# Patient Record
Sex: FEMALE | Race: WHITE | NOT HISPANIC OR LATINO | URBAN - METROPOLITAN AREA
[De-identification: names, ages, dates, MRNs, and addresses within clinical notes are randomized per-mention and may not be internally consistent; named-entity substitution may affect disease eponyms.]

---

## 2021-04-20 ENCOUNTER — INPATIENT (INPATIENT)
Facility: HOSPITAL | Age: 76
LOS: 4 days | Discharge: HOME | DRG: 64 | End: 2021-04-25
Attending: HOSPITALIST | Admitting: HOSPITALIST
Payer: COMMERCIAL

## 2021-04-20 VITALS
DIASTOLIC BLOOD PRESSURE: 96 MMHG | WEIGHT: 240.08 LBS | HEART RATE: 73 BPM | SYSTOLIC BLOOD PRESSURE: 149 MMHG | OXYGEN SATURATION: 97 % | TEMPERATURE: 98 F | HEIGHT: 65 IN | RESPIRATION RATE: 20 BRPM

## 2021-04-20 DIAGNOSIS — R06.02 SHORTNESS OF BREATH: ICD-10-CM

## 2021-04-20 LAB
ALBUMIN SERPL ELPH-MCNC: 3.7 G/DL — SIGNIFICANT CHANGE UP (ref 3.5–5)
ALP SERPL-CCNC: 81 U/L — SIGNIFICANT CHANGE UP (ref 40–120)
ALT FLD-CCNC: 15 U/L DA — SIGNIFICANT CHANGE UP (ref 10–60)
ANION GAP SERPL CALC-SCNC: 6 MMOL/L — SIGNIFICANT CHANGE UP (ref 5–17)
APTT BLD: 33.7 SEC — SIGNIFICANT CHANGE UP (ref 27.5–35.5)
AST SERPL-CCNC: 14 U/L — SIGNIFICANT CHANGE UP (ref 10–40)
BASE EXCESS BLDV CALC-SCNC: 1.6 MMOL/L — SIGNIFICANT CHANGE UP
BILIRUB SERPL-MCNC: 0.6 MG/DL — SIGNIFICANT CHANGE UP (ref 0.2–1.2)
BLOOD GAS COMMENTS, VENOUS: SIGNIFICANT CHANGE UP
BUN SERPL-MCNC: 40 MG/DL — HIGH (ref 7–18)
CALCIUM SERPL-MCNC: 9.4 MG/DL — SIGNIFICANT CHANGE UP (ref 8.4–10.5)
CHLORIDE SERPL-SCNC: 110 MMOL/L — HIGH (ref 96–108)
CO2 SERPL-SCNC: 24 MMOL/L — SIGNIFICANT CHANGE UP (ref 22–31)
CREAT SERPL-MCNC: 1.78 MG/DL — HIGH (ref 0.5–1.3)
GLUCOSE SERPL-MCNC: 169 MG/DL — HIGH (ref 70–99)
HCO3 BLDV-SCNC: 29 MMOL/L — SIGNIFICANT CHANGE UP (ref 22–29)
HCT VFR BLD CALC: 51 % — HIGH (ref 34.5–45)
HGB BLD-MCNC: 16.1 G/DL — HIGH (ref 11.5–15.5)
HOROWITZ INDEX BLDV+IHG-RTO: 21 — SIGNIFICANT CHANGE UP
INR BLD: 1.05 RATIO — SIGNIFICANT CHANGE UP (ref 0.88–1.16)
MCHC RBC-ENTMCNC: 30.7 PG — SIGNIFICANT CHANGE UP (ref 27–34)
MCHC RBC-ENTMCNC: 31.6 GM/DL — LOW (ref 32–36)
MCV RBC AUTO: 97.3 FL — SIGNIFICANT CHANGE UP (ref 80–100)
NRBC # BLD: 0 /100 WBCS — SIGNIFICANT CHANGE UP (ref 0–0)
PCO2 BLDV: 56 MMHG — HIGH (ref 39–42)
PH BLDV: 7.32 — SIGNIFICANT CHANGE UP (ref 7.32–7.43)
PLATELET # BLD AUTO: 238 K/UL — SIGNIFICANT CHANGE UP (ref 150–400)
PO2 BLDV: 24 MMHG — SIGNIFICANT CHANGE UP
POTASSIUM SERPL-MCNC: 4.8 MMOL/L — SIGNIFICANT CHANGE UP (ref 3.5–5.3)
POTASSIUM SERPL-SCNC: 4.8 MMOL/L — SIGNIFICANT CHANGE UP (ref 3.5–5.3)
PROT SERPL-MCNC: 8.1 G/DL — SIGNIFICANT CHANGE UP (ref 6–8.3)
PROTHROM AB SERPL-ACNC: 12.5 SEC — SIGNIFICANT CHANGE UP (ref 10.6–13.6)
RAPID RVP RESULT: SIGNIFICANT CHANGE UP
RBC # BLD: 5.24 M/UL — HIGH (ref 3.8–5.2)
RBC # FLD: 13.3 % — SIGNIFICANT CHANGE UP (ref 10.3–14.5)
SAO2 % BLDV: 36.8 % — SIGNIFICANT CHANGE UP
SARS-COV-2 RNA SPEC QL NAA+PROBE: SIGNIFICANT CHANGE UP
SODIUM SERPL-SCNC: 140 MMOL/L — SIGNIFICANT CHANGE UP (ref 135–145)
TROPONIN I SERPL-MCNC: <0.015 NG/ML — SIGNIFICANT CHANGE UP (ref 0–0.04)
WBC # BLD: 13.25 K/UL — HIGH (ref 3.8–10.5)
WBC # FLD AUTO: 13.25 K/UL — HIGH (ref 3.8–10.5)

## 2021-04-20 PROCEDURE — 71045 X-RAY EXAM CHEST 1 VIEW: CPT | Mod: 26

## 2021-04-20 PROCEDURE — 99223 1ST HOSP IP/OBS HIGH 75: CPT

## 2021-04-20 PROCEDURE — 99285 EMERGENCY DEPT VISIT HI MDM: CPT

## 2021-04-20 PROCEDURE — 70450 CT HEAD/BRAIN W/O DYE: CPT | Mod: 26,MA

## 2021-04-20 RX ORDER — ASPIRIN/CALCIUM CARB/MAGNESIUM 324 MG
81 TABLET ORAL DAILY
Refills: 0 | Status: DISCONTINUED | OUTPATIENT
Start: 2021-04-20 | End: 2021-04-25

## 2021-04-20 RX ORDER — CEFTRIAXONE 500 MG/1
1000 INJECTION, POWDER, FOR SOLUTION INTRAMUSCULAR; INTRAVENOUS EVERY 24 HOURS
Refills: 0 | Status: DISCONTINUED | OUTPATIENT
Start: 2021-04-20 | End: 2021-04-23

## 2021-04-20 RX ORDER — ATENOLOL 25 MG/1
100 TABLET ORAL DAILY
Refills: 0 | Status: DISCONTINUED | OUTPATIENT
Start: 2021-04-20 | End: 2021-04-21

## 2021-04-20 RX ORDER — SODIUM CHLORIDE 9 MG/ML
1000 INJECTION INTRAMUSCULAR; INTRAVENOUS; SUBCUTANEOUS ONCE
Refills: 0 | Status: COMPLETED | OUTPATIENT
Start: 2021-04-20 | End: 2021-04-20

## 2021-04-20 RX ORDER — AZITHROMYCIN 500 MG/1
500 TABLET, FILM COATED ORAL EVERY 24 HOURS
Refills: 0 | Status: DISCONTINUED | OUTPATIENT
Start: 2021-04-20 | End: 2021-04-23

## 2021-04-20 RX ORDER — LISINOPRIL 2.5 MG/1
20 TABLET ORAL DAILY
Refills: 0 | Status: DISCONTINUED | OUTPATIENT
Start: 2021-04-20 | End: 2021-04-21

## 2021-04-20 RX ORDER — ATORVASTATIN CALCIUM 80 MG/1
40 TABLET, FILM COATED ORAL AT BEDTIME
Refills: 0 | Status: DISCONTINUED | OUTPATIENT
Start: 2021-04-20 | End: 2021-04-25

## 2021-04-20 RX ADMIN — CEFTRIAXONE 100 MILLIGRAM(S): 500 INJECTION, POWDER, FOR SOLUTION INTRAMUSCULAR; INTRAVENOUS at 23:36

## 2021-04-20 RX ADMIN — SODIUM CHLORIDE 1000 MILLILITER(S): 9 INJECTION INTRAMUSCULAR; INTRAVENOUS; SUBCUTANEOUS at 21:18

## 2021-04-20 RX ADMIN — SODIUM CHLORIDE 1000 MILLILITER(S): 9 INJECTION INTRAMUSCULAR; INTRAVENOUS; SUBCUTANEOUS at 23:35

## 2021-04-20 NOTE — ED PROVIDER NOTE - OBJECTIVE STATEMENT
74 yo F pmh of CVA w/ R sided residual weakness and HTN presents from home with increased work of breathing x 2 days. Also can normally sit up straight but has been falling to the L x 1 day. Pt unable to provide detailed history due to previous stroke.

## 2021-04-20 NOTE — H&P ADULT - PROBLEM SELECTOR PLAN 7
Has h/o old cva with residual weakness has h/o HTn  Will hold Quinilapril in view of DEJA  c/w atenolol   Monitor BP Presented with postural imbalance, compared to baseline  CT head showed old findings  Examination was limited due to posture, but strength was intact on left side of body  Will consult neuro  Pt eval

## 2021-04-20 NOTE — ED PROVIDER NOTE - PHYSICAL EXAMINATION
GENERAL: well appearing  HEAD: atraumatic   EYES: EOMI, pink conjunctiva   ENT: dry oral mucosa, poor dentition   CARDIAC: RRR, no edema, distal pulses present   RESPIRATORY: lungs CTAB, mild increased work of breathing with abdominal muscle use  GASTROINTESTINAL: no abdominal tenderness, no rebound or guarding, bowel sounds presents  GENITOURINARY: no CVA tenderness   MUSCULOSKELETAL: no deformity   NEUROLOGICAL: AAOx3, spontaneous movement of extremities, RUE contracture    SKIN: intact   PSYCHIATRIC: cooperative  HEME LYMPH: no lymphadenopathy

## 2021-04-20 NOTE — H&P ADULT - PROBLEM SELECTOR PLAN 8
Pt p/w c/o BRBPR x 6 times today assc RLQ pain.  Denies blood thinner use, chest pain, shortness of breath. On Lovenox for dvt prophylaxis Has h/o old cva with residual weakness has h/o HTn  Will hold Quinilapril in view of DEJA  c/w atenolol   Monitor BP

## 2021-04-20 NOTE — H&P ADULT - PROBLEM SELECTOR PLAN 2
Same as above Presented with shortness of breath, abdominal breathing  Was saturating 93 on RA at home  Saturating well on RA  Couldn't appreciate any crackles or ronchi due to body habitus  CXR is concerning for infiltrate in RUL, ?aspiration PNA  Pro-BNP is elevated, patient does not look fluid overload  EKG showed new onset Afib, rate controlled, less concerning for flash pulmonary edema  Will hold off diuretics  Will start on rocephin and zithro for empiric coverage  Will do echocardiogram   Monitor respiratory status

## 2021-04-20 NOTE — H&P ADULT - PROBLEM SELECTOR PLAN 4
Presented with elevated cr  Likely pre renal  Was given 1 L fluid in Ed  Will send UA and urine lytes  Monitor BMP  Avoid nephrotoxins Was found to have new onset Afib   rate controlled  CHADVASC 5   Will start on AC  will c/w atenolol home dose

## 2021-04-20 NOTE — ED ADULT TRIAGE NOTE - CHIEF COMPLAINT QUOTE
Son reports mother usually able to sit up however noted leaning on l side since am and abd breathing and hypoxia (spo2 93%)since Saturday

## 2021-04-20 NOTE — H&P ADULT - ATTENDING COMMENTS
Pt seen and examined.  Case discussed with MAR.  75 year old woman with PMH of remote CVA with right sided residual weakness, HTN, with 2 days of suspected SOB and left sided weakness noted at inability to maintain balance without falling to the left side.  Pt at baseline mental status with minimal verbal communication after remote CVA.  Compliance with meds unclear.    Vital Signs Last 24 Hrs  T(C): 36.8 (20 Apr 2021 19:14), Max: 36.8 (20 Apr 2021 19:14)  T(F): 98.3 (20 Apr 2021 19:14), Max: 98.3 (20 Apr 2021 19:14)  HR: 73 (20 Apr 2021 19:14) (73 - 73)  BP: 149/96 (20 Apr 2021 19:14) (149/96 - 149/96)  RR: 20 (20 Apr 2021 19:14) (20 - 20)  SpO2: 97% (20 Apr 2021 19:14) (97% - 97%)      Labs                         16.1   13.25 )-----------( 238      ( 20 Apr 2021 20:36 )             51.0     PT/INR - ( 20 Apr 2021 20:36 )   PT: 12.5 sec;   INR: 1.05 ratio         PTT - ( 20 Apr 2021 20:36 )  PTT:33.7 sec    04-20    140  |  110<H>  |  40<H>  ----------------------------<  169<H>  4.8   |  24  |  1.78<H>    Ca    9.4      20 Apr 2021 20:36  TPro  8.1  /  Alb  3.7  /  TBili  0.6  /  DBili  x   /  AST  14  /  ALT  15  /  AlkPhos  81  04-20    Pro BNP - 7190  CXR - independent review  suspected early RUL parahilar infiltrate/opacity concerning for a PNA     CT head  Patchy hypodensities in the left cerebral hemisphere predominantly involving the left corona radiata and basal ganglia likely represents encephalomalacia and gliosis due to chronic infarct, associated with ex vacuo dilatation of the left lateral ventricle. Old infarcts are also seen involving the left thalamus, and there is atrophy of the left anterior midbrain, consistent with wallerian degeneration. Additional patchy periventricular and subcortical white matter hypodensities are nonspecific, although likely on the basis of chronic microangiopathy. The basal cisterns are patent.      Impression   - Acute respiratory failure with hypoxia   - Suspected RUL pneumonia with concern for aspiration PNA  - R/O new right sided hemispheric ischemic CVA  - Atrial fibrillation ( ? newly diagnosed) on EKG   - Elevated BNP  - Elevated renal indices with DEJA +/- CKD likely with dehydration  - hemoconcentration likely with dehydration Pt seen and examined.  Case discussed with MAR.  75 year old woman with PMH of remote CVA with right sided residual weakness, HTN, with 2 days of suspected SOB and left sided weakness noted at inability to maintain balance without falling to the left side.  Pt at baseline mental status with minimal verbal communication after remote CVA.  Compliance with meds unclear.    Vital Signs Last 24 Hrs  T(C): 36.8 (20 Apr 2021 19:14), Max: 36.8 (20 Apr 2021 19:14)  T(F): 98.3 (20 Apr 2021 19:14), Max: 98.3 (20 Apr 2021 19:14)  HR: 73 (20 Apr 2021 19:14) (73 - 73)  BP: 149/96 (20 Apr 2021 19:14) (149/96 - 149/96)  RR: 20 (20 Apr 2021 19:14) (20 - 20)  SpO2: 97% (20 Apr 2021 19:14) (97% - 97%)    Exam - as above    Labs                         16.1   13.25 )-----------( 238      ( 20 Apr 2021 20:36 )             51.0     PT/INR - ( 20 Apr 2021 20:36 )   PT: 12.5 sec;   INR: 1.05 ratio         PTT - ( 20 Apr 2021 20:36 )  PTT:33.7 sec    04-20    140  |  110<H>  |  40<H>  ----------------------------<  169<H>  4.8   |  24  |  1.78<H>    Ca    9.4      20 Apr 2021 20:36  TPro  8.1  /  Alb  3.7  /  TBili  0.6  /  DBili  x   /  AST  14  /  ALT  15  /  AlkPhos  81  04-20    Pro BNP - 7190  CXR - independent review  suspected early RUL parahilar infiltrate/opacity concerning for a PNA     CT head  Patchy hypodensities in the left cerebral hemisphere predominantly involving the left corona radiata and basal ganglia likely represents encephalomalacia and gliosis due to chronic infarct, associated with ex vacuo dilatation of the left lateral ventricle. Old infarcts are also seen involving the left thalamus, and there is atrophy of the left anterior midbrain, consistent with wallerian degeneration. Additional patchy periventricular and subcortical white matter hypodensities are nonspecific, although likely on the basis of chronic microangiopathy. The basal cisterns are patent.      Impression   - Acute respiratory failure with hypoxia   - Suspected RUL pneumonia with concern for aspiration PNA  - R/O new right sided hemispheric ischemic CVA  - Atrial fibrillation ( ? newly diagnosed) on EKG - no RVR  - Elevated BNP - r/o left ventricular dysfunction  - Elevated renal indices with DEJA +/- CKD likely with dehydration  - hemoconcentration likely with dehydration  - Physical debility     Plan   - Admit to telemetry   - Supplemental oxygen to keep SaO2 >96%  - Sepsis work up  - Empiric antibiotics for CAP - ceftriaxone and azithromycin  - CHF work up- check for LV dysfunction with ECHO  - Repeat EKG, obtain ECHO for clots, cardiology consult, continue BB and ACE inhibitors  - CHADS2-VASC scoring; high risk for stroke ; initiate full dose anticoagulation  - Hx of stroke; start statin for secondary CVA prevention  - s/p IVF hydration, recheck serum chemistry, check urine electrolytes, urinalysis  - PT evaluation   - neurology consult

## 2021-04-20 NOTE — H&P ADULT - ASSESSMENT
74 yo female from home lives with (care taker) with medical history significant for remote CVA with right sided residual weakness, HTN, comes in with dyspnea for past 2 days.      Ed:   CT head  Patchy hypo densities in the left cerebral hemisphere predominantly involving the left corona radiata and basal ganglia likely represents encephalomalacia and gliosis due to chronic infarct, associated with ex vacuo dilatation of the left lateral ventricle. Old infarcts are also seen involving the left thalamus, and there is atrophy of the left anterior midbrain, consistent with wallerian degeneration. Additional patchy periventricular and subcortical white matter hypo densities are nonspecific, although likely on the basis of chronic microangiopathy. The basal cisterns are patent.  EKG-Afib  Pro-BNP elevated  CXR mild infiltrate    Patient is being admitted to telemetry for new onset Afib and evaluation of dyspnea.

## 2021-04-20 NOTE — H&P ADULT - PROBLEM SELECTOR PLAN 6
has h/o HTn  Will hold Quinilapril in view of DEJA  c/w atenolol   Monitor BP Presented with postural imbalance, compared to baseline  CT head showed old findings  Examination was limited due to posture, but strength was intact on left side of body  Will consult neuro  Pt eval UA positive  Will start on rocephin  f/u cx

## 2021-04-20 NOTE — ED PROVIDER NOTE - CLINICAL SUMMARY MEDICAL DECISION MAKING FREE TEXT BOX
74 yo F with increased work of breathing and unable to sit up straight, possible new CVA? Plan - [facility], CT head, EKG, labs, CXR, admit.

## 2021-04-20 NOTE — H&P ADULT - HISTORY OF PRESENT ILLNESS
74 yo female from home lives with (care taker) with medical history significant for remote CVA with right sided residual weakness, HTN, comes in with dyspnea for past 2 days. History was provided by Son who was at bedside. Patient is mostly bed bound, gets up and lies down. Does not move around at baseline. Patient was found to have abnormal abdominal breathing which started 2 days ago as per  and was not getting better, which got him concerned. Denies cough, fever, chest pain, nausea, vomiting. At baseline patient is able to balance herself in sitting position. But for past 2 days she was falling on left side to support herself and was not able to maintain balance. Otherwise there was no change in movement of left arm and leg compared to baseline. There was no change in mental status. She has speech difficulty at baseline but the family is able to understand her communications, unchanged from baseline. Denies trauma, fall, sick contacts, recent illness. No history of CAD or heart failure. Patient had a PCP who retired.

## 2021-04-20 NOTE — ED PROVIDER NOTE - PROGRESS NOTE DETAILS
EKG - afib, rate 82 EKG - afib, rate 82 (new onset?) labs - WBC 13, Hgb 16, BUN 40, Cr 1.7  CT head - no ICH; no acute infarct  CXR - lungs clear  No PCP. Endorsed to unattached Dr Easton and MAR for admission for new onset afib with SOB/increased work of breathing. Repeat sat 98% on 2 L NC. labs - WBC 13, Hgb 16, BUN 40, Cr 1.7, bnp 7000  CT head - no ICH; no acute infarct  CXR - lungs clear  No PCP. Endorsed to unattached Dr Easton and MAR for admission for new onset afib with SOB/increased work of breathing. Repeat sat 98% on 2 L NC.

## 2021-04-20 NOTE — H&P ADULT - PROBLEM SELECTOR PLAN 1
Presented with shortness of breath, abdominal breathing  Was saturating 93 on RA at home  Saturating well on RA  Couldn't appreciate any crackles or ronchi due to body habitus  CXR is concerning for infiltrate in RUL, ?aspiration PNA  Pro-BNP is elevated, patient does not look fluid overload  EKG showed new onset Afib, rate controlled, less concerning for flash pulmonary edema  Will hold off diuretics  Will start on rocephin and zithro for empiric coverage  Will do echocardiogram   Monitor respiratory status Presented with shortness of breath, abdominal breathing  Was saturating 93 on RA at home  Saturating well on RA  Couldn't appreciate any crackles or ronchi due to body habitus  CXR is concerning for infiltrate in RUL, ?aspiration PNA  Pro-BNP is elevated, patient does not look fluid overload  EKG showed new onset Afib, rate controlled, less concerning for flash pulmonary edema  Will hold off diuretics  Will start on rocephin and zithro for empiric coverage  Will do echocardiogram   Monitor respiratory status  cardio consult Dr Bahena

## 2021-04-20 NOTE — H&P ADULT - NSHPPHYSICALEXAM_GEN_ALL_CORE
Vital Signs (24 Hrs):  T(C): 36.8 (04-20-21 @ 19:14), Max: 36.8 (04-20-21 @ 19:14)  HR: 73 (04-20-21 @ 19:14) (73 - 73)  BP: 149/96 (04-20-21 @ 19:14) (149/96 - 149/96)  RR: 20 (04-20-21 @ 19:14) (20 - 20)  SpO2: 97% (04-20-21 @ 19:14) (97% - 97%)

## 2021-04-20 NOTE — H&P ADULT - PROBLEM SELECTOR PLAN 5
Presented with postural imbalance, compared to baseline  CT head showed old findings  Examination was limited due to posture, but strength was intact on left side of body  Will consult neuro  Pt eval Presented with elevated cr  Likely pre renal  Was given 1 L fluid in Ed  Will send UA and urine lytes  Monitor BMP  Avoid nephrotoxins

## 2021-04-20 NOTE — H&P ADULT - PROBLEM SELECTOR PLAN 3
Was found to have new onset Afib   rate controlled  CHADVASC 5   Will start on AC  will c/w atenolol home dose Presented with shortness of breath, abdominal breathing  Was saturating 93 on RA at home  Saturating well on RA  Couldn't appreciate any crackles or ronchi due to body habitus  CXR is concerning for infiltrate in RUL, ?aspiration PNA  Pro-BNP is elevated, patient does not look fluid overload  EKG showed new onset Afib, rate controlled, less concerning for flash pulmonary edema  Will hold off diuretics  Will start on rocephin and zithro for empiric coverage  Will do echocardiogram   Monitor respiratory status

## 2021-04-20 NOTE — ED PROVIDER NOTE - PRINCIPAL DIAGNOSIS
SOB (shortness of breath) BP: 102/67 continue to monitor BP and metoprolol XL BP: 111/75 this am; continue to monitor BP; continue Metoprolol BP: 127/86 this am; continue to monitor BP; continue Metoprolol laxatives being held, continue to monitor laxatives being held, pt without further episodes  continue to monitor pt with episodes of loose stools yesterday, hold laxatives as per primary team and observe pt with episodes of loose stools yesterday, hold laxatives as per primary team and observe sbp-120-107  continue BB BP: 111/71 (29 Sep 2019 08:22) (111/71 - 125/78)  - c/w toprol XL

## 2021-04-21 DIAGNOSIS — N17.9 ACUTE KIDNEY FAILURE, UNSPECIFIED: ICD-10-CM

## 2021-04-21 DIAGNOSIS — I48.91 UNSPECIFIED ATRIAL FIBRILLATION: ICD-10-CM

## 2021-04-21 DIAGNOSIS — I63.9 CEREBRAL INFARCTION, UNSPECIFIED: ICD-10-CM

## 2021-04-21 DIAGNOSIS — R29.3 ABNORMAL POSTURE: ICD-10-CM

## 2021-04-21 DIAGNOSIS — I10 ESSENTIAL (PRIMARY) HYPERTENSION: ICD-10-CM

## 2021-04-21 DIAGNOSIS — R06.00 DYSPNEA, UNSPECIFIED: ICD-10-CM

## 2021-04-21 DIAGNOSIS — G93.40 ENCEPHALOPATHY, UNSPECIFIED: ICD-10-CM

## 2021-04-21 DIAGNOSIS — N39.0 URINARY TRACT INFECTION, SITE NOT SPECIFIED: ICD-10-CM

## 2021-04-21 DIAGNOSIS — Z29.9 ENCOUNTER FOR PROPHYLACTIC MEASURES, UNSPECIFIED: ICD-10-CM

## 2021-04-21 DIAGNOSIS — J96.01 ACUTE RESPIRATORY FAILURE WITH HYPOXIA: ICD-10-CM

## 2021-04-21 DIAGNOSIS — J18.9 PNEUMONIA, UNSPECIFIED ORGANISM: ICD-10-CM

## 2021-04-21 LAB
A1C WITH ESTIMATED AVERAGE GLUCOSE RESULT: 7.1 % — HIGH (ref 4–5.6)
ALBUMIN SERPL ELPH-MCNC: 3.1 G/DL — LOW (ref 3.5–5)
ALP SERPL-CCNC: 74 U/L — SIGNIFICANT CHANGE UP (ref 40–120)
ALT FLD-CCNC: 13 U/L DA — SIGNIFICANT CHANGE UP (ref 10–60)
ANION GAP SERPL CALC-SCNC: 6 MMOL/L — SIGNIFICANT CHANGE UP (ref 5–17)
ANION GAP SERPL CALC-SCNC: 7 MMOL/L — SIGNIFICANT CHANGE UP (ref 5–17)
APPEARANCE UR: CLEAR — SIGNIFICANT CHANGE UP
AST SERPL-CCNC: 10 U/L — SIGNIFICANT CHANGE UP (ref 10–40)
BACTERIA # UR AUTO: ABNORMAL /HPF
BASOPHILS # BLD AUTO: 0.05 K/UL — SIGNIFICANT CHANGE UP (ref 0–0.2)
BASOPHILS # BLD AUTO: 0.06 K/UL — SIGNIFICANT CHANGE UP (ref 0–0.2)
BASOPHILS NFR BLD AUTO: 0.5 % — SIGNIFICANT CHANGE UP (ref 0–2)
BASOPHILS NFR BLD AUTO: 0.6 % — SIGNIFICANT CHANGE UP (ref 0–2)
BILIRUB SERPL-MCNC: 0.7 MG/DL — SIGNIFICANT CHANGE UP (ref 0.2–1.2)
BILIRUB UR-MCNC: NEGATIVE — SIGNIFICANT CHANGE UP
BUN SERPL-MCNC: 37 MG/DL — HIGH (ref 7–18)
BUN SERPL-MCNC: 38 MG/DL — HIGH (ref 7–18)
CALCIUM SERPL-MCNC: 8.6 MG/DL — SIGNIFICANT CHANGE UP (ref 8.4–10.5)
CALCIUM SERPL-MCNC: 8.8 MG/DL — SIGNIFICANT CHANGE UP (ref 8.4–10.5)
CHLORIDE SERPL-SCNC: 109 MMOL/L — HIGH (ref 96–108)
CHLORIDE SERPL-SCNC: 111 MMOL/L — HIGH (ref 96–108)
CHLORIDE UR-SCNC: 85 MMOL/L — SIGNIFICANT CHANGE UP
CHOLEST SERPL-MCNC: 231 MG/DL — HIGH
CK MB BLD-MCNC: <1.6 % — SIGNIFICANT CHANGE UP (ref 0–3.5)
CK MB CFR SERPL CALC: <1 NG/ML — SIGNIFICANT CHANGE UP (ref 0–3.6)
CK SERPL-CCNC: 63 U/L — SIGNIFICANT CHANGE UP (ref 21–215)
CO2 SERPL-SCNC: 24 MMOL/L — SIGNIFICANT CHANGE UP (ref 22–31)
CO2 SERPL-SCNC: 25 MMOL/L — SIGNIFICANT CHANGE UP (ref 22–31)
COLOR SPEC: YELLOW — SIGNIFICANT CHANGE UP
CREAT ?TM UR-MCNC: 94 MG/DL — SIGNIFICANT CHANGE UP
CREAT SERPL-MCNC: 1.4 MG/DL — HIGH (ref 0.5–1.3)
CREAT SERPL-MCNC: 1.41 MG/DL — HIGH (ref 0.5–1.3)
DIFF PNL FLD: ABNORMAL
EOSINOPHIL # BLD AUTO: 0.13 K/UL — SIGNIFICANT CHANGE UP (ref 0–0.5)
EOSINOPHIL # BLD AUTO: 0.2 K/UL — SIGNIFICANT CHANGE UP (ref 0–0.5)
EOSINOPHIL NFR BLD AUTO: 1.3 % — SIGNIFICANT CHANGE UP (ref 0–6)
EOSINOPHIL NFR BLD AUTO: 2.1 % — SIGNIFICANT CHANGE UP (ref 0–6)
EPI CELLS # UR: ABNORMAL /HPF
ESTIMATED AVERAGE GLUCOSE: 157 MG/DL — HIGH (ref 68–114)
FOLATE SERPL-MCNC: 5.4 NG/ML — SIGNIFICANT CHANGE UP
GLUCOSE BLDC GLUCOMTR-MCNC: 171 MG/DL — HIGH (ref 70–99)
GLUCOSE SERPL-MCNC: 138 MG/DL — HIGH (ref 70–99)
GLUCOSE SERPL-MCNC: 162 MG/DL — HIGH (ref 70–99)
GLUCOSE UR QL: NEGATIVE — SIGNIFICANT CHANGE UP
HCT VFR BLD CALC: 41.8 % — SIGNIFICANT CHANGE UP (ref 34.5–45)
HCT VFR BLD CALC: 44.3 % — SIGNIFICANT CHANGE UP (ref 34.5–45)
HCV AB S/CO SERPL IA: 0.19 S/CO — SIGNIFICANT CHANGE UP (ref 0–0.99)
HCV AB SERPL-IMP: SIGNIFICANT CHANGE UP
HDLC SERPL-MCNC: 30 MG/DL — LOW
HGB BLD-MCNC: 13.4 G/DL — SIGNIFICANT CHANGE UP (ref 11.5–15.5)
HGB BLD-MCNC: 13.9 G/DL — SIGNIFICANT CHANGE UP (ref 11.5–15.5)
IMM GRANULOCYTES NFR BLD AUTO: 0.4 % — SIGNIFICANT CHANGE UP (ref 0–1.5)
IMM GRANULOCYTES NFR BLD AUTO: 0.4 % — SIGNIFICANT CHANGE UP (ref 0–1.5)
INR BLD: 1.12 RATIO — SIGNIFICANT CHANGE UP (ref 0.88–1.16)
KETONES UR-MCNC: NEGATIVE — SIGNIFICANT CHANGE UP
LACTATE SERPL-SCNC: 1.6 MMOL/L — SIGNIFICANT CHANGE UP (ref 0.7–2)
LACTATE SERPL-SCNC: 2.6 MMOL/L — HIGH (ref 0.7–2)
LEUKOCYTE ESTERASE UR-ACNC: ABNORMAL
LIPID PNL WITH DIRECT LDL SERPL: 167 MG/DL — HIGH
LYMPHOCYTES # BLD AUTO: 1.25 K/UL — SIGNIFICANT CHANGE UP (ref 1–3.3)
LYMPHOCYTES # BLD AUTO: 1.7 K/UL — SIGNIFICANT CHANGE UP (ref 1–3.3)
LYMPHOCYTES # BLD AUTO: 12.7 % — LOW (ref 13–44)
LYMPHOCYTES # BLD AUTO: 17.8 % — SIGNIFICANT CHANGE UP (ref 13–44)
MAGNESIUM SERPL-MCNC: 2.7 MG/DL — HIGH (ref 1.6–2.6)
MCHC RBC-ENTMCNC: 31.3 PG — SIGNIFICANT CHANGE UP (ref 27–34)
MCHC RBC-ENTMCNC: 31.4 GM/DL — LOW (ref 32–36)
MCHC RBC-ENTMCNC: 31.4 PG — SIGNIFICANT CHANGE UP (ref 27–34)
MCHC RBC-ENTMCNC: 32.1 GM/DL — SIGNIFICANT CHANGE UP (ref 32–36)
MCV RBC AUTO: 100 FL — SIGNIFICANT CHANGE UP (ref 80–100)
MCV RBC AUTO: 97.7 FL — SIGNIFICANT CHANGE UP (ref 80–100)
MONOCYTES # BLD AUTO: 0.75 K/UL — SIGNIFICANT CHANGE UP (ref 0–0.9)
MONOCYTES # BLD AUTO: 0.87 K/UL — SIGNIFICANT CHANGE UP (ref 0–0.9)
MONOCYTES NFR BLD AUTO: 7.6 % — SIGNIFICANT CHANGE UP (ref 2–14)
MONOCYTES NFR BLD AUTO: 9.1 % — SIGNIFICANT CHANGE UP (ref 2–14)
NEUTROPHILS # BLD AUTO: 6.68 K/UL — SIGNIFICANT CHANGE UP (ref 1.8–7.4)
NEUTROPHILS # BLD AUTO: 7.59 K/UL — HIGH (ref 1.8–7.4)
NEUTROPHILS NFR BLD AUTO: 70.1 % — SIGNIFICANT CHANGE UP (ref 43–77)
NEUTROPHILS NFR BLD AUTO: 77.4 % — HIGH (ref 43–77)
NITRITE UR-MCNC: POSITIVE
NON HDL CHOLESTEROL: 201 MG/DL — HIGH
NRBC # BLD: 0 /100 WBCS — SIGNIFICANT CHANGE UP (ref 0–0)
NRBC # BLD: 0 /100 WBCS — SIGNIFICANT CHANGE UP (ref 0–0)
OSMOLALITY UR: 586 MOS/KG — SIGNIFICANT CHANGE UP (ref 50–1200)
PH UR: 6 — SIGNIFICANT CHANGE UP (ref 5–8)
PHOSPHATE SERPL-MCNC: 3.4 MG/DL — SIGNIFICANT CHANGE UP (ref 2.5–4.5)
PLATELET # BLD AUTO: 212 K/UL — SIGNIFICANT CHANGE UP (ref 150–400)
PLATELET # BLD AUTO: 216 K/UL — SIGNIFICANT CHANGE UP (ref 150–400)
POTASSIUM SERPL-MCNC: 4.6 MMOL/L — SIGNIFICANT CHANGE UP (ref 3.5–5.3)
POTASSIUM SERPL-MCNC: 4.7 MMOL/L — SIGNIFICANT CHANGE UP (ref 3.5–5.3)
POTASSIUM SERPL-SCNC: 4.6 MMOL/L — SIGNIFICANT CHANGE UP (ref 3.5–5.3)
POTASSIUM SERPL-SCNC: 4.7 MMOL/L — SIGNIFICANT CHANGE UP (ref 3.5–5.3)
PROT SERPL-MCNC: 7 G/DL — SIGNIFICANT CHANGE UP (ref 6–8.3)
PROT UR-MCNC: 30 MG/DL
PROTHROM AB SERPL-ACNC: 13.3 SEC — SIGNIFICANT CHANGE UP (ref 10.6–13.6)
RBC # BLD: 4.28 M/UL — SIGNIFICANT CHANGE UP (ref 3.8–5.2)
RBC # BLD: 4.43 M/UL — SIGNIFICANT CHANGE UP (ref 3.8–5.2)
RBC # FLD: 13.3 % — SIGNIFICANT CHANGE UP (ref 10.3–14.5)
RBC # FLD: 13.5 % — SIGNIFICANT CHANGE UP (ref 10.3–14.5)
RBC CASTS # UR COMP ASSIST: ABNORMAL /HPF (ref 0–2)
SODIUM SERPL-SCNC: 140 MMOL/L — SIGNIFICANT CHANGE UP (ref 135–145)
SODIUM SERPL-SCNC: 142 MMOL/L — SIGNIFICANT CHANGE UP (ref 135–145)
SODIUM UR-SCNC: 95 MMOL/L — SIGNIFICANT CHANGE UP
SP GR SPEC: 1.02 — SIGNIFICANT CHANGE UP (ref 1.01–1.02)
TRIGL SERPL-MCNC: 169 MG/DL — HIGH
TROPONIN I SERPL-MCNC: 0.02 NG/ML — SIGNIFICANT CHANGE UP (ref 0–0.04)
TROPONIN I SERPL-MCNC: <0.015 NG/ML — SIGNIFICANT CHANGE UP (ref 0–0.04)
TSH SERPL-MCNC: 0.31 UU/ML — LOW (ref 0.34–4.82)
UROBILINOGEN FLD QL: NEGATIVE — SIGNIFICANT CHANGE UP
VIT B12 SERPL-MCNC: 250 PG/ML — SIGNIFICANT CHANGE UP (ref 232–1245)
WBC # BLD: 9.54 K/UL — SIGNIFICANT CHANGE UP (ref 3.8–10.5)
WBC # BLD: 9.82 K/UL — SIGNIFICANT CHANGE UP (ref 3.8–10.5)
WBC # FLD AUTO: 9.54 K/UL — SIGNIFICANT CHANGE UP (ref 3.8–10.5)
WBC # FLD AUTO: 9.82 K/UL — SIGNIFICANT CHANGE UP (ref 3.8–10.5)
WBC UR QL: ABNORMAL /HPF (ref 0–5)

## 2021-04-21 PROCEDURE — 72170 X-RAY EXAM OF PELVIS: CPT | Mod: 26

## 2021-04-21 PROCEDURE — 99222 1ST HOSP IP/OBS MODERATE 55: CPT

## 2021-04-21 PROCEDURE — 71045 X-RAY EXAM CHEST 1 VIEW: CPT | Mod: 26

## 2021-04-21 PROCEDURE — 70498 CT ANGIOGRAPHY NECK: CPT | Mod: 26

## 2021-04-21 PROCEDURE — 70496 CT ANGIOGRAPHY HEAD: CPT | Mod: 26

## 2021-04-21 PROCEDURE — 99233 SBSQ HOSP IP/OBS HIGH 50: CPT | Mod: GC

## 2021-04-21 PROCEDURE — 99223 1ST HOSP IP/OBS HIGH 75: CPT

## 2021-04-21 RX ORDER — ENOXAPARIN SODIUM 100 MG/ML
100 INJECTION SUBCUTANEOUS
Refills: 0 | Status: DISCONTINUED | OUTPATIENT
Start: 2021-04-21 | End: 2021-04-22

## 2021-04-21 RX ORDER — SODIUM CHLORIDE 9 MG/ML
500 INJECTION INTRAMUSCULAR; INTRAVENOUS; SUBCUTANEOUS ONCE
Refills: 0 | Status: COMPLETED | OUTPATIENT
Start: 2021-04-21 | End: 2021-04-21

## 2021-04-21 RX ORDER — SODIUM CHLORIDE 9 MG/ML
1000 INJECTION, SOLUTION INTRAVENOUS
Refills: 0 | Status: DISCONTINUED | OUTPATIENT
Start: 2021-04-21 | End: 2021-04-23

## 2021-04-21 RX ORDER — SODIUM CHLORIDE 9 MG/ML
500 INJECTION INTRAMUSCULAR; INTRAVENOUS; SUBCUTANEOUS
Refills: 0 | Status: DISCONTINUED | OUTPATIENT
Start: 2021-04-21 | End: 2021-04-21

## 2021-04-21 RX ORDER — ATENOLOL 25 MG/1
100 TABLET ORAL DAILY
Refills: 0 | Status: DISCONTINUED | OUTPATIENT
Start: 2021-04-22 | End: 2021-04-25

## 2021-04-21 RX ORDER — ENOXAPARIN SODIUM 100 MG/ML
100 INJECTION SUBCUTANEOUS
Refills: 0 | Status: DISCONTINUED | OUTPATIENT
Start: 2021-04-21 | End: 2021-04-21

## 2021-04-21 RX ADMIN — AZITHROMYCIN 255 MILLIGRAM(S): 500 TABLET, FILM COATED ORAL at 02:19

## 2021-04-21 RX ADMIN — ENOXAPARIN SODIUM 100 MILLIGRAM(S): 100 INJECTION SUBCUTANEOUS at 17:25

## 2021-04-21 RX ADMIN — Medication 1 APPLICATION(S): at 05:39

## 2021-04-21 RX ADMIN — LISINOPRIL 20 MILLIGRAM(S): 2.5 TABLET ORAL at 05:28

## 2021-04-21 RX ADMIN — Medication 1 APPLICATION(S): at 17:25

## 2021-04-21 RX ADMIN — SODIUM CHLORIDE 75 MILLILITER(S): 9 INJECTION, SOLUTION INTRAVENOUS at 02:55

## 2021-04-21 RX ADMIN — SODIUM CHLORIDE 2000 MILLILITER(S): 9 INJECTION INTRAMUSCULAR; INTRAVENOUS; SUBCUTANEOUS at 13:09

## 2021-04-21 RX ADMIN — ATENOLOL 100 MILLIGRAM(S): 25 TABLET ORAL at 05:28

## 2021-04-21 RX ADMIN — SODIUM CHLORIDE 1000 MILLILITER(S): 9 INJECTION INTRAMUSCULAR; INTRAVENOUS; SUBCUTANEOUS at 12:02

## 2021-04-21 RX ADMIN — ATORVASTATIN CALCIUM 40 MILLIGRAM(S): 80 TABLET, FILM COATED ORAL at 22:54

## 2021-04-21 NOTE — CONSULT NOTE ADULT - ASSESSMENT
Impression:  Encephalopathy in patient with prior CVA who is also noted to have new transient aphasia and left sided weakness in setting of recent Afib Dx concerning for acute CVA or seizure, since exam at around 1pm today, patient's exam has returned to baseline per primary team.     Recommendations:  1.            MRI brain,  2. Please check HbA1C and fasting lipid profile  3. secondary stroke prevebtion with AC and and Lipitor 40mg HS  4.             BP goal of normal of high normal to ensure perfusion  5. EEG. sz and fall ppx.  No AED for now  6. Formal speech and swallow evaluation  7.          PT evaluation  8.          STAT CTH IF the patient has sudden change in mental status or neurological exam  9.          DVT PPx    Thank you for the courtesy of this consult.    david resident and DR. Bocanegra

## 2021-04-21 NOTE — PROGRESS NOTE ADULT - SUBJECTIVE AND OBJECTIVE BOX
PGY-1 Progress Note discussed with attending    PAGER #: [[phone]] TILL 5:00 PM  PLEASE CONTACT ON CALL TEAM:  - On Call Team (Please refer to Marta) FROM 5:00 PM - 8:30PM  - Nightfloat Team FROM 8:30 -7:30 AM    CHIEF COMPLAINT & BRIEF HOSPITAL COURSE:    INTERVAL HPI/OVERNIGHT EVENTS:       MEDICATIONS  (STANDING):  aspirin enteric coated 81 milliGRAM(s) Oral daily  atorvastatin 40 milliGRAM(s) Oral at bedtime  azithromycin  IVPB 500 milliGRAM(s) IV Intermittent every 24 hours  cefTRIAXone   IVPB 1000 milliGRAM(s) IV Intermittent every 24 hours  clotrimazole 1% Cream 1 Application(s) Topical every 12 hours  enoxaparin Injectable 100 milliGRAM(s) SubCutaneous two times a day  sodium chloride 0.45%. 1000 milliLiter(s) (75 mL/Hr) IV Continuous <Continuous>    MEDICATIONS  (PRN):      REVIEW OF SYSTEMS:  CONSTITUTIONAL: No fever, weight loss, or fatigue  RESPIRATORY: No cough, wheezing, chills or hemoptysis; No shortness of breath  CARDIOVASCULAR: No chest pain, palpitations, dizziness, or leg swelling  GASTROINTESTINAL: No abdominal pain. No nausea, vomiting, or hematemesis; No diarrhea or constipation. No melena or hematochezia.  GENITOURINARY: No dysuria or hematuria, urinary frequency  NEUROLOGICAL: No headaches, memory loss, loss of strength, numbness, or tremors  SKIN: No itching, burning, rashes, or lesions     Vital Signs Last 24 Hrs  T(C): 37.2 (21 Apr 2021 11:51), Max: 37.3 (21 Apr 2021 04:15)  T(F): 99 (21 Apr 2021 11:51), Max: 99.1 (21 Apr 2021 04:15)  HR: 88 (21 Apr 2021 12:44) (73 - 127)  BP: 91/62 (21 Apr 2021 12:44) (91/62 - 154/107)  BP(mean): --  RR: 24 (21 Apr 2021 11:51) (18 - 24)  SpO2: 97% (21 Apr 2021 11:51) (97% - 100%)    PHYSICAL EXAMINATION:  GENERAL: NAD, well built  HEAD:  Atraumatic, Normocephalic  EYES:  conjunctiva and sclera clear  NECK: Supple, No JVD, Normal thyroid  CHEST/LUNG: Clear to auscultation. Clear to percussion bilaterally; No rales, rhonchi, wheezing, or rubs  HEART: Regular rate and rhythm; No murmurs, rubs, or gallops  ABDOMEN: Soft, Nontender, Nondistended; Bowel sounds present  NERVOUS SYSTEM:  Alert & Oriented X3,    EXTREMITIES:  2+ Peripheral Pulses, No clubbing, cyanosis, or edema  SKIN: warm dry                          13.4   9.82  )-----------( 212      ( 21 Apr 2021 12:09 )             41.8     04-21    140  |  109<H>  |  37<H>  ----------------------------<  138<H>  4.7   |  25  |  1.40<H>    Ca    8.8      21 Apr 2021 08:28  Phos  3.4     04-21  Mg     2.7     04-21    TPro  7.0  /  Alb  3.1<L>  /  TBili  0.7  /  DBili  x   /  AST  10  /  ALT  13  /  AlkPhos  74  04-21    LIVER FUNCTIONS - ( 21 Apr 2021 08:28 )  Alb: 3.1 g/dL / Pro: 7.0 g/dL / ALK PHOS: 74 U/L / ALT: 13 U/L DA / AST: 10 U/L / GGT: x           CARDIAC MARKERS ( 21 Apr 2021 08:28 )  <0.015 ng/mL / x     / x     / x     / x      CARDIAC MARKERS ( 20 Apr 2021 20:36 )  <0.015 ng/mL / x     / x     / x     / x          PT/INR - ( 21 Apr 2021 08:28 )   PT: 13.3 sec;   INR: 1.12 ratio         PTT - ( 20 Apr 2021 20:36 )  PTT:33.7 sec    CAPILLARY BLOOD GLUCOSE      POCT Blood Glucose.: 171 mg/dL (21 Apr 2021 11:37)          RADIOLOGY & ADDITIONAL TESTS:                   PGY-1 Progress Note discussed with attending    PAGER #: [[phone]] TILL 5:00 PM  PLEASE CONTACT ON CALL TEAM:  - On Call Team (Please refer to Marta) FROM 5:00 PM - 8:30PM  - Nightfloat Team FROM 8:30 -7:30 AM    CHIEF COMPLAINT & BRIEF HOSPITAL COURSE:  76 yo female from home lives with (care taker) with medical history significant for remote CVA with right sided residual weakness, HTN, comes in with dyspnea for past 2 days. History was provided by Son who was at bedside. Patient is mostly bed bound, gets up and lies down. Does not move around at baseline. Patient was found to have abnormal abdominal breathing which started 2 days ago as per  and was not getting better, which got him concerned. Denies cough, fever, chest pain, nausea, vomiting. At baseline patient is able to balance herself in sitting position. But for past 2 days she was falling on left side to support herself and was not able to maintain balance. Otherwise there was no change in movement of left arm and leg compared to baseline. There was no change in mental status. She has speech difficulty at baseline but the family is able to understand her communications, unchanged from baseline. Denies trauma, fall, sick contacts, recent illness. No history of CAD or heart failure. Patient had a PCP who retired.    INTERVAL HPI/OVERNIGHT EVENTS:   Pt was moaning in pain in the am and not responding to verbal commands but was opening her eyes and making eye contact. At 11:30 am Rapid response was called for unresponsiveness as pt was not responding to painful stimuli. BP was 80/50 and 1L of NS bolus given. All labs sent, lactate 2.6 which improved to 1.6 s/p bolus. Trops negative. Urgent CTA head and neck ordered, showing bilateral carotid artery stenosis. Neuro consulted and recommended MRI and EEG to rule out seizures. CXR and CMP pending.   Patient may have metal in her hip from a surgery done in her childhood. Will get xray done before ordering MRI.     MEDICATIONS  (STANDING):  aspirin enteric coated 81 milliGRAM(s) Oral daily  atorvastatin 40 milliGRAM(s) Oral at bedtime  azithromycin  IVPB 500 milliGRAM(s) IV Intermittent every 24 hours  cefTRIAXone   IVPB 1000 milliGRAM(s) IV Intermittent every 24 hours  clotrimazole 1% Cream 1 Application(s) Topical every 12 hours  enoxaparin Injectable 100 milliGRAM(s) SubCutaneous two times a day  sodium chloride 0.45%. 1000 milliLiter(s) (75 mL/Hr) IV Continuous <Continuous>    MEDICATIONS  (PRN):      REVIEW OF SYSTEMS:  CONSTITUTIONAL: No fever, weight loss, or fatigue  RESPIRATORY: No cough, wheezing, chills or hemoptysis; No shortness of breath  CARDIOVASCULAR: No chest pain, palpitations, dizziness, or leg swelling  GASTROINTESTINAL: No abdominal pain. No nausea, vomiting, or hematemesis; No diarrhea or constipation. No melena or hematochezia.  GENITOURINARY: No dysuria or hematuria, urinary frequency  NEUROLOGICAL: No headaches, memory loss, loss of strength, numbness, or tremors  SKIN: No itching, burning, rashes, or lesions     Vital Signs Last 24 Hrs  T(C): 37.2 (21 Apr 2021 11:51), Max: 37.3 (21 Apr 2021 04:15)  T(F): 99 (21 Apr 2021 11:51), Max: 99.1 (21 Apr 2021 04:15)  HR: 88 (21 Apr 2021 12:44) (73 - 127)  BP: 91/62 (21 Apr 2021 12:44) (91/62 - 154/107)  BP(mean): --  RR: 24 (21 Apr 2021 11:51) (18 - 24)  SpO2: 97% (21 Apr 2021 11:51) (97% - 100%)    PHYSICAL EXAMINATION:  GENERAL: lying comfortably in bed  HEAD:  Atraumatic, Normocephalic  EYES:  conjunctiva and sclera clear  NECK: Supple, No JVD, Normal thyroid  CHEST/LUNG: Clear to auscultation. Clear to percussion bilaterally; No rales, rhonchi, wheezing, or rubs  HEART: Irregularly irregular; No murmurs, rubs, or gallops  ABDOMEN: Soft, Nontender, Nondistended; Bowel sounds present  NERVOUS SYSTEM:  Alert & Oriented X1-2,    EXTREMITIES:  2+ Peripheral Pulses, No clubbing, cyanosis, or edema  SKIN: sacral ulcer                           13.4   9.82  )-----------( 212      ( 21 Apr 2021 12:09 )             41.8     04-21    140  |  109<H>  |  37<H>  ----------------------------<  138<H>  4.7   |  25  |  1.40<H>    Ca    8.8      21 Apr 2021 08:28  Phos  3.4     04-21  Mg     2.7     04-21    TPro  7.0  /  Alb  3.1<L>  /  TBili  0.7  /  DBili  x   /  AST  10  /  ALT  13  /  AlkPhos  74  04-21    LIVER FUNCTIONS - ( 21 Apr 2021 08:28 )  Alb: 3.1 g/dL / Pro: 7.0 g/dL / ALK PHOS: 74 U/L / ALT: 13 U/L DA / AST: 10 U/L / GGT: x           CARDIAC MARKERS ( 21 Apr 2021 08:28 )  <0.015 ng/mL / x     / x     / x     / x      CARDIAC MARKERS ( 20 Apr 2021 20:36 )  <0.015 ng/mL / x     / x     / x     / x          PT/INR - ( 21 Apr 2021 08:28 )   PT: 13.3 sec;   INR: 1.12 ratio         PTT - ( 20 Apr 2021 20:36 )  PTT:33.7 sec    CAPILLARY BLOOD GLUCOSE      POCT Blood Glucose.: 171 mg/dL (21 Apr 2021 11:37)          RADIOLOGY & ADDITIONAL TESTS:

## 2021-04-21 NOTE — PROGRESS NOTE ADULT - PROBLEM SELECTOR PLAN 5
Presented with elevated cr  Likely pre renal  Was given 1 L fluid in Ed  Will send UA and urine lytes  Monitor BMP  Avoid nephrotoxins Presented with elevated cr  Likely pre renal  Was given 1 L fluid in Ed  Monitor BMP  Avoid nephrotoxins

## 2021-04-21 NOTE — PROGRESS NOTE ADULT - PROBLEM SELECTOR PLAN 3
Presented with shortness of breath, abdominal breathing  Was saturating 93 on RA at home  Saturating well on RA  Couldn't appreciate any crackles or ronchi due to body habitus  CXR is concerning for infiltrate in RUL, ?aspiration PNA  Pro-BNP is elevated, patient does not look fluid overload  EKG showed new onset Afib, rate controlled, less concerning for flash pulmonary edema  Will hold off diuretics  Will start on rocephin and zithro for empiric coverage  Will do echocardiogram   Monitor respiratory status Presented with shortness of breath, abdominal breathing  Was saturating 93 on RA at home  Saturating well on RA  Couldn't appreciate any crackles or ronchi due to body habitus  CXR is concerning for infiltrate in RUL, ?aspiration PNA  Pro-BNP is elevated, patient does not look fluid overload  EKG showed new onset Afib, rate controlled, less concerning for flash pulmonary edema  Will hold off diuretics  cw rocephin and zithro for empiric coverage  fu echocardiogram   Monitor respiratory status

## 2021-04-21 NOTE — ED ADULT NURSE NOTE - NSIMPLEMENTINTERV_GEN_ALL_ED
Implemented All Universal Safety Interventions:  [place] to call system. Call bell, personal items and telephone within reach. Instruct patient to call for assistance. Room bathroom lighting operational. Non-slip footwear when patient is off stretcher. Physically safe environment: no spills, clutter or unnecessary equipment. Stretcher in lowest position, wheels locked, appropriate side rails in place. Implemented All Fall Risk Interventions:  [place] to call system. Call bell, personal items and telephone within reach. Instruct patient to call for assistance. Room bathroom lighting operational. Non-slip footwear when patient is off stretcher. Physically safe environment: no spills, clutter or unnecessary equipment. Stretcher in lowest position, wheels locked, appropriate side rails in place. Provide visual cue, wrist band, yellow gown, etc. Monitor gait and stability. Monitor for mental status changes and reorient to person, place, and time. Review medications for side effects contributing to fall risk. Reinforce activity limits and safety measures with patient and family.

## 2021-04-21 NOTE — SWALLOW BEDSIDE ASSESSMENT ADULT - SLP PERTINENT HISTORY OF CURRENT PROBLEM
75F lives w/ (care taker) PMHx remote CVA w/ R sided residual weakness, HTN, dyspnea (starting 4/18). Hx reportedly provided by Son who was at bedside. Patient reportedly mostly bed bound, gets up, lies down, Does not move around at baseline. Patient reportedly was found to have abnormal abdominal breathing started 4/18 as per  and was not getting better. Reportedly, at baseline pt able to balance in sitting position, but since 4/18 she was falling on L side to support herself and unable to maintain balance. Reportedly. otherwise no change in mov't of L arm/leg compared to baseline. Reportedly speech difficulty at baseline but family able to understand communications, unchanged from baseline.

## 2021-04-21 NOTE — PROGRESS NOTE ADULT - PROBLEM SELECTOR PLAN 7
Presented with postural imbalance, compared to baseline  CT head showed old findings  Examination was limited due to posture, but strength was intact on left side of body  Will consult neuro  Pt eval Presented with postural imbalance, compared to baseline  CT head showed old findings  Examination was limited due to posture, but strength was intact on left side of body  neuro consulted   Pt josephineal

## 2021-04-21 NOTE — SWALLOW BEDSIDE ASSESSMENT ADULT - COMMENTS
Pt received supine, HOB elevated to 90°. AA+Ox3 (verbal&gestures used) +aphasia +audible grunting +weak labial seal. Pt c/o pain in protruding tooth. Pt requires max assist w/ feeding; however endorsed preference to self-drink (long wait time needed to raise L arm to mouth). Overt s/s penetration/aspiration on cup presentation of nectar thick only.

## 2021-04-21 NOTE — PROGRESS NOTE ADULT - PROBLEM SELECTOR PLAN 1
Presented with shortness of breath, abdominal breathing  Was saturating 93 on RA at home  Saturating well on RA  Couldn't appreciate any crackles or ronchi due to body habitus  CXR is concerning for infiltrate in RUL, ?aspiration PNA  Pro-BNP is elevated, patient does not look fluid overload  EKG showed new onset Afib, rate controlled, less concerning for flash pulmonary edema  Will hold off diuretics  Will start on rocephin and zithro for empiric coverage  Will do echocardiogram   Monitor respiratory status  cardio consult Dr Bahena Pt unresponsive this am, likely syncope vs stroke vs seizure  CTA: Bilateral carotid artery stenosis  MRI to be ordered after xray hips (for metal)  F/u EEG  Cw aspirin, lovenox and statin  BP goal of normal of high normal to ensure perfusion  Dr. Marshall following

## 2021-04-21 NOTE — ED ADULT NURSE NOTE - NSFALLRSKPASTHIST_ED_ALL_ED
Lacey Bahena is a 82 year old female presenting for   Chief Complaint   Patient presents with   • Follow-up     discuss labs; HTN, hypercholesterolemia     Denies Latex allergy or sensitivity.    Medication verified and med list updated  Refills needed today: No    Health Maintenance Due   Topic Date Due   • DTaP/Tdap/Td Vaccine (1 - Tdap) 05/[birth year]/1957   • Shingles Vaccine (1 of 2) 05/28/1988   • Medicare Wellness 65+  10/17/2019                              no

## 2021-04-21 NOTE — SWALLOW BEDSIDE ASSESSMENT ADULT - PHARYNGEAL PHASE
Delayed pharyngeal swallow/Decreased laryngeal elevation Teary eyes post PO intake/Decreased laryngeal elevation/Cough post oral intake/Throat clear post oral intake

## 2021-04-21 NOTE — CONSULT NOTE ADULT - SUBJECTIVE AND OBJECTIVE BOX
Patient is a 75y old  Female who presents with a chief complaint of dyspnea and imbalance (2021 14:00)      HPI:  76 yo female from home lives with (care taker) with medical history significant for remote CVA with right sided residual weakness, HTN, comes in with dyspnea, neurology call rapid response this morning, FS normal.  Patient was not responsive.  The patient has prior right sided weakness but noted by family to lean more to the left recently.  At baseline, she is AOx2, able to sit in chair with assistance but spend most of her time in bed.     She was noted to have recent onset Afib in the hospital.        MEDICATIONS  (STANDING):  aspirin enteric coated 81 milliGRAM(s) Oral daily  atorvastatin 40 milliGRAM(s) Oral at bedtime  azithromycin  IVPB 500 milliGRAM(s) IV Intermittent every 24 hours  cefTRIAXone   IVPB 1000 milliGRAM(s) IV Intermittent every 24 hours  clotrimazole 1% Cream 1 Application(s) Topical every 12 hours  enoxaparin Injectable 100 milliGRAM(s) SubCutaneous two times a day  sodium chloride 0.45%. 1000 milliLiter(s) (75 mL/Hr) IV Continuous <Continuous>    MEDICATIONS  (PRN):    Allergies    Allergy Status Unknown    Intolerances      PAST MEDICAL & SURGICAL HISTORY:  HTN (hypertension)    CVA (cerebrovascular accident)      FAMILY HISTORY: nc mother    SOCIAL HISTORY: non smoker    Review of Systems:  Constitutional: No fevers .                    Eyes, Ears, Mouth, Throat: No vision loss   Respiratory: + shortness of breath.                                Cardiovascular: No chest pain or palpitations  Gastrointestinal: No vomiting.                                         Genitourinary: No  burning on urination.  Musculoskeletal: No joint pain.                                                           Dermatologic: No rash.  Neurological: as per HPI                                                                      Psychiatric: No behavioral problems.  Endocrine: No known hypoglycemia.               Hematologic/Lymphatic: No easy bleeding.    O:  Vital Signs Last 24 Hrs  T(C): 37.2 (2021 11:51), Max: 37.3 (2021 04:15)  T(F): 99 (2021 11:51), Max: 99.1 (2021 04:15)  HR: 88 (2021 12:44) (73 - 127)  BP: 91/62 (2021 12:44) (91/62 - 154/107)  BP(mean): --  RR: 24 (2021 11:51) (18 - 24)  SpO2: 97% (2021 11:51) (97% - 100%)    General Exam:   General appearance: No acute distress                 Cardiovascular: Pedal dorsalis pulses intact bilaterally    Neurological Exam:  NIH Stroke Scale (NIHSS):   1a. LOConscious:  0-alert 1-lethargy 2-obtund 3-coma:    ____0_  1b. LOC Questions:  0-both 1-one 2-none                       __2___  1c. LOC Commands:  0-both 1-one 2-none                     __2__  2.   Gaze:  0-nl 1-partial 2-conjugate                                _0____  3.   Visual:  0-nl 1-part ester 2-full ester 3-bilat ester         _0___  4.   Facial Palsy:  0-nl 1-minor 2-part 3-complete             __2_  5.   Motor Arm:  0-nl 1-drift 2-effort 3-no effort         Left             _4____                              4-no move UN-amputated                     Right  __4___  6.   Motor Leg:                                                                 Left   __4_                                                                                   Right __4_  7.   Ataxia:  0-nl 1-one limb 2-two UN-amp                      0____  8.   Sensory:  0-nl 1-mild 2-severe                                  __0___  9.   Language:  0-nl 1-mild 2-severe 3-mute                     ___3  10.  Dysarthria:  0-nl 1-mild 2-severe 3-barrier                  _3____  11.  Extinction/Inattention:  0-nl 1-mild 2-deep                 _0___         TOTAL NIHSS       ___28_____    Mental Status: Not orientated to self, date and place.  Attention intact.  Does not speak.  Follows simple request to look up or down.  No aphasia.  Unable to assess Knowledge,  Registration or memory.      Cranial Nerves: CN I - not tested.  PERRL, EOMI, VFF, no nystagmus or diplopia.  No APD.  Fundi not visualized bilaterally.  CN V1-3 intact to light touch.  Left facial weakness.  Hearing intact to finger rub bilaterally.  Tongue, uvula and palate midline.  Sternocleidomastoid and Trapezius intact bilaterally.    Motor:   Tone: normal.                  Strength 4/4 bl arms and legs  Unable to assess dysmetria on finger-nose-finger or heel-shin-heel    Sensation: intact to pain in all 4 ext    Deep Tendon Reflexes: 1+ bilateral biceps, triceps, brachioradialis, knee and ankle  Toes equivacal bilaterally    Gait: pt unable    Other:MRS 4      LABS:                        13.4   9.82  )-----------( 212      ( 2021 12:09 )             41.8     04-    140  |  109<H>  |  37<H>  ----------------------------<  138<H>  4.7   |  25  |  1.40<H>    Ca    8.8      2021 08:28  Phos  3.4     -  Mg     2.7     -    TPro  7.0  /  Alb  3.1<L>  /  TBili  0.7  /  DBili  x   /  AST  10  /  ALT  13  /  AlkPhos  74  -    PT/INR - ( 2021 08:28 )   PT: 13.3 sec;   INR: 1.12 ratio         PTT - ( 2021 20:36 )  PTT:33.7 sec  Urinalysis Basic - ( 2021 02:44 )    Color: Yellow / Appearance: Clear / S.020 / pH: x  Gluc: x / Ketone: Negative  / Bili: Negative / Urobili: Negative   Blood: x / Protein: 30 mg/dL / Nitrite: Positive   Leuk Esterase: Moderate / RBC: 5-10 /HPF / WBC 26-50 /HPF   Sq Epi: x / Non Sq Epi: Moderate /HPF / Bacteria: Many /HPF      LDL  HbA1C    RADIOLOGY & ADDITIONAL STUDIES:    EKG: < from: CT Angio Neck w/ IV Cont (21 @ 14:27) >  CT brain:  No hydrocephalus, acute intracranial hemorrhage, mass effect,or brain edema.    Gliosis in the left centrum semiovale and corona radiata. Encephalomalacia in the left subinsular region. Findings are consistent with chronic ischemic changes.    CTA neck:  Approximately 70-75% % short segment stenosis of the origin and proximal segment of the right ICA due to heavily calcified plaque. Normal flow-related enhancement of the more distal right ICA in the neck.    Approximately 75-80% short segment stenosis of the origin and proximal segment of the left ICA due to partially calcified plaque. Normal flow-related enhancement of the more distal left ICA in the neck.    High-grade short segment stenosis of the origin of the left vertebral artery. The left vertebral artery is hypoplastic, but enhances normally distal to its origin.    CTA brain:  No flow-limiting stenosis, vascular aneurysm, or AVM.    The left skull base and intradural internal carotid artery is mildly decreased in caliber.    Otherwise normal flow-related enhancement of the skull base/intracranial internal carotid arteries.    Mild to moderate short segment stenosis of the origin of the left M1 segment. Normal flow-related of the more distal left MCA.    Dr. Mccurdy discussed these findings with Dr. Gomez on 2021 3:30 PM with read back.    < end of copied text >

## 2021-04-21 NOTE — RAPID RESPONSE TEAM SUMMARY - NSSITUATIONBACKGROUNDRRT_GEN_ALL_CORE
RRT was called as pt was not responding to stimuli . Pt was assessed at bedside   Vitals were taken and showed low BP 80/50

## 2021-04-21 NOTE — PROGRESS NOTE ADULT - PROBLEM SELECTOR PLAN 2
Presented with shortness of breath, abdominal breathing  Was saturating 93 on RA at home  Saturating well on RA  Couldn't appreciate any crackles or ronchi due to body habitus  CXR is concerning for infiltrate in RUL, ?aspiration PNA  Pro-BNP is elevated, patient does not look fluid overload  EKG showed new onset Afib, rate controlled, less concerning for flash pulmonary edema  Will hold off diuretics  Will start on rocephin and zithro for empiric coverage  Will do echocardiogram   Monitor respiratory status Presented with shortness of breath, abdominal breathing  Was saturating 93 on RA at home  Saturating well on RA  Couldn't appreciate any crackles or ronchi due to body habitus  CXR is concerning for infiltrate in RUL, ?aspiration PNA  Pro-BNP is elevated, patient does not look fluid overload  EKG showed new onset Afib, rate controlled, less concerning for flash pulmonary edema  Will hold off diuretics  cw rocephin and zithro for empiric coverage  f/u echocardiogram   Monitor respiratory status  cardio consult Dr Bahena

## 2021-04-21 NOTE — SWALLOW BEDSIDE ASSESSMENT ADULT - SWALLOW EVAL: DIAGNOSIS
Pt p/w s/s oropharyngeal dysphagia c/b prolonged mastication, base of tongue pumping, decreased a-p transport/transit time, reduced hyolaryngeal elevation/excursion & delayed pharyngeal swallow. Overt s/s penetration/aspiration noted at this exam on nectar thick liquids e/b cough, throat clear, teary eyes.

## 2021-04-21 NOTE — CONSULT NOTE ADULT - ASSESSMENT
76 yo F with HTN and CVA who presented with shortness of breath, noted to have atrial fibrillation and ? aspiration pneumonia.     1. Dyspnea: Likely multifactorial. Differential includes lung pathology (i.e. infection), possible mild HF exacerbation in setting of infection and/or atrial fibrillation. This is supported by elevated pro-BNP levels, though chest X-ray appears clean.   -Agree with echocardiogram, this will help to evaluate for elevated right-sided pressures which may also contribute to elevated pro-BNP.    2. Atrial fibrillation:   -CHADS2-VASc score is 5 (HTN, age+1, CVA +2, female), consistent with >5% annual risk of stroke if off systemic anticoagulation   -Recommend anticoagulation with Eliquis 5mg PO BID (age < 80, weight > 60 kg)  -Rate controlled on atenolol  -Echocardiogram pending  -Check TSH  -Patient should follow up with cardiologist as outpatient    3. HTN: On atenolol and lisinopril, well controlled    4. Chronic CVAs:  -Previously on aspirin. Would discontinue and switch to Eliquis as per above (if covered by insurance)    ***Note that this is a preliminary note and any recommendations should NOT be carried out until this note is finalized. ***       76 yo F with HTN and CVA who presented with ?shortness of breath, noted to have atrial fibrillation and ? aspiration pneumonia.     1. Dyspnea: Likely multifactorial. Differential includes lung pathology (i.e. infection), possible mild HF exacerbation in setting of infection and/or atrial fibrillation. This is supported by elevated pro-BNP levels, though chest X-ray appears clear.   -Agree with echocardiogram, this will help to evaluate for elevated right-sided pressures which may also contribute to elevated pro-BNP.    2. Atrial fibrillation:   -CHADS2-VASc score is 5 (HTN, age+1, CVA +2, female), consistent with >5% annual risk of stroke if off systemic anticoagulation   -Recommend anticoagulation with Eliquis 5mg PO BID (age < 80, weight > 60 kg)  -Rate controlled on atenolol  -Echocardiogram pending  -Check TSH  -Patient should follow up with cardiologist as outpatient. If she is bedbound, consider cardiologist who makes house calls such as Dr. Christian.     3. HTN: On atenolol and lisinopril, well controlled    4. Chronic CVAs:  -Previously on aspirin. Would discontinue and switch to Eliquis as per above (if covered by insurance)

## 2021-04-21 NOTE — SWALLOW BEDSIDE ASSESSMENT ADULT - CONSISTENCIES ADMINISTERED
applesauce + asif cracker x3 tsp/mech soft asif cracker/solid ice chip water x4 oz/honey thick water/nectar thick applesauce x4 tsp/puree

## 2021-04-21 NOTE — SWALLOW BEDSIDE ASSESSMENT ADULT - SWALLOW EVAL: RECOMMENDED FEEDING/EATING TECHNIQUES
allow for swallow between intakes/alternate food with liquid/check mouth frequently for oral residue/pocketing/maintain upright posture during/after eating for 30 mins/oral hygiene/position upright (90 degrees)

## 2021-04-21 NOTE — CONSULT NOTE ADULT - SUBJECTIVE AND OBJECTIVE BOX
CHIEF COMPLAINT: Shortness of breath    HPI: 74 yo F with HTN and CVA who presented with shortness of breath. Patient     PAST MEDICAL & SURGICAL HISTORY:  As above      Allergies    Allergy Status Unknown    MEDICATIONS  (STANDING):  aspirin enteric coated 81 milliGRAM(s) Oral daily  ATENolol  Tablet 100 milliGRAM(s) Oral daily  atorvastatin 40 milliGRAM(s) Oral at bedtime  azithromycin  IVPB 500 milliGRAM(s) IV Intermittent every 24 hours  cefTRIAXone   IVPB 1000 milliGRAM(s) IV Intermittent every 24 hours  clotrimazole 1% Cream 1 Application(s) Topical every 12 hours  enoxaparin Injectable 100 milliGRAM(s) SubCutaneous two times a day  lisinopril 20 milliGRAM(s) Oral daily  sodium chloride 0.45%. 1000 milliLiter(s) (75 mL/Hr) IV Continuous <Continuous>    MEDICATIONS  (PRN):      FAMILY HISTORY:    No family history of premature coronary artery disease or sudden cardiac death    SOCIAL HISTORY:  Smoking-  Alcohol-  Illicit Drug use-    REVIEW OF SYSTEMS:  Constitutional: [ ] fever, [ ]weight loss,  [ ]fatigue  Eyes: [ ] visual changes  Respiratory: [ ]shortness of breath;  [ ] cough, [ ]wheezing, [ ]chills, [ ]hemoptysis  Cardiovascular: [ ] chest pain, [ ]palpitations, [ ]dizziness,  [ ]leg swelling [ ]syncope  Gastrointestinal: [ ] abdominal pain, [ ]nausea, [ ]vomiting,  [ ]diarrhea   Genitourinary: [ ] dysuria, [ ] hematuria  Neurologic: [ ] headaches [ ] tremors  [ ] weakness [ ] lightheadedness  Skin: [ ] itching, [ ]burning, [ ] rashes  Endocrine: [ ] heat or cold intolerance  Musculoskeletal: [ ] joint pain or swelling; [ ] muscle, back, or extremity pain  Psychiatric: [ ] depression, [ ]anxiety, [ ]mood swings, or [ ]difficulty sleeping  Hematologic: [ ] easy bruising, [ ] bleeding gums       [ x] All others negative	  [ ] Unable to obtain    Vital Signs Last 24 Hrs  T(C): 36.9 (21 Apr 2021 08:11), Max: 37.3 (21 Apr 2021 04:15)  T(F): 98.5 (21 Apr 2021 08:11), Max: 99.1 (21 Apr 2021 04:15)  HR: 81 (21 Apr 2021 08:11) (73 - 89)  BP: 98/53 (21 Apr 2021 08:11) (98/53 - 149/96)  BP(mean): --  RR: 18 (21 Apr 2021 08:11) (18 - 21)  SpO2: 100% (21 Apr 2021 08:11) (97% - 100%)  I&O's Summary      PHYSICAL EXAM:  General: No acute distress  HEENT: EOMI, PERRL  Neck: Supple, No JVD  Lungs: Clear to auscultation bilaterally; No rales or wheezing  Heart: Regular rate and rhythm; No murmurs, rubs, or gallops  Abdomen: Nontender, bowel sounds present  Extremities: No clubbing, cyanosis, or edema  Nervous system:  Alert & Oriented X3, no focal deficits  Psychiatric: Normal affect  Skin: No rashes or lesions      LABS:  04-21    140  |  109<H>  |  37<H>  ----------------------------<  138<H>  4.7   |  25  |  1.40<H>    Ca    8.8      21 Apr 2021 08:28  Phos  3.4     04-21  Mg     2.7     04-21    TPro  7.0  /  Alb  3.1<L>  /  TBili  0.7  /  DBili  x   /  AST  10  /  ALT  13  /  AlkPhos  74  04-21    Creatinine Trend: 1.40<--, 1.41<--, 1.78<--                        13.9   9.54  )-----------( 216      ( 21 Apr 2021 08:28 )             44.3     PT/INR - ( 21 Apr 2021 08:28 )   PT: 13.3 sec;   INR: 1.12 ratio         PTT - ( 20 Apr 2021 20:36 )  PTT:33.7 sec    Lipid Panel:     Cardiac Enzymes: CARDIAC MARKERS ( 20 Apr 2021 20:36 )  <0.015 ng/mL / x     / x     / x     / x        Serum Pro-Brain Natriuretic Peptide: 7190 pg/mL (04-20-21 @ 21:11)    RADIOLOGY:   < from: Xray Chest 1 View- PORTABLE-Urgent (Xray Chest 1 View- PORTABLE-Urgent .) (04.20.21 @ 21:31) >  IMPRESSION:    No acute infiltrate.    < end of copied text >    < from: CT Head No Cont (04.20.21 @ 21:02) >  IMPRESSION:  No acute intracranial hemorrhage or mass effect.  Chronic changes, as described above.    < end of copied text >      ECG [my interpretation]: 4/20/21 @ 19:47: A fib at 82 bpm, nonspecific ST-T wave abnormality    TELEMETRY:    ECHO: Pending     CHIEF COMPLAINT: Shortness of breath    HPI: 76 yo F, bedbound, with HTN and CVA who presented with shortness of breath. Patient is somnolent this morning and despite numerous attempts was unable to provide any information for this HPI, which was obtained from chart review as well as discussion with care providers. Patient reportedly with dyspnea x2 days (unclear if she complained of this or just noted to have abdominal breathing). No reported chest pain, palpitations, or syncope. Symptoms appear to have improved as she is no longer with evidence of respiratory distress based on my exam. No associated LE edema.      PAST MEDICAL & SURGICAL HISTORY:  As above      Allergies    Allergy Status Unknown    MEDICATIONS  (STANDING):  aspirin enteric coated 81 milliGRAM(s) Oral daily  ATENolol  Tablet 100 milliGRAM(s) Oral daily  atorvastatin 40 milliGRAM(s) Oral at bedtime  azithromycin  IVPB 500 milliGRAM(s) IV Intermittent every 24 hours  cefTRIAXone   IVPB 1000 milliGRAM(s) IV Intermittent every 24 hours  clotrimazole 1% Cream 1 Application(s) Topical every 12 hours  enoxaparin Injectable 100 milliGRAM(s) SubCutaneous two times a day  lisinopril 20 milliGRAM(s) Oral daily  sodium chloride 0.45%. 1000 milliLiter(s) (75 mL/Hr) IV Continuous <Continuous>    MEDICATIONS  (PRN):      FAMILY HISTORY:    No family history of premature coronary artery disease or sudden cardiac death    SOCIAL HISTORY:  Smoking-No  Alcohol-No  Illicit Drug use-No    REVIEW OF SYSTEMS:  Constitutional: [ ] fever, [ ]weight loss,  [ ]fatigue  Eyes: [ ] visual changes  Respiratory: [ ]shortness of breath;  [ ] cough, [ ]wheezing, [ ]chills, [ ]hemoptysis  Cardiovascular: [ ] chest pain, [ ]palpitations, [ ]dizziness,  [ ]leg swelling [ ]syncope  Gastrointestinal: [ ] abdominal pain, [ ]nausea, [ ]vomiting,  [ ]diarrhea   Genitourinary: [ ] dysuria, [ ] hematuria  Neurologic: [ ] headaches [ ] tremors  [ ] weakness [ ] lightheadedness  Skin: [ ] itching, [ ]burning, [ ] rashes  Endocrine: [ ] heat or cold intolerance  Musculoskeletal: [ ] joint pain or swelling; [ ] muscle, back, or extremity pain  Psychiatric: [ ] depression, [ ]anxiety, [ ]mood swings, or [ ]difficulty sleeping  Hematologic: [ ] easy bruising, [ ] bleeding gums       [ x] All others negative	  [ ] Unable to obtain    Vital Signs Last 24 Hrs  T(C): 36.9 (21 Apr 2021 08:11), Max: 37.3 (21 Apr 2021 04:15)  T(F): 98.5 (21 Apr 2021 08:11), Max: 99.1 (21 Apr 2021 04:15)  HR: 81 (21 Apr 2021 08:11) (73 - 89)  BP: 98/53 (21 Apr 2021 08:11) (98/53 - 149/96)  BP(mean): --  RR: 18 (21 Apr 2021 08:11) (18 - 21)  SpO2: 100% (21 Apr 2021 08:11) (97% - 100%)  I&O's Summary      PHYSICAL EXAM:  General: No acute distress  HEENT: EOMI, PERRL  Neck: Supple, No JVD  Lungs: Clear to auscultation bilaterally; No rales or wheezing  Heart: Regular rate and rhythm; No murmurs, rubs, or gallops  Abdomen: Nontender, bowel sounds present  Extremities: No clubbing, cyanosis, or edema  Nervous system:  Alert & Oriented X3, no focal deficits  Psychiatric: Normal affect  Skin: No rashes or lesions      LABS:  04-21    140  |  109<H>  |  37<H>  ----------------------------<  138<H>  4.7   |  25  |  1.40<H>    Ca    8.8      21 Apr 2021 08:28  Phos  3.4     04-21  Mg     2.7     04-21    TPro  7.0  /  Alb  3.1<L>  /  TBili  0.7  /  DBili  x   /  AST  10  /  ALT  13  /  AlkPhos  74  04-21    Creatinine Trend: 1.40<--, 1.41<--, 1.78<--                        13.9   9.54  )-----------( 216      ( 21 Apr 2021 08:28 )             44.3     PT/INR - ( 21 Apr 2021 08:28 )   PT: 13.3 sec;   INR: 1.12 ratio         PTT - ( 20 Apr 2021 20:36 )  PTT:33.7 sec    Lipid Panel:     Cardiac Enzymes: CARDIAC MARKERS ( 20 Apr 2021 20:36 )  <0.015 ng/mL / x     / x     / x     / x        Serum Pro-Brain Natriuretic Peptide: 7190 pg/mL (04-20-21 @ 21:11)    RADIOLOGY:   < from: Xray Chest 1 View- PORTABLE-Urgent (Xray Chest 1 View- PORTABLE-Urgent .) (04.20.21 @ 21:31) >  IMPRESSION:    No acute infiltrate.    < end of copied text >    < from: CT Head No Cont (04.20.21 @ 21:02) >  IMPRESSION:  No acute intracranial hemorrhage or mass effect.  Chronic changes, as described above.    < end of copied text >      ECG [my interpretation]: 4/20/21 @ 19:47: A fib at 82 bpm, nonspecific ST-T wave abnormality    TELEMETRY: A fib in 70s, no pauses    ECHO: Pending     CHIEF COMPLAINT: Shortness of breath    HPI: 76 yo F, bedbound, with HTN and CVA who presented with shortness of breath. Patient is somnolent this morning and despite numerous attempts was unable to provide any information for this HPI, which was obtained from chart review as well as discussion with care providers. Patient reportedly with dyspnea x2 days (unclear if she complained of this or just noted to have abdominal breathing). No reported chest pain, palpitations, or syncope. Symptoms appear to have improved as she is no longer with evidence of respiratory distress based on my exam. No associated LE edema.      PAST MEDICAL & SURGICAL HISTORY:  As above      Allergies    Allergy Status Unknown    MEDICATIONS  (STANDING):  aspirin enteric coated 81 milliGRAM(s) Oral daily  ATENolol  Tablet 100 milliGRAM(s) Oral daily  atorvastatin 40 milliGRAM(s) Oral at bedtime  azithromycin  IVPB 500 milliGRAM(s) IV Intermittent every 24 hours  cefTRIAXone   IVPB 1000 milliGRAM(s) IV Intermittent every 24 hours  clotrimazole 1% Cream 1 Application(s) Topical every 12 hours  enoxaparin Injectable 100 milliGRAM(s) SubCutaneous two times a day  lisinopril 20 milliGRAM(s) Oral daily  sodium chloride 0.45%. 1000 milliLiter(s) (75 mL/Hr) IV Continuous <Continuous>    MEDICATIONS  (PRN):      FAMILY HISTORY:    No family history of premature coronary artery disease or sudden cardiac death    SOCIAL HISTORY:  Smoking-No  Alcohol-No  Illicit Drug use-No    REVIEW OF SYSTEMS:  Constitutional: [ ] fever, [ ]weight loss,  [ ]fatigue  Eyes: [ ] visual changes  Respiratory: [ ]shortness of breath;  [ ] cough, [ ]wheezing, [ ]chills, [ ]hemoptysis  Cardiovascular: [ ] chest pain, [ ]palpitations, [ ]dizziness,  [ ]leg swelling [ ]syncope  Gastrointestinal: [ ] abdominal pain, [ ]nausea, [ ]vomiting,  [ ]diarrhea   Genitourinary: [ ] dysuria, [ ] hematuria  Neurologic: [ ] headaches [ ] tremors  [ ] weakness [ ] lightheadedness  Skin: [ ] itching, [ ]burning, [ ] rashes  Endocrine: [ ] heat or cold intolerance  Musculoskeletal: [ ] joint pain or swelling; [ ] muscle, back, or extremity pain  Psychiatric: [ ] depression, [ ]anxiety, [ ]mood swings, or [ ]difficulty sleeping  Hematologic: [ ] easy bruising, [ ] bleeding gums       [ x] All others negative	  [ ] Unable to obtain    Vital Signs Last 24 Hrs  T(C): 36.9 (21 Apr 2021 08:11), Max: 37.3 (21 Apr 2021 04:15)  T(F): 98.5 (21 Apr 2021 08:11), Max: 99.1 (21 Apr 2021 04:15)  HR: 81 (21 Apr 2021 08:11) (73 - 89)  BP: 98/53 (21 Apr 2021 08:11) (98/53 - 149/96)  BP(mean): --  RR: 18 (21 Apr 2021 08:11) (18 - 21)  SpO2: 100% (21 Apr 2021 08:11) (97% - 100%)  I&O's Summary      PHYSICAL EXAM:  General: No acute distress; somnolent  HEENT: EOMI, PERRL  Neck: Supple, No JVD  Lungs: Clear to auscultation bilaterally; No rales or wheezing  Heart: Irregular rate and rhythm; No murmurs, rubs, or gallops  Abdomen: Nontender, bowel sounds present  Extremities: No clubbing, cyanosis, or edema  Nervous system:  Unable to assess as patinet does not follow commands  Psychiatric: Unable to assess  Skin: No rashes or lesions      LABS:  04-21    140  |  109<H>  |  37<H>  ----------------------------<  138<H>  4.7   |  25  |  1.40<H>    Ca    8.8      21 Apr 2021 08:28  Phos  3.4     04-21  Mg     2.7     04-21    TPro  7.0  /  Alb  3.1<L>  /  TBili  0.7  /  DBili  x   /  AST  10  /  ALT  13  /  AlkPhos  74  04-21    Creatinine Trend: 1.40<--, 1.41<--, 1.78<--                        13.9   9.54  )-----------( 216      ( 21 Apr 2021 08:28 )             44.3     PT/INR - ( 21 Apr 2021 08:28 )   PT: 13.3 sec;   INR: 1.12 ratio         PTT - ( 20 Apr 2021 20:36 )  PTT:33.7 sec    Lipid Panel:     Cardiac Enzymes: CARDIAC MARKERS ( 20 Apr 2021 20:36 )  <0.015 ng/mL / x     / x     / x     / x        Serum Pro-Brain Natriuretic Peptide: 7190 pg/mL (04-20-21 @ 21:11)    RADIOLOGY:   < from: Xray Chest 1 View- PORTABLE-Urgent (Xray Chest 1 View- PORTABLE-Urgent .) (04.20.21 @ 21:31) >  IMPRESSION:    No acute infiltrate.    < end of copied text >    < from: CT Head No Cont (04.20.21 @ 21:02) >  IMPRESSION:  No acute intracranial hemorrhage or mass effect.  Chronic changes, as described above.    < end of copied text >      ECG [my interpretation]: 4/20/21 @ 19:47: A fib at 82 bpm, nonspecific ST-T wave abnormality    TELEMETRY: A fib in 70s, no pauses    ECHO: Pending

## 2021-04-21 NOTE — PROGRESS NOTE ADULT - PROBLEM SELECTOR PLAN 4
Was found to have new onset Afib   rate controlled  CHADVASC 5   Will start on AC  will c/w atenolol home dose Was found to have new onset Afib   rate controlled  CHADVASC 5   cw full dose lovenox  will change to eliquis once copay confirmed with pharmacy  will c/w atenolol home dose

## 2021-04-21 NOTE — SWALLOW BEDSIDE ASSESSMENT ADULT - SWALLOW EVAL: CRITERIA FOR SKILLED INTERVENTION MET
no problems identified which require skilled intervention demonstrates skilled criteria for swallowing intervention

## 2021-04-21 NOTE — SWALLOW BEDSIDE ASSESSMENT ADULT - SWALLOW EVAL: RECOMMENDED DIET
Regular diet w/ honey thick liquids for maximal independence Regular diet w/ tray prep & honey thick liquids for maximal independence

## 2021-04-21 NOTE — SWALLOW BEDSIDE ASSESSMENT ADULT - ORAL PHASE
Base of tongue pumping/Decreased anterior-posterior movement of the bolus/Delayed oral transit time Base of tongue pumping Base of tongue pumping/Lingual stasis

## 2021-04-21 NOTE — SWALLOW BEDSIDE ASSESSMENT ADULT - ORAL PREPARATORY PHASE
Decreased mastication ability Within functional limits Anterior loss of bolus/Decreased mastication ability

## 2021-04-22 DIAGNOSIS — R53.2 FUNCTIONAL QUADRIPLEGIA: ICD-10-CM

## 2021-04-22 DIAGNOSIS — E44.0 MODERATE PROTEIN-CALORIE MALNUTRITION: ICD-10-CM

## 2021-04-22 DIAGNOSIS — Z51.5 ENCOUNTER FOR PALLIATIVE CARE: ICD-10-CM

## 2021-04-22 LAB
ANION GAP SERPL CALC-SCNC: 10 MMOL/L — SIGNIFICANT CHANGE UP (ref 5–17)
BUN SERPL-MCNC: 33 MG/DL — HIGH (ref 7–18)
CALCIUM SERPL-MCNC: 8.6 MG/DL — SIGNIFICANT CHANGE UP (ref 8.4–10.5)
CHLORIDE SERPL-SCNC: 109 MMOL/L — HIGH (ref 96–108)
CO2 SERPL-SCNC: 21 MMOL/L — LOW (ref 22–31)
COVID-19 SPIKE DOMAIN AB INTERP: NEGATIVE — SIGNIFICANT CHANGE UP
COVID-19 SPIKE DOMAIN ANTIBODY RESULT: 0.4 U/ML — SIGNIFICANT CHANGE UP
CREAT SERPL-MCNC: 1.2 MG/DL — SIGNIFICANT CHANGE UP (ref 0.5–1.3)
GLUCOSE SERPL-MCNC: 148 MG/DL — HIGH (ref 70–99)
HCT VFR BLD CALC: 42.4 % — SIGNIFICANT CHANGE UP (ref 34.5–45)
HGB BLD-MCNC: 13.3 G/DL — SIGNIFICANT CHANGE UP (ref 11.5–15.5)
MAGNESIUM SERPL-MCNC: 2.6 MG/DL — SIGNIFICANT CHANGE UP (ref 1.6–2.6)
MCHC RBC-ENTMCNC: 31.4 GM/DL — LOW (ref 32–36)
MCHC RBC-ENTMCNC: 31.4 PG — SIGNIFICANT CHANGE UP (ref 27–34)
MCV RBC AUTO: 100 FL — SIGNIFICANT CHANGE UP (ref 80–100)
NRBC # BLD: 0 /100 WBCS — SIGNIFICANT CHANGE UP (ref 0–0)
PHOSPHATE SERPL-MCNC: 2.9 MG/DL — SIGNIFICANT CHANGE UP (ref 2.5–4.5)
PLATELET # BLD AUTO: 184 K/UL — SIGNIFICANT CHANGE UP (ref 150–400)
POTASSIUM SERPL-MCNC: 4.6 MMOL/L — SIGNIFICANT CHANGE UP (ref 3.5–5.3)
POTASSIUM SERPL-SCNC: 4.6 MMOL/L — SIGNIFICANT CHANGE UP (ref 3.5–5.3)
RBC # BLD: 4.24 M/UL — SIGNIFICANT CHANGE UP (ref 3.8–5.2)
RBC # FLD: 13.3 % — SIGNIFICANT CHANGE UP (ref 10.3–14.5)
SARS-COV-2 IGG+IGM SERPL QL IA: 0.4 U/ML — SIGNIFICANT CHANGE UP
SARS-COV-2 IGG+IGM SERPL QL IA: NEGATIVE — SIGNIFICANT CHANGE UP
SODIUM SERPL-SCNC: 140 MMOL/L — SIGNIFICANT CHANGE UP (ref 135–145)
WBC # BLD: 7.55 K/UL — SIGNIFICANT CHANGE UP (ref 3.8–10.5)
WBC # FLD AUTO: 7.55 K/UL — SIGNIFICANT CHANGE UP (ref 3.8–10.5)

## 2021-04-22 PROCEDURE — 95819 EEG AWAKE AND ASLEEP: CPT | Mod: 26

## 2021-04-22 PROCEDURE — 99232 SBSQ HOSP IP/OBS MODERATE 35: CPT

## 2021-04-22 PROCEDURE — 70551 MRI BRAIN STEM W/O DYE: CPT | Mod: 26

## 2021-04-22 PROCEDURE — 99233 SBSQ HOSP IP/OBS HIGH 50: CPT | Mod: GC

## 2021-04-22 RX ORDER — ENOXAPARIN SODIUM 100 MG/ML
100 INJECTION SUBCUTANEOUS ONCE
Refills: 0 | Status: COMPLETED | OUTPATIENT
Start: 2021-04-22 | End: 2021-04-22

## 2021-04-22 RX ORDER — SODIUM CHLORIDE 9 MG/ML
1000 INJECTION, SOLUTION INTRAVENOUS
Refills: 0 | Status: DISCONTINUED | OUTPATIENT
Start: 2021-04-22 | End: 2021-04-23

## 2021-04-22 RX ORDER — APIXABAN 2.5 MG/1
5 TABLET, FILM COATED ORAL
Refills: 0 | Status: DISCONTINUED | OUTPATIENT
Start: 2021-04-23 | End: 2021-04-25

## 2021-04-22 RX ORDER — APIXABAN 2.5 MG/1
1 TABLET, FILM COATED ORAL
Qty: 60 | Refills: 0
Start: 2021-04-22 | End: 2021-05-21

## 2021-04-22 RX ADMIN — CEFTRIAXONE 100 MILLIGRAM(S): 500 INJECTION, POWDER, FOR SOLUTION INTRAMUSCULAR; INTRAVENOUS at 22:42

## 2021-04-22 RX ADMIN — AZITHROMYCIN 255 MILLIGRAM(S): 500 TABLET, FILM COATED ORAL at 05:53

## 2021-04-22 RX ADMIN — Medication 1 APPLICATION(S): at 17:04

## 2021-04-22 RX ADMIN — SODIUM CHLORIDE 75 MILLILITER(S): 9 INJECTION, SOLUTION INTRAVENOUS at 08:46

## 2021-04-22 RX ADMIN — CEFTRIAXONE 100 MILLIGRAM(S): 500 INJECTION, POWDER, FOR SOLUTION INTRAMUSCULAR; INTRAVENOUS at 05:53

## 2021-04-22 RX ADMIN — ENOXAPARIN SODIUM 100 MILLIGRAM(S): 100 INJECTION SUBCUTANEOUS at 05:29

## 2021-04-22 RX ADMIN — ATORVASTATIN CALCIUM 40 MILLIGRAM(S): 80 TABLET, FILM COATED ORAL at 22:33

## 2021-04-22 RX ADMIN — Medication 1 APPLICATION(S): at 06:49

## 2021-04-22 RX ADMIN — Medication 81 MILLIGRAM(S): at 12:15

## 2021-04-22 RX ADMIN — ENOXAPARIN SODIUM 100 MILLIGRAM(S): 100 INJECTION SUBCUTANEOUS at 17:04

## 2021-04-22 NOTE — PHYSICAL THERAPY INITIAL EVALUATION ADULT - SITTING BALANCE: STATIC
needed to supprt self by holding bed bar; or examiners hand. once stable, pt able to support self without aid approx 10 seconds/fair balance

## 2021-04-22 NOTE — PHYSICAL THERAPY INITIAL EVALUATION ADULT - LIVES WITH, PROFILE
lives with  in house with stairs ( pt will be moving to first floor in near future to avoid needing to manage stairs) as per  and son--pt has no services at home and uses a regular bed/spouse

## 2021-04-22 NOTE — PHYSICAL THERAPY INITIAL EVALUATION ADULT - ACTIVE RANGE OF MOTION EXAMINATION, REHAB EVAL
minimal movement of shoulder IR and elbow flex/ext of 10 deg RLE min hip abd/add min ankle DF noted/Left UE Active ROM was WFL (within functional limits)/Left LE Active ROM was WFL (within functional limits)

## 2021-04-22 NOTE — PHYSICAL THERAPY INITIAL EVALUATION ADULT - PASSIVE RANGE OF MOTION EXAMINATION, REHAB EVAL
RUE contracture in shoulder ER ABD and elevation, elbow flex 100 deg 90 deg of ext wrist and hand and fingers contrancted in closed position/Left UE Passive ROM was WFL (within functional limits)/Left LE Passive ROM was WFL (within functional limits)

## 2021-04-22 NOTE — PROGRESS NOTE ADULT - PROBLEM SELECTOR PLAN 1
Pt unresponsive this am, likely syncope vs stroke vs seizure  CTA: Bilateral carotid artery stenosis  MRI to be ordered after xray hips (for metal)  F/u EEG  Cw aspirin, lovenox and statin  BP goal of normal of high normal to ensure perfusion  Dr. Marshall following Pt unresponsive this am, likely syncope vs stroke vs seizure  CTA: Bilateral carotid artery stenosis  MRI: acute/ subacute lacunar infarct   F/u EEG  Cw aspirin, lovenox and statin. Will change lovenox to eliquis tomorrow   BP goal of normal of high normal to ensure perfusion  Dr. Marshall following  Will consult vascular

## 2021-04-22 NOTE — CONSULT NOTE ADULT - PROBLEM SELECTOR RECOMMENDATION 9
CVA with right sided residual weakness 1999; became bedbound dependent 3 years ago.  Able to sit up in bed prior to admission.  Neurology following    Pt remains a FULL CODE

## 2021-04-22 NOTE — PROGRESS NOTE ADULT - PROBLEM SELECTOR PLAN 4
Was found to have new onset Afib   rate controlled  CHADVASC 5   cw full dose lovenox  will change to eliquis once copay confirmed with pharmacy  will c/w atenolol home dose Was found to have new onset Afib   rate controlled  CHADVASC 5   cw full dose lovenox  will change to eliquis tomorrow   will c/w atenolol home dose

## 2021-04-22 NOTE — PHYSICAL THERAPY INITIAL EVALUATION ADULT - GENERAL OBSERVATIONS, REHAB EVAL
Pt found in 5 north bed in NAD, with heart monitor, primafit, and 02 np at 3L/min in situ. Pt is alert, eating her lunch with left hand and son by bedside aiding with lunch

## 2021-04-22 NOTE — CONSULT NOTE ADULT - PROBLEM SELECTOR RECOMMENDATION 3
S/p  fall 3 years ago, bedbound.  Dependent.  High risk for skin failure.  Skin care per protocol  Frequent positioning  PT eval

## 2021-04-22 NOTE — PHYSICAL THERAPY INITIAL EVALUATION ADULT - RISK REDUCTION/PREVENTION, PT EVAL
risk of further contractures, risk of skin breakdown, risk of further deconditioning/risk factors/secondary impairments

## 2021-04-22 NOTE — PROGRESS NOTE ADULT - PROBLEM SELECTOR PLAN 7
Presented with postural imbalance, compared to baseline  CT head showed old findings  Examination was limited due to posture, but strength was intact on left side of body  neuro consulted   Pt josephineal

## 2021-04-22 NOTE — PHYSICAL THERAPY INITIAL EVALUATION ADULT - ADDITIONAL COMMENTS
Pt has been bedbound x 3 years...  washes pt and serves meals and help with feeding. Pt has been able to transfer with mod-max A x1 to sitting at EOB where she sits for 4-5 hours a day.

## 2021-04-22 NOTE — CONSULT NOTE ADULT - PROBLEM SELECTOR RECOMMENDATION 4
Please see discussion noted above ion GOC conversation. Please see discussion noted above in GOC conversation.

## 2021-04-22 NOTE — PROGRESS NOTE ADULT - PROBLEM SELECTOR PLAN 3
Presented with shortness of breath, abdominal breathing  Was saturating 93 on RA at home  Saturating well on RA  Couldn't appreciate any crackles or ronchi due to body habitus  CXR is concerning for infiltrate in RUL, ?aspiration PNA  Pro-BNP is elevated, patient does not look fluid overload  EKG showed new onset Afib, rate controlled, less concerning for flash pulmonary edema  Will hold off diuretics  cw rocephin and zithro for empiric coverage  fu echocardiogram   Monitor respiratory status

## 2021-04-22 NOTE — PROGRESS NOTE ADULT - PROBLEM SELECTOR PLAN 5
Presented with elevated cr  Likely pre renal  Was given 1 L fluid in Ed  Monitor BMP  Avoid nephrotoxins

## 2021-04-22 NOTE — PROGRESS NOTE ADULT - PROBLEM SELECTOR PLAN 2
Presented with shortness of breath, abdominal breathing  Was saturating 93 on RA at home  Saturating well on RA  Couldn't appreciate any crackles or ronchi due to body habitus  CXR is concerning for infiltrate in RUL, ?aspiration PNA  Pro-BNP is elevated, patient does not look fluid overload  EKG showed new onset Afib, rate controlled, less concerning for flash pulmonary edema  Will hold off diuretics  cw rocephin and zithro for empiric coverage  f/u echocardiogram   Monitor respiratory status  cardio consult Dr Bahena Presented with shortness of breath, abdominal breathing  Was saturating 93 on RA at home  Saturating well on RA  Couldn't appreciate any crackles or ronchi due to body habitus  CXR is concerning for infiltrate in RUL, ?aspiration PNA  Pro-BNP is elevated, patient does not look fluid overload  EKG showed new onset Afib, rate controlled, less concerning for flash pulmonary edema  Will hold off diuretics  cw rocephin and zithro for empiric coverage  echocardiogram : EF>55%  Monitor respiratory status  cardio consult Dr Bahena

## 2021-04-22 NOTE — PROGRESS NOTE ADULT - SUBJECTIVE AND OBJECTIVE BOX
Neurology Follow up note    Name  STACI GONG      Subjective:  patient feeling better, son at bedside  no c/o    Review of Systems:  Constitutional:      no fever  Respiratory:                     no cough           MEDICATIONS  (STANDING):  aspirin enteric coated 81 milliGRAM(s) Oral daily  ATENolol  Tablet 100 milliGRAM(s) Oral daily  atorvastatin 40 milliGRAM(s) Oral at bedtime  azithromycin  IVPB 500 milliGRAM(s) IV Intermittent every 24 hours  cefTRIAXone   IVPB 1000 milliGRAM(s) IV Intermittent every 24 hours  clotrimazole 1% Cream 1 Application(s) Topical every 12 hours  sodium chloride 0.45%. 1000 milliLiter(s) (75 mL/Hr) IV Continuous <Continuous>  sodium chloride 0.45%. 1000 milliLiter(s) (75 mL/Hr) IV Continuous <Continuous>    MEDICATIONS  (PRN):      Allergies    Allergy Status Unknown    Intolerances        Objective:   Vital Signs Last 24 Hrs  T(C): 36.2 (22 Apr 2021 16:28), Max: 36.6 (21 Apr 2021 20:07)  T(F): 97.2 (22 Apr 2021 16:28), Max: 97.8 (21 Apr 2021 20:07)  HR: 90 (22 Apr 2021 17:12) (83 - 106)  BP: 129/87 (22 Apr 2021 17:12) (94/60 - 150/94)  BP(mean): --  RR: 20 (22 Apr 2021 16:28) (18 - 20)  SpO2: 98% (22 Apr 2021 17:12) (96% - 100%)    General Exam:   General appearance: No acute distress                 Cardiovascular: Pedal dorsalis pulses intact bilaterally    Neurological Exam:  Mental Status: Orientated to self, date and place.  Attention intact.  No dysarthria, aphasia or neglect.    Cranial Nerves: CN I - not tested.  PERRL, EOMI, VFF, no nystagmus or diplopia.  No APD.  Fundi not visualized bilaterally.  CN V1-3 intact to light touch.  No facial asymmetry.      Motor:   Tone: increased right side.                  Strength: right arm and leg weakness 4, 4  Pronator drift: none                 Dysmeria: None to finger-nose-finger or heel-shin-heel on left    Sensation: intact to pain in all 4 ext    Gait: unable    Other:    04-22    140  |  109<H>  |  33<H>  ----------------------------<  148<H>  4.6   |  21<L>  |  1.20    Ca    8.6      22 Apr 2021 08:10  Phos  2.9     04-22  Mg     2.6     04-22    TPro  7.0  /  Alb  3.1<L>  /  TBili  0.7  /  DBili  x   /  AST  10  /  ALT  13  /  AlkPhos  74  04-21 04-22    140  |  109<H>  |  33<H>  ----------------------------<  148<H>  4.6   |  21<L>  |  1.20    Ca    8.6      22 Apr 2021 08:10  Phos  2.9     04-22  Mg     2.6     04-22    TPro  7.0  /  Alb  3.1<L>  /  TBili  0.7  /  DBili  x   /  AST  10  /  ALT  13  /  AlkPhos  74  04-21    LIVER FUNCTIONS - ( 21 Apr 2021 08:28 )  Alb: 3.1 g/dL / Pro: 7.0 g/dL / ALK PHOS: 74 U/L / ALT: 13 U/L DA / AST: 10 U/L / GGT: x             Radiology    < from: MR Head No Cont (04.22.21 @ 11:26) >    IMPRESSION:  Incomplete and limited exam due to motion.    Acute/subacute lacunar infarct in the right thalamus.    No acute intracranial hemorrhage    < end of copied text >

## 2021-04-22 NOTE — PHYSICAL THERAPY INITIAL EVALUATION ADULT - ORIENTATION, REHAB EVAL
able to state birthdate with cueing, pt aphasic but able to express with single words , pt able to follow requests inconsistently/person

## 2021-04-22 NOTE — CONSULT NOTE ADULT - SUBJECTIVE AND OBJECTIVE BOX
HPI:  76 yo female from home lives with (care taker) with medical history significant for remote CVA with right sided residual weakness, HTN, comes in with dyspnea for past 2 days. History was provided by Son who was at bedside. Patient is mostly bed bound, gets up and lies down. Does not move around at baseline. Patient was found to have abnormal abdominal breathing which started 2 days ago as per  and was not getting better, which got him concerned. Denies cough, fever, chest pain, nausea, vomiting. At baseline patient is able to balance herself in sitting position. But for past 2 days she was falling on left side to support herself and was not able to maintain balance. Otherwise there was no change in movement of left arm and leg compared to baseline. There was no change in mental status. She has speech difficulty at baseline but the family is able to understand her communications, unchanged from baseline. Denies trauma, fall, sick contacts, recent illness. No history of CAD or heart failure. Patient had a PCP who retired. (2021 22:02)    Interval hx: Pt seen and examined at the bedside AOX2 NC in her mouth.  NA.    PAST MEDICAL & SURGICAL HISTORY:  HTN (hypertension)    CVA (cerebrovascular accident)        SOCIAL HISTORY:    Admitted from:  home with spouse and family    [employer]:    [employer]                                    Surrogate/HCP/Guardian:  Damian Garcia          Phone#: [phone]    FAMILY HISTORY:  Pt is unable to participate   Baseline ADLs (prior to admission): bedbound    Allergies    Allergy Status Unknown    Intolerances      Present Symptoms:   Unable to obtain due to poor mentation    MEDICATIONS  (STANDING):  aspirin enteric coated 81 milliGRAM(s) Oral daily  ATENolol  Tablet 100 milliGRAM(s) Oral daily  atorvastatin 40 milliGRAM(s) Oral at bedtime  azithromycin  IVPB 500 milliGRAM(s) IV Intermittent every 24 hours  cefTRIAXone   IVPB 1000 milliGRAM(s) IV Intermittent every 24 hours  clotrimazole 1% Cream 1 Application(s) Topical every 12 hours  enoxaparin Injectable 100 milliGRAM(s) SubCutaneous two times a day  sodium chloride 0.45%. 1000 milliLiter(s) (75 mL/Hr) IV Continuous <Continuous>  sodium chloride 0.45%. 1000 milliLiter(s) (75 mL/Hr) IV Continuous <Continuous>    MEDICATIONS  (PRN):      PHYSICAL EXAM:    Vital Signs Last 24 Hrs  T(C): 36.4 (2021 07:39), Max: 36.8 (2021 15:27)  T(F): 97.5 (2021 07:39), Max: 98.2 (2021 15:27)  HR: 83 (2021 07:39) (83 - 88)  BP: 150/94 (2021 07:39) (91/62 - 150/94)  BP(mean): --  RR: 18 (2021 07:39) (18 - 20)  SpO2: 100% (2021 07:39) (96% - 100%)    General: Chronically ill appearing, obese woman, AOx2, confused.  NAD   Karnofsky Performance Score/Palliative Performance Status Version2: 40    %    HEENT: atraumatic, poor dentition  Lungs: unlabored on NC  CV: normal  tachycardia  GI: soft, non tender on palpation  :  ferrari  Musculoskeletal: bedbound  Skin: no rash or lesions noted  Neuro: able to follow simple commands  Oral intake ability: unable/only mouth care   Diet: [NPO]    LABS:                        13.3   7.55  )-----------( 184      ( 2021 08:10 )             42.4     04-22    140  |  109<H>  |  33<H>  ----------------------------<  148<H>  4.6   |  21<L>  |  1.20    Ca    8.6      2021 08:10  Phos  2.9     04-22  Mg     2.6     04-22    TPro  7.0  /  Alb  3.1<L>  /  TBili  0.7  /  DBili  x   /  AST  10  /  ALT  13  /  AlkPhos  74  04-21    Urinalysis Basic - ( 2021 02:44 )    Color: Yellow / Appearance: Clear / S.020 / pH: x  Gluc: x / Ketone: Negative  / Bili: Negative / Urobili: Negative   Blood: x / Protein: 30 mg/dL / Nitrite: Positive   Leuk Esterase: Moderate / RBC: 5-10 /HPF / WBC 26-50 /HPF   Sq Epi: x / Non Sq Epi: Moderate /HPF / Bacteria: Many /HPF    < from: CT Angio Head w/ IV Cont (21 @ 14:27) >  EXAM:  CT ANGIO NECK (W)AW IC                          EXAM:  CT ANGIO BRAIN (W)AW IC                            PROCEDURE DATE:  2021          INTERPRETATION:  CT angiography of the brain and neck    CLINICAL INDICATION: Falling to the left    TECHNIQUE: Direct axial CT scanning of the brain and neck was obtained from the vertex to the level of the clavicular heads after the dynamic intravenous injection of 90 cc of Omnipaque 350. 10 cc were discarded. Sagittal and coronal maximum intensity projection reformats were provided.  Three-dimensional reconstructions were performed by the radiologist using the [employer] workstation.    Additionally, noncontrast axial CT scanning of the brain was obtained from the skull base to the vertex. Sagittal and coronal reformats were provided.    COMPARISON: CT brain 2021    FINDINGS:    CT BRAIN:    Gliosis in the left centrum semiovale and corona radiata. Encephalomalacia in the left subinsular region. Findings are consistent with chronicischemic changes.    Asymmetric dilatation of the left lateral ventricle which is in part ex vacuo in nature. This results in mild rightward bowing of the septum pellucidum. No effacement of the basal cisterns.    No acute intracranial hemorrhage or brain edema. Moderate white matter microvascular ischemic disease.    No displaced calvarial fracture. Left maxillary sinus mucosal thickening.    CTA NECK:    Stenoses described in this report are on the basis of NASCET criteria.    Approximately 70-75% % short segment stenosis of the origin and proximal segment of the right ICA due to heavily calcified plaque. Normal flow-related enhancement of the more distal right ICA in the neck.    Approximately 75-80% short segment stenosis of the origin and proximal segment of the left ICA due to partially calcified plaque. Normal flow-related enhancement of the more distal left ICA in the neck.    High-grade short segment stenosis of the origin of the left vertebral artery. The left vertebral artery is hypoplastic, but enhances normally distal to its origin.    Mild short segment stenosis of the origin of the right vertebral artery, with normal flow-related enhancement more distally in the neck.    Origins of the common carotid arteries are unremarkable.    Normal flow-related enhancement of the right common carotid artery.    Approximately 35% short segment stenosis of the distal left common carotid artery due to partially calcified plaque.    Approximately 35% short segment stenosis of the origin and proximal segment of the left subclavian artery due to predominantly noncalcified plaque. Normal flow-related enhancement of the more distal left subclavian artery.    CTA BRAIN:    The left skull base and intradural internal carotid artery is mildly decreased in caliber.    Otherwise normal flow-related enhancement of the skull base/intracranial internal carotid arteries.    Mild to moderate short segment stenosis of the origin of the left M1 segment. Normal flow-related of the more distal left MCA.    Normal flow-related enhancement of the bilateral anterior, right middle, and posterior cerebral arteries.    Anterior commuting artery is present. The left A1 segment is hypoplastic.    The right vertebral artery is hypoplastic and ends in the right PICA. Normal flow-related enhancement of the left vertebral artery and basilar artery.    No flow-limiting stenosis or vascular aneurysm. No AVM.    IMPRESSION:    CT brain:  No hydrocephalus, acute intracranial hemorrhage, mass effect,or brain edema.    Gliosis in the left centrum semiovale and corona radiata. Encephalomalacia in the left subinsular region. Findings are consistent with chronic ischemic changes.    CTA neck:  Approximately 70-75% % short segment stenosis of the origin and proximal segment of the right ICA due to heavily calcified plaque. Normal flow-related enhancement of the more distal right ICA in the neck.    Approximately 75-80% short segment stenosis of the origin and proximal segment of the left ICA due to partially calcified plaque. Normal flow-related enhancement of the more distal left ICA in the neck.    High-grade short segment stenosis of the origin of the left vertebral artery. The left vertebral artery is hypoplastic, but enhances normally distal to its origin.    CTA brain:  No flow-limiting stenosis, vascular aneurysm, or AVM.    The left skull base and intradural internal carotid artery is mildly decreased in caliber.    Otherwise normal flow-related enhancement of the skull base/intracranial internal carotid arteries.    Mild to moderate short segment stenosis of the origin of the left M1 segment. Normal flow-related of the more distal left MCA.    Dr. Mccurdy discussed these findings with Dr. Gomez on 2021 3:30 PM with read back.    < end of copied text >      RADIOLOGY & ADDITIONAL STUDIES: Reviewed    ADVANCE DIRECTIVES: FULL CODE

## 2021-04-22 NOTE — PHYSICAL THERAPY INITIAL EVALUATION ADULT - DIAGNOSIS, PT EVAL
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

## 2021-04-22 NOTE — CONSULT NOTE ADULT - CONVERSATION DETAILS
Palliative care NP spoke with the pt's spouse Mr. Redman discussed her current clinical condition and goasl of care.  He endorsed pt suffered a CVA in 1999 with residual rt sided weakness.  Three years ago she fell and has been bedbound, and dependent on ALDs.  He stated she does speak sometimes she get mixed up, and only the family is able to understand what she is saying.   Discussed the risks vs benefits of cardiopulmonary resuscitation and intubation.  He stated he wants everything to keep her alive, including CPR and intubation.  Also discussed artificial nutrition with the risks and benefits.  He stated he is not able to make that decision yet.  All questions answered.  Support provided.     Plan discussed with the primary team.

## 2021-04-22 NOTE — PROGRESS NOTE ADULT - SUBJECTIVE AND OBJECTIVE BOX
PGY-1 Progress Note discussed with attending    PAGER #: [[phone]] TILL 5:00 PM  PLEASE CONTACT ON CALL TEAM:  - On Call Team (Please refer to Marta) FROM 5:00 PM - 8:30PM  - Nightfloat Team FROM 8:30 -7:30 AM    CHIEF COMPLAINT & BRIEF HOSPITAL COURSE:  76 yo female from home lives with (care taker) with medical history significant for remote CVA with right sided residual weakness, HTN, comes in with dyspnea for past 2 days. History was provided by Son who was at bedside. Patient is mostly bed bound, gets up and lies down. Does not move around at baseline. Patient was found to have abnormal abdominal breathing which started 2 days ago as per  and was not getting better, which got him concerned. Denies cough, fever, chest pain, nausea, vomiting. At baseline patient is able to balance herself in sitting position. But for past 2 days she was falling on left side to support herself and was not able to maintain balance. Otherwise there was no change in movement of left arm and leg compared to baseline. There was no change in mental status. She has speech difficulty at baseline but the family is able to understand her communications, unchanged from baseline. Denies trauma, fall, sick contacts, recent illness. No history of CAD or heart failure. Patient had a PCP who retired.    INTERVAL HPI/OVERNIGHT EVENTS:       MEDICATIONS  (STANDING):  aspirin enteric coated 81 milliGRAM(s) Oral daily  ATENolol  Tablet 100 milliGRAM(s) Oral daily  atorvastatin 40 milliGRAM(s) Oral at bedtime  azithromycin  IVPB 500 milliGRAM(s) IV Intermittent every 24 hours  cefTRIAXone   IVPB 1000 milliGRAM(s) IV Intermittent every 24 hours  clotrimazole 1% Cream 1 Application(s) Topical every 12 hours  enoxaparin Injectable 100 milliGRAM(s) SubCutaneous two times a day  sodium chloride 0.45%. 1000 milliLiter(s) (75 mL/Hr) IV Continuous <Continuous>  sodium chloride 0.45%. 1000 milliLiter(s) (75 mL/Hr) IV Continuous <Continuous>    MEDICATIONS  (PRN):      REVIEW OF SYSTEMS:  CONSTITUTIONAL: No fever, weight loss, or fatigue  RESPIRATORY: No cough, wheezing, chills or hemoptysis; No shortness of breath  CARDIOVASCULAR: No chest pain, palpitations, dizziness, or leg swelling  GASTROINTESTINAL: No abdominal pain. No nausea, vomiting, or hematemesis; No diarrhea or constipation. No melena or hematochezia.  GENITOURINARY: No dysuria or hematuria, urinary frequency  NEUROLOGICAL: No headaches, memory loss, loss of strength, numbness, or tremors  SKIN: No itching, burning, rashes, or lesions     Vital Signs Last 24 Hrs  T(C): 36.6 (22 Apr 2021 12:20), Max: 36.8 (21 Apr 2021 15:27)  T(F): 97.8 (22 Apr 2021 12:20), Max: 98.2 (21 Apr 2021 15:27)  HR: 90 (22 Apr 2021 12:20) (83 - 90)  BP: 122/69 (22 Apr 2021 12:20) (94/60 - 150/94)  BP(mean): --  RR: 20 (22 Apr 2021 12:20) (18 - 20)  SpO2: 99% (22 Apr 2021 12:20) (96% - 100%)    PHYSICAL EXAMINATION:  GENERAL: NAD, well built  HEAD:  Atraumatic, Normocephalic  EYES:  conjunctiva and sclera clear  NECK: Supple, No JVD, Normal thyroid  CHEST/LUNG: Clear to auscultation. Clear to percussion bilaterally; No rales, rhonchi, wheezing, or rubs  HEART: Regular rate and rhythm; No murmurs, rubs, or gallops  ABDOMEN: Soft, Nontender, Nondistended; Bowel sounds present  NERVOUS SYSTEM:  Alert & Oriented X3,    EXTREMITIES:  2+ Peripheral Pulses, No clubbing, cyanosis, or edema  SKIN: warm dry                          13.3   7.55  )-----------( 184      ( 22 Apr 2021 08:10 )             42.4     04-22    140  |  109<H>  |  33<H>  ----------------------------<  148<H>  4.6   |  21<L>  |  1.20    Ca    8.6      22 Apr 2021 08:10  Phos  2.9     04-22  Mg     2.6     04-22    TPro  7.0  /  Alb  3.1<L>  /  TBili  0.7  /  DBili  x   /  AST  10  /  ALT  13  /  AlkPhos  74  04-21    LIVER FUNCTIONS - ( 21 Apr 2021 08:28 )  Alb: 3.1 g/dL / Pro: 7.0 g/dL / ALK PHOS: 74 U/L / ALT: 13 U/L DA / AST: 10 U/L / GGT: x           CARDIAC MARKERS ( 21 Apr 2021 15:13 )  0.017 ng/mL / x     / 63 U/L / x     / <1.0 ng/mL  CARDIAC MARKERS ( 21 Apr 2021 08:28 )  <0.015 ng/mL / x     / x     / x     / x      CARDIAC MARKERS ( 20 Apr 2021 20:36 )  <0.015 ng/mL / x     / x     / x     / x          PT/INR - ( 21 Apr 2021 08:28 )   PT: 13.3 sec;   INR: 1.12 ratio         PTT - ( 20 Apr 2021 20:36 )  PTT:33.7 sec    CAPILLARY BLOOD GLUCOSE      POCT Blood Glucose.: 171 mg/dL (21 Apr 2021 11:37)        Culture - Blood (collected 21 Apr 2021 13:33)  Source: .Blood Blood-Peripheral  Preliminary Report (22 Apr 2021 14:01):    No growth to date.        RADIOLOGY & ADDITIONAL TESTS:                   PGY-1 Progress Note discussed with attending    PAGER #: [[phone]] TILL 5:00 PM  PLEASE CONTACT ON CALL TEAM:  - On Call Team (Please refer to Marta) FROM 5:00 PM - 8:30PM  - Nightfloat Team FROM 8:30 -7:30 AM    CHIEF COMPLAINT & BRIEF HOSPITAL COURSE:  76 yo female from home lives with (care taker) with medical history significant for remote CVA with right sided residual weakness, HTN, comes in with dyspnea for past 2 days. History was provided by Son who was at bedside. Patient is mostly bed bound, gets up and lies down. Does not move around at baseline. Patient was found to have abnormal abdominal breathing which started 2 days ago as per  and was not getting better, which got him concerned. Denies cough, fever, chest pain, nausea, vomiting. At baseline patient is able to balance herself in sitting position. But for past 2 days she was falling on left side to support herself and was not able to maintain balance. Otherwise there was no change in movement of left arm and leg compared to baseline. There was no change in mental status. She has speech difficulty at baseline but the family is able to understand her communications, unchanged from baseline. Denies trauma, fall, sick contacts, recent illness. No history of CAD or heart failure. Patient had a PCP who retired.    INTERVAL HPI/OVERNIGHT EVENTS:   No acute overnight events. MRI shows acute/subacute infarct in lacunar area (right thalamus). Pt is back at baseline     MEDICATIONS  (STANDING):  aspirin enteric coated 81 milliGRAM(s) Oral daily  ATENolol  Tablet 100 milliGRAM(s) Oral daily  atorvastatin 40 milliGRAM(s) Oral at bedtime  azithromycin  IVPB 500 milliGRAM(s) IV Intermittent every 24 hours  cefTRIAXone   IVPB 1000 milliGRAM(s) IV Intermittent every 24 hours  clotrimazole 1% Cream 1 Application(s) Topical every 12 hours  enoxaparin Injectable 100 milliGRAM(s) SubCutaneous two times a day  sodium chloride 0.45%. 1000 milliLiter(s) (75 mL/Hr) IV Continuous <Continuous>  sodium chloride 0.45%. 1000 milliLiter(s) (75 mL/Hr) IV Continuous <Continuous>    MEDICATIONS  (PRN):      REVIEW OF SYSTEMS:  CONSTITUTIONAL: No fever, weight loss, or fatigue  RESPIRATORY: No cough, wheezing, chills or hemoptysis; No shortness of breath  CARDIOVASCULAR: No chest pain, palpitations, dizziness, or leg swelling  GASTROINTESTINAL: No abdominal pain. No nausea, vomiting, or hematemesis; No diarrhea or constipation. No melena or hematochezia.  GENITOURINARY: No dysuria or hematuria, urinary frequency  NEUROLOGICAL: No headaches, memory loss, loss of strength, numbness, or tremors  SKIN: No itching, burning, rashes, or lesions     Vital Signs Last 24 Hrs  T(C): 36.6 (22 Apr 2021 12:20), Max: 36.8 (21 Apr 2021 15:27)  T(F): 97.8 (22 Apr 2021 12:20), Max: 98.2 (21 Apr 2021 15:27)  HR: 90 (22 Apr 2021 12:20) (83 - 90)  BP: 122/69 (22 Apr 2021 12:20) (94/60 - 150/94)  BP(mean): --  RR: 20 (22 Apr 2021 12:20) (18 - 20)  SpO2: 99% (22 Apr 2021 12:20) (96% - 100%)    PHYSICAL EXAMINATION:  GENERAL: NAD,   HEAD:  Atraumatic, Normocephalic  EYES:  conjunctiva and sclera clear  NECK: Supple, No JVD, Normal thyroid  CHEST/LUNG: Clear to auscultation. Clear to percussion bilaterally; No rales, rhonchi, wheezing, or rubs  HEART: Regular rate and rhythm; No murmurs, rubs, or gallops  ABDOMEN: Soft, Nontender, Nondistended; Bowel sounds present  NERVOUS SYSTEM:  Alert & Oriented X1-2,    EXTREMITIES:  2+ Peripheral Pulses, No clubbing, cyanosis, or edema  SKIN: sacral ulcer                          13.3   7.55  )-----------( 184      ( 22 Apr 2021 08:10 )             42.4     04-22    140  |  109<H>  |  33<H>  ----------------------------<  148<H>  4.6   |  21<L>  |  1.20    Ca    8.6      22 Apr 2021 08:10  Phos  2.9     04-22  Mg     2.6     04-22    TPro  7.0  /  Alb  3.1<L>  /  TBili  0.7  /  DBili  x   /  AST  10  /  ALT  13  /  AlkPhos  74  04-21    LIVER FUNCTIONS - ( 21 Apr 2021 08:28 )  Alb: 3.1 g/dL / Pro: 7.0 g/dL / ALK PHOS: 74 U/L / ALT: 13 U/L DA / AST: 10 U/L / GGT: x           CARDIAC MARKERS ( 21 Apr 2021 15:13 )  0.017 ng/mL / x     / 63 U/L / x     / <1.0 ng/mL  CARDIAC MARKERS ( 21 Apr 2021 08:28 )  <0.015 ng/mL / x     / x     / x     / x      CARDIAC MARKERS ( 20 Apr 2021 20:36 )  <0.015 ng/mL / x     / x     / x     / x          PT/INR - ( 21 Apr 2021 08:28 )   PT: 13.3 sec;   INR: 1.12 ratio         PTT - ( 20 Apr 2021 20:36 )  PTT:33.7 sec    CAPILLARY BLOOD GLUCOSE      POCT Blood Glucose.: 171 mg/dL (21 Apr 2021 11:37)        Culture - Blood (collected 21 Apr 2021 13:33)  Source: .Blood Blood-Peripheral  Preliminary Report (22 Apr 2021 14:01):    No growth to date.        RADIOLOGY & ADDITIONAL TESTS:

## 2021-04-22 NOTE — PHYSICAL THERAPY INITIAL EVALUATION ADULT - DISCHARGE DISPOSITION, PT EVAL
home with home PT pending progress in mobility ( to obtain baseline of sitting indep and safe at EOB x hours) and assist x1 person for transfer supine-sit EOB/home w/ home PT

## 2021-04-22 NOTE — EEG REPORT - NS EEG TEXT BOX
[facility]   COMPREHENSIVE EPILEPSY CENTER   REPORT OF ROUTINE VIDEO EEG     [facility]: [address], [place] [address], Ph#: [phone]  LI:  [address], [place] [address], Ph#: [phone]      Patient Name: STACI GONG  Age and : 75y ([birth year])  MRN #: [id number]  Location: [address]  Referring Physician: Margaret Bocanegra    Study Date: 21    _____________________________________________________________  TECHNICAL INFORMATION    Placement and Labeling of Electrodes:  The EEG was performed utilizing 20 channels referential EEG connections (coronal over temporal over parasagittal montage) using all standard 10-20 electrode placements with EKG.  Recording was at a sampling rate of 256 samples per second per channel.  Time synchronized digital video recording was done simultaneously with EEG recording.  A low light infrared camera was used for low light recording.  Gilles and seizure detection algorithms were utilized.    _____________________________________________________________  HISTORY    Patient is a 75y old  Female who presents with a chief complaint of dyspnea and imbalance. 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       PERTINENT MEDICATION:  MEDICATIONS  (STANDING):  aspirin enteric coated 81 milliGRAM(s) Oral daily  ATENolol  Tablet 100 milliGRAM(s) Oral daily  atorvastatin 40 milliGRAM(s) Oral at bedtime  azithromycin  IVPB 500 milliGRAM(s) IV Intermittent every 24 hours  cefTRIAXone   IVPB 1000 milliGRAM(s) IV Intermittent every 24 hours  clotrimazole 1% Cream 1 Application(s) Topical every 12 hours  enoxaparin Injectable 100 milliGRAM(s) SubCutaneous two times a day  sodium chloride 0.45%. 1000 milliLiter(s) (75 mL/Hr) IV Continuous <Continuous>  sodium chloride 0.45%. 1000 milliLiter(s) (75 mL/Hr) IV Continuous <Continuous>    _____________________________________________________________  STUDY INTERPRETATION    Findings: The background was continuous, spontaneously variable and reactive. During wakefulness, the posterior dominant rhythm consisted of symmetric, well-modulated 6-7 Hz activity, with amplitude to 30 uV, that attenuated to eye opening.  Low amplitude frontal beta was noted in wakefulness.    Background Slowing:  Diffuse theta and polymorphic delta slowing.    Focal Slowing:   Near continuous theta/delta slowing in the left hemisphere region.    Sleep Background:  Drowsiness was characterized by fragmentation, attenuation, and slowing of the background activity.    Sleep was characterized by the presence of vertex waves, symmetric sleep spindles and K-complexes.    Other Non-Epileptiform Findings:  None were present.    Interictal Epileptiform Activity:   None were present.    Events:  Clinical events: None recorded.  Seizures: None recorded.    Activation Procedures:   Hyperventilation was not performed.    Photic stimulation was performed and did not elicit any abnormality.     Artifacts:  Intermittent myogenic and movement artifacts were noted.    ECG:  The heart rate on single channel ECG was predominantly between 70-80 BPM.    _____________________________________________________________  EEG SUMMARY/CLASSIFICATION    Abnormal EEG in the awake, drowsy and asleep states.  - Near continuous theta/delta slowing in the left hemisphere region.  - Moderate generalized slowing.    _____________________________________________________________  EEG IMPRESSION/CLINICAL CORRELATE    Abnormal EEG study.  1. Structural or functional abnormality in the left hemisphere.   2. Moderate nonspecific diffuse or multifocal cerebral dysfunction.   3. No epileptiform pattern or seizure seen.    _____________________________________________________________        Prelim read     Alexander Chew MD  Epilepsy Fellow, [facility]      Epilepsy Reading Room Ph# [phone]  Epilepsy Answering Service after 5 pm and before 8:30 am: # [phone] [facility]   COMPREHENSIVE EPILEPSY CENTER   REPORT OF ROUTINE VIDEO EEG     [facility]: [address], [place] [address], Ph#: [phone]  [address]:  [address], [place] [address], Ph#: [phone]      Patient Name: STACI GONG  Age and : 75y ([birth year])  MRN #: [id number]  Location: [address]  Referring Physician: Margaret Bocanegra    Study Date: 21    _____________________________________________________________  TECHNICAL INFORMATION    Placement and Labeling of Electrodes:  The EEG was performed utilizing 20 channels referential EEG connections (coronal over temporal over parasagittal montage) using all standard 10-20 electrode placements with EKG.  Recording was at a sampling rate of 256 samples per second per channel.  Time synchronized digital video recording was done simultaneously with EEG recording.  A low light infrared camera was used for low light recording.  Gilles and seizure detection algorithms were utilized.    _____________________________________________________________  HISTORY    Patient is a 75y old  Female who presents with a chief complaint of dyspnea and imbalance. 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       PERTINENT MEDICATION:  MEDICATIONS  (STANDING):  aspirin enteric coated 81 milliGRAM(s) Oral daily  ATENolol  Tablet 100 milliGRAM(s) Oral daily  atorvastatin 40 milliGRAM(s) Oral at bedtime  azithromycin  IVPB 500 milliGRAM(s) IV Intermittent every 24 hours  cefTRIAXone   IVPB 1000 milliGRAM(s) IV Intermittent every 24 hours  clotrimazole 1% Cream 1 Application(s) Topical every 12 hours  enoxaparin Injectable 100 milliGRAM(s) SubCutaneous two times a day  sodium chloride 0.45%. 1000 milliLiter(s) (75 mL/Hr) IV Continuous <Continuous>  sodium chloride 0.45%. 1000 milliLiter(s) (75 mL/Hr) IV Continuous <Continuous>    _____________________________________________________________  STUDY INTERPRETATION    Findings: The background was continuous, spontaneously variable and reactive. During wakefulness, the posterior dominant rhythm consisted of symmetric, well-modulated 7-8 Hz activity, with amplitude to 30 uV, that attenuated to eye opening.  Low amplitude frontal beta was noted in wakefulness.    Background Slowing:  Diffuse theta slowing.    Focal Slowing:   Near continuous theta/delta slowing in the left hemisphere region.    Sleep Background:  Drowsiness was characterized by fragmentation, attenuation, and slowing of the background activity.    Sleep was characterized by the presence of vertex waves, symmetric sleep spindles and K-complexes.    Other Non-Epileptiform Findings:  None were present.    Interictal Epileptiform Activity:   None were present.    Events:  Clinical events: None recorded.  Seizures: None recorded.    Activation Procedures:   Hyperventilation was not performed.    Photic stimulation was performed and did not elicit any abnormality.     Artifacts:  Intermittent myogenic and movement artifacts were noted.    ECG:  The heart rate on single channel ECG was predominantly between 70-80 BPM.    _____________________________________________________________  EEG SUMMARY/CLASSIFICATION    Abnormal EEG in the awake, drowsy and asleep states.  - Near continuous theta/delta slowing in the left hemisphere.  - background slowing, generalized including posterior rhythm less than 8 Hz.     _____________________________________________________________  EEG IMPRESSION/CLINICAL CORRELATE    Abnormal EEG study.  1. Structural or functional abnormality in the left hemisphere.   2. mild diffuse cerebral dysfunction.  3. No clear epileptiform pattern or seizure seen.    _____________________________________________________________    Alexander Chew MD  Epilepsy Fellow, [facility]    Nathanael Hayes MD PhD  Director, Epilepsy Division, [facility] EEG Reading Room Ph#: [phone]  Epilepsy Answering Service after 5PM and before 8:30AM: Ph#: [phone]

## 2021-04-23 LAB
ANION GAP SERPL CALC-SCNC: 8 MMOL/L — SIGNIFICANT CHANGE UP (ref 5–17)
BUN SERPL-MCNC: 36 MG/DL — HIGH (ref 7–18)
CALCIUM SERPL-MCNC: 8.8 MG/DL — SIGNIFICANT CHANGE UP (ref 8.4–10.5)
CHLORIDE SERPL-SCNC: 111 MMOL/L — HIGH (ref 96–108)
CO2 SERPL-SCNC: 22 MMOL/L — SIGNIFICANT CHANGE UP (ref 22–31)
CREAT SERPL-MCNC: 1.4 MG/DL — HIGH (ref 0.5–1.3)
GLUCOSE SERPL-MCNC: 138 MG/DL — HIGH (ref 70–99)
HCT VFR BLD CALC: 43.3 % — SIGNIFICANT CHANGE UP (ref 34.5–45)
HGB BLD-MCNC: 13.6 G/DL — SIGNIFICANT CHANGE UP (ref 11.5–15.5)
MAGNESIUM SERPL-MCNC: 2.6 MG/DL — SIGNIFICANT CHANGE UP (ref 1.6–2.6)
MCHC RBC-ENTMCNC: 31.1 PG — SIGNIFICANT CHANGE UP (ref 27–34)
MCHC RBC-ENTMCNC: 31.4 GM/DL — LOW (ref 32–36)
MCV RBC AUTO: 98.9 FL — SIGNIFICANT CHANGE UP (ref 80–100)
NRBC # BLD: 0 /100 WBCS — SIGNIFICANT CHANGE UP (ref 0–0)
PHOSPHATE SERPL-MCNC: 2.9 MG/DL — SIGNIFICANT CHANGE UP (ref 2.5–4.5)
PLATELET # BLD AUTO: 198 K/UL — SIGNIFICANT CHANGE UP (ref 150–400)
POTASSIUM SERPL-MCNC: 4.8 MMOL/L — SIGNIFICANT CHANGE UP (ref 3.5–5.3)
POTASSIUM SERPL-SCNC: 4.8 MMOL/L — SIGNIFICANT CHANGE UP (ref 3.5–5.3)
RBC # BLD: 4.38 M/UL — SIGNIFICANT CHANGE UP (ref 3.8–5.2)
RBC # FLD: 13.1 % — SIGNIFICANT CHANGE UP (ref 10.3–14.5)
SODIUM SERPL-SCNC: 141 MMOL/L — SIGNIFICANT CHANGE UP (ref 135–145)
WBC # BLD: 8.51 K/UL — SIGNIFICANT CHANGE UP (ref 3.8–10.5)
WBC # FLD AUTO: 8.51 K/UL — SIGNIFICANT CHANGE UP (ref 3.8–10.5)

## 2021-04-23 PROCEDURE — 99233 SBSQ HOSP IP/OBS HIGH 50: CPT | Mod: GC

## 2021-04-23 PROCEDURE — 99232 SBSQ HOSP IP/OBS MODERATE 35: CPT

## 2021-04-23 RX ORDER — SODIUM CHLORIDE 9 MG/ML
1000 INJECTION, SOLUTION INTRAVENOUS
Refills: 0 | Status: DISCONTINUED | OUTPATIENT
Start: 2021-04-23 | End: 2021-04-24

## 2021-04-23 RX ORDER — LEVETIRACETAM 250 MG/1
750 TABLET, FILM COATED ORAL
Refills: 0 | Status: DISCONTINUED | OUTPATIENT
Start: 2021-04-23 | End: 2021-04-25

## 2021-04-23 RX ADMIN — Medication 81 MILLIGRAM(S): at 11:27

## 2021-04-23 RX ADMIN — Medication 1 APPLICATION(S): at 17:15

## 2021-04-23 RX ADMIN — APIXABAN 5 MILLIGRAM(S): 2.5 TABLET, FILM COATED ORAL at 17:15

## 2021-04-23 RX ADMIN — Medication 1 APPLICATION(S): at 06:21

## 2021-04-23 RX ADMIN — ATORVASTATIN CALCIUM 40 MILLIGRAM(S): 80 TABLET, FILM COATED ORAL at 22:47

## 2021-04-23 RX ADMIN — SODIUM CHLORIDE 75 MILLILITER(S): 9 INJECTION, SOLUTION INTRAVENOUS at 17:15

## 2021-04-23 RX ADMIN — APIXABAN 5 MILLIGRAM(S): 2.5 TABLET, FILM COATED ORAL at 06:21

## 2021-04-23 RX ADMIN — SODIUM CHLORIDE 75 MILLILITER(S): 9 INJECTION, SOLUTION INTRAVENOUS at 14:25

## 2021-04-23 RX ADMIN — LEVETIRACETAM 750 MILLIGRAM(S): 250 TABLET, FILM COATED ORAL at 17:15

## 2021-04-23 RX ADMIN — AZITHROMYCIN 255 MILLIGRAM(S): 500 TABLET, FILM COATED ORAL at 01:33

## 2021-04-23 NOTE — PROGRESS NOTE ADULT - PROBLEM SELECTOR PLAN 7
Presented with postural imbalance, compared to baseline  CT head showed old findings  Examination was limited due to posture, but strength was intact on left side of body  neuro consulted   Pt josephineal has h/o HTn  Will hold Quinilapril in view of DEJA  c/w atenolol   Monitor BP

## 2021-04-23 NOTE — ADVANCED PRACTICE NURSE CONSULT - RECOMMEDATIONS
-Clean all affected areas with warm water, mild soap, pat dry, and apply skin prep to the surrounding skin  -Apply TRIAD Moisture Barrier Cream to the Gluteal Fold and Bilateral Gluteal areas b.i.d. PRN  -Elevate/float the patients heels using heel protectors and reposition the patient Q 2hrs using wedges or pillows

## 2021-04-23 NOTE — PROGRESS NOTE ADULT - PROBLEM SELECTOR PLAN 3
Presented with shortness of breath, abdominal breathing  Was saturating 93 on RA at home  Saturating well on RA  Couldn't appreciate any crackles or ronchi due to body habitus  CXR is concerning for infiltrate in RUL, ?aspiration PNA  Pro-BNP is elevated, patient does not look fluid overload  EKG showed new onset Afib, rate controlled, less concerning for flash pulmonary edema  Will hold off diuretics  cw rocephin and zithro for empiric coverage  fu echocardiogram   Monitor respiratory status Presented with shortness of breath, abdominal breathing  Was saturating 93 on RA at home  Saturating well on RA  CXR is concerning for infiltrate in RUL, ?aspiration PNA  Pro-BNP is elevated, patient does not look fluid overload  EKG showed new onset Afib, rate controlled, less concerning for flash pulmonary edema  Will hold off diuretics  S/p rocephin and zithro (3 days)  Monitor respiratory status

## 2021-04-23 NOTE — DISCHARGE NOTE PROVIDER - NSDCMRMEDTOKEN_GEN_ALL_CORE_FT
aspirin 81 mg oral tablet:   ATENOLOL 100MG TABLETS: TAKE 1 TABLET BY MOUTH EVERY DAY  Eliquis 5 mg oral tablet: 1 tab(s) orally 2 times a day   QUINAPRIL 20MG TABLETS: TAKE 1 TABLET BY MOUTH EVERY DAY   aspirin 81 mg oral tablet:   ATENOLOL 100MG TABLETS: TAKE 1 TABLET BY MOUTH EVERY DAY  atorvastatin 40 mg oral tablet: 1 tab(s) orally once a day (at bedtime)  Eliquis 5 mg oral tablet: 1 tab(s) orally 2 times a day   levETIRAcetam 750 mg oral tablet: 1 tab(s) orally 2 times a day

## 2021-04-23 NOTE — PROGRESS NOTE ADULT - PROBLEM SELECTOR PLAN 4
Was found to have new onset Afib   rate controlled  CHADVASC 5   cw full dose lovenox  will change to eliquis tomorrow   will c/w atenolol home dose Was found to have new onset Afib   rate controlled  CHADVASC 5   cw eliquis  will c/w atenolol home dose

## 2021-04-23 NOTE — PROGRESS NOTE ADULT - PROBLEM SELECTOR PLAN 1
Pt unresponsive this am, likely syncope vs stroke vs seizure  CTA: Bilateral carotid artery stenosis  MRI: acute/ subacute lacunar infarct   F/u EEG  Cw aspirin, lovenox and statin. Will change lovenox to eliquis tomorrow   BP goal of normal of high normal to ensure perfusion  Dr. Marshall following  Will consult vascular likely syncope vs stroke vs seizure  CTA: Bilateral carotid artery stenosis  MRI: acute/ subacute lacunar infarct   EEG: Structural or functional abnormality in the left hemisphere, mild diffuse cerebral dysfunction, no epileptiform pattern or seizure   Cw aspirin, Eliquis and statin.    BP goal of normal of high normal to ensure perfusion  Dr. Marshall following  vascular consulted

## 2021-04-23 NOTE — CONSULT NOTE ADULT - ASSESSMENT
76 y/o Female w/ bilateral ICA stenosis; hx of CVA w/ residual right sided weakness and aphasia, new onset of Afib and dyspnea, encephalopathy, post stroke seizure    -BP control   -Cholesterol control   -Recommend Carotid Duplex   -Will follow   -D/w Dr Khanna and agrees

## 2021-04-23 NOTE — DISCHARGE NOTE PROVIDER - HOSPITAL COURSE
74 yo female from home lives with (care taker) with medical history significant for remote CVA with right sided residual weakness, HTN, comes in with dyspnea for past 2 days and encephalopathy with episodes of unresponsiveness. Pt was found to have new afib. She was started on full dose anticoagulation for a  CHADS2-VASc score of 5. Rate controlled with atenolol. On 4/21 rapid response was called as pt had another episode of unresponsiveness. Urgent CT/CTA were done and neuro consulted. Pt was found to have Bilateral stenosis of the carotid arteries. MRI was done and found to have acute/subacute lacunar infarct in the right thalamus. Pt was on aspirin and statin. EEG was done and found to have Structural or functional abnormality in the left hemisphere with mild diffuse cerebral dysfunction. Pt was hence started on keppra 750mg BID. Pt returned to her baseline mental status, passed speech and swallow evaluation and recommended home PT.     On admission pt had a      74 yo female from home lives with (care taker) with medical history significant for remote CVA with right sided residual weakness, HTN, comes in with dyspnea for past 2 days and encephalopathy with episodes of unresponsiveness. Pt was found to have new afib. She was started on full dose anticoagulation for a  CHADS2-VASc score of 5. Rate controlled with atenolol. On 4/21 rapid response was called as pt had another episode of unresponsiveness. Urgent CT/CTA were done and neuro consulted. Pt was found to have Bilateral stenosis of the carotid arteries. Vascular was consulted and carotid duplex ordered which showed.....................MRI was done and found to have acute/subacute lacunar infarct in the right thalamus. Pt was on aspirin and statin. EEG was done and found to have Structural or functional abnormality in the left hemisphere with mild diffuse cerebral dysfunction. Pt was hence started on keppra 750mg BID. Pt returned to her baseline mental status, passed speech and swallow evaluation and recommended home PT.     On admission pt had a SOB and was started on antibiotics azithromycin and ceftriaxone for possible RUL pneumonia and UTI with a positive UA. Blood cultures were negative and urine cultures not sent. Abx were discontinued after a 3 day course with clearing on CXR.     Pt is stable for discharge     76 yo female from home lives with (care taker) with medical history significant for remote CVA with right sided residual weakness, HTN, comes in with dyspnea for past 2 days and encephalopathy with episodes of unresponsiveness. Pt was found to have new afib. She was started on full dose anticoagulation for a  CHADS2-VASc score of 5. Rate controlled with atenolol. On 4/21 rapid response was called as pt had another episode of unresponsiveness. Urgent CT/CTA were done and neuro consulted. Pt was found to have Bilateral stenosis of the carotid arteries. Vascular was consulted and carotid duplex attempted but carotids were unable to be visualized. They recommended Pt is not a surgical candidate and will be managed medically with aspirin, statin, and continued BP control. MRI was done and found to have acute/subacute lacunar infarct in the right thalamus. Pt was on aspirin and statin. EEG was done and found to have Structural or functional abnormality in the left hemisphere with mild diffuse cerebral dysfunction. Pt was hence started on keppra 750mg BID. Pt returned to her baseline mental status, passed speech and swallow evaluation and recommended home PT.     On admission pt had a SOB and was started on antibiotics azithromycin and ceftriaxone for possible RUL pneumonia and UTI with a positive UA. Blood cultures were negative and urine cultures not sent. Abx were discontinued after a 3 day course with clearing on CXR.     Pt is stable for discharge     76 yo female from home lives with (care taker) with medical history significant for remote CVA with right sided residual weakness, HTN, comes in with dyspnea for past 2 days and encephalopathy with episodes of unresponsiveness. Pt was found to have new afib. She was started on full dose anticoagulation w/ eliquis for a  CHADS2-VASc score of 5. Rate controlled with atenolol. On 4/21 rapid response was called as pt had another episode of unresponsiveness. Urgent CT/CTA were done and neuro consulted. Pt was found to have Bilateral stenosis of the carotid arteries. Vascular was consulted and carotid duplex attempted but carotids were unable to be visualized. They recommended Pt is not a surgical candidate and will be managed medically with aspirin, statin, and continued BP control. MRI was done and found to have acute/subacute lacunar infarct in the right thalamus. Pt was on aspirin and statin. EEG was done and found to have Structural or functional abnormality in the left hemisphere with mild diffuse cerebral dysfunction. Pt was hence started on keppra 750mg BID. Pt returned to her baseline mental status, passed speech and swallow evaluation and recommended home PT.     On admission pt had a SOB and was started on antibiotics azithromycin and ceftriaxone for possible RUL pneumonia and UTI with a positive UA. Blood cultures were negative and urine cultures not sent. Abx were discontinued after a 3 day course with clearing on CXR.     Pt is stable for discharge

## 2021-04-23 NOTE — PROGRESS NOTE ADULT - ATTENDING COMMENTS
Transient encephalopathy in patient with chronic right sided weakness, noted to have recurrent left facial weakness today without epileptoform activity on EEG but given prior and recent CVA will recommend to start Keppra 750mg for presumed seziures.  NIHSS 11, MRS 4.  Continue with secondary stroke prevention.  Neurology fu 2 weeks after discharge.  Pls call back with questions.    david resident and Dr. Bocanegra
Patient seen and examined this morning. Plan of care discussed w/ medical team and Neurology. Patient admitted for SOB due to possible RUL pneumonia as well as acute encephalopathy, which appears to be secondary to new acute/subacute stroke in right thalamus and possible seizures. Appreciate neurology consult, start on Keppra 750 BID and continue w/ AC. Was on CTX/Azithro for possible pneumonia and UTI, however repeat CXR clear, U-cx was not sent, she is now off oxygen, blood cultures are cultures negative, completed 3 days will DC abx. Mental status now improved and back to baseline as per . Diet advanced as per S&S. PT recommending home PT pending improvement. Continue Atenolol for rate control of new onset afib, appreciate input from cardiology, started on full dose AC w/ Eliquis, continue tele monitoring, can DC tomorrow if no events. Continue to hold home Quinipril for hypotnesion and monitor BP. Apperciate palliative care input, she remains full code. Possible discharge home in 1-2 days w/ home PT if she remains stable.    Rest as per resident's note.
Patient seen and examined this morning. Plan of care discussed w/ medical team. Patient admitted for SOB due to possible RUL pneumonia as well as acute encephalopathy, unclear etiology at this time but differential includes encephalopathy secondary to stroke vs seizure vs infection vs hypotension. Appreciate neurology consult, obtain EEG and MRI, CTA of H&N w/ b/l carotid stenosis. Continue CTX/Azithro for possible pneumonia. Mental status now improved and back to baseline as per . S&S re-evaluated, will start on diet. Pending PT eval. Continue Atenolol for rate control of new onset afib, appreciate input from cardiology, started on full dose AC w/ Lovenox, will switch to Eliquis, continue tele monitoring and obtain echo. Continue to hold home Quinipril for hypotnesion and monitor BP. Follow up palliative consult.    Rest as per resident's note.
Patient seen and examined this morning and again this afternoon with her  and daughter at bedside. Plan of care discussed w/ medical team and neurology. Patient admitted for SOB due to possible RUL pneumonia as well as acute encephalopathy, unclear etiology at this time but differential includes encephalopathy secondary to stroke vs seizure vs infection vs hypotension. Appreciate neurology consult, obtain EEG and MRI, CTA of H&N w/ b/l carotid stenosis. Continue CTX/Azithro for possible pneumonia, f/up S&S, started on dysphagia diet. Will need PT eval. Continue Atenolol for rate control of new onset afib, appreciate input from cardiology, started on full dose AC w/ Lovenox, can switch to Eliquis however will ensure insurance coverage, continue tele monitoring and obtain echo. Had a rapid response this morning, was noted to be hypotensive, obtaining work up as per above, BP improved s/p IV bolus, mental status now back to baseline, continue w/ IV hydration and hold home Quinipril and monitor BP. Will obtain palliative consult as well.     Rest as per resident's note.

## 2021-04-23 NOTE — PROGRESS NOTE ADULT - SUBJECTIVE AND OBJECTIVE BOX
PGY-1 Progress Note discussed with attending    PAGER #: [[phone]] TILL 5:00 PM  PLEASE CONTACT ON CALL TEAM:  - On Call Team (Please refer to Marta) FROM 5:00 PM - 8:30PM  - Nightfloat Team FROM 8:30 -7:30 AM    CHIEF COMPLAINT & BRIEF HOSPITAL COURSE:    INTERVAL HPI/OVERNIGHT EVENTS:       MEDICATIONS  (STANDING):  apixaban 5 milliGRAM(s) Oral two times a day  aspirin enteric coated 81 milliGRAM(s) Oral daily  ATENolol  Tablet 100 milliGRAM(s) Oral daily  atorvastatin 40 milliGRAM(s) Oral at bedtime  azithromycin  IVPB 500 milliGRAM(s) IV Intermittent every 24 hours  cefTRIAXone   IVPB 1000 milliGRAM(s) IV Intermittent every 24 hours  clotrimazole 1% Cream 1 Application(s) Topical every 12 hours  sodium chloride 0.45%. 1000 milliLiter(s) (75 mL/Hr) IV Continuous <Continuous>    MEDICATIONS  (PRN):      REVIEW OF SYSTEMS:  CONSTITUTIONAL: No fever, weight loss, or fatigue  RESPIRATORY: No cough, wheezing, chills or hemoptysis; No shortness of breath  CARDIOVASCULAR: No chest pain, palpitations, dizziness, or leg swelling  GASTROINTESTINAL: No abdominal pain. No nausea, vomiting, or hematemesis; No diarrhea or constipation. No melena or hematochezia.  GENITOURINARY: No dysuria or hematuria, urinary frequency  NEUROLOGICAL: No headaches, memory loss, loss of strength, numbness, or tremors  SKIN: No itching, burning, rashes, or lesions     Vital Signs Last 24 Hrs  T(C): 36.9 (23 Apr 2021 11:01), Max: 36.9 (22 Apr 2021 19:12)  T(F): 98.5 (23 Apr 2021 11:01), Max: 98.5 (22 Apr 2021 23:33)  HR: 116 (23 Apr 2021 11:01) (85 - 116)  BP: 103/61 (23 Apr 2021 11:01) (92/54 - 168/89)  BP(mean): --  RR: 20 (23 Apr 2021 11:01) (18 - 20)  SpO2: 98% (23 Apr 2021 11:01) (95% - 100%)    PHYSICAL EXAMINATION:  GENERAL: NAD, well built  HEAD:  Atraumatic, Normocephalic  EYES:  conjunctiva and sclera clear  NECK: Supple, No JVD, Normal thyroid  CHEST/LUNG: Clear to auscultation. Clear to percussion bilaterally; No rales, rhonchi, wheezing, or rubs  HEART: Regular rate and rhythm; No murmurs, rubs, or gallops  ABDOMEN: Soft, Nontender, Nondistended; Bowel sounds present  NERVOUS SYSTEM:  Alert & Oriented X3,    EXTREMITIES:  2+ Peripheral Pulses, No clubbing, cyanosis, or edema  SKIN: warm dry                          13.6   8.51  )-----------( 198      ( 23 Apr 2021 06:52 )             43.3     04-23    141  |  111<H>  |  36<H>  ----------------------------<  138<H>  4.8   |  22  |  1.40<H>    Ca    8.8      23 Apr 2021 06:52  Phos  2.9     04-23  Mg     2.6     04-23        CARDIAC MARKERS ( 21 Apr 2021 15:13 )  0.017 ng/mL / x     / 63 U/L / x     / <1.0 ng/mL          CAPILLARY BLOOD GLUCOSE      POCT Blood Glucose.: 171 mg/dL (21 Apr 2021 11:37)        Culture - Blood (collected 21 Apr 2021 13:33)  Source: .Blood Blood-Peripheral  Preliminary Report (22 Apr 2021 14:01):    No growth to date.        RADIOLOGY & ADDITIONAL TESTS:                   PGY-1 Progress Note discussed with attending    PAGER #: [[phone]] TILL 5:00 PM  PLEASE CONTACT ON CALL TEAM:  - On Call Team (Please refer to Marta) FROM 5:00 PM - 8:30PM  - Nightfloat Team FROM 8:30 -7:30 AM    CHIEF COMPLAINT & BRIEF HOSPITAL COURSE:  74 yo female from home lives with (care taker) with medical history significant for remote CVA with right sided residual weakness, HTN, comes in with dyspnea for past 2 days. History was provided by Son who was at bedside. Patient is mostly bed bound, gets up and lies down. Does not move around at baseline. Patient was found to have abnormal abdominal breathing which started 2 days ago as per  and was not getting better, which got him concerned. Denies cough, fever, chest pain, nausea, vomiting. At baseline patient is able to balance herself in sitting position. But for past 2 days she was falling on left side to support herself and was not able to maintain balance. Otherwise there was no change in movement of left arm and leg compared to baseline. There was no change in mental status. She has speech difficulty at baseline but the family is able to understand her communications, unchanged from baseline. Denies trauma, fall, sick contacts, recent illness. No history of CAD or heart failure. Patient had a PCP who retired.    INTERVAL HPI/OVERNIGHT EVENTS:   No acute overnight events. Pt is in her usual state of health. Keppra was started for seizure prevention.     MEDICATIONS  (STANDING):  apixaban 5 milliGRAM(s) Oral two times a day  aspirin enteric coated 81 milliGRAM(s) Oral daily  ATENolol  Tablet 100 milliGRAM(s) Oral daily  atorvastatin 40 milliGRAM(s) Oral at bedtime  azithromycin  IVPB 500 milliGRAM(s) IV Intermittent every 24 hours  cefTRIAXone   IVPB 1000 milliGRAM(s) IV Intermittent every 24 hours  clotrimazole 1% Cream 1 Application(s) Topical every 12 hours  sodium chloride 0.45%. 1000 milliLiter(s) (75 mL/Hr) IV Continuous <Continuous>    MEDICATIONS  (PRN):      REVIEW OF SYSTEMS:  CONSTITUTIONAL: No fever, weight loss, or fatigue  RESPIRATORY: No cough, wheezing, chills or hemoptysis; No shortness of breath  CARDIOVASCULAR: No chest pain, palpitations, dizziness, or leg swelling  GASTROINTESTINAL: No abdominal pain. No nausea, vomiting, or hematemesis; No diarrhea or constipation. No melena or hematochezia.  GENITOURINARY: No dysuria or hematuria, urinary frequency  NEUROLOGICAL: No headaches, memory loss, loss of strength, numbness, or tremors  SKIN: No itching, burning, rashes, or lesions     Vital Signs Last 24 Hrs  T(C): 36.9 (23 Apr 2021 11:01), Max: 36.9 (22 Apr 2021 19:12)  T(F): 98.5 (23 Apr 2021 11:01), Max: 98.5 (22 Apr 2021 23:33)  HR: 116 (23 Apr 2021 11:01) (85 - 116)  BP: 103/61 (23 Apr 2021 11:01) (92/54 - 168/89)  BP(mean): --  RR: 20 (23 Apr 2021 11:01) (18 - 20)  SpO2: 98% (23 Apr 2021 11:01) (95% - 100%)    PHYSICAL EXAMINATION:  GENERAL: NAD, in her usual state of health   HEAD:  Atraumatic, Normocephalic  EYES:  conjunctiva and sclera clear  NECK: Supple, No JVD, Normal thyroid  CHEST/LUNG: Clear to auscultation. Clear to percussion bilaterally; No rales, rhonchi, wheezing, or rubs  HEART: Regular rate and rhythm; No murmurs, rubs, or gallops  ABDOMEN: Soft, Nontender, Nondistended; Bowel sounds present  NERVOUS SYSTEM:  Alert & Oriented X1-2,    EXTREMITIES:  2+ Peripheral Pulses, No clubbing, cyanosis, or edema  SKIN: sacral ulcer                           13.6   8.51  )-----------( 198      ( 23 Apr 2021 06:52 )             43.3     04-23    141  |  111<H>  |  36<H>  ----------------------------<  138<H>  4.8   |  22  |  1.40<H>    Ca    8.8      23 Apr 2021 06:52  Phos  2.9     04-23  Mg     2.6     04-23        CARDIAC MARKERS ( 21 Apr 2021 15:13 )  0.017 ng/mL / x     / 63 U/L / x     / <1.0 ng/mL          CAPILLARY BLOOD GLUCOSE      POCT Blood Glucose.: 171 mg/dL (21 Apr 2021 11:37)        Culture - Blood (collected 21 Apr 2021 13:33)  Source: .Blood Blood-Peripheral  Preliminary Report (22 Apr 2021 14:01):    No growth to date.        RADIOLOGY & ADDITIONAL TESTS:

## 2021-04-23 NOTE — PROGRESS NOTE ADULT - SUBJECTIVE AND OBJECTIVE BOX
++++++++++++++++++++++++++NOTE NOT COMPLETED+++++++++++++++++++++++++++++++ Neurology Follow up note    Name  STACI GONG    Interval History -No neurological events overnight     Subjective:  Pt unable to provide history.    Review of Systems:  Limited in aphasic pt   Constitutional: No generalized weakness. No fevers or chills.                    Eyes, Ears, Mouth, Throat: No vision loss   Respiratory: No shortness of breath or cough.                                Cardiovascular: No chest pain or palpitations  Gastrointestinal: No nausea or vomiting.                                         Genitourinary: No urinary incontinence or burning on urination.  Musculoskeletal: No joint pain.                                                           Dermatologic: No rash.  Neurological: as per HPI                                                                      Psychiatric: No behavioral problems.  Endocrine: No known hypoglycemia.               Hematologic/Lymphatic: No easy bleeding.    MEDICATIONS  (STANDING):  apixaban 5 milliGRAM(s) Oral two times a day  aspirin enteric coated 81 milliGRAM(s) Oral daily  ATENolol  Tablet 100 milliGRAM(s) Oral daily  atorvastatin 40 milliGRAM(s) Oral at bedtime  azithromycin  IVPB 500 milliGRAM(s) IV Intermittent every 24 hours  cefTRIAXone   IVPB 1000 milliGRAM(s) IV Intermittent every 24 hours  clotrimazole 1% Cream 1 Application(s) Topical every 12 hours  sodium chloride 0.45%. 1000 milliLiter(s) (75 mL/Hr) IV Continuous <Continuous>    MEDICATIONS  (PRN):      Allergies    Allergy Status Unknown    Intolerances        Objective:   Vital Signs Last 24 Hrs  T(C): 36.9 (23 Apr 2021 11:01), Max: 36.9 (22 Apr 2021 19:12)  T(F): 98.5 (23 Apr 2021 11:01), Max: 98.5 (22 Apr 2021 23:33)  HR: 116 (23 Apr 2021 11:01) (85 - 116)  BP: 103/61 (23 Apr 2021 11:01) (92/54 - 168/89)  RR: 20 (23 Apr 2021 11:01) (18 - 20)  SpO2: 98% (23 Apr 2021 11:01) (95% - 100%)    General Exam: Limited in aphasic pt  General appearance: No acute distress                 Cardiovascular: Pedal dorsalis pulses intact bilaterally    Neurological Exam:  Mental Status: Orientated to self only, not date and place.  Attention impaired.  (+) Dysarthria, (+) Aphasia.  No neglect.  Knowledge, registration, short and long term memory impaired.    Cranial Nerves: CN I - not tested.  PERRL, EOMI, VFF, no nystagmus or diplopia.  No APD.  Fundi not visualized bilaterally.  CN V1-3 intact to light touch.  (+) Facial asymmetry.  Hearing intact to finger rub bilaterally.  Tongue, uvula and palate midline.  Sternocleidomastoid and Trapezius intact bilaterally.    Motor:   Tone: Spastic           Strength: Right sided hemiparesis.  Moves left side, unable to formally test-does not follow commands.    Pronator drift: Unable to assess-does not follow command                 Dysmetria:  Unable to assess-does not follow command  No truncal ataxia.    Tremor: No resting, postural or action tremor.  No myoclonus.    Sensation: Responds to light touch and withdraws to painful stimuli    Deep Tendon Reflexes: 1+ bilateral biceps, triceps, brachioradialis, knee.  Mute bilateral ankle.  Toes mute bilaterally.      Gait: Deferred at this time.       Other:    04-23    141  |  111<H>  |  36<H>  ----------------------------<  138<H>  4.8   |  22  |  1.40<H>    Ca    8.8      23 Apr 2021 06:52  Phos  2.9     04-23  Mg     2.6     04-23          Radiology  EEG SUMMARY/CLASSIFICATION    Abnormal EEG in the awake, drowsy and asleep states.  - Near continuous theta/delta slowing in the left hemisphere.  - background slowing, generalized including posterior rhythm less than 8 Hz.     _____________________________________________________________  EEG IMPRESSION/CLINICAL CORRELATE    Abnormal EEG study.  1. Structural or functional abnormality in the left hemisphere.   2. mild diffuse cerebral dysfunction.  3. No clear epileptiform pattern or seizure seen.    _____________________________________________________________    Alexander Chew MD  Epilepsy Fellow, [facility]    Nathanael Hayes MD PhD  Director, Epilepsy Division, [facility] EEG Reading Room Ph#: [phone]  Epilepsy Answering Service after 5PM and before 8:30AM: Ph#: [phone]       Electronic Signatures:  Nathanael Hayes)  (Signed 22-Apr-2021 18:48)  	Authored: EEG REPORT  	Co-Signer: EEG REPORT

## 2021-04-23 NOTE — DISCHARGE NOTE PROVIDER - ATTENDING COMMENTS
Patient seen and examined on day of discharge 4/25, vitals and labs reviewed. Plan of care discussed w/ medical team. Patient admitted for SOB and acute encephalopathy, which appears to be secondary to new acute/subacute stroke in right thalamus, possible seizures, and new onset Afbi. Appreciate neurology consult, started on Keppra 750 BID and continue w/ AC. Was empirically started on CTX/Azithro for possible pneumonia and UTI, however repeat CXR clear, U-cx was not sent, she is now off oxygen, blood cultures are cultures negative, completed 3 days and DC'd abx. Mental status now back to baseline as per . PT recommended home PT pending w/ home health aid. Continue Atenolol for rate control of new onset afib, appreciate input from cardiology, started on full dose AC w/ Eliquis. Continue to hold home Quinipril for hypotnesion during admission, BP now normotensive w/ Atenolol.    Discharge time spent: 35 minutes

## 2021-04-23 NOTE — DISCHARGE NOTE PROVIDER - NSDCCPCAREPLAN_GEN_ALL_CORE_FT
PRINCIPAL DISCHARGE DIAGNOSIS  Diagnosis: New onset atrial fibrillation  Assessment and Plan of Treatment: You have New onset atrial fibrillation which is an irregular rhythm of the heart and increases your chance of making clots. You were started on anticoagulation to prevent this and your heart rate was controlled with atenolol. You will be discharged on Eliquis to be taken 2 times a day and atenolol. Follow up with a cardiologist in 1-2 weeks. You may follow up with Dr. Christian as he makes house calls.         SECONDARY DISCHARGE DIAGNOSES  Diagnosis: Stroke  Assessment and Plan of Treatment: You had episodes of unresponsiveness and CT and MRI head were done. They revealed that you had a stroke in your brain involving the right thalamus. You were seen by the neurologist and started on aspirin and statin. Your CTA revealed that you have bilateral stenosis of your carotid arteries. You were seen by vascular for that and they recommended a carotid duplex which showed............. and recommeded..............Continue to take these medicaions and folllow up with your PCP and neurologist    Diagnosis: Seizure  Assessment and Plan of Treatment: You had episodes of unresponsiveness and we were consered about seizure like activity. An EEG was done which showed abnormality in the left hemisphere. You were started on a antiseizure medication. Continue to take keppra 2 times a day as prescribed.    Diagnosis: PNA (pneumonia)  Assessment and Plan of Treatment: You has shortness of breath on admission and had a infiltrate on your xray and hence were started on antibiotics. You completed a 3 day course of antibiotics with resolution of your symptoms and clearing of your chest xray.    Diagnosis: UTI (urinary tract infection)  Assessment and Plan of Treatment: Your urine analysis was positive on admission. You did not have any symptoms. You were already on antibiotics for possible pneumonia. You completed a 3 day course of antibiotics.    Diagnosis: HTN (hypertension)  Assessment and Plan of Treatment: You blood pressure is well controlled on atenolol only. Do not take quinapril. Follow up with you PCP in 1-2 week to restart your home medication as requried.    Diagnosis: DEJA (acute kidney injury)  Assessment and Plan of Treatment: You had a mildly elevated creatinine which was likely due to dehydration and contrast that you received for your CT scan. This resolved with IV fluids. Follow up with your PCP for repeat blood tests in 1-2 weeks.     PRINCIPAL DISCHARGE DIAGNOSIS  Diagnosis: New onset atrial fibrillation  Assessment and Plan of Treatment: You have New onset atrial fibrillation which is an irregular rhythm of the heart and increases your chance of making clots. You were started on anticoagulation to prevent this and your heart rate was controlled with atenolol. You will be discharged on Eliquis to be taken 2 times a day and atenolol. Follow up with a cardiologist in 1-2 weeks. You may follow up with Dr. Christian as he makes house calls.         SECONDARY DISCHARGE DIAGNOSES  Diagnosis: Stroke  Assessment and Plan of Treatment: You had episodes of unresponsiveness and CT and MRI head were done. They revealed that you had a stroke in your brain involving the right thalamus. You were seen by the neurologist and started on aspirin and statin. Your CTA revealed that you have bilateral stenosis of your carotid arteries. You are not a surgical condidate and will be mamaged medically with aspirin, statin, and continued BP control.  Continue to take these medicaions and folllow up with your PCP and neurologist in 1-2 week.    Diagnosis: Seizure  Assessment and Plan of Treatment: You had episodes of unresponsiveness and we were consered about seizure like activity. An EEG was done which showed abnormality in the left hemisphere. You were started on a antiseizure medication. Continue to take keppra 2 times a day as prescribed.    Diagnosis: PNA (pneumonia)  Assessment and Plan of Treatment: You has shortness of breath on admission and had a infiltrate on your xray and hence were started on antibiotics. You completed a 3 day course of antibiotics with resolution of your symptoms and clearing of your chest xray.    Diagnosis: UTI (urinary tract infection)  Assessment and Plan of Treatment: Your urine analysis was positive on admission. You did not have any symptoms. You were already on antibiotics for possible pneumonia. You completed a 3 day course of antibiotics.    Diagnosis: HTN (hypertension)  Assessment and Plan of Treatment: You blood pressure is well controlled on atenolol only. Do not take quinapril. Follow up with you PCP in 1-2 week to restart your home medication as requried.    Diagnosis: DEJA (acute kidney injury)  Assessment and Plan of Treatment: You had a mildly elevated creatinine which was likely due to dehydration and contrast that you received for your CT scan. This resolved with IV fluids. Follow up with your PCP for repeat blood tests in 1-2 weeks.

## 2021-04-23 NOTE — PROGRESS NOTE ADULT - PROBLEM SELECTOR PLAN 2
Presented with shortness of breath, abdominal breathing  Was saturating 93 on RA at home  Saturating well on RA  Couldn't appreciate any crackles or ronchi due to body habitus  CXR is concerning for infiltrate in RUL, ?aspiration PNA  Pro-BNP is elevated, patient does not look fluid overload  EKG showed new onset Afib, rate controlled, less concerning for flash pulmonary edema  Will hold off diuretics  cw rocephin and zithro for empiric coverage  echocardiogram : EF>55%  Monitor respiratory status  cardio consult Dr Bahena

## 2021-04-23 NOTE — PROGRESS NOTE ADULT - PROBLEM SELECTOR PLAN 8
has h/o HTn  Will hold Quinilapril in view of DEJA  c/w atenolol   Monitor BP Has h/o old cva with residual weakness

## 2021-04-23 NOTE — DISCHARGE NOTE PROVIDER - CARE PROVIDER_API CALL
Ric Christian)  Cardiology Medicine  [address] [facility], [place] [address]  Phone: [phone]  Fax: [phone]  Follow Up Time:

## 2021-04-23 NOTE — CONSULT NOTE ADULT - ATTENDING COMMENTS
75 y F with hx obesity, s/p CVA, mainly bedbound, dementia, adm for sob, weakness. A&Ox2, NAD.  is primary surrogate, wishes to continue all aggressive medical measures at this time. Remains FULL CODE. Palliative will remain available as needed.
I have discussed the case with the surgical house staff. I have personally seen, examined, and participated in the care of this patient. I have reviewed all pertinent clinical information.    Patient seen and examined with patient's  at bedside.  Per history, CVA in 1999 with residual right sided weakness. At baseline, patient is nonverbal and bedbound.  History significant for hypertension.   Admitted with new onset afib and dyspnea. Also being treated for seizures.     On exam, patient is alert, awake, follows commands.  Right sided hemiparesis. LUE and LLE sensation intact. LUE strength 5/5. LLE 4/5.     Imaging reviewed.  CTA significant for bilateral ICA stenosis, measuring > 70%. Also with left vertebral stenosis.   MR brain significant for chronic left sided infarct w/ wallerian degeneration. An acute/subacute lacunar infarct is noted in the right thalamus. But it is an incomplete and limited exam due to motion.   Carotid duplex attempted but carotids were unable to be visualized.     A/P 75 year old female with history of CVA with residual right sided weakness and limited baseline functional status with asymptomatic high grade bilateral carotid stenosis on CTA. Also with seizures and new onset afib.   - Patient is a not a surgical candidate for revascularization. Recommend continued medical therapy with continued use of aspirin, statin, and continued BP control. This was discussed with the patient and patient's  at bedside.   - Neurology following and recommended Keppra for seizure.   - Cardiology following and recommend eliquis for treatment of atrial fibrillation.

## 2021-04-23 NOTE — CONSULT NOTE ADULT - SUBJECTIVE AND OBJECTIVE BOX
Attending: Dr Khanna    HPI:  74 yo female from home lives with  (care taker) with medical history significant for remote CVA with right sided residual weakness, HTN, admitted to medical services w/ new onset of Afib and dyspnea, encephalopathy, post stroke seizure; Vascular surgery called re bilateral ICA stenosis; pt seen and examined at bedside,  at bedside; As per  pt suffered CVA in 1999 w/ residual right sided weakness and aphasia, at baseline mostly bedbound, no history no smoking, no ETOH use.         PAST MEDICAL & SURGICAL HISTORY:  HTN (hypertension)  CVA (cerebrovascular accident)        MEDICATIONS  (STANDING):  apixaban 5 milliGRAM(s) Oral two times a day  aspirin enteric coated 81 milliGRAM(s) Oral daily  ATENolol  Tablet 100 milliGRAM(s) Oral daily  atorvastatin 40 milliGRAM(s) Oral at bedtime  clotrimazole 1% Cream 1 Application(s) Topical every 12 hours  levETIRAcetam 750 milliGRAM(s) Oral two times a day  sodium chloride 0.45%. 1000 milliLiter(s) (75 mL/Hr) IV Continuous <Continuous>    MEDICATIONS  (PRN):      Allergies    Allergy Status Unknown  Intolerances        SOCIAL HISTORY:  No smoking, no ETOH use   FAMILY HISTORY:  Unknown     Vital Signs Last 24 Hrs  T(C): 36.9 (23 Apr 2021 11:01), Max: 36.9 (22 Apr 2021 19:12)  T(F): 98.5 (23 Apr 2021 11:01), Max: 98.5 (22 Apr 2021 23:33)  HR: 116 (23 Apr 2021 11:01) (85 - 116)  BP: 103/61 (23 Apr 2021 11:01) (92/54 - 168/89)  BP(mean): --  RR: 20 (23 Apr 2021 11:01) (18 - 20)  SpO2: 98% (23 Apr 2021 11:01) (95% - 100%)    I&O's Summary    22 Apr 2021 07:01  -  23 Apr 2021 07:00  --------------------------------------------------------  IN: 1211 mL / OUT: 750 mL / NET: 461 mL        LABS:                        13.6   8.51  )-----------( 198      ( 23 Apr 2021 06:52 )             43.3     04-23    141  |  111<H>  |  36<H>  ----------------------------<  138<H>  4.8   |  22  |  1.40<H>    Ca    8.8      23 Apr 2021 06:52  Phos  2.9     04-23  Mg     2.6     04-23          RADIOLOGY & ADDITIONAL STUDIES:  < from: CT Angio Neck w/ IV Cont (04.21.21 @ 14:27) >  FINDINGS:    CT BRAIN:    Gliosis in the left centrum semiovale and corona radiata. Encephalomalacia in the left subinsular region. Findings are consistent with chronicischemic changes.    Asymmetric dilatation of the left lateral ventricle which is in part ex vacuo in nature. This results in mild rightward bowing of the septum pellucidum. No effacement of the basal cisterns.    No acute intracranial hemorrhage or brain edema. Moderate white matter microvascular ischemic disease.    No displaced calvarial fracture. Left maxillary sinus mucosal thickening.    CTA NECK:    Stenoses described in this report are on the basis of NASCET criteria.    Approximately 70-75% % short segment stenosis of the origin and proximal segment of the right ICA due to heavily calcified plaque. Normal flow-related enhancement of the more distal right ICA in the neck.    Approximately 75-80% short segment stenosis of the origin and proximal segment of the left ICA due to partially calcified plaque. Normal flow-related enhancement of the more distal left ICA in the neck.    High-grade short segment stenosis of the origin of the left vertebral artery. The left vertebral artery is hypoplastic, but enhances normally distal to its origin.    Mild short segment stenosis of the origin of the right vertebral artery, with normal flow-related enhancement more distally in the neck.    Origins of the common carotid arteries are unremarkable.    Normal flow-related enhancement of the right common carotid artery.    Approximately 35% short segment stenosis of the distal left common carotid artery due to partially calcified plaque.    Approximately 35% short segment stenosis of the origin and proximal segment of the left subclavian artery due to predominantly noncalcified plaque. Normal flow-related enhancement of the more distal left subclavian artery.    CTA BRAIN:    The left skull base and intradural internal carotid artery is mildly decreased in caliber.    Otherwise normal flow-related enhancement of the skull base/intracranial internal carotid arteries.    Mild to moderate short segment stenosis of the origin of the left M1 segment. Normal flow-related of the more distal left MCA.    Normal flow-related enhancement of the bilateral anterior, right middle, and posterior cerebral arteries.    Anterior commuting artery is present. The left A1 segment is hypoplastic.    The right vertebral artery is hypoplastic and ends in the right PICA. Normal flow-related enhancement of the left vertebral artery and basilar artery.    No flow-limiting stenosis or vascular aneurysm. No AVM.    IMPRESSION:    CT brain:  No hydrocephalus, acute intracranial hemorrhage, mass effect,or brain edema.    Gliosis in the left centrum semiovale and corona radiata. Encephalomalacia in the left subinsular region. Findings are consistent with chronic ischemic changes.    CTA neck:  Approximately 70-75% % short segment stenosis of the origin and proximal segment of the right ICA due to heavily calcified plaque. Normal flow-related enhancement of the more distal right ICA in the neck.    Approximately 75-80% short segment stenosis of the origin and proximal segment of the left ICA due to partially calcified plaque. Normal flow-related enhancement of the more distal left ICA in the neck.    High-grade short segment stenosis of the origin of the left vertebral artery. The left vertebral artery is hypoplastic, but enhances normally distal to its origin.    CTA brain:  No flow-limiting stenosis, vascular aneurysm, or AVM.    The left skull base and intradural internal carotid artery is mildly decreased in caliber.    Otherwise normal flow-related enhancement of the skull base/intracranial internal carotid arteries.    Mild to moderate short segment stenosis of the origin of the left M1 segment. Normal flow-related of the more distal left MCA.    < end of copied text >      < from: MR Head No Cont (04.22.21 @ 11:26) >  FINDINGS:  Incomplete exam. Study also limited due to motion    There is moderate. diffuse parenchymal volume loss. There are T2 prolongation signal abnormalities in the periventricular subcortical white matter likely related to severe chronic microvascular ischemic changes.    Chronic infarct left corona radiata/basal ganglia, left thalamus with left wallerian degeneration    Acute/subacute lacunar infarct noted in the right thalamus    There is no acute parenchymal hemorrhage, parenchymal mass, or midline shift. There is no extra-axial fluid collection.  There is no hydrocephalus.    Mucosal thickening paranasal sinuses. Nonspecific mild prominence bilateral optic nerve sheath.    Nonspecific low T1 bone marrow signal which could be related to red marrow hyperplasia in the correct clinical setting. Marrow infiltrative disease not excluded.    IMPRESSION:  Incomplete and limited exam due to motion.    Acute/subacute lacunar infarct in the right thalamus.    No acute intracranial hemorrhage    < end of copied text >

## 2021-04-23 NOTE — ADVANCED PRACTICE NURSE CONSULT - ASSESSMENT
This is a 75yr old female patient admitted for Shortness of Breath, presenting with evidence of Incontinence Associated Dermatitis to the Bilateral Gluteus and Gluteal Fold areas

## 2021-04-24 DIAGNOSIS — I65.23 OCCLUSION AND STENOSIS OF BILATERAL CAROTID ARTERIES: ICD-10-CM

## 2021-04-24 DIAGNOSIS — R06.02 SHORTNESS OF BREATH: ICD-10-CM

## 2021-04-24 LAB
ANION GAP SERPL CALC-SCNC: 9 MMOL/L — SIGNIFICANT CHANGE UP (ref 5–17)
BUN SERPL-MCNC: 25 MG/DL — HIGH (ref 7–18)
CALCIUM SERPL-MCNC: 9.3 MG/DL — SIGNIFICANT CHANGE UP (ref 8.4–10.5)
CHLORIDE SERPL-SCNC: 109 MMOL/L — HIGH (ref 96–108)
CO2 SERPL-SCNC: 22 MMOL/L — SIGNIFICANT CHANGE UP (ref 22–31)
CREAT SERPL-MCNC: 1.2 MG/DL — SIGNIFICANT CHANGE UP (ref 0.5–1.3)
GLUCOSE SERPL-MCNC: 139 MG/DL — HIGH (ref 70–99)
HCT VFR BLD CALC: 44.2 % — SIGNIFICANT CHANGE UP (ref 34.5–45)
HGB BLD-MCNC: 13.9 G/DL — SIGNIFICANT CHANGE UP (ref 11.5–15.5)
MAGNESIUM SERPL-MCNC: 2.4 MG/DL — SIGNIFICANT CHANGE UP (ref 1.6–2.6)
MCHC RBC-ENTMCNC: 30.7 PG — SIGNIFICANT CHANGE UP (ref 27–34)
MCHC RBC-ENTMCNC: 31.4 GM/DL — LOW (ref 32–36)
MCV RBC AUTO: 97.6 FL — SIGNIFICANT CHANGE UP (ref 80–100)
NRBC # BLD: 0 /100 WBCS — SIGNIFICANT CHANGE UP (ref 0–0)
PHOSPHATE SERPL-MCNC: 2.5 MG/DL — SIGNIFICANT CHANGE UP (ref 2.5–4.5)
PLATELET # BLD AUTO: 206 K/UL — SIGNIFICANT CHANGE UP (ref 150–400)
POTASSIUM SERPL-MCNC: 4.7 MMOL/L — SIGNIFICANT CHANGE UP (ref 3.5–5.3)
POTASSIUM SERPL-SCNC: 4.7 MMOL/L — SIGNIFICANT CHANGE UP (ref 3.5–5.3)
RBC # BLD: 4.53 M/UL — SIGNIFICANT CHANGE UP (ref 3.8–5.2)
RBC # FLD: 12.9 % — SIGNIFICANT CHANGE UP (ref 10.3–14.5)
SODIUM SERPL-SCNC: 140 MMOL/L — SIGNIFICANT CHANGE UP (ref 135–145)
WBC # BLD: 7.64 K/UL — SIGNIFICANT CHANGE UP (ref 3.8–10.5)
WBC # FLD AUTO: 7.64 K/UL — SIGNIFICANT CHANGE UP (ref 3.8–10.5)

## 2021-04-24 PROCEDURE — 99223 1ST HOSP IP/OBS HIGH 75: CPT

## 2021-04-24 PROCEDURE — 99232 SBSQ HOSP IP/OBS MODERATE 35: CPT

## 2021-04-24 RX ORDER — ATORVASTATIN CALCIUM 80 MG/1
1 TABLET, FILM COATED ORAL
Qty: 30 | Refills: 0
Start: 2021-04-24 | End: 2021-05-23

## 2021-04-24 RX ORDER — QUINAPRIL HYDROCHLORIDE 40 MG/1
0 TABLET, FILM COATED ORAL
Qty: 0 | Refills: 0 | DISCHARGE

## 2021-04-24 RX ORDER — LEVETIRACETAM 250 MG/1
1 TABLET, FILM COATED ORAL
Qty: 60 | Refills: 0
Start: 2021-04-24 | End: 2021-05-23

## 2021-04-24 RX ADMIN — Medication 1 APPLICATION(S): at 05:10

## 2021-04-24 RX ADMIN — Medication 1 APPLICATION(S): at 17:30

## 2021-04-24 RX ADMIN — LEVETIRACETAM 750 MILLIGRAM(S): 250 TABLET, FILM COATED ORAL at 05:10

## 2021-04-24 RX ADMIN — ATENOLOL 100 MILLIGRAM(S): 25 TABLET ORAL at 05:10

## 2021-04-24 RX ADMIN — LEVETIRACETAM 750 MILLIGRAM(S): 250 TABLET, FILM COATED ORAL at 17:30

## 2021-04-24 RX ADMIN — APIXABAN 5 MILLIGRAM(S): 2.5 TABLET, FILM COATED ORAL at 05:10

## 2021-04-24 RX ADMIN — ATORVASTATIN CALCIUM 40 MILLIGRAM(S): 80 TABLET, FILM COATED ORAL at 21:56

## 2021-04-24 RX ADMIN — Medication 81 MILLIGRAM(S): at 12:20

## 2021-04-24 RX ADMIN — APIXABAN 5 MILLIGRAM(S): 2.5 TABLET, FILM COATED ORAL at 17:30

## 2021-04-24 NOTE — PROGRESS NOTE ADULT - REASON FOR ADMISSION
dyspnea and imbalance

## 2021-04-24 NOTE — PROGRESS NOTE ADULT - ASSESSMENT
Impression:  Encephalopathy in patient with prior CVA who is also noted to have new transient aphasia and left sided weakness in setting of recent Afib Dx concerning for acute CVA or seizure.  The sub/acute thalamic does not explain the patient's symptoms fully.       Recommendations:  1.  cw secondary stroke prevebtion with AC and and Lipitor 40mg HS  2             BP goal of normal of high normal to ensure perfusion  3. EEG. sz and fall ppx.  No AED for now  4   PT evaluation  5.          STAT CTH IF the patient has sudden change in mental status or neurological exam  6.          DVT PPx    Thank you for the courtesy of this consult.    dw resident and DR. Bocanegra
74 yo female from home lives with (care taker) with medical history significant for remote CVA with right sided residual weakness, HTN, comes in with dyspnea for 2 days.      Ed:   CT head  Patchy hypo densities in the left cerebral hemisphere predominantly involving the left corona radiata and basal ganglia likely represents encephalomalacia and gliosis due to chronic infarct, associated with ex vacuo dilatation of the left lateral ventricle. Old infarcts are also seen involving the left thalamus, and there is atrophy of the left anterior midbrain, consistent with wallerian degeneration. Additional patchy periventricular and subcortical white matter hypo densities are nonspecific, although likely on the basis of chronic microangiopathy. The basal cisterns are patent.  EKG-Afib  Pro-BNP elevated  CXR mild infiltrate    Patient is being admitted to telemetry for new onset Afib and evaluation of dyspnea.
74yo female w/ post stroke seizure    Recommendations:    - Keppra 750mg BID for seizure    - Consider continuing workup for additional etiologies of encephalopathy    - Maintain seizure and fall precautions     - PT as tolerated 
74 yo female from home lives with (care taker) with medical history significant for remote CVA with right sided residual weakness, HTN, comes in with dyspnea for past 2 days.      Ed:   CT head  Patchy hypo densities in the left cerebral hemisphere predominantly involving the left corona radiata and basal ganglia likely represents encephalomalacia and gliosis due to chronic infarct, associated with ex vacuo dilatation of the left lateral ventricle. Old infarcts are also seen involving the left thalamus, and there is atrophy of the left anterior midbrain, consistent with wallerian degeneration. Additional patchy periventricular and subcortical white matter hypo densities are nonspecific, although likely on the basis of chronic microangiopathy. The basal cisterns are patent.  EKG-Afib  Pro-BNP elevated  CXR mild infiltrate    Patient is being admitted to telemetry for new onset Afib and evaluation of dyspnea.

## 2021-04-24 NOTE — PROGRESS NOTE ADULT - SUBJECTIVE AND OBJECTIVE BOX
Patient seen and examined this morning. Patient sleeping comfortably, awakes to name calling. Denies any acute complaints.    apixaban 5 milliGRAM(s) Oral two times a day  aspirin enteric coated 81 milliGRAM(s) Oral daily  ATENolol  Tablet 100 milliGRAM(s) Oral daily  atorvastatin 40 milliGRAM(s) Oral at bedtime  clotrimazole 1% Cream 1 Application(s) Topical every 12 hours  levETIRAcetam 750 milliGRAM(s) Oral two times a day  sodium chloride 0.45%. 1000 milliLiter(s) IV Continuous <Continuous>      VITALS:  Vital Signs Last 24 Hrs  T(C): 36.6 (24 Apr 2021 11:48), Max: 36.8 (23 Apr 2021 15:15)  T(F): 97.8 (24 Apr 2021 11:48), Max: 98.3 (23 Apr 2021 20:50)  HR: 85 (24 Apr 2021 11:48) (74 - 120)  BP: 138/83 (24 Apr 2021 11:48) (102/70 - 165/79)  BP(mean): --  RR: 18 (24 Apr 2021 11:48) (16 - 18)  SpO2: 96% (24 Apr 2021 11:48) (91% - 98%)    EXAM:  GEN: obese female, in no acute distress  CVS: rrr, normal s1/s2  RESP: clear bilaterally  ABD: soft, nontender, nondistended, normoactive  EXT: no LE edema  NEURO: aaox1-2, residual right sided weakness and mildly slurred speech    LABS:                        13.9   7.64  )-----------( 206      ( 24 Apr 2021 06:52 )             44.2     04-24    140  |  109<H>  |  25<H>  ----------------------------<  139<H>  4.7   |  22  |  1.20    Ca    9.3      24 Apr 2021 06:52  Phos  2.5     04-24  Mg     2.4     04-24        IMAGING: reviewed

## 2021-04-24 NOTE — PROGRESS NOTE ADULT - PROBLEM SELECTOR PLAN 1
likely secondary to acute/subacute stroke vs seizure  CTA: Bilateral carotid artery stenosis  MRI: acute/ subacute lacunar infarct   EEG: Structural or functional abnormality in the left hemisphere, mild diffuse cerebral dysfunction, no epileptiform pattern or seizure   Cw aspirin, Eliquis and statin.    BP goal of normal of high normal to ensure perfusion  Dr. Marshall following, recommended to start Keppra 750 bid and Eliquis for AC as she has Afib  vascular consulted, attempted carotid US however unable to visualize, will f/up with vascular  Mental status back to baseline around AAOx2  Possible discharge to home w/ Home PT and HHA tomorrow

## 2021-04-24 NOTE — PROGRESS NOTE ADULT - PROBLEM SELECTOR PLAN 7
Attempted carotid US, however unable to visualize  continue w/ Aspirin for now  Follow up vascular consult

## 2021-04-24 NOTE — PROGRESS NOTE ADULT - PROBLEM SELECTOR PLAN 10
On Lovenox for dvt prophylaxis

## 2021-04-24 NOTE — PROGRESS NOTE ADULT - PROBLEM SELECTOR PROBLEM 4
New onset atrial fibrillation

## 2021-04-24 NOTE — PROGRESS NOTE ADULT - PROBLEM SELECTOR PLAN 4
Was found to have new onset Afib   rate controlled  CHADVASC 5   c/w eliquis  will c/w atenolol home dose for rate control

## 2021-04-24 NOTE — PROGRESS NOTE ADULT - PROBLEM SELECTOR PLAN 2
Presented with shortness of breath, abdominal breathing  Now saturating well on RA  CXR w/ possible RUL, was on Ceftriaxone and Azithro, however no infiltrates on repeat CXR therefore discontinued  Pro-BNP is elevated, patient does not look fluid overload  EKG w/ new onset Afib, rate controlled  echocardiogram : EF>55%  cardio consult Dr Bahena

## 2021-04-24 NOTE — PROGRESS NOTE ADULT - PROBLEM SELECTOR PROBLEM 6
UTI (urinary tract infection)
DEJA (acute kidney injury)

## 2021-04-24 NOTE — PROGRESS NOTE ADULT - PROBLEM SELECTOR PLAN 9
has h/o HTn  Will hold Quinilapril in view of DEJA and hypotension, BP now improved  c/w atenolol   Monitor BP

## 2021-04-24 NOTE — PROGRESS NOTE ADULT - PROBLEM SELECTOR PLAN 3
CXR w/ possible RUL, was on Ceftriaxone and Azithro, however no infiltrates on repeat CXR therefore discontinued s/p 3 days  Monitor respiratory status

## 2021-04-24 NOTE — PROGRESS NOTE ADULT - PROBLEM SELECTOR PROBLEM 5
DEJA (acute kidney injury)
CVA (cerebrovascular accident)

## 2021-04-24 NOTE — CHART NOTE - NSCHARTNOTEFT_GEN_A_CORE
Palliative care will sign off.  Please reconsult if needed.
Alerted by RN, patient's , Sim was upset this AM as patient was originally scheduled for discharge, however could not get a clear answer about discharge all morning. Writer spoke to patient at approximately 1PM and explained that patient was to be discharged home pending US doppler carotid arteries. US was performed yesterday in which the CXR [employer] informed Sim that "everything was done." However, it appears that the US doppler CA was unable to be visualized and no results. Writer then contacted vascular surgery attending who then spoke to Sim. Patient is a poor candidate for any sort of vascular procedures given the increased risks and questionable benefit. Sim was then called at 5:30PM in which he was updated regarding vascular recommendations. Sim stated that he already spoke to the vascular attending and is aware. Sim was then informed that patient is ready for discharge tomorrow, Sunday 4/25 pending reinstatement of home services. Sim also expressed that he left clothing for patient to wear when patient is to be discharged. Writer expressed his apologies regarding the lack of communication in which Sim then expressed forgiveness. All questions answered, primary team please plan for discharge tomorrow AM.

## 2021-04-24 NOTE — PROGRESS NOTE ADULT - PROBLEM SELECTOR PLAN 5
Has h/o old cva with residual weakness and a new acute/subacute right thalamic stroke  Has new onset Afib w/ elevated CV score therefore on Eliquis

## 2021-04-25 VITALS
RESPIRATION RATE: 18 BRPM | OXYGEN SATURATION: 95 % | DIASTOLIC BLOOD PRESSURE: 90 MMHG | SYSTOLIC BLOOD PRESSURE: 156 MMHG | TEMPERATURE: 98 F | HEART RATE: 85 BPM

## 2021-04-25 PROCEDURE — 93306 TTE W/DOPPLER COMPLETE: CPT

## 2021-04-25 PROCEDURE — 70496 CT ANGIOGRAPHY HEAD: CPT

## 2021-04-25 PROCEDURE — 80061 LIPID PANEL: CPT

## 2021-04-25 PROCEDURE — 82553 CREATINE MB FRACTION: CPT

## 2021-04-25 PROCEDURE — 83735 ASSAY OF MAGNESIUM: CPT

## 2021-04-25 PROCEDURE — 0225U NFCT DS DNA&RNA 21 SARSCOV2: CPT

## 2021-04-25 PROCEDURE — 80053 COMPREHEN METABOLIC PANEL: CPT

## 2021-04-25 PROCEDURE — 84100 ASSAY OF PHOSPHORUS: CPT

## 2021-04-25 PROCEDURE — 85025 COMPLETE CBC W/AUTO DIFF WBC: CPT

## 2021-04-25 PROCEDURE — 85027 COMPLETE CBC AUTOMATED: CPT

## 2021-04-25 PROCEDURE — 71045 X-RAY EXAM CHEST 1 VIEW: CPT

## 2021-04-25 PROCEDURE — 86901 BLOOD TYPING SEROLOGIC RH(D): CPT

## 2021-04-25 PROCEDURE — 36415 COLL VENOUS BLD VENIPUNCTURE: CPT

## 2021-04-25 PROCEDURE — 95957 EEG DIGITAL ANALYSIS: CPT

## 2021-04-25 PROCEDURE — 85610 PROTHROMBIN TIME: CPT

## 2021-04-25 PROCEDURE — 84443 ASSAY THYROID STIM HORMONE: CPT

## 2021-04-25 PROCEDURE — 93005 ELECTROCARDIOGRAM TRACING: CPT

## 2021-04-25 PROCEDURE — 80048 BASIC METABOLIC PNL TOTAL CA: CPT

## 2021-04-25 PROCEDURE — 81001 URINALYSIS AUTO W/SCOPE: CPT

## 2021-04-25 PROCEDURE — 92526 ORAL FUNCTION THERAPY: CPT

## 2021-04-25 PROCEDURE — 82746 ASSAY OF FOLIC ACID SERUM: CPT

## 2021-04-25 PROCEDURE — 92610 EVALUATE SWALLOWING FUNCTION: CPT

## 2021-04-25 PROCEDURE — 86769 SARS-COV-2 COVID-19 ANTIBODY: CPT

## 2021-04-25 PROCEDURE — 84300 ASSAY OF URINE SODIUM: CPT

## 2021-04-25 PROCEDURE — 85730 THROMBOPLASTIN TIME PARTIAL: CPT

## 2021-04-25 PROCEDURE — 70498 CT ANGIOGRAPHY NECK: CPT

## 2021-04-25 PROCEDURE — 82962 GLUCOSE BLOOD TEST: CPT

## 2021-04-25 PROCEDURE — 82436 ASSAY OF URINE CHLORIDE: CPT

## 2021-04-25 PROCEDURE — 82803 BLOOD GASES ANY COMBINATION: CPT

## 2021-04-25 PROCEDURE — 82607 VITAMIN B-12: CPT

## 2021-04-25 PROCEDURE — 70450 CT HEAD/BRAIN W/O DYE: CPT

## 2021-04-25 PROCEDURE — 83935 ASSAY OF URINE OSMOLALITY: CPT

## 2021-04-25 PROCEDURE — 83036 HEMOGLOBIN GLYCOSYLATED A1C: CPT

## 2021-04-25 PROCEDURE — 99239 HOSP IP/OBS DSCHRG MGMT >30: CPT | Mod: GC

## 2021-04-25 PROCEDURE — 83605 ASSAY OF LACTIC ACID: CPT

## 2021-04-25 PROCEDURE — 83880 ASSAY OF NATRIURETIC PEPTIDE: CPT

## 2021-04-25 PROCEDURE — 84484 ASSAY OF TROPONIN QUANT: CPT

## 2021-04-25 PROCEDURE — 95819 EEG AWAKE AND ASLEEP: CPT

## 2021-04-25 PROCEDURE — 86900 BLOOD TYPING SEROLOGIC ABO: CPT

## 2021-04-25 PROCEDURE — 82570 ASSAY OF URINE CREATININE: CPT

## 2021-04-25 PROCEDURE — 86803 HEPATITIS C AB TEST: CPT

## 2021-04-25 PROCEDURE — 82550 ASSAY OF CK (CPK): CPT

## 2021-04-25 PROCEDURE — 87040 BLOOD CULTURE FOR BACTERIA: CPT

## 2021-04-25 PROCEDURE — 72170 X-RAY EXAM OF PELVIS: CPT

## 2021-04-25 PROCEDURE — 99285 EMERGENCY DEPT VISIT HI MDM: CPT | Mod: 25

## 2021-04-25 PROCEDURE — 86850 RBC ANTIBODY SCREEN: CPT

## 2021-04-25 PROCEDURE — 97162 PT EVAL MOD COMPLEX 30 MIN: CPT

## 2021-04-25 PROCEDURE — 70551 MRI BRAIN STEM W/O DYE: CPT

## 2021-04-25 RX ADMIN — Medication 1 APPLICATION(S): at 06:07

## 2021-04-25 RX ADMIN — APIXABAN 5 MILLIGRAM(S): 2.5 TABLET, FILM COATED ORAL at 06:07

## 2021-04-25 RX ADMIN — LEVETIRACETAM 750 MILLIGRAM(S): 250 TABLET, FILM COATED ORAL at 06:07

## 2021-04-25 RX ADMIN — ATENOLOL 100 MILLIGRAM(S): 25 TABLET ORAL at 06:07

## 2021-04-25 NOTE — DISCHARGE NOTE NURSING/CASE MANAGEMENT/SOCIAL WORK - PATIENT PORTAL LINK FT
You can access the FollowMyHealth Patient Portal offered by [facility] by registering at the following website: [email or web address]. By joining [employer]’s FollowMyHealth portal, you will also be able to view your health information using other applications (apps) compatible with our system.

## 2021-04-26 LAB
CULTURE RESULTS: SIGNIFICANT CHANGE UP
SPECIMEN SOURCE: SIGNIFICANT CHANGE UP

## 2021-05-04 ENCOUNTER — INPATIENT (INPATIENT)
Facility: HOSPITAL | Age: 76
LOS: 5 days | Discharge: NURSING, REHAB OR LONG-TERM CARE | DRG: 682 | End: 2021-05-10
Attending: HOSPITALIST | Admitting: HOSPITALIST
Payer: COMMERCIAL

## 2021-05-04 VITALS
HEART RATE: 88 BPM | WEIGHT: 250 LBS | TEMPERATURE: 98 F | SYSTOLIC BLOOD PRESSURE: 94 MMHG | HEIGHT: 65 IN | RESPIRATION RATE: 18 BRPM | DIASTOLIC BLOOD PRESSURE: 68 MMHG | OXYGEN SATURATION: 98 %
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
## 2021-05-04 LAB
ALBUMIN SERPL ELPH-MCNC: 2.7 G/DL — LOW (ref 3.5–5)
ALP SERPL-CCNC: 99 U/L — SIGNIFICANT CHANGE UP (ref 40–120)
ALT FLD-CCNC: 28 U/L DA — SIGNIFICANT CHANGE UP (ref 10–60)
ANION GAP SERPL CALC-SCNC: 5 MMOL/L — SIGNIFICANT CHANGE UP (ref 5–17)
APTT BLD: 30.9 SEC — SIGNIFICANT CHANGE UP (ref 27.5–35.5)
AST SERPL-CCNC: 32 U/L — SIGNIFICANT CHANGE UP (ref 10–40)
BASOPHILS # BLD AUTO: 0 K/UL — SIGNIFICANT CHANGE UP (ref 0–0.2)
BASOPHILS NFR BLD AUTO: 0 % — SIGNIFICANT CHANGE UP (ref 0–2)
BILIRUB SERPL-MCNC: 1 MG/DL — SIGNIFICANT CHANGE UP (ref 0.2–1.2)
BUN SERPL-MCNC: 44 MG/DL — HIGH (ref 7–18)
CALCIUM SERPL-MCNC: 8.8 MG/DL — SIGNIFICANT CHANGE UP (ref 8.4–10.5)
CHLORIDE SERPL-SCNC: 107 MMOL/L — SIGNIFICANT CHANGE UP (ref 96–108)
CK SERPL-CCNC: 259 U/L — HIGH (ref 21–215)
CO2 SERPL-SCNC: 24 MMOL/L — SIGNIFICANT CHANGE UP (ref 22–31)
CREAT SERPL-MCNC: 1.4 MG/DL — HIGH (ref 0.5–1.3)
EOSINOPHIL # BLD AUTO: 0 K/UL — SIGNIFICANT CHANGE UP (ref 0–0.5)
EOSINOPHIL NFR BLD AUTO: 0 % — SIGNIFICANT CHANGE UP (ref 0–6)
GLUCOSE SERPL-MCNC: 148 MG/DL — HIGH (ref 70–99)
HCT VFR BLD CALC: 36.1 % — SIGNIFICANT CHANGE UP (ref 34.5–45)
HGB BLD-MCNC: 11.6 G/DL — SIGNIFICANT CHANGE UP (ref 11.5–15.5)
INR BLD: 2.16 RATIO — HIGH (ref 0.88–1.16)
LACTATE SERPL-SCNC: 1.9 MMOL/L — SIGNIFICANT CHANGE UP (ref 0.7–2)
LYMPHOCYTES # BLD AUTO: 0.76 K/UL — LOW (ref 1–3.3)
LYMPHOCYTES # BLD AUTO: 4 % — LOW (ref 13–44)
MCHC RBC-ENTMCNC: 31.7 PG — SIGNIFICANT CHANGE UP (ref 27–34)
MCHC RBC-ENTMCNC: 32.1 GM/DL — SIGNIFICANT CHANGE UP (ref 32–36)
MCV RBC AUTO: 98.6 FL — SIGNIFICANT CHANGE UP (ref 80–100)
MONOCYTES # BLD AUTO: 0.94 K/UL — HIGH (ref 0–0.9)
MONOCYTES NFR BLD AUTO: 5 % — SIGNIFICANT CHANGE UP (ref 2–14)
NEUTROPHILS # BLD AUTO: 16.99 K/UL — HIGH (ref 1.8–7.4)
NEUTROPHILS NFR BLD AUTO: 86 % — HIGH (ref 43–77)
PLATELET # BLD AUTO: 335 K/UL — SIGNIFICANT CHANGE UP (ref 150–400)
POTASSIUM SERPL-MCNC: 4.5 MMOL/L — SIGNIFICANT CHANGE UP (ref 3.5–5.3)
POTASSIUM SERPL-SCNC: 4.5 MMOL/L — SIGNIFICANT CHANGE UP (ref 3.5–5.3)
PROT SERPL-MCNC: 6.6 G/DL — SIGNIFICANT CHANGE UP (ref 6–8.3)
PROTHROM AB SERPL-ACNC: 24.8 SEC — HIGH (ref 10.6–13.6)
RBC # BLD: 3.66 M/UL — LOW (ref 3.8–5.2)
RBC # FLD: 14.6 % — HIGH (ref 10.3–14.5)
SARS-COV-2 RNA SPEC QL NAA+PROBE: SIGNIFICANT CHANGE UP
SODIUM SERPL-SCNC: 136 MMOL/L — SIGNIFICANT CHANGE UP (ref 135–145)
TROPONIN I SERPL-MCNC: <0.015 NG/ML — SIGNIFICANT CHANGE UP (ref 0–0.04)
WBC # BLD: 18.88 K/UL — HIGH (ref 3.8–10.5)
WBC # FLD AUTO: 18.88 K/UL — HIGH (ref 3.8–10.5)

## 2021-05-04 PROCEDURE — 99285 EMERGENCY DEPT VISIT HI MDM: CPT

## 2021-05-04 PROCEDURE — 71045 X-RAY EXAM CHEST 1 VIEW: CPT | Mod: 26

## 2021-05-04 RX ORDER — SODIUM CHLORIDE 9 MG/ML
500 INJECTION INTRAMUSCULAR; INTRAVENOUS; SUBCUTANEOUS ONCE
Refills: 0 | Status: COMPLETED | OUTPATIENT
Start: 2021-05-04 | End: 2021-05-04

## 2021-05-04 NOTE — ED ADULT TRIAGE NOTE - CHIEF COMPLAINT QUOTE
pt biba as per the ems pt was with physical therapist pt slide to the floor on to her knees , pt was assisted to the floor . b/l knee abrasion  , denies hitting head on aspirin. h/o stroke 1 week ago

## 2021-05-04 NOTE — ED PROVIDER NOTE - PROGRESS NOTE DETAILS
noted leukocytosis. family denies recent illness. cultures drawn will hold antibiotics for now, u/a/cx pending. Patient awake, being fed by son.

## 2021-05-04 NOTE — ED ADULT NURSE NOTE - OBJECTIVE STATEMENT
Patients present to ED today for fall out of bed. Son states that physical therapist came to the house today and tried to sit the pt up in the bed, pt slid off of the bed and fell onto the floor. Son and physical therapist able to grab her, no head strike on fall. When trying to get pt off of the floor, pt scraped knees

## 2021-05-04 NOTE — ED PROVIDER NOTE - PHYSICAL EXAMINATION
SKIN: R sided pruritis involving hand, arm and leg  abrasions to L knee  NEURO: pt attempting to speak but unable to express herself (baseline as per son), R arm and leg not moving and contracted, L hand able to squeeze on command SKIN: R sided paresis involving hand, arm and leg  abrasions to L knee  NEURO: pt attempting to speak but unable to express herself (baseline as per son), R arm and leg not moving and contracted, L hand able to squeeze on command

## 2021-05-04 NOTE — ED PROVIDER NOTE - OBJECTIVE STATEMENT
74 y/o female with PMHx of CVA, HTN presents to the ED c/o fall x today. Pt was d/c from hospital x 9 days ago after CVA. Pt returns to ED today for fall out of bed. Son states that physical therapist came to the house today and tried to sit the pt up in the bed, pt slid off of the bed and fell onto the floor. Son and physical therapist able to grab her, no head strike on fall. When trying to get pt off of the floor, pt scraped knees on the ground. Pt in ED as pt now needs to go into rehab as pt's family is unable to adequately care for pt at home, lacking facilities to do so at home. NKDA.

## 2021-05-05 DIAGNOSIS — R53.2 FUNCTIONAL QUADRIPLEGIA: ICD-10-CM

## 2021-05-05 DIAGNOSIS — I65.29 OCCLUSION AND STENOSIS OF UNSPECIFIED CAROTID ARTERY: ICD-10-CM
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
## 2021-05-05 DIAGNOSIS — N17.9 ACUTE KIDNEY FAILURE, UNSPECIFIED: ICD-10-CM

## 2021-05-05 DIAGNOSIS — E43 UNSPECIFIED SEVERE PROTEIN-CALORIE MALNUTRITION: ICD-10-CM

## 2021-05-05 DIAGNOSIS — I63.9 CEREBRAL INFARCTION, UNSPECIFIED: ICD-10-CM

## 2021-05-05 DIAGNOSIS — D72.829 ELEVATED WHITE BLOOD CELL COUNT, UNSPECIFIED: ICD-10-CM

## 2021-05-05 DIAGNOSIS — Z29.9 ENCOUNTER FOR PROPHYLACTIC MEASURES, UNSPECIFIED: ICD-10-CM

## 2021-05-05 DIAGNOSIS — Z51.5 ENCOUNTER FOR PALLIATIVE CARE: ICD-10-CM

## 2021-05-05 DIAGNOSIS — F03.90 UNSPECIFIED DEMENTIA, UNSPECIFIED SEVERITY, WITHOUT BEHAVIORAL DISTURBANCE, PSYCHOTIC DISTURBANCE, MOOD DISTURBANCE, AND ANXIETY: ICD-10-CM

## 2021-05-05 DIAGNOSIS — I48.91 UNSPECIFIED ATRIAL FIBRILLATION: ICD-10-CM

## 2021-05-05 PROBLEM — I10 ESSENTIAL (PRIMARY) HYPERTENSION: Chronic | Status: ACTIVE | Noted: 2021-04-20

## 2021-05-05 LAB
ANION GAP SERPL CALC-SCNC: 6 MMOL/L — SIGNIFICANT CHANGE UP (ref 5–17)
APPEARANCE UR: CLEAR — SIGNIFICANT CHANGE UP
BACTERIA # UR AUTO: ABNORMAL /HPF
BILIRUB UR-MCNC: NEGATIVE — SIGNIFICANT CHANGE UP
BUN SERPL-MCNC: 44 MG/DL — HIGH (ref 7–18)
CALCIUM SERPL-MCNC: 8.7 MG/DL — SIGNIFICANT CHANGE UP (ref 8.4–10.5)
CHLORIDE SERPL-SCNC: 106 MMOL/L — SIGNIFICANT CHANGE UP (ref 96–108)
CO2 SERPL-SCNC: 24 MMOL/L — SIGNIFICANT CHANGE UP (ref 22–31)
COLOR SPEC: YELLOW — SIGNIFICANT CHANGE UP
COMMENT - URINE: SIGNIFICANT CHANGE UP
CREAT SERPL-MCNC: 1.16 MG/DL — SIGNIFICANT CHANGE UP (ref 0.5–1.3)
DIFF PNL FLD: ABNORMAL
EPI CELLS # UR: SIGNIFICANT CHANGE UP /HPF
GLUCOSE SERPL-MCNC: 127 MG/DL — HIGH (ref 70–99)
GLUCOSE UR QL: NEGATIVE — SIGNIFICANT CHANGE UP
HCT VFR BLD CALC: 34.3 % — LOW (ref 34.5–45)
HGB BLD-MCNC: 11 G/DL — LOW (ref 11.5–15.5)
HYALINE CASTS # UR AUTO: ABNORMAL /LPF
KETONES UR-MCNC: NEGATIVE — SIGNIFICANT CHANGE UP
LEUKOCYTE ESTERASE UR-ACNC: NEGATIVE — SIGNIFICANT CHANGE UP
MAGNESIUM SERPL-MCNC: 2.6 MG/DL — SIGNIFICANT CHANGE UP (ref 1.6–2.6)
MCHC RBC-ENTMCNC: 32 PG — SIGNIFICANT CHANGE UP (ref 27–34)
MCHC RBC-ENTMCNC: 32.1 GM/DL — SIGNIFICANT CHANGE UP (ref 32–36)
MCV RBC AUTO: 99.7 FL — SIGNIFICANT CHANGE UP (ref 80–100)
NITRITE UR-MCNC: NEGATIVE — SIGNIFICANT CHANGE UP
NRBC # BLD: 0 /100 WBCS — SIGNIFICANT CHANGE UP (ref 0–0)
PH UR: 5 — SIGNIFICANT CHANGE UP (ref 5–8)
PHOSPHATE SERPL-MCNC: 3.6 MG/DL — SIGNIFICANT CHANGE UP (ref 2.5–4.5)
PLATELET # BLD AUTO: 306 K/UL — SIGNIFICANT CHANGE UP (ref 150–400)
POTASSIUM SERPL-MCNC: 4.5 MMOL/L — SIGNIFICANT CHANGE UP (ref 3.5–5.3)
POTASSIUM SERPL-SCNC: 4.5 MMOL/L — SIGNIFICANT CHANGE UP (ref 3.5–5.3)
PROT UR-MCNC: 15
RBC # BLD: 3.44 M/UL — LOW (ref 3.8–5.2)
RBC # FLD: 14.6 % — HIGH (ref 10.3–14.5)
RBC CASTS # UR COMP ASSIST: SIGNIFICANT CHANGE UP /HPF (ref 0–2)
SODIUM SERPL-SCNC: 136 MMOL/L — SIGNIFICANT CHANGE UP (ref 135–145)
SP GR SPEC: 1.02 — SIGNIFICANT CHANGE UP (ref 1.01–1.02)
URATE CRY FLD QL MICRO: ABNORMAL /HPF
UROBILINOGEN FLD QL: NEGATIVE — SIGNIFICANT CHANGE UP
WBC # BLD: 12.38 K/UL — HIGH (ref 3.8–10.5)
WBC # FLD AUTO: 12.38 K/UL — HIGH (ref 3.8–10.5)
WBC UR QL: SIGNIFICANT CHANGE UP /HPF (ref 0–5)

## 2021-05-05 PROCEDURE — 99222 1ST HOSP IP/OBS MODERATE 55: CPT

## 2021-05-05 PROCEDURE — 99223 1ST HOSP IP/OBS HIGH 75: CPT

## 2021-05-05 PROCEDURE — 99497 ADVNCD CARE PLAN 30 MIN: CPT | Mod: 25

## 2021-05-05 RX ORDER — CEFTRIAXONE 500 MG/1
1000 INJECTION, POWDER, FOR SOLUTION INTRAMUSCULAR; INTRAVENOUS EVERY 24 HOURS
Refills: 0 | Status: COMPLETED | OUTPATIENT
Start: 2021-05-05 | End: 2021-05-07

## 2021-05-05 RX ORDER — SODIUM CHLORIDE 9 MG/ML
1000 INJECTION, SOLUTION INTRAVENOUS
Refills: 0 | Status: DISCONTINUED | OUTPATIENT
Start: 2021-05-05 | End: 2021-05-07

## 2021-05-05 RX ORDER — APIXABAN 2.5 MG/1
5 TABLET, FILM COATED ORAL EVERY 12 HOURS
Refills: 0 | Status: DISCONTINUED | OUTPATIENT
Start: 2021-05-05 | End: 2021-05-10

## 2021-05-05 RX ORDER — ATORVASTATIN CALCIUM 80 MG/1
40 TABLET, FILM COATED ORAL AT BEDTIME
Refills: 0 | Status: DISCONTINUED | OUTPATIENT
Start: 2021-05-05 | End: 2021-05-10

## 2021-05-05 RX ORDER — ATENOLOL 25 MG/1
100 TABLET ORAL DAILY
Refills: 0 | Status: DISCONTINUED | OUTPATIENT
Start: 2021-05-05 | End: 2021-05-10

## 2021-05-05 RX ORDER — SODIUM CHLORIDE 9 MG/ML
1000 INJECTION INTRAMUSCULAR; INTRAVENOUS; SUBCUTANEOUS
Refills: 0 | Status: DISCONTINUED | OUTPATIENT
Start: 2021-05-05 | End: 2021-05-05

## 2021-05-05 RX ORDER — ASPIRIN/CALCIUM CARB/MAGNESIUM 324 MG
81 TABLET ORAL DAILY
Refills: 0 | Status: DISCONTINUED | OUTPATIENT
Start: 2021-05-05 | End: 2021-05-10

## 2021-05-05 RX ORDER — LEVETIRACETAM 250 MG/1
750 TABLET, FILM COATED ORAL
Refills: 0 | Status: DISCONTINUED | OUTPATIENT
Start: 2021-05-05 | End: 2021-05-10

## 2021-05-05 RX ADMIN — SODIUM CHLORIDE 70 MILLILITER(S): 9 INJECTION INTRAMUSCULAR; INTRAVENOUS; SUBCUTANEOUS at 06:59

## 2021-05-05 RX ADMIN — LEVETIRACETAM 750 MILLIGRAM(S): 250 TABLET, FILM COATED ORAL at 06:49

## 2021-05-05 RX ADMIN — SODIUM CHLORIDE 70 MILLILITER(S): 9 INJECTION, SOLUTION INTRAVENOUS at 21:47

## 2021-05-05 RX ADMIN — ATENOLOL 100 MILLIGRAM(S): 25 TABLET ORAL at 06:49

## 2021-05-05 RX ADMIN — SODIUM CHLORIDE 500 MILLILITER(S): 9 INJECTION INTRAMUSCULAR; INTRAVENOUS; SUBCUTANEOUS at 00:56

## 2021-05-05 RX ADMIN — APIXABAN 5 MILLIGRAM(S): 2.5 TABLET, FILM COATED ORAL at 06:49

## 2021-05-05 RX ADMIN — CEFTRIAXONE 100 MILLIGRAM(S): 500 INJECTION, POWDER, FOR SOLUTION INTRAMUSCULAR; INTRAVENOUS at 11:30

## 2021-05-05 NOTE — PROGRESS NOTE ADULT - PROBLEM SELECTOR PLAN 2
Pt noted to have wbc >18 k and left shift   will obtain UA, u cx   CXR negative for new infiltrates  will fu blood cultures   will start on rocephin for empiric coverage Pt noted to have wbc >18 k and left shift   will obtain UA, u cx   CXR negative for new infiltrates  fu blood cultures   will start on rocephin for empiric coverage  UA neg complete 3 days rocephin

## 2021-05-05 NOTE — H&P ADULT - ASSESSMENT
76 yo female from home lives with  with history significant for remote CVA with right sided residual weakness, obesity, HTN, carotid stenosis (b/l), afib (on eliquis), presumed seizure (on keppra) presents to the ED after a mechanical fall today.

## 2021-05-05 NOTE — PROGRESS NOTE ADULT - ASSESSMENT
74 yo female from home lives with  with history significant for remote CVA with right sided residual weakness, obesity, HTN, carotid stenosis (b/l), afib (on eliquis), presumed seizure (on keppra) presents to the ED after a mechanical fall today.

## 2021-05-05 NOTE — H&P ADULT - ATTENDING COMMENTS
Patient is a 75 year old female with a PMH of HTN, Atrial Fibrillation (on Eliquis), h/o CVA with right sided residual weakness, Bilateral Carotid Stenosis, (Presumed) Seizure Disorder who was BIBEMS due to fall.  Patient was discharged from [facility] on 4/25/2021 after being admitted on 4/20/2021 for treatment of CVA.  Patient was reportedly at home, and while working with the visiting physical therapist, as she was sitting up from bed, she slid down to the floor sustaining a minimally traumatic fall scraping her knees.    T(C): 36.6 (05-05-21 @ 01:49), Max: 36.7 (05-04-21 @ 16:21)  T(F): 97.9 (05-05-21 @ 01:49), Max: 98 (05-04-21 @ 16:21)  HR: 71 (05-05-21 @ 01:49) (71 - 88)  BP: 125/82 (05-05-21 @ 01:49) (94/68 - 125/82)  RR: 18 (05-05-21 @ 01:49) (18 - 18)  SpO2: 96% (05-05-21 @ 01:49) (96% - 98%)  Wt(kg): --    P/E: As above MAR    A/P:    Fall:  -XR Bilateral Knees identified advanced bilateral knee degeneration.  -Will need to involve     Leukocytosis:  -Potentially a reactive process  -Nevertheless, will send Procalcitonin    Bilateral Carotid Stenosis:  -Will resume patient's home medication    Chronic Renal Insufficiency:  -eGFR= 37 (Baseline= 45 on 5/4/2021)  -Will send UA and Urinary Electrolytes (Sodium, Potassium, Creatinine, Chloride)    Atrial Fibrillation:  -Will resume patient's home medication    CVA:  -Will resume patient's home medication    GI/DVT PPx:  -Eliquis  -Pepcid Patient is a 75 year old female with a PMH of HTN, Atrial Fibrillation (on Eliquis), h/o CVA with right sided residual weakness, Bilateral Carotid Stenosis, (Presumed) Seizure Disorder who was BIBEMS due to fall.  Patient was [facility] from [facility] on 4/25/[facility]21 after being admitted on 4/20/2021 for treatment of CVA.  Patient was reportedly at home, and while working with the visiting physical therapist, as she was sitting up from bed, she slid down to the floor sustaining a minimally traumatic fall scraping her knees.    T(C): 36.6 (05-05-21 @ 01:49), Max: 36.7 (05-04-21 @ 16:21)  T(F): 97.9 (05-05-21 @ 01:49), Max: 98 (05-04-21 @ 16:21)  HR: 71 (05-05-21 @ 01:49) (71 - 88)  BP: 125/82 (05-05-21 @ 01:49) (94/68 - 125/82)  RR: 18 (05-05-21 @ 01:49) (18 - 18)  SpO2: 96% (05-05-21 @ 01:49) (96% - 98%)  Wt(kg): --    P/E: As above MAR    A/P:    Fall:  -XR Bilateral Knees identified advanced bilateral knee degeneration.  -Will need to involve  for possible placement in an assisted living facility    Leukocytosis:  -Potentially a reactive process  -Nevertheless, will send Procalcitonin    Bilateral Carotid Stenosis:  -Will resume patient's home medication    Chronic Renal Insufficiency:  -eGFR= 37 (Baseline= 45 on 5/4/2021)  -Will send UA and Urinary Electrolytes (Sodium, Potassium, Creatinine, Chloride)    Atrial Fibrillation:  -Will resume patient's home medication    CVA:  -Will resume patient's home medication    GI/DVT PPx:  -Eliquis  -Pepcid

## 2021-05-05 NOTE — ADVANCED PRACTICE NURSE CONSULT - RECOMMEDATIONS
-Clean all wounds with normal saline and apply several coatings of skin prep to the surrounding skin  -Please consider consulting Palliative Care for further evaluation  -Apply TRIAD Moisture Barrier Cream to the Bilateral Gluteal and Coccyx areas b.i.d. PRN  -Please continue application of the external incontinence management system to assist with moisture management and wound healing  -Offload the L. Lat Foot, L. Ankle, and Heels  -Apply Xeroform gauze to the L. Knee wound and cover with a non-adherent dry dressing Daily PRN  -Elevate/float the patients heels using heel protectors and reposition the patient Q 2hrs using wedges or pillows

## 2021-05-05 NOTE — H&P ADULT - PROBLEM SELECTOR PLAN 6
Pt with recent hx of stroke   able to tolerate diet   at home on keppra for presumed seizure after cva admission   will cw keppra and send keppra levels in am

## 2021-05-05 NOTE — H&P ADULT - PROBLEM SELECTOR PLAN 7
IMPROVE VTE Individual Risk Assessment  RISK                                                         Points  [  ] Previous VTE                                      3  [  ] Thrombophilia                                   2  [  ] Lower limb paralysis                         2 (unable to hold up >15 seconds)    [  ] Current Cancer                                  2       (within 6 months)  [  ] Immobilization > 24 hrs                    1  [  ] ICU/CCU stay > 24 hrs                         1  [  ] Age > 60                                              1  heparin for dvt ppx

## 2021-05-05 NOTE — CONSULT NOTE ADULT - PROBLEM SELECTOR RECOMMENDATION 2
2/2 dementia.  Spouse reports poor po intake prior to admission.  Albumin 2.6.  With skin failure. Diet as tolerated.   Needs assist with feeding.  Currently NPO.   Nutrition consult  SLP berta noted.  Family to discuss feeding tube.

## 2021-05-05 NOTE — SWALLOW BEDSIDE ASSESSMENT ADULT - COMMENTS
Puree trial manually removed Pt received supine w/ open mouth posture. HOB elevated to 90°. Minimally arousable +audible grunts. Of note, clinical decline in pt's status since last seen.

## 2021-05-05 NOTE — PROGRESS NOTE ADULT - PROBLEM SELECTOR PLAN 1
Pt presented after a slip from bed  Pt with right sided paralysis and severe obesity   will consult physical therapy   pt will benefit from rehab as family is unable to care at home Pt presented after a slip from bed  Pt with right sided paralysis and severe obesity   will consult physical therapy   pt will benefit from rehab as family is unable to care at home  XR with no acute fractures, only degenerative changes  no head CT on admission, will obtain baseline neuro status from family  palliative consult

## 2021-05-05 NOTE — ADVANCED PRACTICE NURSE CONSULT - APN SPECIALTY LIST
Subjective:      Patient ID: Dylon Zee is a 64 y.o. female. Patient is here for PMB-on HRT. Patient also due for annual.     Gynecologic Exam         Review of Systems   Constitutional: Negative. HENT: Negative. Eyes: Negative. Respiratory: Negative. Cardiovascular: Negative. Gastrointestinal: Negative. Genitourinary: Positive for vaginal bleeding. Musculoskeletal: Negative. Skin: Negative. Neurological: Negative. Psychiatric/Behavioral: Negative.       Date of Birth 1961  Past Medical History:   Diagnosis Date    Allergic rhinitis     BMI 40.0-44.9, adult ([facility] [address] 5/15/2017    2017 a1c 5.3, knee  Pain, hyperlipidemia    Elevated blood pressure reading 9/3/2020    Hyperlipidemia     Medial meniscus tear     PONV (postoperative nausea and vomiting)     Scop patch worked well    Primary osteoarthritis involving multiple joints 2019     Past Surgical History:   Procedure Laterality Date    FOOT SURGERY  2015    rt tendon repair    KNEE ARTHROSCOPY Left 2017    OTHER SURGICAL HISTORY Right 2015    Secondary repair of peroneus brevis tendon tear R    SHOULDER SURGERY Left 2017    TUBAL LIGATION       OB History    Para Term  AB Living   3 3 3     3   SAB TAB Ectopic Molar Multiple Live Births             3      # Outcome Date GA Lbr Jimmie/2nd Weight Sex Delivery Anes PTL Lv   3 Term 48 37w0d   F Vag-Spont   WILL   2 Term 14 37w0d   F Vag-Spont   WILL   1 Term 82 37w0d   M Vag-Spont   WILL     Social History     Socioeconomic History    Marital status:      Spouse name: Not on file    Number of children: 3    Years of education: Not on file    Highest education level: Not on file   Occupational History    Occupation: RN   Social Needs    Financial resource strain: Not on file    Food insecurity     Worry: Not on file     Inability: Not on file   [employer] needs     Medical: Not on file     Non-medical: Not on Wound Ostomy Care file   Tobacco Use    Smoking status: Never Smoker    Smokeless tobacco: Never Used   Substance and Sexual Activity    Alcohol use: No     Alcohol/week: 0.0 standard drinks     Comment: rarely    Drug use: No    Sexual activity: Yes     Partners: Male   Lifestyle    Physical activity     Days per week: Not on file     Minutes per session: Not on file    Stress: Not on file   Relationships    Social connections     Talks on phone: Not on file     Gets together: Not on file     Attends Yarsani service: Not on file     Active member of club or organization: Not on file     Attends meetings of clubs or organizations: Not on file     Relationship status: Not on file    Intimate partner violence     Fear of current or ex partner: Not on file     Emotionally abused: Not on file     Physically abused: Not on file     Forced sexual activity: Not on file   Other Topics Concern    Not on file   Social History Narrative    Not on file     No Known Allergies  Outpatient Medications Marked as Taking for the 10/15/20 encounter (Office Visit) with Ari Newsome MD   Medication Sig Dispense Refill    DULoxetine (CYMBALTA) 60 MG extended release capsule TAKE 1 CAPSULE BY MOUTH ONE TIME A DAY AS NEEDED FOR PAIN 90 capsule 1    simvastatin (ZOCOR) 40 MG tablet 1 po q 24 hours for cholesterol 90 tablet 0    meloxicam (MOBIC) 15 MG tablet TAKE 1 TABLET BY MOUTH ONE TIME A DAY WITH FOOD 90 tablet 2    progesterone (PROMETRIUM) 100 MG capsule TAKE ONE TABLET BY MOUTH NIGHTLY 90 capsule 1    estradiol (ESTRACE) 2 MG tablet Take 1 tablet by mouth daily 90 tablet 3    fluticasone (FLONASE) 50 MCG/ACT nasal spray 1 spray by Nasal route daily 1 Bottle 3    tiZANidine (ZANAFLEX) 4 MG tablet 1 po q hs for muscle pain 90 tablet 1    acetaminophen (APAP EXTRA STRENGTH) 500 MG tablet 1 po bid prn 120 tablet 3    omeprazole (PRILOSEC OTC) 20 MG tablet Take 20 mg by mouth daily as needed.       cetirizine (ZYRTEC) 10 MG tablet Take 10 mg by mouth nightly        Family History   Problem Relation Age of Onset    High Blood Pressure Mother     Arthritis Mother     Heart Disease Father     High Blood Pressure Brother     Diabetes Brother     Heart Attack Brother     Heart Disease Maternal Grandmother     Stroke Paternal Grandmother     Heart Disease Paternal Grandfather     High Blood Pressure Brother     Depression Sister     Thyroid Disease Sister     High Cholesterol Sister     Depression Sister     High Cholesterol Sister     Rheum Arthritis Neg Hx     Osteoarthritis Neg Hx     Asthma Neg Hx     Breast Cancer Neg Hx     Cancer Neg Hx     Heart Failure Neg Hx     Hypertension Neg Hx     Migraines Neg Hx     Ovarian Cancer Neg Hx     Rashes/Skin Problems Neg Hx     Seizures Neg Hx      /86 (Site: Right Upper Arm, Position: Sitting, Cuff Size: Large Adult)   Pulse 65   Resp 17   Ht 5' 4\" (1.626 m)   Wt 236 lb 6.4 oz (107.2 kg)   LMP 09/07/2020   BMI 40.58 kg/m²       Objective:   Physical Exam  Constitutional:       General: She is not in acute distress. Appearance: Normal appearance. She is well-developed and normal weight. She is not diaphoretic. HENT:      Head: Normocephalic and atraumatic. Nose: Nose normal.      Mouth/Throat:      Mouth: Mucous membranes are moist.      Pharynx: Oropharynx is clear. Eyes:      Pupils: Pupils are equal, round, and reactive to light. Neck:      Musculoskeletal: Normal range of motion and neck supple. No neck rigidity. Thyroid: No thyromegaly. Cardiovascular:      Rate and Rhythm: Normal rate and regular rhythm. Heart sounds: Normal heart sounds. No murmur. No friction rub. No gallop. Pulmonary:      Effort: Pulmonary effort is normal. No respiratory distress. Breath sounds: Normal breath sounds. No wheezing or rales.    Chest:      Breasts:         Right: Normal. No swelling, bleeding, inverted nipple, mass, nipple discharge, skin change or tenderness. Left: Normal. No swelling, bleeding, inverted nipple, mass, nipple discharge, skin change or tenderness. Abdominal:      General: Abdomen is flat. Bowel sounds are normal. There is no distension. Palpations: Abdomen is soft. There is no hepatomegaly or mass. Tenderness: There is no abdominal tenderness. There is no guarding or rebound. Hernia: No hernia is present. There is no hernia in the left inguinal area. Genitourinary:     General: Normal vulva. Exam position: Lithotomy position. Pubic Area: No rash. Labia:         Right: No rash, tenderness, lesion or injury. Left: No rash, tenderness, lesion or injury. Urethra: No prolapse, urethral pain, urethral swelling or urethral lesion. Vagina: Normal. No signs of injury and foreign body. No vaginal discharge, erythema, tenderness or bleeding. Cervix: No cervical motion tenderness, discharge, friability, lesion, erythema, cervical bleeding or eversion. Uterus: Not deviated, not enlarged, not fixed, not tender and no uterine prolapse. Adnexa:         Right: No mass, tenderness or fullness. Left: No mass, tenderness or fullness. Rectum: Normal. Guaiac result negative. No mass, tenderness, anal fissure, external hemorrhoid or internal hemorrhoid. Normal anal tone. Comments: Normal urethral meatus, nl urethra, nl bladder. Musculoskeletal: Normal range of motion. General: No tenderness. Lymphadenopathy:      Cervical: No cervical adenopathy. Lower Body: No right inguinal adenopathy. No left inguinal adenopathy. Skin:     General: Skin is warm and dry. Findings: No erythema or rash. Neurological:      General: No focal deficit present. Mental Status: She is alert and oriented to person, place, and time. Deep Tendon Reflexes: Reflexes are normal and symmetric.    Psychiatric:         Mood and Affect: Mood normal.

## 2021-05-05 NOTE — H&P ADULT - NSICDXPASTMEDICALHX_GEN_ALL_CORE_FT
PAST MEDICAL HISTORY:  Atrial fibrillation     Carotid stenosis     CVA (cerebrovascular accident)     HTN (hypertension)

## 2021-05-05 NOTE — CONSULT NOTE ADULT - ATTENDING COMMENTS
75 y F w obesity, advanced vascular dementia, bedbound for several yrs, adm s/p fall. Known to Palliative service from recent admission. Hospice eligible.  will discuss GOC w son. Palliative will follow. Remains full code at this time.

## 2021-05-05 NOTE — ADVANCED PRACTICE NURSE CONSULT - ASSESSMENT
This is a 75yr old female patient admitted for a Fall, presenting with the following:  -There is a Stage 1 Pressure Injury to the L. Heel, as evident by non-blanchable erythema  -There is an intact DTI to the L. Lat Foot (x2), L. Ankle, and R. Heel without drainage  -There is a DTI to the Coccyx with epidermal separation, red tissue, and scant drainage  -There is evidence of Moisture Associated Skin Damage to the Bilateral Gluteal areas  -There is an Abrasion to the L. Knee with pink tissue and scant drainage

## 2021-05-05 NOTE — H&P ADULT - PROBLEM SELECTOR PLAN 2
Pt noted to have wbc >18 k and left shift   will obtain UA, u cx   CXR negative for new infiltrates  will fu blood cultures   will start on rocephin for empiric coverage

## 2021-05-05 NOTE — PROGRESS NOTE ADULT - SUBJECTIVE AND OBJECTIVE BOX
PGY-1 Progress Note discussed with attending    PAGER #: [[phone]] TILL 5:00 PM  PLEASE CONTACT ON CALL TEAM:   - On Call Team (Please refer to Marta) FROM 5:00 PM - 8:30PM  - Nightfloat Team FROM 8:30 -7:30 AM    CHIEF COMPLAINT & BRIEF HOSPITAL COURSE:      INTERVAL HPI/OVERNIGHT EVENTS:       REVIEW OF SYSTEMS:  CONSTITUTIONAL: No fever, weight loss, or fatigue  RESPIRATORY: No cough, wheezing, chills or hemoptysis; No shortness of breath  CARDIOVASCULAR: No chest pain, palpitations, dizziness, or leg swelling  GASTROINTESTINAL: No abdominal pain. No nausea, vomiting, or hematemesis; No diarrhea or constipation. No melena or hematochezia.  GENITOURINARY: No dysuria or hematuria, urinary frequency  NEUROLOGICAL: No headaches, memory loss, loss of strength, numbness, or tremors  SKIN: No itching, burning, rashes, or lesions     MEDICATIONS  (STANDING):  apixaban 5 milliGRAM(s) Oral every 12 hours  aspirin  chewable 81 milliGRAM(s) Oral daily  ATENolol  Tablet 100 milliGRAM(s) Oral daily  atorvastatin 40 milliGRAM(s) Oral at bedtime  cefTRIAXone   IVPB 1000 milliGRAM(s) IV Intermittent every 24 hours  levETIRAcetam 750 milliGRAM(s) Oral two times a day  sodium chloride 0.9%. 1000 milliLiter(s) (70 mL/Hr) IV Continuous <Continuous>    MEDICATIONS  (PRN):      Vital Signs Last 24 Hrs  T(C): 36.3 (05 May 2021 05:59), Max: 36.7 (04 May 2021 16:21)  T(F): 97.4 (05 May 2021 05:59), Max: 98 (04 May 2021 16:21)  HR: 80 (05 May 2021 05:59) (71 - 88)  BP: 107/72 (05 May 2021 05:59) (94/68 - 125/82)  BP(mean): --  RR: 16 (05 May 2021 05:59) (16 - 18)  SpO2: 96% (05 May 2021 05:59) (96% - 98%)    PHYSICAL EXAMINATION:  GENERAL: NAD, well built  HEAD:  Atraumatic, Normocephalic  EYES:  conjunctiva and sclera clear  NECK: Supple, No JVD, Normal thyroid  CHEST/LUNG: Clear to auscultation. Clear to percussion bilaterally; No rales, rhonchi, wheezing, or rubs  HEART: Regular rate and rhythm; No murmurs, rubs, or gallops  ABDOMEN: Soft, Nontender, Nondistended; Bowel sounds present  NERVOUS SYSTEM:  Alert & Oriented X3,    EXTREMITIES:  2+ Peripheral Pulses, No clubbing, cyanosis, or edema  SKIN: warm dry                          11.0   12.38 )-----------( 306      ( 05 May 2021 06:53 )             34.3     05-05    136  |  106  |  44<H>  ----------------------------<  127<H>  4.5   |  24  |  1.16    Ca    8.7      05 May 2021 06:53  Phos  3.6     05-05  Mg     2.6     05-05    TPro  6.6  /  Alb  2.7<L>  /  TBili  1.0  /  DBili  x   /  AST  32  /  ALT  28  /  AlkPhos  99  05-04    LIVER FUNCTIONS - ( 04 May 2021 19:02 )  Alb: 2.7 g/dL / Pro: 6.6 g/dL / ALK PHOS: 99 U/L / ALT: 28 U/L DA / AST: 32 U/L / GGT: x           CARDIAC MARKERS ( 04 May 2021 19:02 )  <0.015 ng/mL / x     / 259 U/L / x     / x          PT/INR - ( 04 May 2021 19:02 )   PT: 24.8 sec;   INR: 2.16 ratio         PTT - ( 04 May 2021 19:02 )  PTT:30.9 sec        I&O's Summary      CAPILLARY BLOOD GLUCOSE        CAPILLARY BLOOD GLUCOSE          RADIOLOGY & ADDITIONAL TESTS:                   PGY-1 Progress Note discussed with attending    PAGER #: [[phone]] TILL 5:00 PM  PLEASE CONTACT ON CALL TEAM:   - On Call Team (Please refer to Marta) FROM 5:00 PM - 8:30PM  - Nightfloat Team FROM 8:30 -7:30 AM    CHIEF COMPLAINT & BRIEF HOSPITAL COURSE:  75 F, home, Hx remote CVA with right sided residual weakness, obesity, HTN, carotid stenosis (b/l), afib (on eliquis), presumed seizure (on keppra) p/a mechanical fall today.  d/c from hospital x 9 days ago after CVA. Pt returns to ED today for fall out of bed. Son states that physical therapist came to the house today and tried to sit the pt up in the bed, pt slid off of the bed and fell onto the floor. Son and physical therapist able to grab her. Pt did not hit her head or LOC. When trying to get pt off of the floor, pt scraped knees on the ground. As per son family is unable to adequately care for pt at home and interested in rehab.     INTERVAL HPI/OVERNIGHT EVENTS:   Seen by speech and swallow, recs made to continue NPO as food had to be hand picked out of her mouth. Palliative consulted for poor prognosis and failure to thrive.    REVIEW OF SYSTEMS:  unable to obtain patient is non-communicative    MEDICATIONS  (STANDING):  apixaban 5 milliGRAM(s) Oral every 12 hours  aspirin  chewable 81 milliGRAM(s) Oral daily  ATENolol  Tablet 100 milliGRAM(s) Oral daily  atorvastatin 40 milliGRAM(s) Oral at bedtime  cefTRIAXone   IVPB 1000 milliGRAM(s) IV Intermittent every 24 hours  levETIRAcetam 750 milliGRAM(s) Oral two times a day  sodium chloride 0.9%. 1000 milliLiter(s) (70 mL/Hr) IV Continuous <Continuous>    MEDICATIONS  (PRN):      Vital Signs Last 24 Hrs  T(C): 36.3 (05 May 2021 05:59), Max: 36.7 (04 May 2021 16:21)  T(F): 97.4 (05 May 2021 05:59), Max: 98 (04 May 2021 16:21)  HR: 80 (05 May 2021 05:59) (71 - 88)  BP: 107/72 (05 May 2021 05:59) (94/68 - 125/82)  BP(mean): --  RR: 16 (05 May 2021 05:59) (16 - 18)  SpO2: 96% (05 May 2021 05:59) (96% - 98%)    PHYSICAL EXAMINATION:  GENERAL: lying in bed, mouth open, obese F on RA  HEAD:  Atraumatic, Normocephalic  MOUTH: poor dentition  EYES:  conjunctiva and sclera clear  NECK: Supple, No JVD,  CHEST/LUNG: Diminished air entry. No rales, rhonchi, wheezing, or rubs  HEART: irregular rate and rhythm; No murmurs, rubs, or gallops  ABDOMEN: Soft, Nontender, Nondistended; +soft abd hernia. Bowel sounds present  NERVOUS SYSTEM:  Alert & Oriented X0, contracted, limited ROM  EXTREMITIES:  2+ Peripheral Pulses, No clubbing, cyanosis, or edema  SKIN: warm dry, posterior gluteal skin breakdown. right arm ecchymoses.                          11.0   12.38 )-----------( 306      ( 05 May 2021 06:53 )             34.3     05-05    136  |  106  |  44<H>  ----------------------------<  127<H>  4.5   |  24  |  1.16    Ca    8.7      05 May 2021 06:53  Phos  3.6     05-05  Mg     2.6     05-05    TPro  6.6  /  Alb  2.7<L>  /  TBili  1.0  /  DBili  x   /  AST  32  /  ALT  28  /  AlkPhos  99  05-04    LIVER FUNCTIONS - ( 04 May 2021 19:02 )  Alb: 2.7 g/dL / Pro: 6.6 g/dL / ALK PHOS: 99 U/L / ALT: 28 U/L DA / AST: 32 U/L / GGT: x           CARDIAC MARKERS ( 04 May 2021 19:02 )  <0.015 ng/mL / x     / 259 U/L / x     / x          PT/INR - ( 04 May 2021 19:02 )   PT: 24.8 sec;   INR: 2.16 ratio         PTT - ( 04 May 2021 19:02 )  PTT:30.9 sec        I&O's Summary      CAPILLARY BLOOD GLUCOSE        CAPILLARY BLOOD GLUCOSE          RADIOLOGY & ADDITIONAL TESTS:

## 2021-05-05 NOTE — PROGRESS NOTE ADULT - PROBLEM SELECTOR PLAN 5
Pt noted to have cr 1.4  on last admission was found to have elevated cr and trended down to 1.2 -1,4  will send urine lytes and start on ivf for now Pt noted to have cr 1.4  on last admission was found to have elevated cr and trended down to 1.2 -1,4  will send urine lytes and start on ivf for now  improved to baseline

## 2021-05-05 NOTE — CONSULT NOTE ADULT - CONVERSATION DETAILS
Palliative care NP spoke with the pt's spouse Mr. Redman discussed her current clinical condition and goals of care. He stated the physical therapist that seeing the pt at home was not beneficial. She became more debilitated.  He would like her to go to [facility] for rehab.    Educated him about vascular dementia and provided anticipatory guidance. Mr Garcia stated he is hoping for a miracle to get her back to her baseline.    Discussed the risks vs benefits of cardiopulmonary resuscitation, intubation, and artificial nutrition.  He stated he will need time to discuss GOC with his son.   Pt remains a FULL CODE. Palliative care will follow.   All questions answered.  Support provided. Palliative care NP spoke with the pt's spouse Mr. Redman discussed her current clinical condition and goals of care. He stated the physical therapist that seeing the pt at home was not beneficial. She became more debilitated.  He would like her to go to [facility] for rehab.    Educated him about vascular dementia and provided anticipatory guidance. Mr Garcia stated he is hoping for a miracle to get her back to her baseline.    Discussed the risks vs benefits of cardiopulmonary resuscitation, intubation, and artificial nutrition.  He stated he will need time to discuss GOC with his son.   Pt remains a FULL CODE. Palliative care will follow.   All questions answered.  Support provided.    Due to the patient's health status and restrictions on visitation during the Public Health Emergency, the Advance Care Planning service was performed via telephone with patient's ___husband______.

## 2021-05-05 NOTE — H&P ADULT - PROBLEM SELECTOR PLAN 1
Pt presented after a slip from bed  Pt with right sided paralysis and severe obesity   will consult physical therapy   pt will benefit from rehab as family is unable to care at home

## 2021-05-05 NOTE — H&P ADULT - PROBLEM SELECTOR PLAN 5
Pt noted to have cr 1.4  on last admission was found to have elevated cr and trended down to 1.2 -1,4  will send urine lytes and start on ivf for now

## 2021-05-05 NOTE — CONSULT NOTE ADULT - PROBLEM SELECTOR RECOMMENDATION 3
S/p  fall. Bedbound.  Dependent.  High risk for further skin failure  Skin care per protocol  Frequent positioning  PT eval.

## 2021-05-05 NOTE — H&P ADULT - NSHPPHYSICALEXAM_GEN_ALL_CORE
Vital Signs Last 24 Hrs  T(C): 36.6 (05 May 2021 01:49), Max: 36.7 (04 May 2021 16:21)  T(F): 97.9 (05 May 2021 01:49), Max: 98 (04 May 2021 16:21)  HR: 71 (05 May 2021 01:49) (71 - 88)  BP: 125/82 (05 May 2021 01:49) (94/68 - 125/82)  BP(mean): --  RR: 18 (05 May 2021 01:49) (18 - 18)  SpO2: 96% (05 May 2021 01:49) (96% - 98%)    Physical exam   GENERAL: obese female in bed   HEAD:  Atraumatic, Normocephalic  EYES: EOMI, PERRLA, conjunctiva and sclera clear  ENT: Moist mucous membranes  NECK: Supple, No JVD  CHEST/LUNG: Clear to auscultation bilaterally; No rales, rhonchi, wheezing, or rubs.  HEART: Regular rate and rhythm; S1+ S2+  ABDOMEN: Bowel sounds present; Soft, Nontender, Nondistended. No hepatomegaly  EXTREMITIES:  2+ Peripheral Pulses, brisk capillary refill. No clubbing, cyanosis, or edema  NERVOUS SYSTEM:  Awake and follows command,  R arm and leg not moving and contracted, L hand able to squeeze on command, speech is muffled and slurred   MSK: right sided paralysis   SKIN: No rashes or lesions

## 2021-05-05 NOTE — PROGRESS NOTE ADULT - PROBLEM SELECTOR PLAN 6
Pt with recent hx of stroke   able to tolerate diet   at home on keppra for presumed seizure after cva admission   will cw keppra and send keppra levels in am Pt with recent hx of stroke   able to tolerate diet   at home on keppra for presumed seizure after cva admission   will cw keppra and   - f/u keppra levels in am

## 2021-05-05 NOTE — H&P ADULT - HISTORY OF PRESENT ILLNESS
76 yo female from home lives with  with history significant for remote CVA with right sided residual weakness, obesity, HTN, carotid stenosis (b/l), afib (on eliquis), presumed seizure (on keppra) presents to the ED after a mechanical fall today. Pt was d/c from hospital x 9 days ago after CVA. Pt returns to ED today for fall out of bed. Son states that physical therapist came to the house today and tried to sit the pt up in the bed, pt slid off of the bed and fell onto the floor. Son and physical therapist able to grab her. Pt did not hit her head or LOC. When trying to get pt off of the floor, pt scraped knees on the ground. As per son family is unable to adequately care for pt at home and interested in rehab. Pt is unable to provide ROS as she is unable to communicate adequately

## 2021-05-05 NOTE — CONSULT NOTE ADULT - SUBJECTIVE AND OBJECTIVE BOX
HPI:  76 yo female from home lives with  with history significant for remote CVA with right sided residual weakness, obesity, HTN, carotid stenosis (b/l), afib (on eliquis), presumed seizure (on keppra) presents to the ED after a mechanical fall today. Pt was d/c from hospital x 9 days ago after CVA. Pt returns to ED today for fall out of bed. Son states that physical therapist came to the house today and tried to sit the pt up in the bed, pt slid off of the bed and fell onto the floor. Son and physical therapist able to grab her. Pt did not hit her head or LOC. When trying to get pt off of the floor, pt scraped knees on the ground. As per son family is unable to adequately care for pt at home and interested in rehab. Pt is unable to provide ROS as she is unable to communicate adequately  (05 May 2021 02:38)    Interval hx: Pt seen and examined at the bedside. AOX0, responsive to pain. NAD. Pt is known to Palliative care from previous admission.       PAST MEDICAL & SURGICAL HISTORY:  HTN (hypertension)    CVA (cerebrovascular accident)    Atrial fibrillation    Carotid stenosis        SOCIAL HISTORY:    Admitted from:  home with spouse and family    [employer]:    [employer]                                    Surrogate/HCP/Guardian:  Damian Garcia          Phone#: (o) [phone]; h) [phone]    FAMILY HISTORY:  Pt is unable to participate   Baseline ADLs (prior to admission): bedbound    Allergies    Allergy Status Unknown    Intolerances      Present Symptoms:    Unable to obtain due to poor mentation    MEDICATIONS  (STANDING):  apixaban 5 milliGRAM(s) Oral every 12 hours  aspirin  chewable 81 milliGRAM(s) Oral daily  ATENolol  Tablet 100 milliGRAM(s) Oral daily  atorvastatin 40 milliGRAM(s) Oral at bedtime  cefTRIAXone   IVPB 1000 milliGRAM(s) IV Intermittent every 24 hours  levETIRAcetam 750 milliGRAM(s) Oral two times a day  sodium chloride 0.9%. 1000 milliLiter(s) (70 mL/Hr) IV Continuous <Continuous>    MEDICATIONS  (PRN):      PHYSICAL EXAM:    Vital Signs Last 24 Hrs  T(C): 36.3 (05 May 2021 05:59), Max: 36.7 (04 May 2021 16:21)  T(F): 97.4 (05 May 2021 05:59), Max: 98 (04 May 2021 16:21)  HR: 80 (05 May 2021 05:59) (71 - 88)  BP: 107/72 (05 May 2021 05:59) (94/68 - 125/82)  BP(mean): --  RR: 16 (05 May 2021 05:59) (16 - 18)  SpO2: 96% (05 May 2021 05:59) (96% - 98%)    General: Chronically ill appearing, obese woman, AOx0, lethargic.  NAD   Karnofsky Performance Score/Palliative Performance Status Version2: 40    %    HEENT: atraumatic, poor dentition  Lungs: unlabored on NC  CV: normal  tachycardia  GI: soft, non tender on palpation  :  ferrari  Musculoskeletal: contracted right arm, and right leg, bedbound  Skin: Stage 1 Pressure Injury to the L. Heel  Neuro: unable to follow commands  Oral intake ability: unable/only mouth care    LABS:                        11.0   12.38 )-----------( 306      ( 05 May 2021 06:53 )             34.3     05-05    136  |  106  |  44<H>  ----------------------------<  127<H>  4.5   |  24  |  1.16    Ca    8.7      05 May 2021 06:53  Phos  3.6     05-05  Mg     2.6     05-05    TPro  6.6  /  Alb  2.7<L>  /  TBili  1.0  /  DBili  x   /  AST  32  /  ALT  28  /  AlkPhos  99  05-04    Urinalysis Basic - ( 05 May 2021 09:48 )    Color: Yellow / Appearance: Clear / S.020 / pH: x  Gluc: x / Ketone: Negative  / Bili: Negative / Urobili: Negative   Blood: x / Protein: 15 / Nitrite: Negative   Leuk Esterase: Negative / RBC: 0-2 /HPF / WBC 0-2 /HPF   Sq Epi: x / Non Sq Epi: Few /HPF / Bacteria: Few /HPF        RADIOLOGY & ADDITIONAL STUDIES: Reviewed    ADVANCE DIRECTIVES: FULL CODE

## 2021-05-05 NOTE — H&P ADULT - PROBLEM SELECTOR PLAN 3
Pt was noted to have bl stenosis of carotids   pt not a candidate for surgical intervention and was started on asa and statin after being seen by vascular   continue with home medications

## 2021-05-05 NOTE — CONSULT NOTE ADULT - PROBLEM SELECTOR RECOMMENDATION 9
Hx CVA with right sided residual weakness 1999; became bedbound dependent 3 years ago.  Spouse reports pt was able to sit up in bed with 1 person support.    Pt is lethargic, AOX0 at the time of exam.  Contracted, bedbound.  Total care.  FAST 7c.    Pt is appropriate for hospice.     Pt's spouse stated he needs time to discuss code status with his son.   Pt remains a FULL CODE.  Palliative care will follow.

## 2021-05-06 LAB
ANION GAP SERPL CALC-SCNC: 11 MMOL/L — SIGNIFICANT CHANGE UP (ref 5–17)
BUN SERPL-MCNC: 35 MG/DL — HIGH (ref 7–18)
CALCIUM SERPL-MCNC: 8.4 MG/DL — SIGNIFICANT CHANGE UP (ref 8.4–10.5)
CHLORIDE SERPL-SCNC: 109 MMOL/L — HIGH (ref 96–108)
CO2 SERPL-SCNC: 21 MMOL/L — LOW (ref 22–31)
COVID-19 SPIKE DOMAIN AB INTERP: NEGATIVE — SIGNIFICANT CHANGE UP
COVID-19 SPIKE DOMAIN ANTIBODY RESULT: 0.4 U/ML — SIGNIFICANT CHANGE UP
CREAT SERPL-MCNC: 1 MG/DL — SIGNIFICANT CHANGE UP (ref 0.5–1.3)
GLUCOSE SERPL-MCNC: 145 MG/DL — HIGH (ref 70–99)
HCT VFR BLD CALC: 33.3 % — LOW (ref 34.5–45)
HGB BLD-MCNC: 10.6 G/DL — LOW (ref 11.5–15.5)
MAGNESIUM SERPL-MCNC: 2.4 MG/DL — SIGNIFICANT CHANGE UP (ref 1.6–2.6)
MCHC RBC-ENTMCNC: 31.5 PG — SIGNIFICANT CHANGE UP (ref 27–34)
MCHC RBC-ENTMCNC: 31.8 GM/DL — LOW (ref 32–36)
MCV RBC AUTO: 99.1 FL — SIGNIFICANT CHANGE UP (ref 80–100)
NRBC # BLD: 0 /100 WBCS — SIGNIFICANT CHANGE UP (ref 0–0)
PHOSPHATE SERPL-MCNC: 2.9 MG/DL — SIGNIFICANT CHANGE UP (ref 2.5–4.5)
PLATELET # BLD AUTO: 341 K/UL — SIGNIFICANT CHANGE UP (ref 150–400)
POTASSIUM SERPL-MCNC: 3.8 MMOL/L — SIGNIFICANT CHANGE UP (ref 3.5–5.3)
POTASSIUM SERPL-SCNC: 3.8 MMOL/L — SIGNIFICANT CHANGE UP (ref 3.5–5.3)
RBC # BLD: 3.36 M/UL — LOW (ref 3.8–5.2)
RBC # FLD: 14.7 % — HIGH (ref 10.3–14.5)
SARS-COV-2 IGG+IGM SERPL QL IA: 0.4 U/ML — SIGNIFICANT CHANGE UP
SARS-COV-2 IGG+IGM SERPL QL IA: NEGATIVE — SIGNIFICANT CHANGE UP
SODIUM SERPL-SCNC: 141 MMOL/L — SIGNIFICANT CHANGE UP (ref 135–145)
WBC # BLD: 12.21 K/UL — HIGH (ref 3.8–10.5)
WBC # FLD AUTO: 12.21 K/UL — HIGH (ref 3.8–10.5)

## 2021-05-06 PROCEDURE — 70450 CT HEAD/BRAIN W/O DYE: CPT | Mod: 26

## 2021-05-06 PROCEDURE — 99233 SBSQ HOSP IP/OBS HIGH 50: CPT | Mod: GC

## 2021-05-06 RX ADMIN — CEFTRIAXONE 100 MILLIGRAM(S): 500 INJECTION, POWDER, FOR SOLUTION INTRAMUSCULAR; INTRAVENOUS at 12:00

## 2021-05-06 RX ADMIN — Medication 81 MILLIGRAM(S): at 12:01

## 2021-05-06 RX ADMIN — APIXABAN 5 MILLIGRAM(S): 2.5 TABLET, FILM COATED ORAL at 17:06

## 2021-05-06 RX ADMIN — SODIUM CHLORIDE 70 MILLILITER(S): 9 INJECTION, SOLUTION INTRAVENOUS at 07:21

## 2021-05-06 RX ADMIN — ATORVASTATIN CALCIUM 40 MILLIGRAM(S): 80 TABLET, FILM COATED ORAL at 22:03

## 2021-05-06 RX ADMIN — LEVETIRACETAM 750 MILLIGRAM(S): 250 TABLET, FILM COATED ORAL at 17:06

## 2021-05-06 NOTE — PHYSICAL THERAPY INITIAL EVALUATION ADULT - DIAGNOSIS, PT EVAL
Limitations in strength, ROM, balance, functional endurance impairing her ability to perform ADL's with assistance.

## 2021-05-06 NOTE — SWALLOW BEDSIDE ASSESSMENT ADULT - ASR SWALLOW DENTITION
Pt additionally has R side lower protruding tooth/incomplete
Pt has protruding R lower tooth/incomplete

## 2021-05-06 NOTE — SWALLOW BEDSIDE ASSESSMENT ADULT - ORAL PHASE
Base of tongue pumping/Decreased anterior-posterior movement of the bolus/Delayed oral transit time/Lingual stasis Base of tongue pumping/Decreased anterior-posterior movement of the bolus/Delayed oral transit time Decreased anterior-posterior movement of the bolus/Delayed oral transit time Base of tongue pumping/Decreased anterior-posterior movement of the bolus/Delayed oral transit time/Stasis in lateral sulci/Lingual stasis

## 2021-05-06 NOTE — SWALLOW BEDSIDE ASSESSMENT ADULT - ORAL PREPARATORY PHASE
Prolonged mastication/Decreased mastication ability Within functional limits Pt demonstrated difficulty w/ control of presentation size independently.

## 2021-05-06 NOTE — SWALLOW BEDSIDE ASSESSMENT ADULT - SLP PERTINENT HISTORY OF CURRENT PROBLEM
75F from home, lives w/ . PMHx remote CVA w/ R side residual weakness, obesity, HTN, carotid stenosis (b/l), afib (eliquis), presumed seizure (keppra). Reportedly presented to ED after a mechanical fall out of bed, pt was d/c from hospital x9 days ago after CVA. Reportedly per son Physical Therapist (PT)came to house 5/4 & tried to sit pt up in bed, pt slid off of the bed & fell onto floor, son & PT grabbed her so she did not hit head/LOC. However, pt scraped knees trying to get up. Reportedly per son, family unable to adequately care for pt at home, interested in rehab.
75F from home, lives w/ . PMHx remote CVA w/ R side residual weakness, obesity, HTN, carotid stenosis (b/l), afib (eliquis), presumed seizure (keppra). Reportedly presented to ED after a mechanical fall out of bed, pt was d/c from hospital x9 days ago after CVA. Reportedly per son Physical Therapist (PT)came to house 5/4 & tried to sit pt up in bed, pt slid off of the bed & fell onto floor, son & PT grabbed her so she did not hit head/LOC. However, pt scraped knees trying to get up. Reportedly per son, family unable to adequately care for pt at home, interested in rehab.

## 2021-05-06 NOTE — SWALLOW BEDSIDE ASSESSMENT ADULT - CONSISTENCIES ADMINISTERED
water x3 cup sips/nectar thick [place] x1/solid water x3 cup sips/honey thick water x4 cup sips/thin liquid applesauce x5 tsp/puree ice chip x1 applesauce + cracker x3 tsp/mech soft

## 2021-05-06 NOTE — PROGRESS NOTE ADULT - PROBLEM SELECTOR PLAN 1
Pt presented after a slip from bed  Pt with right sided paralysis and severe obesity   will consult physical therapy   pt will benefit from rehab as family is unable to care at home  XR with no acute fractures, only degenerative changes  no head CT on admission, will obtain baseline neuro status from family  palliative consult Pt presented after a slip from bed  Pt with right sided paralysis and severe obesity   will consult physical therapy   pt will benefit from rehab as family is unable to care at home  XR with no acute fractures, only degenerative changes  CT head no acute change 5/6  palliative consult

## 2021-05-06 NOTE — SWALLOW BEDSIDE ASSESSMENT ADULT - COMMENTS
Suspected premature spillage into pharynx of thin liquids. Pt received supine, HOB elevated to 90°. AA+Ox2(name, year) Of note, pt requires longer wait time for word retrieval. Significant improvement from 5/6 AM. +aphasia +grunting +weak R labial seal. Pt c/o pain in R foot, RN Bessy made aware. Pt independently self-feeds w/ L arm, expressed preference for maintaining independence. During solid PO trial, pt c/o cracker getting stuck around protruding tooth. PO trial of liquid cleared all oral stasis from solid trials.

## 2021-05-06 NOTE — PROGRESS NOTE ADULT - PROBLEM SELECTOR PLAN 6
Pt with recent hx of stroke   able to tolerate diet   at home on keppra for presumed seizure after cva admission   will cw keppra and   - f/u keppra levels in am Pt with recent hx of stroke   able to tolerate diet   at home on keppra for presumed seizure after cva admission   will cw keppra and   - f/u keppra levels pending result

## 2021-05-06 NOTE — PHYSICAL THERAPY INITIAL EVALUATION ADULT - RANGE OF MOTION EXAMINATION, REHAB EVAL
R shoulder and elbow, and both hips and knees limited by strength and pain./deficits as listed below

## 2021-05-06 NOTE — PROGRESS NOTE ADULT - ATTENDING COMMENTS
Patient seen and examined. Case discussed with housestaff. Retained urine overnight and failed straight cath on multiple occasions so ferrari placed. Significantly more awake today. Repeat CT head done given encephalopathy yesterday and no acute changes seen. PT is recommending DC to [facility]. Speech and swallow re-evaluated patient and now on dysphagia diet. Spoke with patient's  and patient is not on CPAP/BIPAP or oxygen at home. I did express my concerns about the patient, her decline since prior admission, and that trajectory will continue to be downward. We also talked about skin breakdown and  reports that this is all new since last admission given decreased mobility.  reports understanding but also reports 20 years ago when she had her last stroke, she also was barely arrousable at times and was told she would not do well, but she improved and lived and so he remains hopeful. Continue ceftriaxone for acute cystitis. DC planning pending clinical status in the AM. Patient seen and examined. Case discussed with housestaff. Retained urine overnight and failed straight cath on multiple occasions so ferrari placed. Significantly more awake today. Repeat CT head done given encephalopathy yesterday and no acute changes seen. PT is recommending DC to [facility]. Speech and swallow re-evaluated patient and now on dysphagia diet. Spoke with patient's  and patient is not on CPAP/BIPAP or oxygen at home. I did express my concerns about the patient, her decline since prior admission, and that trajectory will continue to be downward. We also talked about skin breakdown and  reports that this is all new since last admission given decreased mobility.  reports understanding but also reports 20 years ago when she had her last stroke, she also was barely arrousable at times and was told she would not do well, but she improved and lived and so he remains hopeful. Continue ceftriaxone for acute cystitis. DC planning pending clinical status in the AM. Functional quadreplegia - full assist with ADLs.

## 2021-05-06 NOTE — PROGRESS NOTE ADULT - ASSESSMENT
74 yo female from home lives with  with history significant for remote CVA with right sided residual weakness, obesity, HTN, carotid stenosis (b/l), afib (on eliquis), presumed seizure (on keppra) presents to the ED after a mechanical fall today.  74 yo female from home lives with  with history significant for remote CVA with right sided residual weakness, obesity, HTN, carotid stenosis (b/l), afib (on eliquis), presumed seizure (on keppra) presents to the ED after a mechanical fall.

## 2021-05-06 NOTE — SWALLOW BEDSIDE ASSESSMENT ADULT - PHARYNGEAL PHASE
Delayed pharyngeal swallow/Cough post oral intake/Delayed cough post oral intake Delayed pharyngeal swallow

## 2021-05-06 NOTE — PROGRESS NOTE ADULT - PROBLEM SELECTOR PLAN 5
Pt noted to have cr 1.4  on last admission was found to have elevated cr and trended down to 1.2 -1,4  will send urine lytes and start on ivf for now  improved to baseline Pt noted to have cr 1.4  on last admission was found to have elevated cr and trended down to 1.2 -1,4  will send urine lytes and start on ivf for now  improved to baseline  Cr 1.16 today Pt noted to have cr 1.4  on last admission was found to have elevated cr and trended down to 1.2 -1,4  will send urine lytes and start on ivf for now  improved to baseline  Cr 1.00 today

## 2021-05-06 NOTE — PHYSICAL THERAPY INITIAL EVALUATION ADULT - IMPAIRMENTS FOUND, PT EVAL
aerobic capacity/endurance/anthropometric characteristics/arousal, attention, and cognition/cognitive impairment/ergonomics and body mechanics/gait, locomotion, and balance/joint integrity and mobility/muscle strength/poor safety awareness/posture/ROM/tone

## 2021-05-06 NOTE — PROGRESS NOTE ADULT - PROBLEM SELECTOR PLAN 2
Pt noted to have wbc >18 k and left shift   will obtain UA, u cx   CXR negative for new infiltrates  fu blood cultures   will start on rocephin for empiric coverage  UA neg complete 3 days rocephin Pt noted to have wbc >18 k and left shift   will obtain UA, u cx   CXR negative for new infiltrates  fu blood cultures   c/w  rocephin for empiric coverage  UA neg will complete 3 days rocephin D2 5/5-5-8

## 2021-05-06 NOTE — PHYSICAL THERAPY INITIAL EVALUATION ADULT - IMPAIRMENTS CONTRIBUTING IMPAIRED BED MOBILITY, REHAB EVAL
impaired balance/abnormal muscle tone/pain/impaired postural control/decreased ROM/impaired sensory feedback

## 2021-05-06 NOTE — SWALLOW BEDSIDE ASSESSMENT ADULT - SWALLOW EVAL: CRITERIA FOR SKILLED INTERVENTION MET
no significant expected improvement in functional status
current level of function same as previous level of function

## 2021-05-06 NOTE — SWALLOW BEDSIDE ASSESSMENT ADULT - SWALLOW EVAL: RECOMMENDED DIET
NPO, with non-oral nutrition/hydration/medications.
Mechanical soft diet w/ nectar thick liquids. Downgrade to puree if pt exhibits difficulty w/ mastication.

## 2021-05-06 NOTE — SWALLOW BEDSIDE ASSESSMENT ADULT - SWALLOW EVAL: BUCCAL STRENGTH AND MOBILITY
Unable to assess at this exam; Pt minimally arousable
GWFL; however difficult to assess secondary to difficulty following directions

## 2021-05-06 NOTE — PHYSICAL THERAPY INITIAL EVALUATION ADULT - GENERAL OBSERVATIONS, REHAB EVAL
Patient received supine HOB elevated, +NPO, +IV, RUE and RLE hypertonic and held in flexion. Multiple hematomas on trunk and UE's, abrasions on anterior side of both knees. Redness on posterior gluteal areas.

## 2021-05-06 NOTE — PHYSICAL THERAPY INITIAL EVALUATION ADULT - ADDITIONAL COMMENTS
Obtained history from family, family reports patient was bedbound previous to ED admission, was able to perform some activities in bed but needed assistance sitting up in bed, sitting edge of bed and dependent on getting out of bed. Family also reports patient has not walked in years due to fear and was also afraid of getting out of bed.

## 2021-05-06 NOTE — PROGRESS NOTE ADULT - SUBJECTIVE AND OBJECTIVE BOX
PGY-1 Progress Note discussed with attending    PAGER #: [[phone]] TILL 5:00 PM  PLEASE CONTACT ON CALL TEAM:   - On Call Team (Please refer to Marta) FROM 5:00 PM - 8:30PM  - Nightfloat Team FROM 8:30 -7:30 AM    CHIEF COMPLAINT & BRIEF HOSPITAL COURSE:      INTERVAL HPI/OVERNIGHT EVENTS:       REVIEW OF SYSTEMS:  CONSTITUTIONAL: No fever, weight loss, or fatigue  RESPIRATORY: No cough, wheezing, chills or hemoptysis; No shortness of breath  CARDIOVASCULAR: No chest pain, palpitations, dizziness, or leg swelling  GASTROINTESTINAL: No abdominal pain. No nausea, vomiting, or hematemesis; No diarrhea or constipation. No melena or hematochezia.  GENITOURINARY: No dysuria or hematuria, urinary frequency  NEUROLOGICAL: No headaches, memory loss, loss of strength, numbness, or tremors  SKIN: No itching, burning, rashes, or lesions     MEDICATIONS  (STANDING):  apixaban 5 milliGRAM(s) Oral every 12 hours  aspirin  chewable 81 milliGRAM(s) Oral daily  ATENolol  Tablet 100 milliGRAM(s) Oral daily  atorvastatin 40 milliGRAM(s) Oral at bedtime  cefTRIAXone   IVPB 1000 milliGRAM(s) IV Intermittent every 24 hours  dextrose 5% + sodium chloride 0.9%. 1000 milliLiter(s) (70 mL/Hr) IV Continuous <Continuous>  levETIRAcetam 750 milliGRAM(s) Oral two times a day    MEDICATIONS  (PRN):      Vital Signs Last 24 Hrs  T(C): 37 (06 May 2021 05:07), Max: 37 (06 May 2021 05:07)  T(F): 98.6 (06 May 2021 05:07), Max: 98.6 (06 May 2021 05:07)  HR: 68 (06 May 2021 09:02) (60 - 87)  BP: 109/78 (06 May 2021 09:02) (92/40 - 119/69)  BP(mean): --  RR: 18 (06 May 2021 09:02) (16 - 18)  SpO2: 98% (06 May 2021 09:02) (95% - 100%)    PHYSICAL EXAMINATION:  GENERAL: NAD, well built  HEAD:  Atraumatic, Normocephalic  EYES:  conjunctiva and sclera clear  NECK: Supple, No JVD, Normal thyroid  CHEST/LUNG: Clear to auscultation. Clear to percussion bilaterally; No rales, rhonchi, wheezing, or rubs  HEART: Regular rate and rhythm; No murmurs, rubs, or gallops  ABDOMEN: Soft, Nontender, Nondistended; Bowel sounds present  NERVOUS SYSTEM:  Alert & Oriented X3,    EXTREMITIES:  2+ Peripheral Pulses, No clubbing, cyanosis, or edema  SKIN: warm dry                          11.0   12.38 )-----------( 306      ( 05 May 2021 06:53 )             34.3     05-05    136  |  106  |  44<H>  ----------------------------<  127<H>  4.5   |  24  |  1.16    Ca    8.7      05 May 2021 06:53  Phos  3.6     05-05  Mg     2.6     05-05    TPro  6.6  /  Alb  2.7<L>  /  TBili  1.0  /  DBili  x   /  AST  32  /  ALT  28  /  AlkPhos  99  05-04    LIVER FUNCTIONS - ( 04 May 2021 19:02 )  Alb: 2.7 g/dL / Pro: 6.6 g/dL / ALK PHOS: 99 U/L / ALT: 28 U/L DA / AST: 32 U/L / GGT: x           CARDIAC MARKERS ( 04 May 2021 19:02 )  <0.015 ng/mL / x     / 259 U/L / x     / x          PT/INR - ( 04 May 2021 19:02 )   PT: 24.8 sec;   INR: 2.16 ratio         PTT - ( 04 May 2021 19:02 )  PTT:30.9 sec      Culture - Blood (collected 05 May 2021 04:44)  Source: .Blood Blood-Peripheral  Preliminary Report (06 May 2021 05:01):    No growth to date.    Culture - Blood (collected 05 May 2021 04:44)  Source: .Blood Blood-Peripheral  Preliminary Report (06 May 2021 05:01):    No growth to date.        I&O's Summary    05 May 2021 07:01  -  06 May 2021 07:00  --------------------------------------------------------  IN: 0 mL / OUT: 500 mL / NET: -500 mL        CAPILLARY BLOOD GLUCOSE        CAPILLARY BLOOD GLUCOSE          RADIOLOGY & ADDITIONAL TESTS:                   PGY-1 Progress Note discussed with attending    PAGER #: [[phone]] TILL 5:00 PM  PLEASE CONTACT ON CALL TEAM:   - On Call Team (Please refer to Marta) FROM 5:00 PM - 8:30PM  - Nightfloat Team FROM 8:30 -7:30 AM    CHIEF COMPLAINT & BRIEF HOSPITAL COURSE:  75 F, home, Hx remote CVA with right sided residual weakness, obesity, HTN, carotid stenosis (b/l), afib (on eliquis), presumed seizure (on keppra) p/a mechanical fall today.  d/c from hospital x 9 days ago after CVA. Pt returns to ED today for fall out of bed. Son states that physical therapist came to the house today and tried to sit the pt up in the bed, pt slid off of the bed and fell onto the floor. Son and physical therapist able to grab her. Pt did not hit her head or LOC. When trying to get pt off of the floor, pt scraped knees on the ground. As per son family is unable to adequately care for pt at home and interested in rehab.     INTERVAL HPI/OVERNIGHT EVENTS:   More awake this AM, speech and swallow re-eval today. CT head no acute changes.    REVIEW OF SYSTEMS:  Minimally verbal but denies any complaints. Verbalizes that she doesnt have pain and feels well.    MEDICATIONS  (STANDING):  apixaban 5 milliGRAM(s) Oral every 12 hours  aspirin  chewable 81 milliGRAM(s) Oral daily  ATENolol  Tablet 100 milliGRAM(s) Oral daily  atorvastatin 40 milliGRAM(s) Oral at bedtime  cefTRIAXone   IVPB 1000 milliGRAM(s) IV Intermittent every 24 hours  dextrose 5% + sodium chloride 0.9%. 1000 milliLiter(s) (70 mL/Hr) IV Continuous <Continuous>  levETIRAcetam 750 milliGRAM(s) Oral two times a day    MEDICATIONS  (PRN):      Vital Signs Last 24 Hrs  T(C): 37 (06 May 2021 05:07), Max: 37 (06 May 2021 05:07)  T(F): 98.6 (06 May 2021 05:07), Max: 98.6 (06 May 2021 05:07)  HR: 68 (06 May 2021 09:02) (60 - 87)  BP: 109/78 (06 May 2021 09:02) (92/40 - 119/69)  BP(mean): --  RR: 18 (06 May 2021 09:02) (16 - 18)  SpO2: 98% (06 May 2021 09:02) (95% - 100%)    PHYSICAL EXAMINATION:  GENERAL: lying in bed, mouth open, awake, obese F on RA  HEAD:  Atraumatic, Normocephalic  MOUTH: poor dentition  EYES:  conjunctiva and sclera clear  NECK: Supple, No JVD,  CHEST/LUNG: Diminished air entry. No rales, rhonchi, wheezing, or rubs  HEART: irregular rate and rhythm; No murmurs, rubs, or gallops  ABDOMEN: Soft, Nontender, Nondistended; +soft abd hernia. Bowel sounds present. Ly  NERVOUS SYSTEM:  Alert & Oriented X0, contracted, limited ROM. Tracks with eyes.  EXTREMITIES:  2+ Peripheral Pulses, No clubbing, cyanosis, or edema  SKIN: warm dry, posterior gluteal skin breakdown. right arm ecchymoses.                        11.0   12.38 )-----------( 306      ( 05 May 2021 06:53 )             34.3     05-05    136  |  106  |  44<H>  ----------------------------<  127<H>  4.5   |  24  |  1.16    Ca    8.7      05 May 2021 06:53  Phos  3.6     05-05  Mg     2.6     05-05    TPro  6.6  /  Alb  2.7<L>  /  TBili  1.0  /  DBili  x   /  AST  32  /  ALT  28  /  AlkPhos  99  05-04    LIVER FUNCTIONS - ( 04 May 2021 19:02 )  Alb: 2.7 g/dL / Pro: 6.6 g/dL / ALK PHOS: 99 U/L / ALT: 28 U/L DA / AST: 32 U/L / GGT: x           CARDIAC MARKERS ( 04 May 2021 19:02 )  <0.015 ng/mL / x     / 259 U/L / x     / x          PT/INR - ( 04 May 2021 19:02 )   PT: 24.8 sec;   INR: 2.16 ratio         PTT - ( 04 May 2021 19:02 )  PTT:30.9 sec      Culture - Blood (collected 05 May 2021 04:44)  Source: .Blood Blood-Peripheral  Preliminary Report (06 May 2021 05:01):    No growth to date.    Culture - Blood (collected 05 May 2021 04:44)  Source: .Blood Blood-Peripheral  Preliminary Report (06 May 2021 05:01):    No growth to date.        I&O's Summary    05 May 2021 07:01  -  06 May 2021 07:00  --------------------------------------------------------  IN: 0 mL / OUT: 500 mL / NET: -500 mL      RADIOLOGY & ADDITIONAL TESTS:      < from: CT Head No Cont (05.06.21 @ 09:58) >    EXAM:  CT BRAIN                            PROCEDURE DATE:  05/06/2021          INTERPRETATION:  CT brain without contrast    Altered mental status    Comparison 04/20/21    There is encephalomalacia related to a previous left basal ganglia infarctwith ex vacuo ventricular enlargement. There is generalized chronic microvascular ischemic change and mild generalized volume loss elsewhere as well. There is no hemorrhage or cortical edema, mass effect or hydrocephalus.    IMPRESSION:  No acute findings            RONALD GOMEZ MD; Attending Radiologist  This document has been electronically signed. May  6 2021 10:07AM    < end of copied text >

## 2021-05-06 NOTE — SWALLOW BEDSIDE ASSESSMENT ADULT - SWALLOW EVAL: FUNCTIONAL LEVEL AT TIME OF EVAL
Max Assist; pt minimally arousable
Min Assist; Pt only requires set up & can independently self-feed w/ L arm

## 2021-05-07 ENCOUNTER — TRANSCRIPTION ENCOUNTER (OUTPATIENT)
Age: 76
End: 2021-05-07

## 2021-05-07 LAB
ALBUMIN SERPL ELPH-MCNC: 2.5 G/DL — LOW (ref 3.5–5)
ALP SERPL-CCNC: 103 U/L — SIGNIFICANT CHANGE UP (ref 40–120)
ALT FLD-CCNC: 26 U/L DA — SIGNIFICANT CHANGE UP (ref 10–60)
ANION GAP SERPL CALC-SCNC: 5 MMOL/L — SIGNIFICANT CHANGE UP (ref 5–17)
AST SERPL-CCNC: 26 U/L — SIGNIFICANT CHANGE UP (ref 10–40)
BILIRUB SERPL-MCNC: 1.3 MG/DL — HIGH (ref 0.2–1.2)
BUN SERPL-MCNC: 34 MG/DL — HIGH (ref 7–18)
CALCIUM SERPL-MCNC: 8.6 MG/DL — SIGNIFICANT CHANGE UP (ref 8.4–10.5)
CHLORIDE SERPL-SCNC: 112 MMOL/L — HIGH (ref 96–108)
CK SERPL-CCNC: 203 U/L — SIGNIFICANT CHANGE UP (ref 21–215)
CO2 SERPL-SCNC: 24 MMOL/L — SIGNIFICANT CHANGE UP (ref 22–31)
CREAT SERPL-MCNC: 1.03 MG/DL — SIGNIFICANT CHANGE UP (ref 0.5–1.3)
GLUCOSE SERPL-MCNC: 154 MG/DL — HIGH (ref 70–99)
HCT VFR BLD CALC: 33.8 % — LOW (ref 34.5–45)
HGB BLD-MCNC: 10.6 G/DL — LOW (ref 11.5–15.5)
MAGNESIUM SERPL-MCNC: 2.4 MG/DL — SIGNIFICANT CHANGE UP (ref 1.6–2.6)
MCHC RBC-ENTMCNC: 31.4 GM/DL — LOW (ref 32–36)
MCHC RBC-ENTMCNC: 31.6 PG — SIGNIFICANT CHANGE UP (ref 27–34)
MCV RBC AUTO: 100.9 FL — HIGH (ref 80–100)
NRBC # BLD: 0 /100 WBCS — SIGNIFICANT CHANGE UP (ref 0–0)
PHOSPHATE SERPL-MCNC: 2.6 MG/DL — SIGNIFICANT CHANGE UP (ref 2.5–4.5)
PLATELET # BLD AUTO: 316 K/UL — SIGNIFICANT CHANGE UP (ref 150–400)
POTASSIUM SERPL-MCNC: 4.2 MMOL/L — SIGNIFICANT CHANGE UP (ref 3.5–5.3)
POTASSIUM SERPL-SCNC: 4.2 MMOL/L — SIGNIFICANT CHANGE UP (ref 3.5–5.3)
PROT SERPL-MCNC: 6.4 G/DL — SIGNIFICANT CHANGE UP (ref 6–8.3)
RBC # BLD: 3.35 M/UL — LOW (ref 3.8–5.2)
RBC # FLD: 14.9 % — HIGH (ref 10.3–14.5)
SODIUM SERPL-SCNC: 141 MMOL/L — SIGNIFICANT CHANGE UP (ref 135–145)
WBC # BLD: 10.9 K/UL — HIGH (ref 3.8–10.5)
WBC # FLD AUTO: 10.9 K/UL — HIGH (ref 3.8–10.5)

## 2021-05-07 PROCEDURE — 99233 SBSQ HOSP IP/OBS HIGH 50: CPT | Mod: GC

## 2021-05-07 RX ADMIN — APIXABAN 5 MILLIGRAM(S): 2.5 TABLET, FILM COATED ORAL at 05:40

## 2021-05-07 RX ADMIN — LEVETIRACETAM 750 MILLIGRAM(S): 250 TABLET, FILM COATED ORAL at 05:40

## 2021-05-07 RX ADMIN — APIXABAN 5 MILLIGRAM(S): 2.5 TABLET, FILM COATED ORAL at 17:43

## 2021-05-07 RX ADMIN — LEVETIRACETAM 750 MILLIGRAM(S): 250 TABLET, FILM COATED ORAL at 17:44

## 2021-05-07 RX ADMIN — CEFTRIAXONE 100 MILLIGRAM(S): 500 INJECTION, POWDER, FOR SOLUTION INTRAMUSCULAR; INTRAVENOUS at 10:08

## 2021-05-07 RX ADMIN — ATORVASTATIN CALCIUM 40 MILLIGRAM(S): 80 TABLET, FILM COATED ORAL at 22:01

## 2021-05-07 RX ADMIN — ATENOLOL 100 MILLIGRAM(S): 25 TABLET ORAL at 05:40

## 2021-05-07 RX ADMIN — Medication 81 MILLIGRAM(S): at 13:26

## 2021-05-07 NOTE — DISCHARGE NOTE PROVIDER - HOSPITAL COURSE
75 F, home, Hx remote CVA with right sided residual weakness, obesity, HTN, carotid stenosis (b/l), afib (on eliquis), presumed seizure (on keppra) p/a mechanical fall today.  d/c from hospital x 9 days ago after CVA. Pt returns to ED today for fall out of bed. Son states that physical therapist came to the house today and tried to sit the pt up in the bed, pt slid off of the bed and fell onto the floor. Son and physical therapist able to grab her. Pt did not hit her head or LOC. When trying to get pt off of the floor, pt scraped knees on the ground. As per son family is unable to adequately care for pt at home and interested in rehab. More awake on second day of admission s/p IVF and Abx. Speech and swallow re-evaluated 5/6 made recs for mech soft nectar consistency. CT head no acute changes on 5/6. Pending DC to [facility]. Completed Abx on 5/8. 75 F, home, Hx remote CVA with right sided residual weakness, obesity, HTN, carotid stenosis (b/l), afib (on eliquis), presumed seizure (on keppra) p/a mechanical fall today.  d/c from hospital x 9 days ago after CVA. Pt returns to ED today for fall out of bed. Son states that physical therapist came to the house today and tried to sit the pt up in the bed, pt slid off of the bed and fell onto the floor. Son and physical therapist able to grab her. Pt did not hit her head or LOC. When trying to get pt off of the floor, pt scraped knees on the ground. As per son family is unable to adequately care for pt at home and interested in rehab. More awake on second day of admission s/p IVF and Abx. Speech and swallow re-evaluated 5/6 made recs for mech soft nectar consistency. CT head no acute changes on 5/6. Pending DC to [facility]. Completed Abx Rocephin for UTI on 5/8.    Given patient's improved clinical status and current hemodynamic stability, decision was made to discharge. Discussed with attending. Please refer to patient's complete medical chart with documents for a full hospital course, for this is only a brief summary.   75 F, home, Hx remote CVA with right sided residual weakness, obesity, HTN, carotid stenosis (b/l), afib (on eliquis), presumed seizure (on keppra) p/a mechanical fall today.  d/c from hospital x 9 days ago after CVA. Pt returns to ED today for fall out of bed. Son states that physical therapist came to the house today and tried to sit the pt up in the bed, pt slid off of the bed and fell onto the floor. Son and physical therapist able to grab her. Pt did not hit her head or LOC. When trying to get pt off of the floor, pt scraped knees on the ground. As per son family is unable to adequately care for pt at home and interested in rehab. More awake on second day of admission s/p IVF and Abx. Speech and swallow re-evaluated 5/6 made recs for mech soft nectar consistency. CT head no acute changes on 5/6. Pending DC to [facility]. Completed Abx Rocephin for UTI on 5/8. Consulted neuro Dr. Amaral re slightly elevated keppra level. No recs made for changes - will need to follow as o/p with neurology. Continue current dosing regimen as szr activity is well controlle.d     Given patient's improved clinical status and current hemodynamic stability, decision was made to discharge. Discussed with attending. Please refer to patient's complete medical chart with documents for a full hospital course, for this is only a brief summary.   75 F, home, Hx remote CVA with right sided residual weakness, obesity, HTN, carotid stenosis (b/l), afib (on eliquis), presumed seizure (on keppra) p/a mechanical fall today.  d/c from hospital x 9 days ago after CVA. Pt returns to ED today for fall out of bed. Son states that physical therapist came to the house today and tried to sit the pt up in the bed, pt slid off of the bed and fell onto the floor. Son and physical therapist able to grab her. Pt did not hit her head or LOC. When trying to get pt off of the floor, pt scraped knees on the ground. As per son family is unable to adequately care for pt at home and interested in rehab. More awake on second day of admission s/p IVF and Abx. Speech and swallow re-evaluated 5/6 made recs for mech soft nectar consistency. CT head no acute changes on 5/6. Pending DC to [facility]. Completed Abx Rocephin for UTI on 5/8. Consulted neuro Dr. Amaral re slightly elevated keppra level. No recs made for changes - will need to follow as o/p with neurology. Continue current dosing regimen as seizure activity is well controlle.d     Given patient's improved clinical status and current hemodynamic stability, decision was made to discharge. Discussed with attending. Please refer to patient's complete medical chart with documents for a full hospital course, for this is only a brief summary.

## 2021-05-07 NOTE — DIETITIAN INITIAL EVALUATION ADULT. - PERTINENT MEDS FT
MEDICATIONS:  apixaban 5 every 12 hours  aspirin  chewable 81 daily  ATENolol  Tablet 100 daily  atorvastatin 40 at bedtime  dextrose 5% + sodium chloride 0.9%. 1000 <Continuous>  levETIRAcetam 750 two times a day

## 2021-05-07 NOTE — PROGRESS NOTE ADULT - PROBLEM SELECTOR PLAN 6
Pt with recent hx of stroke   able to tolerate diet   at home on keppra for presumed seizure after cva admission   will cw keppra and   - f/u keppra levels pending result

## 2021-05-07 NOTE — PROGRESS NOTE ADULT - PROBLEM SELECTOR PLAN 1
Pt presented after a slip from bed  Pt with right sided paralysis and severe obesity   will consult physical therapy   pt will benefit from rehab as family is unable to care at home  XR with no acute fractures, only degenerative changes  CT head no acute change 5/6  palliative consult  DC to [facility] pending

## 2021-05-07 NOTE — PROGRESS NOTE ADULT - PROBLEM SELECTOR PLAN 7
IMPROVE VTE Individual Risk Assessment  RISK                                                         Points  [  ] Previous VTE                                      3  [  ] Thrombophilia                                   2  [  ] Lower limb paralysis                         2 (unable to hold up >15 seconds)    [  ] Current Cancer                                  2       (within 6 months)  [  ] Immobilization > 24 hrs                    1  [  ] ICU/CCU stay > 24 hrs                         1  [  ] Age > 60                                              1  on Eliquis for AC

## 2021-05-07 NOTE — DISCHARGE NOTE PROVIDER - NSDCCPCAREPLAN_GEN_ALL_CORE_FT
PRINCIPAL DISCHARGE DIAGNOSIS  Diagnosis: Fall  Assessment and Plan of Treatment: You presented to the emergency after a fall. On admission we performed a CT scan of your head which showed - - - . Radiographs of your - - - . EGK showed - - -. Your vital signs were notable for - - - -. Orthostatic vital signs were positive / negative***. You were admitted to the cardiac monitoring telemetry unit for syncope/fall workup. As a part of the fall work up a carotid ultrasound was performed which showed *** no significant stenosis to the vessels running to your brain. Echocardiogram showed - - -.   Neurology was consulted and - - -   Cardiology was consulted and - - -   Your syncope was likely due to - - -      SECONDARY DISCHARGE DIAGNOSES  Diagnosis: Atrial fibrillation  Assessment and Plan of Treatment: Atrial fibrillation is an irregular, often rapid heart rate that commonly causes poor blood flow. The heart's upper chambers (atria) beat out of coordination with the lower chambers (ventricles). This condition may have no symptoms, but when symptoms do appear they include palpitations, shortness of breath, and fatigue. Due to the irregular beating of your heart you are at increased risk of blood clots which can travel to your brain and cause a stroke. For your Afib you are managed with - - - for heart rate control and - - - for anticoagulation to reduce the risk of blood clot formation. Please continue these medications and follow up with your primary care physician and or cardiologist. Understand that blood thinners place you at increased risk of bleeds to take care and use caution. Avoid falls and injuries. Go to the hospital if you get injured or develop any type of bleeding or black or blood stools.Atrial fibrillation    Diagnosis: Functional quadriplegia  Assessment and Plan of Treatment: You have a history of stoke with right sided residual weakness and functional quadriplegia. You are bedbound and need full care.   , obesity, HTN, carotid stenosis (b/l), afib (on eliquis), presumed seizure (on keppra) p/a mechanical fall today.  d/c from hospital x 9 days ago after CVA. Pt returns to ED today for fall out of bed. Son states that physical therapist came to the house today and tried to sit the pt up in the bed, pt slid off of the bed and fell onto the floor. Son and physical therapist able to grab her. Pt did not hit her head or LOC. When trying to get pt off of the floor, pt scraped knees on the ground. As per son family is unable to adequately care for pt at home and interested in rehab. More awake on second day of admission s/p IVF and Abx. Speech and swallow re-evaluated 5/6 made recs for mech soft nectar consistency. CT head no acute changes on 5/6. Pending DC to [facility]. Completed Abx on 5/8.      Diagnosis: DEJA (acute kidney injury)  Assessment and Plan of Treatment: DEJA (acute kidney injury)  A condition in which the kidneys suddenly can't filter waste from the blood. Acute renal failure develops rapidly over a few hours or days. It may be fatal. It's most common in those who are critically ill and already hospitalized. Symptoms include decreased urinary output, swelling due to fluid retention, nausea, fatigue, and shortness of breath. Sometimes symptoms may be subtle or may not appear at all. In addition to addressing the underlying cause, treatments include fluids, medication, and dialysis.    Diagnosis: Leukocytosis  Assessment and Plan of Treatment: High white blood cell count can have causes that aren't due to underlying disease. Examples include normal individual variation, recent surgery, steroid use, medication side effects, or stress.     PRINCIPAL DISCHARGE DIAGNOSIS  Diagnosis: Fall  Assessment and Plan of Treatment: You presented to the emergency after a fall. On admission we performed a CT scan of your head which showed no acute changes. Radiographs of your knee were done which were notable for chronic degenerative changes, no acute fracture or dislocation. Your chest X-ray did not show any pathology. Physical therapy evaluated you and you are for discharge to rehab facility.      SECONDARY DISCHARGE DIAGNOSES  Diagnosis: Atrial fibrillation  Assessment and Plan of Treatment: Atrial fibrillation is an irregular, often rapid heart rate that commonly causes poor blood flow. The heart's upper chambers (atria) beat out of coordination with the lower chambers (ventricles). This condition may have no symptoms, but when symptoms do appear they include palpitations, shortness of breath, and fatigue. Due to the irregular beating of your heart you are at increased risk of blood clots which can travel to your brain and cause a stroke. For your Afib you are managed with atenolol for heart rate control and apixaban for anticoagulation to reduce the risk of blood clot formation. Please continue these medications and follow up with your primary care physician and or cardiologist. Understand that blood thinners place you at increased risk of bleeds to take care and use caution. Avoid falls and injuries. Go to the hospital if you get injured or develop any type of bleeding or black or blood stools.Atrial fibrillation    Diagnosis: Functional quadriplegia  Assessment and Plan of Treatment: You have a history of stroke with right sided residual weakness and functional quadriplegia. You are bedbound and need full care. Head CT on this admission was negative for acute change. Physical therapy was consulted and you are for discharge to rehab. [facility].    Diagnosis: DEJA (acute kidney injury)  Assessment and Plan of Treatment: DEJA (acute kidney injury)  A condition in which the kidneys suddenly can't filter waste from the blood. Acute renal failure develops rapidly over a few hours or days. It may be fatal. It's most common in those who are critically ill and already hospitalized. Symptoms include decreased urinary output, swelling due to fluid retention, nausea, fatigue, and shortness of breath. Sometimes symptoms may be subtle or may not appear at all. In addition to addressing the underlying cause, treatments include fluids, medication, and dialysis. A marker of your kidney function is your serum BUN/Creatinine ratio which elevates when the kidney gets injured. Your Creatinine was elevated and then improved down to baseline. Your last serum creatinine level was 1.13. Please follow up with your primary doctor.    Diagnosis: Leukocytosis  Assessment and Plan of Treatment: High white blood cell count can have causes that aren't due to underlying disease. Examples include normal individual variation, recent surgery, steroid use, medication side effects, or stress. Your elevated white blood cell count could have been caused by your slight urine infection. We gave you a course of 3 days of ceftriaxone for this infection and IV fluids. Your white blood cell count improved to 11.63 on discharge. Please follow up with your primary doctor for recheck. If you develop fever, chills, nausea or urinary dyscomfort please see your doctor. We have sent you to a rehab facility with a ferrari catheter. Please trial ferrari removal at facility. You were unable to pass a trial of void here during your stay.    Diagnosis: Seizure disorder  Assessment and Plan of Treatment: You have a history of stroke and seizure disorder. Your head CT on this admission showed no acute change. We continued you on your Levetiracetam (Keppra) 750 twice a day medication. Your serum drug level of Levetiracetam (Keppra) was slightly elevated at 42 (normal is 10-40). We consulted Neurology Dr. Amaral and he made recommendations to keep your current dosing regimen. Please follow up with your primary doctor and neurologist.     PRINCIPAL DISCHARGE DIAGNOSIS  Diagnosis: Fall  Assessment and Plan of Treatment: You presented to the emergency after a fall. On admission we performed a CT scan of your head which showed no acute changes. Radiographs of your knee were done which were notable for chronic degenerative changes, no acute fracture or dislocation. Your chest X-ray did not show any pathology. Physical therapy evaluated you and you are for discharge to rehab facility.      SECONDARY DISCHARGE DIAGNOSES  Diagnosis: Atrial fibrillation  Assessment and Plan of Treatment: Atrial fibrillation is an irregular, often rapid heart rate that commonly causes poor blood flow. The heart's upper chambers (atria) beat out of coordination with the lower chambers (ventricles). This condition may have no symptoms, but when symptoms do appear they include palpitations, shortness of breath, and fatigue. Due to the irregular beating of your heart you are at increased risk of blood clots which can travel to your brain and cause a stroke. For your Afib you are managed with atenolol for heart rate control and apixaban for anticoagulation to reduce the risk of blood clot formation. Please continue these medications and follow up with your primary care physician and or cardiologist. Understand that blood thinners place you at increased risk of bleeds to take care and use caution. Avoid falls and injuries. Go to the hospital if you get injured or develop any type of bleeding or black or blood stools.    Diagnosis: Functional quadriplegia  Assessment and Plan of Treatment: You have a history of stroke with right sided residual weakness and functional quadriplegia. You are bedbound and need full care. Head CT on this admission was negative for acute change. Physical therapy was consulted and you are for discharge to rehab. [facility].    Diagnosis: DEJA (acute kidney injury)  Assessment and Plan of Treatment: DEJA (acute kidney injury)  A condition in which the kidneys suddenly can't filter waste from the blood. Acute renal failure develops rapidly over a few hours or days. It may be fatal. It's most common in those who are critically ill and already hospitalized. Symptoms include decreased urinary output, swelling due to fluid retention, nausea, fatigue, and shortness of breath. Sometimes symptoms may be subtle or may not appear at all. In addition to addressing the underlying cause, treatments include fluids, medication, and dialysis. A marker of your kidney function is your serum BUN/Creatinine ratio which elevates when the kidney gets injured. Your Creatinine was elevated and then improved down to baseline. Your last serum creatinine level was 1.13. Please follow up with your primary doctor.    Diagnosis: Leukocytosis  Assessment and Plan of Treatment: High white blood cell count can have causes that aren't due to underlying disease. Examples include normal individual variation, recent surgery, steroid use, medication side effects, or stress. Your elevated white blood cell count could have been caused by your slight urine infection. We gave you a course of 3 days of ceftriaxone for this infection and IV fluids. Your white blood cell count improved to 11.63 on discharge. Please follow up with your primary doctor for recheck. If you develop fever, chills, nausea or urinary dyscomfort please see your doctor. We have sent you to a rehab facility with a ferrari catheter. Please trial ferrari removal at facility. You were unable to pass a trial of void here during your stay.    Diagnosis: Seizure disorder  Assessment and Plan of Treatment: You have a history of stroke and seizure disorder. Your head CT on this admission showed no acute change. We continued you on your Levetiracetam (Keppra) 750 twice a day medication. Your serum drug level of Levetiracetam (Keppra) was slightly elevated at 42 (normal is 10-40). We consulted Neurology Dr. Amaral and he made recommendations to keep your current dosing regimen. Please follow up with your primary doctor and neurologist.

## 2021-05-07 NOTE — PROGRESS NOTE ADULT - SUBJECTIVE AND OBJECTIVE BOX
PGY-1 Progress Note discussed with attending    PAGER #: [[phone]] TILL 5:00 PM  PLEASE CONTACT ON CALL TEAM:   - On Call Team (Please refer to Marta) FROM 5:00 PM - 8:30PM  - Nightfloat Team FROM 8:30 -7:30 AM    CHIEF COMPLAINT & BRIEF HOSPITAL COURSE:  75 F, home, Hx remote CVA with right sided residual weakness, obesity, HTN, carotid stenosis (b/l), afib (on eliquis), presumed seizure (on keppra) p/a mechanical fall today.  d/c from hospital x 9 days ago after CVA. Pt returns to ED today for fall out of bed. Son states that physical therapist came to the house today and tried to sit the pt up in the bed, pt slid off of the bed and fell onto the floor. Son and physical therapist able to grab her. Pt did not hit her head or LOC. When trying to get pt off of the floor, pt scraped knees on the ground. As per son family is unable to adequately care for pt at home and interested in rehab. More awake on second day of admission s/p IVF and Abx. Speech and swallow re-evaluated 5/6 made recs for mech soft nectar consistency. CT head no acute changes on 5/6. Pending DC to [facility].     INTERVAL HPI/OVERNIGHT EVENTS:   No changes overnight. Completed antibiotics today.     REVIEW OF SYSTEMS:  Denies any complaints.  Limited interaction 2/2 chronic conditions    MEDICATIONS  (STANDING):  apixaban 5 milliGRAM(s) Oral every 12 hours  aspirin  chewable 81 milliGRAM(s) Oral daily  ATENolol  Tablet 100 milliGRAM(s) Oral daily  atorvastatin 40 milliGRAM(s) Oral at bedtime  dextrose 5% + sodium chloride 0.9%. 1000 milliLiter(s) (70 mL/Hr) IV Continuous <Continuous>  levETIRAcetam 750 milliGRAM(s) Oral two times a day    MEDICATIONS  (PRN):      Vital Signs Last 24 Hrs  T(C): 36.9 (07 May 2021 13:27), Max: 36.9 (06 May 2021 20:20)  T(F): 98.4 (07 May 2021 13:27), Max: 98.5 (06 May 2021 20:20)  HR: 90 (07 May 2021 13:27) (88 - 90)  BP: 144/87 (07 May 2021 13:27) (106/64 - 144/87)  BP(mean): --  RR: 18 (07 May 2021 13:27) (17 - 18)  SpO2: 98% (07 May 2021 13:27) (96% - 100%)    PHYSICAL EXAMINATION:  GENERAL: lying in bed, mouth open, awake, obese F on RA  HEAD:  Atraumatic, Normocephalic  MOUTH: poor dentition  EYES:  conjunctiva and sclera clear  NECK: Supple, No JVD,  CHEST/LUNG: Diminished air entry. No rales, rhonchi, wheezing, or rubs  HEART: irregular rate and rhythm; No murmurs, rubs, or gallops  ABDOMEN: Soft, Nontender, Nondistended; +soft abd hernia. Bowel sounds present. Ly  NERVOUS SYSTEM:  Alert & Oriented X0, contracted, limited ROM. Tracks with eyes.  Ly in place.  EXTREMITIES:  2+ Peripheral Pulses, No clubbing, cyanosis, or edema  SKIN: warm dry, posterior gluteal skin breakdown. Right arm ecchymoses.                                   10.6   10.90 )-----------( 316      ( 07 May 2021 08:26 )             33.8     05-07    141  |  112<H>  |  34<H>  ----------------------------<  154<H>  4.2   |  24  |  1.03    Ca    8.6      07 May 2021 08:26  Phos  2.6     05-07  Mg     2.4     05-07    TPro  6.4  /  Alb  2.5<L>  /  TBili  1.3<H>  /  DBili  x   /  AST  26  /  ALT  26  /  AlkPhos  103  05-07    LIVER FUNCTIONS - ( 07 May 2021 08:26 )  Alb: 2.5 g/dL / Pro: 6.4 g/dL / ALK PHOS: 103 U/L / ALT: 26 U/L DA / AST: 26 U/L / GGT: x           CARDIAC MARKERS ( 07 May 2021 08:26 )  x     / x     / 203 U/L / x     / x        Culture - Blood (collected 05 May 2021 04:44)  Source: .Blood Blood-Peripheral  Preliminary Report (06 May 2021 05:01):    No growth to date.    Culture - Blood (collected 05 May 2021 04:44)  Source: .Blood Blood-Peripheral  Preliminary Report (06 May 2021 05:01):    No growth to date.        I&O's Summary    06 May 2021 07:01  -  07 May 2021 07:00  --------------------------------------------------------  IN: 0 mL / OUT: 550 mL / NET: -550 mL    07 May 2021 07:01  -  07 May 2021 15:22  --------------------------------------------------------  IN: 0 mL / OUT: 100 mL / NET: -100 mL        RADIOLOGY & ADDITIONAL TESTS:    < from: CT Head No Cont (05.06.21 @ 09:58) >    EXAM:  CT BRAIN                            PROCEDURE DATE:  05/06/2021      INTERPRETATION:  CT brain without contrast    Altered mental status    Comparison 04/20/21    There is encephalomalacia related to a previous left basal ganglia infarctwith ex vacuo ventricular enlargement. There is generalized chronic microvascular ischemic change and mild generalized volume loss elsewhere as well. There is no hemorrhage or cortical edema, mass effect or hydrocephalus.    IMPRESSION:  No acute findings      RONALD GOMEZ MD; Attending Radiologist  This document has been electronically signed. May  6 2021 10:07AM    < end of copied text >

## 2021-05-07 NOTE — DISCHARGE NOTE PROVIDER - CARE PROVIDER_API CALL
PETRONA KIM  Internal Medicine  [address] [place] [address]  Phone: [phone]  Fax: [phone]  Follow Up Time: 1 week

## 2021-05-07 NOTE — PROGRESS NOTE ADULT - ATTENDING COMMENTS
Patient seen and examined. Remains awake and alert. Awaiting acceptance to [facility]. Family requesting [facility]. To complete course of antibiotics today for acute cystitis. If remains in house for the weekend, will consider TOV. Remaining care as above.

## 2021-05-07 NOTE — PROGRESS NOTE ADULT - PROBLEM SELECTOR PLAN 2
Pt noted to have wbc >18 k and left shift   will obtain UA, u cx   CXR negative for new infiltrates  Blood cultures NGTD  UA neg   completed 3 days rocephin 5/5-5-8

## 2021-05-07 NOTE — DIETITIAN INITIAL EVALUATION ADULT. - PERTINENT LABORATORY DATA
05-07 Na141 mmol/L Glu 154 mg/dL<H> K+ 4.2 mmol/L Cr  1.03 mg/dL BUN 34 mg/dL<H>   05-07 Phos 2.6 mg/dL   05-07 Alb 2.5 g/dL<L>       04-21 Chol 231 mg/dL<H> LDL --    HDL 30 mg/dL<L> Trig 169 mg/dL<H>  04-21-21 @ 14:53 HgbA1C 7.1 [4.0 - 5.6]

## 2021-05-07 NOTE — PROGRESS NOTE ADULT - PROBLEM SELECTOR PLAN 5
Pt noted to have cr 1.4  on last admission was found to have elevated cr and trended down to 1.2 -1,4  will send urine lytes and start on ivf for now  improved to baseline  Cr 1.03 today  s/p IVF D5/NS

## 2021-05-07 NOTE — DISCHARGE NOTE PROVIDER - NSDCMRMEDTOKEN_GEN_ALL_CORE_FT
aspirin 81 mg oral tablet:   ATENOLOL 100MG TABLETS: TAKE 1 TABLET BY MOUTH EVERY DAY  atorvastatin 40 mg oral tablet: 1 tab(s) orally once a day (at bedtime)  Eliquis 5 mg oral tablet: 1 tab(s) orally 2 times a day   levETIRAcetam 750 mg oral tablet: 1 tab(s) orally 2 times a day

## 2021-05-07 NOTE — PROGRESS NOTE ADULT - ASSESSMENT
76 yo female from home lives with  with history significant for remote CVA with right sided residual weakness, obesity, HTN, carotid stenosis (b/l), afib (on eliquis), presumed seizure (on keppra) presents to the ED after a mechanical fall.

## 2021-05-07 NOTE — DIETITIAN INITIAL EVALUATION ADULT. - OTHER INFO
Pt visited. Pt is alert able to answer simple question.   Nutrition consult noted for Pressure ulcer stage > 2, Pt with Multiple pressure ulcer From DTI to Stage 1.  D/W RN pt is fed by staff Pt ate > 50 % for  B fast. S/P Swallow eval  Mech soft [place] liquids. H/O CVA. Pt states she likes Pudding ( chocolate ) . Labs noted A1c 7.1. Bed scale 234 lb. HT 5 feet 5 inches. BMI 39.

## 2021-05-08 LAB
ANION GAP SERPL CALC-SCNC: 8 MMOL/L — SIGNIFICANT CHANGE UP (ref 5–17)
BUN SERPL-MCNC: 35 MG/DL — HIGH (ref 7–18)
CALCIUM SERPL-MCNC: 9.1 MG/DL — SIGNIFICANT CHANGE UP (ref 8.4–10.5)
CHLORIDE SERPL-SCNC: 111 MMOL/L — HIGH (ref 96–108)
CO2 SERPL-SCNC: 23 MMOL/L — SIGNIFICANT CHANGE UP (ref 22–31)
CREAT SERPL-MCNC: 1.09 MG/DL — SIGNIFICANT CHANGE UP (ref 0.5–1.3)
GLUCOSE SERPL-MCNC: 154 MG/DL — HIGH (ref 70–99)
HCT VFR BLD CALC: 34.6 % — SIGNIFICANT CHANGE UP (ref 34.5–45)
HGB BLD-MCNC: 10.7 G/DL — LOW (ref 11.5–15.5)
LEVETIRACETAM SERPL-MCNC: 42.4 UG/ML — HIGH (ref 10–40)
MAGNESIUM SERPL-MCNC: 2.5 MG/DL — SIGNIFICANT CHANGE UP (ref 1.6–2.6)
MCHC RBC-ENTMCNC: 30.9 GM/DL — LOW (ref 32–36)
MCHC RBC-ENTMCNC: 31.2 PG — SIGNIFICANT CHANGE UP (ref 27–34)
MCV RBC AUTO: 100.9 FL — HIGH (ref 80–100)
NRBC # BLD: 0 /100 WBCS — SIGNIFICANT CHANGE UP (ref 0–0)
PHOSPHATE SERPL-MCNC: 2.8 MG/DL — SIGNIFICANT CHANGE UP (ref 2.5–4.5)
PLATELET # BLD AUTO: 333 K/UL — SIGNIFICANT CHANGE UP (ref 150–400)
POTASSIUM SERPL-MCNC: 4.5 MMOL/L — SIGNIFICANT CHANGE UP (ref 3.5–5.3)
POTASSIUM SERPL-SCNC: 4.5 MMOL/L — SIGNIFICANT CHANGE UP (ref 3.5–5.3)
RBC # BLD: 3.43 M/UL — LOW (ref 3.8–5.2)
RBC # FLD: 15 % — HIGH (ref 10.3–14.5)
SODIUM SERPL-SCNC: 142 MMOL/L — SIGNIFICANT CHANGE UP (ref 135–145)
WBC # BLD: 11.74 K/UL — HIGH (ref 3.8–10.5)
WBC # FLD AUTO: 11.74 K/UL — HIGH (ref 3.8–10.5)

## 2021-05-08 PROCEDURE — 99233 SBSQ HOSP IP/OBS HIGH 50: CPT | Mod: GC

## 2021-05-08 RX ORDER — ACETAMINOPHEN 500 MG
650 TABLET ORAL EVERY 6 HOURS
Refills: 0 | Status: DISCONTINUED | OUTPATIENT
Start: 2021-05-08 | End: 2021-05-10

## 2021-05-08 RX ORDER — SODIUM CHLORIDE 9 MG/ML
1000 INJECTION, SOLUTION INTRAVENOUS
Refills: 0 | Status: DISCONTINUED | OUTPATIENT
Start: 2021-05-08 | End: 2021-05-09

## 2021-05-08 RX ORDER — ACETAMINOPHEN 500 MG
1000 TABLET ORAL ONCE
Refills: 0 | Status: COMPLETED | OUTPATIENT
Start: 2021-05-08 | End: 2021-05-08

## 2021-05-08 RX ORDER — ACETAMINOPHEN 500 MG
650 TABLET ORAL EVERY 6 HOURS
Refills: 0 | Status: DISCONTINUED | OUTPATIENT
Start: 2021-05-08 | End: 2021-05-08

## 2021-05-08 RX ADMIN — Medication 650 MILLIGRAM(S): at 18:47

## 2021-05-08 RX ADMIN — APIXABAN 5 MILLIGRAM(S): 2.5 TABLET, FILM COATED ORAL at 17:11

## 2021-05-08 RX ADMIN — SODIUM CHLORIDE 75 MILLILITER(S): 9 INJECTION, SOLUTION INTRAVENOUS at 13:27

## 2021-05-08 RX ADMIN — LEVETIRACETAM 750 MILLIGRAM(S): 250 TABLET, FILM COATED ORAL at 06:34

## 2021-05-08 RX ADMIN — ATENOLOL 100 MILLIGRAM(S): 25 TABLET ORAL at 06:35

## 2021-05-08 RX ADMIN — ATORVASTATIN CALCIUM 40 MILLIGRAM(S): 80 TABLET, FILM COATED ORAL at 21:41

## 2021-05-08 RX ADMIN — APIXABAN 5 MILLIGRAM(S): 2.5 TABLET, FILM COATED ORAL at 06:34

## 2021-05-08 RX ADMIN — Medication 650 MILLIGRAM(S): at 18:16

## 2021-05-08 RX ADMIN — Medication 400 MILLIGRAM(S): at 23:39

## 2021-05-08 RX ADMIN — LEVETIRACETAM 750 MILLIGRAM(S): 250 TABLET, FILM COATED ORAL at 17:11

## 2021-05-08 RX ADMIN — Medication 81 MILLIGRAM(S): at 13:27

## 2021-05-08 NOTE — PROGRESS NOTE ADULT - PROBLEM SELECTOR PLAN 1
Pt presented after a slip from bed  Pt with right sided paralysis and severe obesity   will consult physical therapy   pt will benefit from rehab as family is unable to care at home  XR with no acute fractures, only degenerative changes  CT head no acute change 5/6  palliative consult  DC to [facility] pending Pt presented after a slip from bed  Pt with right sided paralysis and severe obesity   will consult physical therapy   pt will benefit from rehab as family is unable to care at home  XR with no acute fractures, only degenerative changes  CT head no acute change 5/6  palliative consulted  DC to [facility] pending

## 2021-05-08 NOTE — PROGRESS NOTE ADULT - SUBJECTIVE AND OBJECTIVE BOX
PGY-1 Progress Note discussed with attending    PAGER #: [[phone]] TILL 5:00 PM  PLEASE CONTACT ON CALL TEAM:   - On Call Team (Please refer to Marta) FROM 5:00 PM - 8:30PM  - Nightfloat Team FROM 8:30 -7:30 AM    CHIEF COMPLAINT & BRIEF HOSPITAL COURSE:      INTERVAL HPI/OVERNIGHT EVENTS:       REVIEW OF SYSTEMS:  CONSTITUTIONAL: No fever, weight loss, or fatigue  RESPIRATORY: No cough, wheezing, chills or hemoptysis; No shortness of breath  CARDIOVASCULAR: No chest pain, palpitations, dizziness, or leg swelling  GASTROINTESTINAL: No abdominal pain. No nausea, vomiting, or hematemesis; No diarrhea or constipation. No melena or hematochezia.  GENITOURINARY: No dysuria or hematuria, urinary frequency  NEUROLOGICAL: No headaches, memory loss, loss of strength, numbness, or tremors  SKIN: No itching, burning, rashes, or lesions     MEDICATIONS  (STANDING):  apixaban 5 milliGRAM(s) Oral every 12 hours  aspirin  chewable 81 milliGRAM(s) Oral daily  ATENolol  Tablet 100 milliGRAM(s) Oral daily  atorvastatin 40 milliGRAM(s) Oral at bedtime  levETIRAcetam 750 milliGRAM(s) Oral two times a day    MEDICATIONS  (PRN):      Vital Signs Last 24 Hrs  T(C): 37.2 (08 May 2021 04:40), Max: 37.2 (08 May 2021 04:40)  T(F): 98.9 (08 May 2021 04:40), Max: 98.9 (08 May 2021 04:40)  HR: 88 (08 May 2021 04:40) (88 - 90)  BP: 134/82 (08 May 2021 04:40) (106/64 - 144/87)  BP(mean): --  RR: 18 (08 May 2021 04:40) (17 - 18)  SpO2: 100% (08 May 2021 04:40) (94% - 100%)    PHYSICAL EXAMINATION:  GENERAL: NAD, well built  HEAD:  Atraumatic, Normocephalic  EYES:  conjunctiva and sclera clear  NECK: Supple, No JVD, Normal thyroid  CHEST/LUNG: Clear to auscultation. Clear to percussion bilaterally; No rales, rhonchi, wheezing, or rubs  HEART: Regular rate and rhythm; No murmurs, rubs, or gallops  ABDOMEN: Soft, Nontender, Nondistended; Bowel sounds present  NERVOUS SYSTEM:  Alert & Oriented X3,    EXTREMITIES:  2+ Peripheral Pulses, No clubbing, cyanosis, or edema  SKIN: warm dry                          10.7   11.74 )-----------( 333      ( 08 May 2021 07:22 )             34.6     05-08    142  |  111<H>  |  35<H>  ----------------------------<  154<H>  4.5   |  23  |  1.09    Ca    9.1      08 May 2021 07:22  Phos  2.8     05-08  Mg     2.5     05-08    TPro  6.4  /  Alb  2.5<L>  /  TBili  1.3<H>  /  DBili  x   /  AST  26  /  ALT  26  /  AlkPhos  103  05-07    LIVER FUNCTIONS - ( 07 May 2021 08:26 )  Alb: 2.5 g/dL / Pro: 6.4 g/dL / ALK PHOS: 103 U/L / ALT: 26 U/L DA / AST: 26 U/L / GGT: x           CARDIAC MARKERS ( 07 May 2021 08:26 )  x     / x     / 203 U/L / x     / x                  I&O's Summary    07 May 2021 07:01  -  08 May 2021 07:00  --------------------------------------------------------  IN: 0 mL / OUT: 350 mL / NET: -350 mL        CAPILLARY BLOOD GLUCOSE        CAPILLARY BLOOD GLUCOSE          RADIOLOGY & ADDITIONAL TESTS:                   PGY-1 Progress Note discussed with attending    PAGER #: [[phone]] TILL 5:00 PM  PLEASE CONTACT ON CALL TEAM:   - On Call Team (Please refer to Marta) FROM 5:00 PM - 8:30PM  - Nightfloat Team FROM 8:30 -7:30 AM    CHIEF COMPLAINT & BRIEF HOSPITAL COURSE:  75 F, home, Hx remote CVA with right sided residual weakness, obesity, HTN, carotid stenosis (b/l), afib (on eliquis), presumed seizure (on keppra) p/a mechanical fall today.  d/c from hospital x 9 days ago after CVA. Pt returns to ED today for fall out of bed. Son states that physical therapist came to the house today and tried to sit the pt up in the bed, pt slid off of the bed and fell onto the floor. Son and physical therapist able to grab her. Pt did not hit her head or LOC. When trying to get pt off of the floor, pt scraped knees on the ground. As per son family is unable to adequately care for pt at home and interested in rehab. More awake on second day of admission s/p IVF and Abx. Speech and swallow re-evaluated 5/6 made recs for mech soft nectar consistency. CT head no acute changes on 5/6. Completed abx on 5/7. Pending DC to [facility].     INTERVAL HPI/OVERNIGHT EVENTS:   Pt pulled out ferrari overnight - given TOV but did not urinate. Ferrari was replaced. Comfortable this AM. Urine danielle and dark, will give gently IVF hydration. Added glucerna BID per nutrition recs.      REVIEW OF SYSTEMS:  Unable to obtain   Denies any complaints but poor verbal capacity 2/2 to chronic dementia    MEDICATIONS  (STANDING):  apixaban 5 milliGRAM(s) Oral every 12 hours  aspirin  chewable 81 milliGRAM(s) Oral daily  ATENolol  Tablet 100 milliGRAM(s) Oral daily  atorvastatin 40 milliGRAM(s) Oral at bedtime  levETIRAcetam 750 milliGRAM(s) Oral two times a day    MEDICATIONS  (PRN):      Vital Signs Last 24 Hrs  T(C): 37.2 (08 May 2021 04:40), Max: 37.2 (08 May 2021 04:40)  T(F): 98.9 (08 May 2021 04:40), Max: 98.9 (08 May 2021 04:40)  HR: 88 (08 May 2021 04:40) (88 - 90)  BP: 134/82 (08 May 2021 04:40) (106/64 - 144/87)  BP(mean): --  RR: 18 (08 May 2021 04:40) (17 - 18)  SpO2: 100% (08 May 2021 04:40) (94% - 100%)    PHYSICAL EXAMINATION:  GENERAL: lying in bed, awake alert on RA  HEAD:  Atraumatic, Normocephalic  MOUTH: poor/ absent dentition  EYES:  conjunctiva and sclera clear  NECK: Supple, No JVD,  CHEST/LUNG: Diminished air entry. No rales, rhonchi, wheezing, or rubs  HEART: irregular rate and rhythm; No murmurs, rubs, or gallops  ABDOMEN: Soft, Nontender, Nondistended; +soft abd hernia. Bowel sounds present. Ferrari  NERVOUS SYSTEM:  Alert & Oriented X0, contracted, limited ROM. Tracks with eyes. Minimal verbal response to examiner.  Ferrari in place - danielle yellow urine, no blood.  EXTREMITIES:  2+ Peripheral Pulses, No clubbing, cyanosis, or edema  SKIN: warm dry, posterior gluteal skin breakdown. Right arm ecchymoses.                                  10.7   11.74 )-----------( 333      ( 08 May 2021 07:22 )             34.6     05-08    142  |  111<H>  |  35<H>  ----------------------------<  154<H>  4.5   |  23  |  1.09    Ca    9.1      08 May 2021 07:22  Phos  2.8     05-08  Mg     2.5     05-08    TPro  6.4  /  Alb  2.5<L>  /  TBili  1.3<H>  /  DBili  x   /  AST  26  /  ALT  26  /  AlkPhos  103  05-07    LIVER FUNCTIONS - ( 07 May 2021 08:26 )  Alb: 2.5 g/dL / Pro: 6.4 g/dL / ALK PHOS: 103 U/L / ALT: 26 U/L DA / AST: 26 U/L / GGT: x           CARDIAC MARKERS ( 07 May 2021 08:26 )  x     / x     / 203 U/L / x     / x            I&O's Summary    07 May 2021 07:01  -  08 May 2021 07:00  --------------------------------------------------------  IN: 0 mL / OUT: 350 mL / NET: -350 mL        RADIOLOGY & ADDITIONAL TESTS:    < from: CT Head No Cont (05.06.21 @ 09:58) >    EXAM:  CT BRAIN                            PROCEDURE DATE:  05/06/2021          INTERPRETATION:  CT brain without contrast    Altered mental status    Comparison 04/20/21    There is encephalomalacia related to a previous left basal ganglia infarctwith ex vacuo ventricular enlargement. There is generalized chronic microvascular ischemic change and mild generalized volume loss elsewhere as well. There is no hemorrhage or cortical edema, mass effect or hydrocephalus.    IMPRESSION:  No acute findings            RONALD GOMEZ MD; Attending Radiologist  This document has been electronically signed. May  6 2021 10:07AM    < end of copied text >

## 2021-05-08 NOTE — PROGRESS NOTE ADULT - ATTENDING COMMENTS
Patient seen and examined. Case discussed with house staff. Overnight, self-removed ferrari, no trauma noted. A trial of void was attempted at that time but patient reportedly failed TOV so ferrari reinserted. Dry appearing on exam today, will hydrate with LR at 75cc/hr. Appreciate nutrition recs, will start Glucerna shakes Patient is morbidly obese. Will need to follow it nutrition while at [facility]. Remaining care as above.

## 2021-05-08 NOTE — PROGRESS NOTE ADULT - PROBLEM SELECTOR PLAN 5
Pt noted to have cr 1.4  on last admission was found to have elevated cr and trended down to 1.2 -1,4  will send urine lytes and start on ivf for now  improved to baseline  Cr 1.03 today  s/p IVF D5/NS Pt noted to have cr 1.4  on last admission was found to have elevated cr and trended down to 1.2 -1,4  will send urine lytes and start on ivf for now  improved to baseline  Cr 1.05 today  started LR IVF @ 75 for 24 hours 5/8

## 2021-05-09 DIAGNOSIS — E66.01 MORBID (SEVERE) OBESITY DUE TO EXCESS CALORIES: ICD-10-CM

## 2021-05-09 LAB
ANION GAP SERPL CALC-SCNC: 10 MMOL/L — SIGNIFICANT CHANGE UP (ref 5–17)
BUN SERPL-MCNC: 40 MG/DL — HIGH (ref 7–18)
CALCIUM SERPL-MCNC: 8.5 MG/DL — SIGNIFICANT CHANGE UP (ref 8.4–10.5)
CHLORIDE SERPL-SCNC: 108 MMOL/L — SIGNIFICANT CHANGE UP (ref 96–108)
CO2 SERPL-SCNC: 21 MMOL/L — LOW (ref 22–31)
CREAT SERPL-MCNC: 1.37 MG/DL — HIGH (ref 0.5–1.3)
GLUCOSE SERPL-MCNC: 138 MG/DL — HIGH (ref 70–99)
HCT VFR BLD CALC: 31.4 % — LOW (ref 34.5–45)
HGB BLD-MCNC: 10.1 G/DL — LOW (ref 11.5–15.5)
MAGNESIUM SERPL-MCNC: 2.3 MG/DL — SIGNIFICANT CHANGE UP (ref 1.6–2.6)
MCHC RBC-ENTMCNC: 31.8 PG — SIGNIFICANT CHANGE UP (ref 27–34)
MCHC RBC-ENTMCNC: 32.2 GM/DL — SIGNIFICANT CHANGE UP (ref 32–36)
MCV RBC AUTO: 98.7 FL — SIGNIFICANT CHANGE UP (ref 80–100)
NRBC # BLD: 0 /100 WBCS — SIGNIFICANT CHANGE UP (ref 0–0)
PHOSPHATE SERPL-MCNC: 3.2 MG/DL — SIGNIFICANT CHANGE UP (ref 2.5–4.5)
PLATELET # BLD AUTO: 310 K/UL — SIGNIFICANT CHANGE UP (ref 150–400)
POTASSIUM SERPL-MCNC: 4.6 MMOL/L — SIGNIFICANT CHANGE UP (ref 3.5–5.3)
POTASSIUM SERPL-SCNC: 4.6 MMOL/L — SIGNIFICANT CHANGE UP (ref 3.5–5.3)
RBC # BLD: 3.18 M/UL — LOW (ref 3.8–5.2)
RBC # FLD: 14.8 % — HIGH (ref 10.3–14.5)
SODIUM SERPL-SCNC: 139 MMOL/L — SIGNIFICANT CHANGE UP (ref 135–145)
WBC # BLD: 11.12 K/UL — HIGH (ref 3.8–10.5)
WBC # FLD AUTO: 11.12 K/UL — HIGH (ref 3.8–10.5)

## 2021-05-09 PROCEDURE — 99233 SBSQ HOSP IP/OBS HIGH 50: CPT

## 2021-05-09 RX ORDER — SODIUM CHLORIDE 9 MG/ML
1000 INJECTION, SOLUTION INTRAVENOUS
Refills: 0 | Status: DISCONTINUED | OUTPATIENT
Start: 2021-05-09 | End: 2021-05-10

## 2021-05-09 RX ADMIN — APIXABAN 5 MILLIGRAM(S): 2.5 TABLET, FILM COATED ORAL at 06:04

## 2021-05-09 RX ADMIN — Medication 1000 MILLIGRAM(S): at 00:15

## 2021-05-09 RX ADMIN — ATENOLOL 100 MILLIGRAM(S): 25 TABLET ORAL at 12:20

## 2021-05-09 RX ADMIN — Medication 650 MILLIGRAM(S): at 06:05

## 2021-05-09 RX ADMIN — LEVETIRACETAM 750 MILLIGRAM(S): 250 TABLET, FILM COATED ORAL at 17:51

## 2021-05-09 RX ADMIN — Medication 650 MILLIGRAM(S): at 07:00

## 2021-05-09 RX ADMIN — Medication 81 MILLIGRAM(S): at 12:20

## 2021-05-09 RX ADMIN — LEVETIRACETAM 750 MILLIGRAM(S): 250 TABLET, FILM COATED ORAL at 06:05

## 2021-05-09 RX ADMIN — APIXABAN 5 MILLIGRAM(S): 2.5 TABLET, FILM COATED ORAL at 17:51

## 2021-05-09 RX ADMIN — ATORVASTATIN CALCIUM 40 MILLIGRAM(S): 80 TABLET, FILM COATED ORAL at 21:31

## 2021-05-09 NOTE — PROGRESS NOTE ADULT - PROBLEM SELECTOR PLAN 8
IMPROVE VTE Individual Risk Assessment  RISK                                                         Points  [  ] Previous VTE                                      3  [  ] Thrombophilia                                   2  [  ] Lower limb paralysis                         2 (unable to hold up >15 seconds)    [  ] Current Cancer                                  2       (within 6 months)  [  ] Immobilization > 24 hrs                    1  [  ] ICU/CCU stay > 24 hrs                         1  [  ] Age > 60                                              1  on Eliquis for AC -Full assist with ADLs

## 2021-05-09 NOTE — PROGRESS NOTE ADULT - PROBLEM SELECTOR PLAN 5
Pt noted to have cr 1.37 today  on last admission was found to have elevated cr and trended down to 1.2 -1,4  =Continue LR IVF @ 75 for 24 hours more

## 2021-05-09 NOTE — PROGRESS NOTE ADULT - PROBLEM SELECTOR PLAN 7
IMPROVE VTE Individual Risk Assessment  RISK                                                         Points  [  ] Previous VTE                                      3  [  ] Thrombophilia                                   2  [  ] Lower limb paralysis                         2 (unable to hold up >15 seconds)    [  ] Current Cancer                                  2       (within 6 months)  [  ] Immobilization > 24 hrs                    1  [  ] ICU/CCU stay > 24 hrs                         1  [  ] Age > 60                                              1  on Eliquis for AC -LEXIE recs appreciated

## 2021-05-09 NOTE — PROGRESS NOTE ADULT - SUBJECTIVE AND OBJECTIVE BOX
[facility] Medicine    Patient is a 75y old  Female who presents with a chief complaint of fall (08 May 2021 07:50)      SUBJECTIVE / OVERNIGHT EVENTS: No acute events overnight. Received acetaminophen x 1 for pain. Per nursing, patient ate breakfast very well today and part of lunch Offers no new complaints. Wants to be left alone.    MEDICATIONS  (STANDING):  apixaban 5 milliGRAM(s) Oral every 12 hours  aspirin  chewable 81 milliGRAM(s) Oral daily  ATENolol  Tablet 100 milliGRAM(s) Oral daily  atorvastatin 40 milliGRAM(s) Oral at bedtime  lactated ringers. 1000 milliLiter(s) (75 mL/Hr) IV Continuous <Continuous>  levETIRAcetam 750 milliGRAM(s) Oral two times a day    MEDICATIONS  (PRN):  acetaminophen   Tablet .. 650 milliGRAM(s) Oral every 6 hours PRN Mild Pain (1 - 3), Moderate Pain (4 - 6)      Vital Signs Last 24 Hrs  T(C): 36.7 (05-09-21 @ 12:38)  T(F): 98.1 (05-09-21 @ 12:38), Max: 98.6 (05-08-21 @ 20:32)  HR: 93 (05-09-21 @ 12:38) (88 - 93)  BP: 115/78 (05-09-21 @ 12:38)  BP(mean): --  RR: 18 (05-09-21 @ 12:38) (17 - 18)  SpO2: 100% (05-09-21 @ 12:38) (98% - 100%)  Wt(kg): --    05-08 @ 07:01  -  05-09 @ 07:00  --------------------------------------------------------  IN: 0 mL / OUT: 1100 mL / NET: -1100 mL    05-09 @ 07:01  -  05-09 @ 16:28  --------------------------------------------------------  IN: 0 mL / OUT: 100 mL / NET: -100 mL      CAPILLARY BLOOD GLUCOSE        I&O's Summary    08 May 2021 07:01  -  09 May 2021 07:00  --------------------------------------------------------  IN: 0 mL / OUT: 1100 mL / NET: -1100 mL    09 May 2021 07:01  -  09 May 2021 16:28  --------------------------------------------------------  IN: 0 mL / OUT: 100 mL / NET: -100 mL        PHYSICAL EXAM:  GENERAL: NAD, well-developed  HEAD:  Atraumatic, Normocephalic  EYES: EOMI, PERRLA, conjunctiva and sclera clear  NECK: Supple, No JVD  CHEST/LUNG: Clear to auscultation bilaterally; No wheeze  HEART: Regular rate and rhythm; No murmurs, rubs, or gallops  ABDOMEN: Soft, Nontender, Nondistended; Bowel sounds present  EXTREMITIES:  2+ Peripheral Pulses, No clubbing, cyanosis, or edema  PSYCH: AAOx3  NEUROLOGY: non-focal  SKIN: No rashes or lesions    LABS:                        10.1   11.12 )-----------( 310      ( 09 May 2021 07:46 )             31.4     05-09    139  |  108  |  40<H>  ----------------------------<  138<H>  4.6   |  21<L>  |  1.37<H>    Ca    8.5      09 May 2021 07:46  Phos  3.2     05-09  Mg     2.3     05-09                RADIOLOGY & ADDITIONAL TESTS:    Imaging Personally Reviewed:    Consultant(s) Notes Reviewed:      Care Discussed with Consultants/Other Providers:    Assessment and Plan: [facility] Medicine    Patient is a 75y old  Female who presents with a chief complaint of fall (08 May 2021 07:50)      SUBJECTIVE / OVERNIGHT EVENTS: No acute events overnight. Received acetaminophen x 1 for pain. Per nursing, patient ate breakfast very well today and part of lunch Offers no new complaints. Wants to be left alone.    MEDICATIONS  (STANDING):  apixaban 5 milliGRAM(s) Oral every 12 hours  aspirin  chewable 81 milliGRAM(s) Oral daily  ATENolol  Tablet 100 milliGRAM(s) Oral daily  atorvastatin 40 milliGRAM(s) Oral at bedtime  lactated ringers. 1000 milliLiter(s) (75 mL/Hr) IV Continuous <Continuous>  levETIRAcetam 750 milliGRAM(s) Oral two times a day    MEDICATIONS  (PRN):  acetaminophen   Tablet .. 650 milliGRAM(s) Oral every 6 hours PRN Mild Pain (1 - 3), Moderate Pain (4 - 6)      Vital Signs Last 24 Hrs  T(C): 36.7 (05-09-21 @ 12:38)  T(F): 98.1 (05-09-21 @ 12:38), Max: 98.6 (05-08-21 @ 20:32)  HR: 93 (05-09-21 @ 12:38) (88 - 93)  BP: 115/78 (05-09-21 @ 12:38)  BP(mean): --  RR: 18 (05-09-21 @ 12:38) (17 - 18)  SpO2: 100% (05-09-21 @ 12:38) (98% - 100%)  Wt(kg): --    05-08 @ 07:01  -  05-09 @ 07:00  --------------------------------------------------------  IN: 0 mL / OUT: 1100 mL / NET: -1100 mL    05-09 @ 07:01  -  05-09 @ 16:28  --------------------------------------------------------  IN: 0 mL / OUT: 100 mL / NET: -100 mL      CAPILLARY BLOOD GLUCOSE        I&O's Summary    08 May 2021 07:01  -  09 May 2021 07:00  --------------------------------------------------------  IN: 0 mL / OUT: 1100 mL / NET: -1100 mL    09 May 2021 07:01  -  09 May 2021 16:28  --------------------------------------------------------  IN: 0 mL / OUT: 100 mL / NET: -100 mL    Physical Exam  GENERAL: lying flat in bed, awake alert on RA  HEAD:  Atraumatic, Normocephalic  MOUTH: poor/ absent dentition  EYES:  conjunctiva and sclera clear  NECK: Supple, No JVD,  CHEST/LUNG: Diminished air entry. No rales, rhonchi, wheezing, or rubs  HEART: irregular rate and rhythm; No murmurs, r  ABDOMEN: Soft, Nontender, Nondistended; +soft abd hernia. Bowel sounds present. Ly draining yellow urine  NERVOUS SYSTEM:  Alert & Oriented X1, contracted, limited ROM. Tracks with eyes. Follows commands verbally  EXTREMITIES:  2+ Peripheral Pulses, No clubbing, cyanosis, or edema  SKIN: warm dry, posterior gluteal skin breakdown. Right arm ecchymoses.    LABS:                        10.1   11.12 )-----------( 310      ( 09 May 2021 07:46 )             31.4     05-09    139  |  108  |  40<H>  ----------------------------<  138<H>  4.6   |  21<L>  |  1.37<H>    Ca    8.5      09 May 2021 07:46  Phos  3.2     05-09  Mg     2.3     05-09        RADIOLOGY & ADDITIONAL TESTS:    Imaging Personally Reviewed:    Consultant(s) Notes Reviewed:      Care Discussed with Consultants/Other Providers:    Assessment and Plan:

## 2021-05-09 NOTE — PROGRESS NOTE ADULT - PROBLEM/PLAN-8
DISPLAY PLAN FREE TEXT patient presents with sore throat, nausea, mylagia, and concern for COVID exposure.  As patient is nontoxic appearing will test for COVID and d/c.  Quarantine reviewed and return precautions reviewed.

## 2021-05-09 NOTE — PROGRESS NOTE ADULT - PROBLEM SELECTOR PLAN 1
Pt presented after a slip from bed. Pt with right sided paralysis and severe obesity    pt will benefit from rehab as family is unable to care at home due to mobility  XR with no acute fractures, only degenerative changes  CT head no acute change 5/6  palliative consulted but family wishing for [facility]  DC to [facility] pending

## 2021-05-10 ENCOUNTER — TRANSCRIPTION ENCOUNTER (OUTPATIENT)
Age: 76
End: 2021-05-10

## 2021-05-10 VITALS
SYSTOLIC BLOOD PRESSURE: 120 MMHG | RESPIRATION RATE: 17 BRPM | HEART RATE: 63 BPM | TEMPERATURE: 98 F | DIASTOLIC BLOOD PRESSURE: 80 MMHG | OXYGEN SATURATION: 99 %

## 2021-05-10 LAB
ALBUMIN SERPL ELPH-MCNC: 2.2 G/DL — LOW (ref 3.5–5)
ALBUMIN SERPL ELPH-MCNC: 2.3 G/DL — LOW (ref 3.5–5)
ALP SERPL-CCNC: 101 U/L — SIGNIFICANT CHANGE UP (ref 40–120)
ALP SERPL-CCNC: 93 U/L — SIGNIFICANT CHANGE UP (ref 40–120)
ALT FLD-CCNC: 23 U/L DA — SIGNIFICANT CHANGE UP (ref 10–60)
ALT FLD-CCNC: 24 U/L DA — SIGNIFICANT CHANGE UP (ref 10–60)
ANION GAP SERPL CALC-SCNC: 11 MMOL/L — SIGNIFICANT CHANGE UP (ref 5–17)
AST SERPL-CCNC: 23 U/L — SIGNIFICANT CHANGE UP (ref 10–40)
AST SERPL-CCNC: 24 U/L — SIGNIFICANT CHANGE UP (ref 10–40)
BILIRUB DIRECT SERPL-MCNC: 0.2 MG/DL — SIGNIFICANT CHANGE UP (ref 0–0.2)
BILIRUB DIRECT SERPL-MCNC: 0.3 MG/DL — HIGH (ref 0–0.2)
BILIRUB INDIRECT FLD-MCNC: 0.6 MG/DL — SIGNIFICANT CHANGE UP (ref 0.2–1)
BILIRUB INDIRECT FLD-MCNC: 0.8 MG/DL — SIGNIFICANT CHANGE UP (ref 0.2–1)
BILIRUB SERPL-MCNC: 0.9 MG/DL — SIGNIFICANT CHANGE UP (ref 0.2–1.2)
BILIRUB SERPL-MCNC: 1 MG/DL — SIGNIFICANT CHANGE UP (ref 0.2–1.2)
BUN SERPL-MCNC: 37 MG/DL — HIGH (ref 7–18)
CALCIUM SERPL-MCNC: 8.6 MG/DL — SIGNIFICANT CHANGE UP (ref 8.4–10.5)
CHLORIDE SERPL-SCNC: 109 MMOL/L — HIGH (ref 96–108)
CO2 SERPL-SCNC: 21 MMOL/L — LOW (ref 22–31)
CREAT SERPL-MCNC: 1.13 MG/DL — SIGNIFICANT CHANGE UP (ref 0.5–1.3)
CULTURE RESULTS: SIGNIFICANT CHANGE UP
CULTURE RESULTS: SIGNIFICANT CHANGE UP
GLUCOSE SERPL-MCNC: 120 MG/DL — HIGH (ref 70–99)
HCT VFR BLD CALC: 31.3 % — LOW (ref 34.5–45)
HGB BLD-MCNC: 10 G/DL — LOW (ref 11.5–15.5)
MAGNESIUM SERPL-MCNC: 2.4 MG/DL — SIGNIFICANT CHANGE UP (ref 1.6–2.6)
MCHC RBC-ENTMCNC: 31.7 PG — SIGNIFICANT CHANGE UP (ref 27–34)
MCHC RBC-ENTMCNC: 31.9 GM/DL — LOW (ref 32–36)
MCV RBC AUTO: 99.4 FL — SIGNIFICANT CHANGE UP (ref 80–100)
NRBC # BLD: 0 /100 WBCS — SIGNIFICANT CHANGE UP (ref 0–0)
PHOSPHATE SERPL-MCNC: 3 MG/DL — SIGNIFICANT CHANGE UP (ref 2.5–4.5)
PLATELET # BLD AUTO: 300 K/UL — SIGNIFICANT CHANGE UP (ref 150–400)
POTASSIUM SERPL-MCNC: 4.3 MMOL/L — SIGNIFICANT CHANGE UP (ref 3.5–5.3)
POTASSIUM SERPL-SCNC: 4.3 MMOL/L — SIGNIFICANT CHANGE UP (ref 3.5–5.3)
PROT SERPL-MCNC: 5.7 G/DL — LOW (ref 6–8.3)
PROT SERPL-MCNC: 6 G/DL — SIGNIFICANT CHANGE UP (ref 6–8.3)
RBC # BLD: 3.15 M/UL — LOW (ref 3.8–5.2)
RBC # FLD: 15.3 % — HIGH (ref 10.3–14.5)
SARS-COV-2 RNA SPEC QL NAA+PROBE: SIGNIFICANT CHANGE UP
SODIUM SERPL-SCNC: 141 MMOL/L — SIGNIFICANT CHANGE UP (ref 135–145)
SPECIMEN SOURCE: SIGNIFICANT CHANGE UP
SPECIMEN SOURCE: SIGNIFICANT CHANGE UP
WBC # BLD: 11.63 K/UL — HIGH (ref 3.8–10.5)
WBC # FLD AUTO: 11.63 K/UL — HIGH (ref 3.8–10.5)

## 2021-05-10 PROCEDURE — G0378: CPT

## 2021-05-10 PROCEDURE — 83735 ASSAY OF MAGNESIUM: CPT

## 2021-05-10 PROCEDURE — 71045 X-RAY EXAM CHEST 1 VIEW: CPT

## 2021-05-10 PROCEDURE — 84484 ASSAY OF TROPONIN QUANT: CPT

## 2021-05-10 PROCEDURE — 80076 HEPATIC FUNCTION PANEL: CPT

## 2021-05-10 PROCEDURE — 84100 ASSAY OF PHOSPHORUS: CPT

## 2021-05-10 PROCEDURE — 93005 ELECTROCARDIOGRAM TRACING: CPT

## 2021-05-10 PROCEDURE — 82550 ASSAY OF CK (CPK): CPT

## 2021-05-10 PROCEDURE — 87635 SARS-COV-2 COVID-19 AMP PRB: CPT

## 2021-05-10 PROCEDURE — 97163 PT EVAL HIGH COMPLEX 45 MIN: CPT

## 2021-05-10 PROCEDURE — 85025 COMPLETE CBC W/AUTO DIFF WBC: CPT

## 2021-05-10 PROCEDURE — 80048 BASIC METABOLIC PNL TOTAL CA: CPT

## 2021-05-10 PROCEDURE — 99239 HOSP IP/OBS DSCHRG MGMT >30: CPT | Mod: GC

## 2021-05-10 PROCEDURE — 36415 COLL VENOUS BLD VENIPUNCTURE: CPT

## 2021-05-10 PROCEDURE — 99285 EMERGENCY DEPT VISIT HI MDM: CPT

## 2021-05-10 PROCEDURE — 85610 PROTHROMBIN TIME: CPT

## 2021-05-10 PROCEDURE — 85027 COMPLETE CBC AUTOMATED: CPT

## 2021-05-10 PROCEDURE — 80177 DRUG SCRN QUAN LEVETIRACETAM: CPT

## 2021-05-10 PROCEDURE — 83605 ASSAY OF LACTIC ACID: CPT

## 2021-05-10 PROCEDURE — 80053 COMPREHEN METABOLIC PANEL: CPT

## 2021-05-10 PROCEDURE — 73560 X-RAY EXAM OF KNEE 1 OR 2: CPT

## 2021-05-10 PROCEDURE — 85730 THROMBOPLASTIN TIME PARTIAL: CPT

## 2021-05-10 PROCEDURE — 81001 URINALYSIS AUTO W/SCOPE: CPT

## 2021-05-10 PROCEDURE — 86769 SARS-COV-2 COVID-19 ANTIBODY: CPT

## 2021-05-10 PROCEDURE — 87040 BLOOD CULTURE FOR BACTERIA: CPT

## 2021-05-10 PROCEDURE — 70450 CT HEAD/BRAIN W/O DYE: CPT

## 2021-05-10 RX ADMIN — ATENOLOL 100 MILLIGRAM(S): 25 TABLET ORAL at 06:44

## 2021-05-10 RX ADMIN — LEVETIRACETAM 750 MILLIGRAM(S): 250 TABLET, FILM COATED ORAL at 06:43

## 2021-05-10 RX ADMIN — APIXABAN 5 MILLIGRAM(S): 2.5 TABLET, FILM COATED ORAL at 06:43

## 2021-05-10 RX ADMIN — Medication 81 MILLIGRAM(S): at 11:58

## 2021-05-10 NOTE — PROGRESS NOTE ADULT - PROBLEM SELECTOR PLAN 5
Pt noted to have cr 1.4  on last admission was found to have elevated cr and trended down to 1.2 -1,4  will send urine lytes and start on ivf for now  improved to baseline  Cr 1.05 today  started LR IVF @ 75 for 24 hours 5/8

## 2021-05-10 NOTE — PROGRESS NOTE ADULT - ASSESSMENT
74 yo female from home lives with  with history significant for remote CVA with right sided residual weakness, obesity, HTN, carotid stenosis (b/l), afib (on eliquis), presumed seizure (on keppra) presents to the ED after a mechanical fall.

## 2021-05-10 NOTE — PROGRESS NOTE ADULT - PROBLEM SELECTOR PLAN 1
Pt presented after a slip from bed  Pt with right sided paralysis and severe obesity   will consult physical therapy   pt will benefit from rehab as family is unable to care at home  XR with no acute fractures, only degenerative changes  CT head no acute change 5/6  palliative consulted  DC to [facility] pending

## 2021-05-10 NOTE — DISCHARGE NOTE NURSING/CASE MANAGEMENT/SOCIAL WORK - PATIENT PORTAL LINK FT
You can access the FollowMyHealth Patient Portal offered by [facility] by registering at the following website: [email or web address]. By joining [facility]’s FollowMyHealth portal, you will also be able to view your health information using other applications (apps) compatible with our system.

## 2021-05-10 NOTE — PROGRESS NOTE ADULT - PROBLEM SELECTOR PLAN 6
Pt with recent hx of stroke   able to tolerate diet   at home on keppra for presumed seizure after cva admission   will cw keppra and   - f/u keppra levels pending result Pt with recent hx of stroke   able to tolerate diet   at home on keppra for presumed seizure after cva admission   will cw keppra and   - keppra levels elevated   NEURO consulted Dr. Amaral

## 2021-05-10 NOTE — PROGRESS NOTE ADULT - PROBLEM SELECTOR PROBLEM 3
Carotid stenosis
no

## 2021-05-10 NOTE — PROGRESS NOTE ADULT - SUBJECTIVE AND OBJECTIVE BOX
PGY-1 Progress Note discussed with attending    PAGER #: [[phone]] TILL 5:00 PM  PLEASE CONTACT ON CALL TEAM:   - On Call Team (Please refer to Marta) FROM 5:00 PM - 8:30PM  - Nightfloat Team FROM 8:30 -7:30 AM    CHIEF COMPLAINT & BRIEF HOSPITAL COURSE:      INTERVAL HPI/OVERNIGHT EVENTS:       REVIEW OF SYSTEMS:  CONSTITUTIONAL: No fever, weight loss, or fatigue  RESPIRATORY: No cough, wheezing, chills or hemoptysis; No shortness of breath  CARDIOVASCULAR: No chest pain, palpitations, dizziness, or leg swelling  GASTROINTESTINAL: No abdominal pain. No nausea, vomiting, or hematemesis; No diarrhea or constipation. No melena or hematochezia.  GENITOURINARY: No dysuria or hematuria, urinary frequency  NEUROLOGICAL: No headaches, memory loss, loss of strength, numbness, or tremors  SKIN: No itching, burning, rashes, or lesions     MEDICATIONS  (STANDING):  apixaban 5 milliGRAM(s) Oral every 12 hours  aspirin  chewable 81 milliGRAM(s) Oral daily  ATENolol  Tablet 100 milliGRAM(s) Oral daily  atorvastatin 40 milliGRAM(s) Oral at bedtime  lactated ringers. 1000 milliLiter(s) (75 mL/Hr) IV Continuous <Continuous>  levETIRAcetam 750 milliGRAM(s) Oral two times a day    MEDICATIONS  (PRN):  acetaminophen   Tablet .. 650 milliGRAM(s) Oral every 6 hours PRN Mild Pain (1 - 3), Moderate Pain (4 - 6)      Vital Signs Last 24 Hrs  T(C): 36.7 (10 May 2021 05:07), Max: 36.7 (09 May 2021 12:38)  T(F): 98.1 (10 May 2021 05:07), Max: 98.1 (09 May 2021 12:38)  HR: 101 (10 May 2021 05:07) (91 - 101)  BP: 131/77 (10 May 2021 05:07) (100/48 - 131/77)  BP(mean): --  RR: 18 (10 May 2021 05:07) (18 - 18)  SpO2: 100% (10 May 2021 05:07) (97% - 100%)    PHYSICAL EXAMINATION:  GENERAL: NAD, well built  HEAD:  Atraumatic, Normocephalic  EYES:  conjunctiva and sclera clear  NECK: Supple, No JVD, Normal thyroid  CHEST/LUNG: Clear to auscultation. Clear to percussion bilaterally; No rales, rhonchi, wheezing, or rubs  HEART: Regular rate and rhythm; No murmurs, rubs, or gallops  ABDOMEN: Soft, Nontender, Nondistended; Bowel sounds present  NERVOUS SYSTEM:  Alert & Oriented X3,    EXTREMITIES:  2+ Peripheral Pulses, No clubbing, cyanosis, or edema  SKIN: warm dry                          10.0   11.63 )-----------( 300      ( 10 May 2021 06:54 )             31.3     05-10    141  |  109<H>  |  37<H>  ----------------------------<  120<H>  4.3   |  21<L>  |  1.13    Ca    8.6      10 May 2021 06:54  Phos  3.0     05-10  Mg     2.4     05-10                    I&O's Summary    09 May 2021 07:01  -  10 May 2021 07:00  --------------------------------------------------------  IN: 900 mL / OUT: 660 mL / NET: 240 mL        CAPILLARY BLOOD GLUCOSE        CAPILLARY BLOOD GLUCOSE          RADIOLOGY & ADDITIONAL TESTS:                   PGY-1 Progress Note discussed with attending    PAGER #: [[phone]] TILL 5:00 PM  PLEASE CONTACT ON CALL TEAM:   - On Call Team (Please refer to Marta) FROM 5:00 PM - 8:30PM  - Nightfloat Team FROM 8:30 -7:30 AM    CHIEF COMPLAINT & BRIEF HOSPITAL COURSE:  75 F, home, Hx remote CVA with right sided residual weakness, obesity, HTN, carotid stenosis (b/l), afib (on eliquis), presumed seizure (on keppra) p/a mechanical fall today.  d/c from hospital x 9 days ago after CVA. Pt returns to ED today for fall out of bed. Son states that physical therapist came to the house today and tried to sit the pt up in the bed, pt slid off of the bed and fell onto the floor. Son and physical therapist able to grab her. Pt did not hit her head or LOC. When trying to get pt off of the floor, pt scraped knees on the ground. As per son family is unable to adequately care for pt at home and interested in rehab. More awake on second day of admission s/p IVF and Abx. Speech and swallow re-evaluated 5/6 made recs for mech soft nectar consistency. CT head no acute changes on 5/6. Completed abx on 5/7. Pending DC to [facility]. Pt pulled out ferrari overnight - given TOV but did not urinate. Ferrari was replaced 5/8. Completed gently IVF hydration. Added glucerna BID per nutrition recs.    INTERVAL HPI/OVERNIGHT EVENTS:   No events overnight. Keppra level elevated, Neuro consulted. COVID sent.    REVIEW OF SYSTEMS:  Unable to obtain    MEDICATIONS  (STANDING):  apixaban 5 milliGRAM(s) Oral every 12 hours  aspirin  chewable 81 milliGRAM(s) Oral daily  ATENolol  Tablet 100 milliGRAM(s) Oral daily  atorvastatin 40 milliGRAM(s) Oral at bedtime  lactated ringers. 1000 milliLiter(s) (75 mL/Hr) IV Continuous <Continuous>  levETIRAcetam 750 milliGRAM(s) Oral two times a day    MEDICATIONS  (PRN):  acetaminophen   Tablet .. 650 milliGRAM(s) Oral every 6 hours PRN Mild Pain (1 - 3), Moderate Pain (4 - 6)      Vital Signs Last 24 Hrs  T(C): 36.7 (10 May 2021 05:07), Max: 36.7 (09 May 2021 12:38)  T(F): 98.1 (10 May 2021 05:07), Max: 98.1 (09 May 2021 12:38)  HR: 101 (10 May 2021 05:07) (91 - 101)  BP: 131/77 (10 May 2021 05:07) (100/48 - 131/77)  BP(mean): --  RR: 18 (10 May 2021 05:07) (18 - 18)  SpO2: 100% (10 May 2021 05:07) (97% - 100%)    PHYSICAL EXAMINATION:  GENERAL: lying in bed, awake alert on RA  HEAD:  Atraumatic, Normocephalic  MOUTH: poor/ absent dentition  EYES:  conjunctiva and sclera clear  NECK: Supple, No JVD,  CHEST/LUNG: Diminished air entry. No rales, rhonchi, wheezing, or rubs  HEART: irregular rate and rhythm; No murmurs, rubs, or gallops  ABDOMEN: Soft, Nontender, Nondistended; +soft abd hernia. Bowel sounds present. Ferrari  NERVOUS SYSTEM:  Alert & Oriented X0, contracted, limited ROM. Tracks with eyes. Minimal verbal response to examiner.  Ferrari in place - danielle yellow urine, no blood.  EXTREMITIES:  2+ Peripheral Pulses, No clubbing, cyanosis, or edema  SKIN: warm dry, posterior gluteal skin breakdown. Right arm ecchymoses.                        10.0   11.63 )-----------( 300      ( 10 May 2021 06:54 )             31.3     05-10    141  |  109<H>  |  37<H>  ----------------------------<  120<H>  4.3   |  21<L>  |  1.13    Ca    8.6      10 May 2021 06:54  Phos  3.0     05-10  Mg     2.4     05-10    I&O's Summary    09 May 2021 07:01  -  10 May 2021 07:00  --------------------------------------------------------  IN: 900 mL / OUT: 660 mL / NET: 240 mL

## 2021-05-10 NOTE — PROGRESS NOTE ADULT - PROBLEM SELECTOR PLAN 4
Pt at home on atenolol and eliquis   continue with home medications

## 2021-05-10 NOTE — PROGRESS NOTE ADULT - PROBLEM SELECTOR PROBLEM 6
CVA (cerebrovascular accident)

## 2021-05-10 NOTE — PROGRESS NOTE ADULT - ATTENDING COMMENTS
Patient seen and examined. Case discussed with housestaff. Worked with PT earlier this morning and also eating well per RN. Repeat COVID testing noted to be negative. Keppra level noted to be slightly elevated today. Will consult neurology for slightly elevated level but this can be done as an outpatient should patient be accepted to [facility]. Repeat LFTs from this AM unremarkable.  Remaining care as above.

## 2021-05-10 NOTE — CHART NOTE - NSCHARTNOTEFT_GEN_A_CORE
Spoke with the pt's spouse Mr. Redman, he stated the family has decided to maintain FULL CODE status.  They want all medical intervention to maintain the pt's life.   Palliative care will sign off. Please reconsult if needed.

## 2021-05-10 NOTE — PROGRESS NOTE ADULT - PROBLEM SELECTOR PROBLEM 5
DEJA (acute kidney injury)

## 2021-06-05 NOTE — H&P ADULT - PROBLEM SELECTOR PROBLEM 9
tx from raj/ sdh and facial Injury, unwitnessed fall at assisted living/ confused at baseline Prophylactic measure CVA (cerebrovascular accident)

## 2021-06-17 NOTE — ED PROVIDER NOTE - NS_EDPROVIDERDISPOUSERTYPE_ED_A_ED
----- Message from Marvel Peitt MD sent at 5/16/2021  2:17 PM CDT -----  Please contact this patient, let him know his hemoglobin is up to 11.1.    Stay on iron replacement and follow-up with me in 3 months earlier if needed.   Scribe Attestation (For Scribes USE Only)... Attending Attestation (For Attendings USE Only).../Scribe Attestation (For Scribes USE Only)...

## 2021-07-09 ENCOUNTER — EMERGENCY (EMERGENCY)
Facility: HOSPITAL | Age: 76
LOS: 1 days | Discharge: HOME | End: 2021-07-09
Attending: EMERGENCY MEDICINE
Payer: COMMERCIAL

## 2021-07-09 VITALS
OXYGEN SATURATION: 95 % | HEIGHT: 65 IN | SYSTOLIC BLOOD PRESSURE: 154 MMHG | RESPIRATION RATE: 24 BRPM | TEMPERATURE: 98 F | DIASTOLIC BLOOD PRESSURE: 124 MMHG | HEART RATE: 103 BPM

## 2021-07-09 PROCEDURE — 99283 EMERGENCY DEPT VISIT LOW MDM: CPT

## 2021-07-09 RX ORDER — APIXABAN 2.5 MG/1
5 TABLET, FILM COATED ORAL ONCE
Refills: 0 | Status: COMPLETED | OUTPATIENT
Start: 2021-07-09 | End: 2021-07-09

## 2021-07-09 RX ORDER — LEVETIRACETAM 250 MG/1
750 TABLET, FILM COATED ORAL ONCE
Refills: 0 | Status: COMPLETED | OUTPATIENT
Start: 2021-07-09 | End: 2021-07-09

## 2021-07-09 NOTE — ED ADULT NURSE NOTE - NSFALLRSKPASTHIST_ED_ALL_ED
Tele sitter called to inform this RN that tele sitter units were back up and running. Report given to sachin pierre. Frequent rounding in place.    no

## 2021-07-09 NOTE — ED ADULT TRIAGE NOTE - TEMPERATURE IN FAHRENHEIT (DEGREES F)
Patient discharged to home in stable conditon.  Written and verbal after care 
instructions given. 

Patient verbalizes understanding of instructions.
98

## 2021-07-09 NOTE — ED PROVIDER NOTE - PATIENT PORTAL LINK FT
You can access the FollowMyHealth Patient Portal offered by [facility] by registering at the following website: [email or web address]. By joining [employer]’s FollowMyHealth portal, you will also be able to view your health information using other applications (apps) compatible with our system.

## 2021-07-09 NOTE — ED ADULT NURSE NOTE - NSIMPLEMENTINTERV_GEN_ALL_ED
Implemented All Fall with Harm Risk Interventions:  [place] to call system. Call bell, personal items and telephone within reach. Instruct patient to call for assistance. Room bathroom lighting operational. Non-slip footwear when patient is off stretcher. Physically safe environment: no spills, clutter or unnecessary equipment. Stretcher in lowest position, wheels locked, appropriate side rails in place. Provide visual cue, wrist band, yellow gown, etc. Monitor gait and stability. Monitor for mental status changes and reorient to person, place, and time. Review medications for side effects contributing to fall risk. Reinforce activity limits and safety measures with patient and family. Provide visual clues: red socks.

## 2021-07-09 NOTE — ED PROVIDER NOTE - CLINICAL SUMMARY MEDICAL DECISION MAKING FREE TEXT BOX
75 year old female with ferrari not draining. vitals WNL PE as above.  ferrari removed. bladder scan negative for urine and pt urinating without issue. will discharge via ambulette. f/u with PMD. return precautions discussed.

## 2021-07-09 NOTE — ED PROVIDER NOTE - OBJECTIVE STATEMENT
75 year old female PMh afib, cva, HTN, bedbound coming in for ferrari not draining. hx as per pts son. states she had the ferrari placed in the rehab to help with healing of her pressure ulcers. Last night it was changed and today pt was urinating normally and the urine wasn't going into the bag. states that now she has a 24 hour aid and they don't want her to have the ferrari anymore. denies all other complaints.

## 2021-07-10 PROBLEM — I48.91 UNSPECIFIED ATRIAL FIBRILLATION: Chronic | Status: ACTIVE | Noted: 2021-05-05

## 2021-07-10 PROBLEM — I65.29 OCCLUSION AND STENOSIS OF UNSPECIFIED CAROTID ARTERY: Chronic | Status: ACTIVE | Noted: 2021-05-05

## 2021-07-10 PROCEDURE — 99283 EMERGENCY DEPT VISIT LOW MDM: CPT

## 2021-07-10 RX ORDER — ACETAMINOPHEN 500 MG
650 TABLET ORAL ONCE
Refills: 0 | Status: COMPLETED | OUTPATIENT
Start: 2021-07-10 | End: 2021-07-10

## 2021-07-10 RX ADMIN — Medication 650 MILLIGRAM(S): at 02:47

## 2021-07-10 RX ADMIN — APIXABAN 5 MILLIGRAM(S): 2.5 TABLET, FILM COATED ORAL at 00:43

## 2021-07-10 RX ADMIN — LEVETIRACETAM 750 MILLIGRAM(S): 250 TABLET, FILM COATED ORAL at 00:43

## 2022-04-02 ENCOUNTER — EMERGENCY (EMERGENCY)
Facility: HOSPITAL | Age: 77
LOS: 1 days | Discharge: HOME | End: 2022-04-02
Attending: EMERGENCY MEDICINE
Payer: COMMERCIAL

## 2022-04-02 VITALS
OXYGEN SATURATION: 98 % | TEMPERATURE: 99 F | SYSTOLIC BLOOD PRESSURE: 132 MMHG | DIASTOLIC BLOOD PRESSURE: 55 MMHG | HEART RATE: 91 BPM | RESPIRATION RATE: 18 BRPM

## 2022-04-02 VITALS
SYSTOLIC BLOOD PRESSURE: 120 MMHG | WEIGHT: 229.94 LBS | DIASTOLIC BLOOD PRESSURE: 75 MMHG | RESPIRATION RATE: 16 BRPM | HEIGHT: 65 IN | HEART RATE: 100 BPM | OXYGEN SATURATION: 98 % | TEMPERATURE: 98 F

## 2022-04-02 PROCEDURE — 99282 EMERGENCY DEPT VISIT SF MDM: CPT

## 2022-04-02 PROCEDURE — 99283 EMERGENCY DEPT VISIT LOW MDM: CPT

## 2022-04-02 NOTE — ED PROVIDER NOTE - PATIENT PORTAL LINK FT
You can access the FollowMyHealth Patient Portal offered by [facility] by registering at the following website: [email or web address]. By joining [employer]’s FollowMyHealth portal, you will also be able to view your health information using other applications (apps) compatible with our system.

## 2022-04-02 NOTE — ED PROVIDER NOTE - NSFOLLOWUPINSTRUCTIONS_ED_ALL_ED_FT
SKIN YEAST INFECTION - AfterCare(R) Instructions(ER/ED)           Skin Yeast Infection    WHAT YOU NEED TO KNOW:    Yeast is normally present on the skin. Infection happens when you have too much yeast, or when it gets into a cut on your skin. Certain types of mold and fungus can cause a yeast infection. A skin yeast infection can appear anywhere on your skin or nail beds. Skin yeast infections are usually found on warm, moist parts of the body. Examples include between skin folds or under the breasts.    DISCHARGE INSTRUCTIONS:    Return to the emergency department if:   •You have signs of infection, such as pus, warmth or red streaks coming from the wound, or a fever.          Call your doctor if:   •Your symptoms worsen or do not get better within 7 to 10 days.      •You have new or returning signs of a skin yeast infection after treatment.      •You have questions or concerns about your condition or care.      Medicines:   •Antifungal medicine may be given as a cream, ointment, or pill.      •Take your medicine as directed. Contact your healthcare provider if you think your medicine is not helping or if you have side effects. Tell him or her if you are allergic to any medicine. Keep a list of the medicines, vitamins, and herbs you take. Include the amounts, and when and why you take them. Bring the list or the pill bottles to follow-up visits. Carry your medicine list with you in case of an emergency.      Care for the skin near the infection: You may only have discolored patches of skin, or areas that are dry and flaking. Care for these skin problems as directed by your healthcare provider. If you have painful skin or an open sore, you will need to protect the skin and prevent damage. You will also need to keep the skin dry as much as possible. Ask your healthcare provider how to care for your skin while the infection clears. The following are general guidelines for caring for painful or open skin:  •Keep the skin clean. Ask your healthcare provider if you should wash with mild soap and water. Do not use soap that contains alcohol. Alcohol can dry and irritate the skin and make symptoms worse. Your baby's healthcare provider may tell you to use diaper cream or ointment when you change his or her diaper. This will protect the skin and prevent moisture from collecting.      •Keep the skin dry. Pat the area dry with a towel. Do not rub, because this may irritate the skin. If you have a skin yeast infection between skin folds, lift the top part gently and hold it while you dry between your skin folds. Always dry your feet completely after you swim or bathe, including between your toes. Dry your skin if you are sweating from exercise or exposure to heat. Use a clean towel each time to prevent spreading or continuing the infection.      •Keep the skin protected. Ask your healthcare provider if you should cover the area with a bandage or leave it open. Check your skin each day to make sure you do not have new or worsening problems. You may need to have someone check the skin if you cannot see the area easily.      Prevent another skin yeast infection:   •Do not share clothing or towels      •Wear shower shoes if you need to use a public shower      •Dry your feet completely after you bathe, and apply antifungal powder or cream as directed      •Put on socks before you get dressed so you do not spread fungus from your feet      •Wear light clothing that allows air to get to your skin      •Manage your weight to prevent skin folds where yeast can collect      •Manage diabetes      •Use antibiotics correctly to prevent antibiotic resistance      •Change your baby's diaper often, and keep the area clean and dry as much as possible      •Use a diaper cream or ointment that contains zinc oxide or dimethicone on your baby's diaper area as directed      Follow up with your doctor as directed: Write down your questions so you remember to ask them during your visits.       © Copyright [employer] 2022           back to top                          © Copyright [employer] 2022

## 2022-04-02 NOTE — ED ADULT TRIAGE NOTE - CHIEF COMPLAINT QUOTE
severe rash in the back x 1 month severe rash in the back x 1 month, not improving with creams, afebrile at home

## 2022-04-02 NOTE — ED PROVIDER NOTE - PHYSICAL EXAMINATION
GENERAL: well appearing, no acute distress   HEAD: atraumatic   EYES: EOMI, pink conjunctiva   ENT: moist oral mucosa   CARDIAC: RRR, no edema, distal pulses present   RESPIRATORY: lungs CTAB, no increased work of breathing   GASTROINTESTINAL: no abdominal tenderness, no rebound or guarding, bowel sounds presents  MUSCULOSKELETAL: no deformity   NEUROLOGICAL: alert, spontaneous movement of extremities   SKIN: redness under breasts/neck/entire back with some excoriations; b/l heels ulcer consistent w/ dry gangrene   PSYCHIATRIC: cooperative

## 2022-04-02 NOTE — ED ADULT NURSE NOTE - OBJECTIVE STATEMENT
As per son rash on body over 1 month started as a pimple. BAck and under breast fold with ness. Sacrum with 2x 3 opening

## 2022-04-02 NOTE — ED PROVIDER NOTE - OBJECTIVE STATEMENT
75 yo F pmh of CVA, bedbound, presents with son who is concerned about rash to pt's back x 3 weeks. Has been rotating pt to side and now worried about redness on hips. Denies fever, AMS, other acute complaints.

## 2022-04-02 NOTE — ED ADULT NURSE NOTE - NSIMPLEMENTINTERV_GEN_ALL_ED
Implemented All Universal Safety Interventions:  [place] to call system. Call bell, personal items and telephone within reach. Instruct patient to call for assistance. Room bathroom lighting operational. Non-slip footwear when patient is off stretcher. Physically safe environment: no spills, clutter or unnecessary equipment. Stretcher in lowest position, wheels locked, appropriate side rails in place.

## 2022-04-02 NOTE — ED PROVIDER NOTE - CLINICAL SUMMARY MEDICAL DECISION MAKING FREE TEXT BOX
Elderly F with rash to back and under breasts and neck. Suspect fungal. Plan - offered admission, shared decision making used, son wants to go home with trial of fungal cream, dry skin, fu PCP or visiting RN. Discussed indications for patient return to ED. Patient's family understood.

## 2022-06-28 NOTE — ED ADULT NURSE NOTE - NS ED NURSE LEVEL OF CONSCIOUSNESS SPEECH
Health Call Center    Phone Message    May a detailed message be left on voicemail: yes     Reason for Call: Medication Refill Request    Has the patient contacted the pharmacy for the refill? Yes   Name of medication being requested: methotrexate 2.5 MG tablet  Provider who prescribed the medication:   Pharmacy: [id number] [place] [address] [address].  Date medication is needed: ASAP    Patient stated he lost his prescription and the pharmacy will not let him  another one. Please discuss with patient and pharmacy, thank you.       Action Taken: Message routed to:  Other: Cardiology    Travel Screening: Not Applicable                                                                      
Returned call to pharmacy. They confirm that Jose just filled his methotrexate and therefore insurance won't cover refill as it is too soon to fill. Reviewed that they gave him 2 weeks supply to give him time to try and locate his supply. Also confirmed that if he cannot find it, he can pay cash for a refill. Would be 37$ for 70 tablets.     Called Jose. Reviewed above. HE states understanding. States he will continue to look for his supply and if he doesn't find it, he states he is comfortable paying the $37 for a refill to get him to September when insurance will pay for his refill again. No further questions at this time.   
Speaking Coherently

## 2022-07-26 ENCOUNTER — INPATIENT (INPATIENT)
Facility: HOSPITAL | Age: 77
LOS: 13 days | Discharge: HOME | DRG: 871 | End: 2022-08-09
Attending: STUDENT IN AN ORGANIZED HEALTH CARE EDUCATION/TRAINING PROGRAM | Admitting: STUDENT IN AN ORGANIZED HEALTH CARE EDUCATION/TRAINING PROGRAM
Payer: COMMERCIAL

## 2022-07-26 VITALS
SYSTOLIC BLOOD PRESSURE: 117 MMHG | HEIGHT: 65 IN | WEIGHT: 229.94 LBS | TEMPERATURE: 101 F | OXYGEN SATURATION: 94 % | HEART RATE: 120 BPM | RESPIRATION RATE: 20 BRPM | DIASTOLIC BLOOD PRESSURE: 78 MMHG

## 2022-07-26 DIAGNOSIS — N17.9 ACUTE KIDNEY FAILURE, UNSPECIFIED: ICD-10-CM

## 2022-07-26 DIAGNOSIS — I10 ESSENTIAL (PRIMARY) HYPERTENSION: ICD-10-CM

## 2022-07-26 DIAGNOSIS — A41.9 SEPSIS, UNSPECIFIED ORGANISM: ICD-10-CM

## 2022-07-26 DIAGNOSIS — I65.29 OCCLUSION AND STENOSIS OF UNSPECIFIED CAROTID ARTERY: ICD-10-CM

## 2022-07-26 DIAGNOSIS — I63.9 CEREBRAL INFARCTION, UNSPECIFIED: ICD-10-CM

## 2022-07-26 DIAGNOSIS — Z29.9 ENCOUNTER FOR PROPHYLACTIC MEASURES, UNSPECIFIED: ICD-10-CM

## 2022-07-26 DIAGNOSIS — N39.0 URINARY TRACT INFECTION, SITE NOT SPECIFIED: ICD-10-CM

## 2022-07-26 DIAGNOSIS — G93.41 METABOLIC ENCEPHALOPATHY: ICD-10-CM

## 2022-07-26 DIAGNOSIS — K56.41 FECAL IMPACTION: ICD-10-CM

## 2022-07-26 DIAGNOSIS — I48.91 UNSPECIFIED ATRIAL FIBRILLATION: ICD-10-CM

## 2022-07-26 LAB
ALBUMIN SERPL ELPH-MCNC: 2.7 G/DL — LOW (ref 3.5–5)
ALP SERPL-CCNC: 94 U/L — SIGNIFICANT CHANGE UP (ref 40–120)
ALT FLD-CCNC: 39 U/L DA — SIGNIFICANT CHANGE UP (ref 10–60)
ANION GAP SERPL CALC-SCNC: 9 MMOL/L — SIGNIFICANT CHANGE UP (ref 5–17)
APPEARANCE UR: ABNORMAL
APTT BLD: 35.6 SEC — HIGH (ref 27.5–35.5)
AST SERPL-CCNC: 31 U/L — SIGNIFICANT CHANGE UP (ref 10–40)
BASOPHILS # BLD AUTO: 0 K/UL — SIGNIFICANT CHANGE UP (ref 0–0.2)
BASOPHILS NFR BLD AUTO: 0 % — SIGNIFICANT CHANGE UP (ref 0–2)
BILIRUB SERPL-MCNC: 0.9 MG/DL — SIGNIFICANT CHANGE UP (ref 0.2–1.2)
BILIRUB UR-MCNC: NEGATIVE — SIGNIFICANT CHANGE UP
BUN SERPL-MCNC: 30 MG/DL — HIGH (ref 7–18)
CALCIUM SERPL-MCNC: 9.7 MG/DL — SIGNIFICANT CHANGE UP (ref 8.4–10.5)
CHLORIDE SERPL-SCNC: 108 MMOL/L — SIGNIFICANT CHANGE UP (ref 96–108)
CO2 SERPL-SCNC: 23 MMOL/L — SIGNIFICANT CHANGE UP (ref 22–31)
COLOR SPEC: YELLOW — SIGNIFICANT CHANGE UP
CREAT SERPL-MCNC: 1.38 MG/DL — HIGH (ref 0.5–1.3)
DIFF PNL FLD: ABNORMAL
EGFR: 40 ML/MIN/1.73M2 — LOW
EOSINOPHIL # BLD AUTO: 0 K/UL — SIGNIFICANT CHANGE UP (ref 0–0.5)
EOSINOPHIL NFR BLD AUTO: 0 % — SIGNIFICANT CHANGE UP (ref 0–6)
GLUCOSE SERPL-MCNC: 222 MG/DL — HIGH (ref 70–99)
GLUCOSE UR QL: NEGATIVE — SIGNIFICANT CHANGE UP
HCT VFR BLD CALC: 45.7 % — HIGH (ref 34.5–45)
HGB BLD-MCNC: 14.5 G/DL — SIGNIFICANT CHANGE UP (ref 11.5–15.5)
INR BLD: 2.2 RATIO — HIGH (ref 0.88–1.16)
KETONES UR-MCNC: NEGATIVE — SIGNIFICANT CHANGE UP
LACTATE SERPL-SCNC: 1.8 MMOL/L — SIGNIFICANT CHANGE UP (ref 0.7–2)
LACTATE SERPL-SCNC: 3.7 MMOL/L — HIGH (ref 0.7–2)
LEUKOCYTE ESTERASE UR-ACNC: ABNORMAL
LYMPHOCYTES # BLD AUTO: 0.45 K/UL — LOW (ref 1–3.3)
LYMPHOCYTES # BLD AUTO: 3 % — LOW (ref 13–44)
MCHC RBC-ENTMCNC: 29.8 PG — SIGNIFICANT CHANGE UP (ref 27–34)
MCHC RBC-ENTMCNC: 31.7 GM/DL — LOW (ref 32–36)
MCV RBC AUTO: 93.8 FL — SIGNIFICANT CHANGE UP (ref 80–100)
MONOCYTES # BLD AUTO: 0 K/UL — SIGNIFICANT CHANGE UP (ref 0–0.9)
MONOCYTES NFR BLD AUTO: 0 % — LOW (ref 2–14)
NEUTROPHILS # BLD AUTO: 14.34 K/UL — HIGH (ref 1.8–7.4)
NEUTROPHILS NFR BLD AUTO: 94 % — HIGH (ref 43–77)
NITRITE UR-MCNC: POSITIVE
PH UR: 5 — SIGNIFICANT CHANGE UP (ref 5–8)
PLATELET # BLD AUTO: 205 K/UL — SIGNIFICANT CHANGE UP (ref 150–400)
POTASSIUM SERPL-MCNC: 4.5 MMOL/L — SIGNIFICANT CHANGE UP (ref 3.5–5.3)
POTASSIUM SERPL-SCNC: 4.5 MMOL/L — SIGNIFICANT CHANGE UP (ref 3.5–5.3)
PROT SERPL-MCNC: 7.2 G/DL — SIGNIFICANT CHANGE UP (ref 6–8.3)
PROT UR-MCNC: 30 MG/DL
PROTHROM AB SERPL-ACNC: 26.4 SEC — HIGH (ref 10.5–13.4)
RAPID RVP RESULT: SIGNIFICANT CHANGE UP
RBC # BLD: 4.87 M/UL — SIGNIFICANT CHANGE UP (ref 3.8–5.2)
RBC # FLD: 14.6 % — HIGH (ref 10.3–14.5)
SARS-COV-2 RNA SPEC QL NAA+PROBE: SIGNIFICANT CHANGE UP
SODIUM SERPL-SCNC: 140 MMOL/L — SIGNIFICANT CHANGE UP (ref 135–145)
SP GR SPEC: 1.02 — SIGNIFICANT CHANGE UP (ref 1.01–1.02)
UROBILINOGEN FLD QL: NEGATIVE — SIGNIFICANT CHANGE UP
WBC # BLD: 14.94 K/UL — HIGH (ref 3.8–10.5)
WBC # FLD AUTO: 14.94 K/UL — HIGH (ref 3.8–10.5)

## 2022-07-26 PROCEDURE — 76705 ECHO EXAM OF ABDOMEN: CPT | Mod: 26

## 2022-07-26 PROCEDURE — 99223 1ST HOSP IP/OBS HIGH 75: CPT | Mod: GC

## 2022-07-26 PROCEDURE — 74176 CT ABD & PELVIS W/O CONTRAST: CPT | Mod: 26,MA

## 2022-07-26 PROCEDURE — 71045 X-RAY EXAM CHEST 1 VIEW: CPT | Mod: 26

## 2022-07-26 PROCEDURE — 70450 CT HEAD/BRAIN W/O DYE: CPT | Mod: 26,MA

## 2022-07-26 PROCEDURE — 99285 EMERGENCY DEPT VISIT HI MDM: CPT

## 2022-07-26 RX ORDER — CEFTRIAXONE 500 MG/1
1000 INJECTION, POWDER, FOR SOLUTION INTRAMUSCULAR; INTRAVENOUS ONCE
Refills: 0 | Status: COMPLETED | OUTPATIENT
Start: 2022-07-26 | End: 2022-07-26

## 2022-07-26 RX ORDER — LANOLIN ALCOHOL/MO/W.PET/CERES
3 CREAM (GRAM) TOPICAL AT BEDTIME
Refills: 0 | Status: DISCONTINUED | OUTPATIENT
Start: 2022-07-26 | End: 2022-08-09

## 2022-07-26 RX ORDER — ACETAMINOPHEN 500 MG
1000 TABLET ORAL ONCE
Refills: 0 | Status: COMPLETED | OUTPATIENT
Start: 2022-07-26 | End: 2022-07-26

## 2022-07-26 RX ORDER — LEVETIRACETAM 250 MG/1
750 TABLET, FILM COATED ORAL
Refills: 0 | Status: DISCONTINUED | OUTPATIENT
Start: 2022-07-26 | End: 2022-07-27

## 2022-07-26 RX ORDER — SODIUM CHLORIDE 9 MG/ML
2000 INJECTION INTRAMUSCULAR; INTRAVENOUS; SUBCUTANEOUS ONCE
Refills: 0 | Status: COMPLETED | OUTPATIENT
Start: 2022-07-26 | End: 2022-07-26

## 2022-07-26 RX ORDER — MINERAL OIL
133 OIL (ML) MISCELLANEOUS ONCE
Refills: 0 | Status: COMPLETED | OUTPATIENT
Start: 2022-07-26 | End: 2022-07-26

## 2022-07-26 RX ORDER — ATORVASTATIN CALCIUM 80 MG/1
40 TABLET, FILM COATED ORAL AT BEDTIME
Refills: 0 | Status: DISCONTINUED | OUTPATIENT
Start: 2022-07-26 | End: 2022-08-09

## 2022-07-26 RX ORDER — CEFTRIAXONE 500 MG/1
1000 INJECTION, POWDER, FOR SOLUTION INTRAMUSCULAR; INTRAVENOUS EVERY 24 HOURS
Refills: 0 | Status: DISCONTINUED | OUTPATIENT
Start: 2022-07-27 | End: 2022-07-27

## 2022-07-26 RX ORDER — SODIUM CHLORIDE 9 MG/ML
1000 INJECTION INTRAMUSCULAR; INTRAVENOUS; SUBCUTANEOUS
Refills: 0 | Status: DISCONTINUED | OUTPATIENT
Start: 2022-07-26 | End: 2022-07-27

## 2022-07-26 RX ORDER — METOPROLOL TARTRATE 50 MG
5 TABLET ORAL EVERY 6 HOURS
Refills: 0 | Status: DISCONTINUED | OUTPATIENT
Start: 2022-07-26 | End: 2022-07-28

## 2022-07-26 RX ORDER — ACETAMINOPHEN 500 MG
650 TABLET ORAL ONCE
Refills: 0 | Status: COMPLETED | OUTPATIENT
Start: 2022-07-26 | End: 2022-07-26

## 2022-07-26 RX ORDER — ASPIRIN/CALCIUM CARB/MAGNESIUM 324 MG
81 TABLET ORAL DAILY
Refills: 0 | Status: DISCONTINUED | OUTPATIENT
Start: 2022-07-26 | End: 2022-08-09

## 2022-07-26 RX ORDER — APIXABAN 2.5 MG/1
5 TABLET, FILM COATED ORAL EVERY 12 HOURS
Refills: 0 | Status: COMPLETED | OUTPATIENT
Start: 2022-07-26 | End: 2022-07-31

## 2022-07-26 RX ADMIN — SODIUM CHLORIDE 2000 MILLILITER(S): 9 INJECTION INTRAMUSCULAR; INTRAVENOUS; SUBCUTANEOUS at 09:19

## 2022-07-26 RX ADMIN — Medication 650 MILLIGRAM(S): at 11:13

## 2022-07-26 RX ADMIN — Medication 400 MILLIGRAM(S): at 20:39

## 2022-07-26 RX ADMIN — CEFTRIAXONE 100 MILLIGRAM(S): 500 INJECTION, POWDER, FOR SOLUTION INTRAMUSCULAR; INTRAVENOUS at 08:15

## 2022-07-26 RX ADMIN — Medication 650 MILLIGRAM(S): at 10:43

## 2022-07-26 RX ADMIN — SODIUM CHLORIDE 80 MILLILITER(S): 9 INJECTION INTRAMUSCULAR; INTRAVENOUS; SUBCUTANEOUS at 18:00

## 2022-07-26 RX ADMIN — SODIUM CHLORIDE 2000 MILLILITER(S): 9 INJECTION INTRAMUSCULAR; INTRAVENOUS; SUBCUTANEOUS at 08:00

## 2022-07-26 RX ADMIN — ATORVASTATIN CALCIUM 40 MILLIGRAM(S): 80 TABLET, FILM COATED ORAL at 22:30

## 2022-07-26 RX ADMIN — Medication 133 MILLILITER(S): at 20:39

## 2022-07-26 RX ADMIN — Medication 1000 MILLIGRAM(S): at 21:14

## 2022-07-26 RX ADMIN — CEFTRIAXONE 1000 MILLIGRAM(S): 500 INJECTION, POWDER, FOR SOLUTION INTRAMUSCULAR; INTRAVENOUS at 09:56

## 2022-07-26 NOTE — ED PROVIDER NOTE - CLINICAL SUMMARY MEDICAL DECISION MAKING FREE TEXT BOX
76F with PMH dementia, non-verbal baseline, CVA R sided weakness p/w fever and AMS over the past 2 days. Vomited at home as well. Son denies noticing any other symptoms. States she has a history of recurrent UTIs. Pt well appearing, tachy and febrile but normotensive. No meningismus. No PNA on CXR. Potential sources include UTI or infected pressure ulcer.

## 2022-07-26 NOTE — H&P ADULT - PROBLEM SELECTOR PLAN 2
Patient is a 76 Y o with Hx Dementia of recurrent UTIs  about 5x a year with , now presenting with AMS  Patient found to have sepsis with elevated lactate on admission, leukocytosis,   Tachy, soft BP and fever recorder  source found to be UTI  -f/u EKG  - s/p 2L bolus  -c/w gentle hydration with NS at 80ml/h for 24h  -cbc with diff daily  -follow up blood cx, ucx  -c/w antibiotics Patient is a 76 Y o with Hx Dementia of recurrent UTIs  about 5x a year with , now presenting with AMS  Patient found to have sepsis with elevated lactate on admission, leukocytosis,   Tachy, soft BP and fever recorder  source found to be UTI  -f/u EKG  - s/p 2L bolus  -c/w gentle hydration with NS at 80ml/h for 24h  -cbc with diff daily  -follow up blood cx, ucx  -c/w antibiotics- ceftriaxone for 7 days

## 2022-07-26 NOTE — ED ADULT NURSE NOTE - CHIEF COMPLAINT QUOTE
She has change in mental status, vomited twice since 4 pm yesterday.  Last known normal was 08:00 yesterday.  She had a stroke 20 years ago with right sided residual weakness.  Patient does not usually communicate verbally, as per son

## 2022-07-26 NOTE — H&P ADULT - HISTORY OF PRESENT ILLNESS
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     Son states that mother has Hx of recurrent UTI treated mostly at home with cipro, however this time, mental status were concerning to him for another stroke, which prompted ED admission.    Reports chronic constipation, relieved by docusate 2tablets and a dulcolax suppository every morning, No reported urinary changes, No chest pain, shortness of breath, no LOC, reported.    In the ED: Febrile T max 102.5, , /78   Exam: LLQ and RLQ tenderness to palpation  Labs: Lactate 3.7, UA +Ve, and white count of 14  CT abdomen pelvis showed distended gallbladder with stones, stool impaction and stercoral colitis, non obstructing left nephrolithiasis    Patient was admitted for management of metabolic encephalopathy, 2/2 sepsis 2/2 UTI, DEJA and for observation for cholelithiasis.

## 2022-07-26 NOTE — ED ADULT TRIAGE NOTE - CHIEF COMPLAINT QUOTE
CERTIFICATE OF RETURN TO WORK    June 2, 2017      Re:   Jose Jett  [address]  [facility] [address]                        This is to certify that Jose Jett has been under my care.       RESTRICTIONS: please excuse May 31, and June 2 for medical illness.  Care rendered.      REMARKS:         SIGNATURE:___________________________________________,   6/2/2017      DARLENE Regalado Kennebeck, PA-C  Family Medicine  [address] [place] [address]  [phone]  
She has change in mental status, vomited twice since 4 pm yesterday.  Last known normal was 08:00 yesterday.  She had a stroke 20 years ago with right sided residual weakness.  Patient does not usually communicate verbally, as per son

## 2022-07-26 NOTE — ED PROVIDER NOTE - OBJECTIVE STATEMENT
76F with PMH HTN, A fib, dementia, non-verbal baseline, CVA R sided weakness p/w fever and AMS over the past 2 days. Vomited at home as well. Son denies noticing any other symptoms. States she has a history of recurrent UTIs.

## 2022-07-26 NOTE — H&P ADULT - PROBLEM SELECTOR PLAN 3
baseline around 1.2 , no hx of CKD comes in with sepsis 2/2 UTI  DEJA found on labs with Cr of 1.38  Intra-renal unlikely: still early  Obtructive unlikely as the left renal stone is non obstructing  Likely pre renal with BUN/ Cr ratio >20  Patient has already received bolus of 2L  will recheck BMP in the AM

## 2022-07-26 NOTE — H&P ADULT - NSHPLABSRESULTS_GEN_ALL_CORE
14.5   14.94 )-----------( 205      ( 2022 07:25 )             45.7         140  |  108  |  30<H>  ----------------------------<  222<H>  4.5   |  23  |  1.38<H>    Ca    9.7      2022 07:25    TPro  7.2  /  Alb  2.7<L>  /  TBili  0.9  /  DBili  x   /  AST  31  /  ALT  39  /  AlkPhos  94          LIVER FUNCTIONS - ( 2022 07:25 )  Alb: 2.7 g/dL / Pro: 7.2 g/dL / ALK PHOS: 94 U/L / ALT: 39 U/L DA / AST: 31 U/L / GGT: x           PT/INR - ( 2022 07:25 )   PT: 26.4 sec;   INR: 2.20 ratio         PTT - ( 2022 07:25 )  PTT:35.6 sec  Urinalysis Basic - ( 2022 10:37 )    Color: Yellow / Appearance: Slightly Turbid / S.020 / pH: x  Gluc: x / Ketone: Negative  / Bili: Negative / Urobili: Negative   Blood: x / Protein: 30 mg/dL / Nitrite: Positive   Leuk Esterase: Moderate / RBC: 2-5 /HPF / WBC >50 /HPF   Sq Epi: x / Non Sq Epi: Moderate /HPF / Bacteria: Many /HPF        Lactate, Blood: 1.8 mmol/L ( @ 10:50)  Lactate, Blood: 3.7 mmol/L ( @ 07:19)    Blood, Urine: Large ( @ 10:37)          EKG:     RADIOLOGY STUDIES:      CT abdomen pelvis 2022  Rectal fecal impaction with associated uncomplicated stercoral proctitis.  Cholelithiasis with markedly distended gallbladder. Correlate with right   upper quadrant ultrasound.  Nonobstructive left nephrolithiasis      RUQ US 2022  Gallstones in the gallbladder without evidence of thickened gallbladder   wall, pericholecystic fluid or ultrasonic Ho's sign.

## 2022-07-26 NOTE — H&P ADULT - PROBLEM SELECTOR PLAN 1
Patient is a 76 Y o with Hx Dementia of recurrent UTIs  about 5x a year with , now presenting with a 24 h Hx of encephalopathy noted at home off of baseline  CTH -ve for acute changes  Patient found to have sepsis with elevated lactate on admission  Tachy, soft BP and fever recorder  source found to be UTI  - s/p 2L bolus  -c/w gentle hydration with NS at 80ml/h for 24h  -f/u Dysphagia screen   -c/w abx Patient is a 76 Y o with Hx Dementia of recurrent UTIs  about 5x a year with , now presenting with a 24 h Hx of encephalopathy noted at home off of baseline  CTH -ve for acute changes  Patient found to have sepsis with elevated lactate, HR and white count on admission  source found to be UTI  - s/p 2L bolus, lactate improved on repeat  - mental status also now improved and back to baseline  -c/w gentle hydration with NS at 80ml/h for 24h  -f/u Dysphagia screen   -c/w abx

## 2022-07-26 NOTE — ED PROVIDER NOTE - PHYSICAL EXAMINATION
Gen: non-verbal at baseline, non-toxic  Head: NCAT  HEENT: EOMI, oral mucosa moist, normal conjunctiva  Lung: CTAB  CV: tachycardia, no murmurs, rubs or gallops  Abd: soft, NTND, no guarding, no CVA tenderness  MSK: no visible deformities  Neuro: No focal sensory or motor deficits, normal CN exam   Skin: +pressure ulcer over left lateral hip, small amount of pus  Psych: normal affect.     Tl Gonsalves, DO

## 2022-07-26 NOTE — H&P ADULT - ATTENDING COMMENTS
patient seen with son at bedside. mental status much improved. check labs, fu blood and urine cx , c/w iv atbx  plan will be d.c home once stable with private HHA

## 2022-07-26 NOTE — H&P ADULT - PROBLEM SELECTOR PLAN 7
Seen on CT  with a chronic Hx of constipation   patient gets suppository and stool softener every morning  Missed today  will give dulcolax and mineral oil enema

## 2022-07-26 NOTE — CHART NOTE - NSCHARTNOTEFT_GEN_A_CORE
Comes in with UTI   and AMS   patient has anxiety reported by son, and has been reluctant to stay in the hospital  dysphagia screen failed due to anxiety, as evidenced by tachypnea  also found to be tachy to 150s  Takes atenolol at home 100mg daily  now NPo awaiting S/S  will start with iv prn lopressor, to restart home meds once safe

## 2022-07-26 NOTE — H&P ADULT - NSHPPHYSICALEXAM_GEN_ALL_CORE
T(C): 37.3 (07-26-22 @ 09:45), Max: 39.2 (07-26-22 @ 07:58)  T(F): 99.2 (07-26-22 @ 09:45), Max: 102.5 (07-26-22 @ 07:58)  HR: 94 (07-26-22 @ 09:45) (94 - 120)  BP: 102/57 (07-26-22 @ 09:45) (102/57 - 123/81)  RR: 18 (07-26-22 @ 09:45) (18 - 20)  SpO2: 100% (07-26-22 @ 09:45) (94% - 100%)    GENERAL: NAD, lying in bed comfortably  HEAD:  Atraumatic, Normocephalic  EYES: EOMI, PERRLA, conjunctiva and sclera clear  ENT: Moist mucous membranes  NECK: Supple, No JVD  CHEST/LUNG: Clear to auscultation bilaterally; No rales, rhonchi, wheezing, or rubs. Unlabored respirations  HEART: Regular rate and rhythm; No murmurs, rubs, or gallops  ABDOMEN: Bowel sounds present; Soft, Nontender, Nondistended. No hepatomegally  EXTREMITIES:  2+ Peripheral Pulses, brisk capillary refill. No clubbing, cyanosis, or edema  NERVOUS SYSTEM:  Alert & Oriented X3, speech clear. No deficits   MSK: FROM all 4 extremities, full and equal strength  SKIN: No rashes or lesions T(C): 37.3 (07-26-22 @ 09:45), Max: 39.2 (07-26-22 @ 07:58)  T(F): 99.2 (07-26-22 @ 09:45), Max: 102.5 (07-26-22 @ 07:58)  HR: 94 (07-26-22 @ 09:45) (94 - 120)  BP: 102/57 (07-26-22 @ 09:45) (102/57 - 123/81)  RR: 18 (07-26-22 @ 09:45) (18 - 20)  SpO2: 100% (07-26-22 @ 09:45) (94% - 100%)    GENERAL: NAD, lying in bed comfortably  HEAD:  Atraumatic, Normocephalic  EYES: EOMI, PERRLA, conjunctiva and sclera clear  ENT: Moist mucous membranes  NECK: Supple, No JVD, no brui  CHEST/LUNG: Clear to auscultation bilaterally; No rales, rhonchi, wheezing, or rubs. Unlabored respirations  HEART: Regular rate and rhythm; No murmurs, rubs, or gallops  ABDOMEN: LLQ and RLQ TTP, soft and non-distended, BS +  EXTREMITIES:  2+ Peripheral Pulses, brisk capillary refill. No clubbing, cyanosis, or edema  NERVOUS SYSTEM:  Alert & Oriented X1-2, minimally verbal   MSK: FROM all 4 extremities, full and equal strength  SKIN: has a decub ulcer on the left hip

## 2022-07-26 NOTE — GOALS OF CARE CONVERSATION - ADVANCED CARE PLANNING - CONVERSATION DETAILS
The molst was discussed as a formality   For all admitted patients, we need to discuss the details of what CPR entails  these details wer explained to the Son at bedside: who is also the caregiver    He expressed that he has had his father pass away this year, on the ventilator, and was unable to decide at this time  However he feels as though his mother would want to be resuscitated with chest compressions but needs time to think about intubation due to  recent loss    FULL CODE for now

## 2022-07-26 NOTE — H&P ADULT - ASSESSMENT
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     Son states that mother has Hx of recurrent UTI treated mostly at home with cipro, however this time, mental status were concerning to him for another stroke, which prompted ED admission.    Reports chronic constipation, relieved by docusate 2tablets and a dulcolax suppository every morning, No reported urinary changes, No chest pain, shortness of breath, no LOC, reported.    In the ED: Febrile T max 102.5, , /78   Exam: LLQ and RLQ tenderness to palpation  Labs: Lactate 3.7, UA +Ve, and white count of 14  CT abdomen pelvis showed distended gallbladder with stones, stool impaction and stercoral colitis, non obstructing left nephrolithiasis    Patient was admitted for management of metabolic encephalopathy, 2/2 sepsis 2/2 UTI, DEJA and for observation for cholelithiasis. 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

## 2022-07-26 NOTE — H&P ADULT - NSHPSOCIALHISTORY_GEN_ALL_CORE
Patient lives at home with son  is non verbal and bed bound  gets out of bed to wheel chair from time to time

## 2022-07-27 DIAGNOSIS — R78.81 BACTEREMIA: ICD-10-CM

## 2022-07-27 DIAGNOSIS — N20.0 CALCULUS OF KIDNEY: ICD-10-CM

## 2022-07-27 DIAGNOSIS — L89.90 PRESSURE ULCER OF UNSPECIFIED SITE, UNSPECIFIED STAGE: ICD-10-CM

## 2022-07-27 DIAGNOSIS — R93.89 ABNORMAL FINDINGS ON DIAGNOSTIC IMAGING OF OTHER SPECIFIED BODY STRUCTURES: ICD-10-CM

## 2022-07-27 DIAGNOSIS — Z02.9 ENCOUNTER FOR ADMINISTRATIVE EXAMINATIONS, UNSPECIFIED: ICD-10-CM

## 2022-07-27 DIAGNOSIS — K80.20 CALCULUS OF GALLBLADDER WITHOUT CHOLECYSTITIS WITHOUT OBSTRUCTION: ICD-10-CM

## 2022-07-27 LAB
-  CTX-M RESISTANCE MARKER: SIGNIFICANT CHANGE UP
-  ESBL: SIGNIFICANT CHANGE UP
ALBUMIN SERPL ELPH-MCNC: 2 G/DL — LOW (ref 3.5–5)
ALP SERPL-CCNC: 68 U/L — SIGNIFICANT CHANGE UP (ref 40–120)
ALT FLD-CCNC: 34 U/L DA — SIGNIFICANT CHANGE UP (ref 10–60)
ANION GAP SERPL CALC-SCNC: 8 MMOL/L — SIGNIFICANT CHANGE UP (ref 5–17)
ANISOCYTOSIS BLD QL: SLIGHT — SIGNIFICANT CHANGE UP
AST SERPL-CCNC: 23 U/L — SIGNIFICANT CHANGE UP (ref 10–40)
BASOPHILS # BLD AUTO: 0 K/UL — SIGNIFICANT CHANGE UP (ref 0–0.2)
BASOPHILS NFR BLD AUTO: 0 % — SIGNIFICANT CHANGE UP (ref 0–2)
BILIRUB SERPL-MCNC: 0.3 MG/DL — SIGNIFICANT CHANGE UP (ref 0.2–1.2)
BUN SERPL-MCNC: 37 MG/DL — HIGH (ref 7–18)
CALCIUM SERPL-MCNC: 8.9 MG/DL — SIGNIFICANT CHANGE UP (ref 8.4–10.5)
CHLORIDE SERPL-SCNC: 115 MMOL/L — HIGH (ref 96–108)
CO2 SERPL-SCNC: 21 MMOL/L — LOW (ref 22–31)
CREAT SERPL-MCNC: 1.21 MG/DL — SIGNIFICANT CHANGE UP (ref 0.5–1.3)
E COLI DNA BLD POS QL NAA+NON-PROBE: SIGNIFICANT CHANGE UP
EGFR: 47 ML/MIN/1.73M2 — LOW
EOSINOPHIL # BLD AUTO: 0 K/UL — SIGNIFICANT CHANGE UP (ref 0–0.5)
EOSINOPHIL NFR BLD AUTO: 0 % — SIGNIFICANT CHANGE UP (ref 0–6)
GLUCOSE SERPL-MCNC: 186 MG/DL — HIGH (ref 70–99)
GRAM STN FLD: SIGNIFICANT CHANGE UP
HCT VFR BLD CALC: 36.8 % — SIGNIFICANT CHANGE UP (ref 34.5–45)
HGB BLD-MCNC: 11.1 G/DL — LOW (ref 11.5–15.5)
LYMPHOCYTES # BLD AUTO: 1.09 K/UL — SIGNIFICANT CHANGE UP (ref 1–3.3)
LYMPHOCYTES # BLD AUTO: 7 % — LOW (ref 13–44)
MAGNESIUM SERPL-MCNC: 2.1 MG/DL — SIGNIFICANT CHANGE UP (ref 1.6–2.6)
MANUAL SMEAR VERIFICATION: SIGNIFICANT CHANGE UP
MCHC RBC-ENTMCNC: 29.7 PG — SIGNIFICANT CHANGE UP (ref 27–34)
MCHC RBC-ENTMCNC: 30.2 GM/DL — LOW (ref 32–36)
MCV RBC AUTO: 98.4 FL — SIGNIFICANT CHANGE UP (ref 80–100)
METHOD TYPE: SIGNIFICANT CHANGE UP
MONOCYTES # BLD AUTO: 0.62 K/UL — SIGNIFICANT CHANGE UP (ref 0–0.9)
MONOCYTES NFR BLD AUTO: 4 % — SIGNIFICANT CHANGE UP (ref 2–14)
MRSA PCR RESULT.: SIGNIFICANT CHANGE UP
NEUTROPHILS # BLD AUTO: 13.9 K/UL — HIGH (ref 1.8–7.4)
NEUTROPHILS NFR BLD AUTO: 87 % — HIGH (ref 43–77)
NEUTS BAND # BLD: 2 % — SIGNIFICANT CHANGE UP (ref 0–8)
NRBC # BLD: 0 /100 — SIGNIFICANT CHANGE UP (ref 0–0)
OVALOCYTES BLD QL SMEAR: SLIGHT — SIGNIFICANT CHANGE UP
PHOSPHATE SERPL-MCNC: 3.6 MG/DL — SIGNIFICANT CHANGE UP (ref 2.5–4.5)
PLAT MORPH BLD: NORMAL — SIGNIFICANT CHANGE UP
PLATELET # BLD AUTO: 142 K/UL — LOW (ref 150–400)
PLATELET COUNT - ESTIMATE: NORMAL — SIGNIFICANT CHANGE UP
POIKILOCYTOSIS BLD QL AUTO: SLIGHT — SIGNIFICANT CHANGE UP
POTASSIUM SERPL-MCNC: 4.3 MMOL/L — SIGNIFICANT CHANGE UP (ref 3.5–5.3)
POTASSIUM SERPL-SCNC: 4.3 MMOL/L — SIGNIFICANT CHANGE UP (ref 3.5–5.3)
PROT SERPL-MCNC: 5.6 G/DL — LOW (ref 6–8.3)
RBC # BLD: 3.74 M/UL — LOW (ref 3.8–5.2)
RBC # FLD: 14.7 % — HIGH (ref 10.3–14.5)
RBC BLD AUTO: ABNORMAL
S AUREUS DNA NOSE QL NAA+PROBE: DETECTED
SODIUM SERPL-SCNC: 144 MMOL/L — SIGNIFICANT CHANGE UP (ref 135–145)
SPECIMEN SOURCE: SIGNIFICANT CHANGE UP
SPECIMEN SOURCE: SIGNIFICANT CHANGE UP
WBC # BLD: 15.62 K/UL — HIGH (ref 3.8–10.5)
WBC # FLD AUTO: 15.62 K/UL — HIGH (ref 3.8–10.5)

## 2022-07-27 PROCEDURE — 99223 1ST HOSP IP/OBS HIGH 75: CPT | Mod: GC

## 2022-07-27 PROCEDURE — 99233 SBSQ HOSP IP/OBS HIGH 50: CPT

## 2022-07-27 RX ORDER — CHLORHEXIDINE GLUCONATE 213 G/1000ML
1 SOLUTION TOPICAL DAILY
Refills: 0 | Status: DISCONTINUED | OUTPATIENT
Start: 2022-07-27 | End: 2022-08-09

## 2022-07-27 RX ORDER — LEVETIRACETAM 250 MG/1
750 TABLET, FILM COATED ORAL EVERY 12 HOURS
Refills: 0 | Status: DISCONTINUED | OUTPATIENT
Start: 2022-07-27 | End: 2022-07-28

## 2022-07-27 RX ORDER — MEROPENEM 1 G/30ML
1000 INJECTION INTRAVENOUS ONCE
Refills: 0 | Status: COMPLETED | OUTPATIENT
Start: 2022-07-27 | End: 2022-07-27

## 2022-07-27 RX ORDER — MEROPENEM 1 G/30ML
INJECTION INTRAVENOUS
Refills: 0 | Status: DISCONTINUED | OUTPATIENT
Start: 2022-07-27 | End: 2022-07-28

## 2022-07-27 RX ORDER — ACETAMINOPHEN 500 MG
650 TABLET ORAL ONCE
Refills: 0 | Status: COMPLETED | OUTPATIENT
Start: 2022-07-27 | End: 2022-07-27

## 2022-07-27 RX ORDER — SODIUM CHLORIDE 9 MG/ML
1000 INJECTION INTRAMUSCULAR; INTRAVENOUS; SUBCUTANEOUS
Refills: 0 | Status: DISCONTINUED | OUTPATIENT
Start: 2022-07-27 | End: 2022-07-28

## 2022-07-27 RX ORDER — MEROPENEM 1 G/30ML
1000 INJECTION INTRAVENOUS EVERY 8 HOURS
Refills: 0 | Status: DISCONTINUED | OUTPATIENT
Start: 2022-07-27 | End: 2022-07-28

## 2022-07-27 RX ORDER — MUPIROCIN 20 MG/G
1 OINTMENT TOPICAL
Refills: 0 | Status: COMPLETED | OUTPATIENT
Start: 2022-07-27 | End: 2022-08-01

## 2022-07-27 RX ADMIN — CEFTRIAXONE 100 MILLIGRAM(S): 500 INJECTION, POWDER, FOR SOLUTION INTRAMUSCULAR; INTRAVENOUS at 06:00

## 2022-07-27 RX ADMIN — Medication 81 MILLIGRAM(S): at 11:58

## 2022-07-27 RX ADMIN — Medication 5 MILLIGRAM(S): at 22:14

## 2022-07-27 RX ADMIN — APIXABAN 5 MILLIGRAM(S): 2.5 TABLET, FILM COATED ORAL at 17:06

## 2022-07-27 RX ADMIN — Medication 3 MILLIGRAM(S): at 23:07

## 2022-07-27 RX ADMIN — MEROPENEM 100 MILLIGRAM(S): 1 INJECTION INTRAVENOUS at 17:05

## 2022-07-27 RX ADMIN — MEROPENEM 100 MILLIGRAM(S): 1 INJECTION INTRAVENOUS at 22:14

## 2022-07-27 RX ADMIN — SODIUM CHLORIDE 100 MILLILITER(S): 9 INJECTION INTRAMUSCULAR; INTRAVENOUS; SUBCUTANEOUS at 10:21

## 2022-07-27 RX ADMIN — MUPIROCIN 1 APPLICATION(S): 20 OINTMENT TOPICAL at 17:06

## 2022-07-27 RX ADMIN — LEVETIRACETAM 400 MILLIGRAM(S): 250 TABLET, FILM COATED ORAL at 17:56

## 2022-07-27 RX ADMIN — ATORVASTATIN CALCIUM 40 MILLIGRAM(S): 80 TABLET, FILM COATED ORAL at 22:14

## 2022-07-27 RX ADMIN — Medication 650 MILLIGRAM(S): at 23:07

## 2022-07-27 RX ADMIN — CHLORHEXIDINE GLUCONATE 1 APPLICATION(S): 213 SOLUTION TOPICAL at 17:08

## 2022-07-27 RX ADMIN — SODIUM CHLORIDE 100 MILLILITER(S): 9 INJECTION INTRAMUSCULAR; INTRAVENOUS; SUBCUTANEOUS at 04:59

## 2022-07-27 NOTE — GOALS OF CARE CONVERSATION - ADVANCED CARE PLANNING - CONVERSATION DETAILS
Spoke with son Sim and updated patient condition, test result, treatment plan including MOLST,   He understands patient will likely need long term antibiotic for ESBL E coli bacteremia,    Pt son  refuses SLP recommendation of puree/thin liquid, requesting soft bite size diet, He states he has been fed with soft food and there was no problem until now. Explained pt is also risk for aspiration as per SLP specialist and needs closely monitoring, He is agreeable if patient has not tolerated and shows sx of aspiration, then will switch to puree diet.   He wishes Full code, CPR, aggressive treatement for his mother. Does not want to sign MOLST at this time.

## 2022-07-27 NOTE — ADVANCED PRACTICE NURSE CONSULT - ASSESSMENT
This is a 76yr old female patient admitted for UTI, presenting with the following:  -There is an intact DTI to the R. Heel without drainage  -There is a Stage 1 Pressure Injury to the R. Hip and L. Heel, as evident by non-blanchable erythema  -There is a DTI to the Coccyx with epidermal separation, red tissue, drainage, and surrounding tissue blanchable erythema  -There is a L. Hip Stage 4 Pressure Injury with pink tissue, purulent drainage, periwound erythema, and undermining (up to 0.6cm at 360 degrees)

## 2022-07-27 NOTE — PROGRESS NOTE ADULT - PROBLEM SELECTOR PLAN 4
Hx of HTN take atenolol   now with sepsis and not hypertensive  can hold home meds -baseline around 1.2 , no hx of CKD comes in with sepsis 2/2 UTI  -DEJA found on labs with Cr of 1.38  -Intra-renal unlikely: still early  -Obtructive unlikely as the left renal stone is non obstructing  -Likely pre renal with BUN/ Cr ratio >20  -Patient has already received bolus of 2L  -Cr 1.2 today  -monitor BMP

## 2022-07-27 NOTE — SWALLOW BEDSIDE ASSESSMENT ADULT - ASR SWALLOW LINGUAL MOBILITY
Notable tongue protrusion throughout trials/impaired anterior elevation/impaired left lateral movement/impaired right lateral movement

## 2022-07-27 NOTE — SWALLOW BEDSIDE ASSESSMENT ADULT - ORAL PREPARATORY PHASE
Notable tongue protrusion during mastication/Reduced oral grading/Decreased mastication ability Reduced oral grading

## 2022-07-27 NOTE — SWALLOW BEDSIDE ASSESSMENT ADULT - ORAL PHASE
Decreased anterior-posterior movement of the bolus Ground solids remained on tongue post swallow, benefitted from liquid wash to clear oral residue./Decreased anterior-posterior movement of the bolus/Lingual stasis

## 2022-07-27 NOTE — CONSULT NOTE ADULT - SUBJECTIVE AND OBJECTIVE BOX
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     Son states that mother has Hx of recurrent UTI treated mostly at home with cipro, however this time, mental status were concerning to him for another stroke, which prompted ED admission.    Reports chronic constipation, relieved by docusate 2tablets and a dulcolax suppository every morning, No reported urinary changes, No chest pain, shortness of breath, no LOC, reported.    In the ED: Febrile T max 102.5, , /78   Exam: LLQ and RLQ tenderness to palpation  Labs: Lactate 3.7, UA +Ve, and white count of 14  CT abdomen pelvis showed distended gallbladder with stones, stool impaction and stercoral colitis, non obstructing left nephrolithiasis    Patient was admitted for management of metabolic encephalopathy, 2/2 sepsis 2/2 UTI, DEJA and for observation for cholelithiasis.   (2022 14:36)      PAST MEDICAL & SURGICAL HISTORY:  HTN (hypertension)      CVA (cerebrovascular accident)      Atrial fibrillation      Carotid stenosis      No significant past surgical history          MEDS:  apixaban 5 milliGRAM(s) Oral every 12 hours  aspirin  chewable 81 milliGRAM(s) Oral daily  atorvastatin 40 milliGRAM(s) Oral at bedtime  bisacodyl 5 milliGRAM(s) Oral at bedtime  cefTRIAXone   IVPB 1000 milliGRAM(s) IV Intermittent every 24 hours  levETIRAcetam  IVPB 750 milliGRAM(s) IV Intermittent every 12 hours  melatonin 3 milliGRAM(s) Oral at bedtime PRN  metoprolol tartrate Injectable 5 milliGRAM(s) IV Push every 6 hours PRN  sodium chloride 0.9%. 1000 milliLiter(s) IV Continuous <Continuous>      ALLERGIES  penicillin (Unknown)      SOCIAL HISTORY:    FAMILY HISTORY:  No pertinent family history in first degree relatives        ROS:    General:	    Skin:  	  HEENT:    Respiratory and Thorax:  	  Cardiovascular:	    Gastrointestinal:	    Genitourinary:	    Musculoskeletal:	    Neurological:	    Psychiatric:	    Hematology/Lymphatics:	    Endocrine:	    PHYSICAL EXAM:    Vital Signs Last 24 Hrs  T(C): 36.2 (2022 08:13), Max: 39.4 (2022 20:32)  T(F): 97.2 (2022 08:13), Max: 102.9 (2022 20:32)  HR: 95 (2022 08:13) (79 - 163)  BP: 101/60 (2022 08:13) (73/49 - 105/58)  BP(mean): --  RR: 20 (2022 08:13) (18 - 22)  SpO2: 100% (2022 08:13) (94% - 100%)    Parameters below as of 2022 08:13  Patient On (Oxygen Delivery Method): nasal cannula  O2 Flow (L/min): 3        Gen:    HEENT:    Neck:    Lymph Nodes:    Breasts:    Back:    Chest/Thorax:    Cardiovascular:    Gastrointestinal:    Genitourinary:    Rectal:    Extremities:    Vascular:    Neurological:    Skin:    Psychiatric:    LABS/DIAGNOSTIC TESTS:                          11.1   15.62 )-----------( 142      ( 2022 05:40 )             36.8     WBC Count: 15.62 K/uL ( @ 05:40)  WBC Count: 14.94 K/uL ( @ 07:25)          144  |  115<H>  |  37<H>  ----------------------------<  186<H>  4.3   |  21<L>  |  1.21    Ca    8.9      2022 05:40  Phos  3.6       Mg     2.1         TPro  5.6<L>  /  Alb  2.0<L>  /  TBili  0.3  /  DBili  x   /  AST  23  /  ALT  34  /  AlkPhos  68        Urinalysis Basic - ( 2022 10:37 )    Color: Yellow / Appearance: Slightly Turbid / S.020 / pH: x  Gluc: x / Ketone: Negative  / Bili: Negative / Urobili: Negative   Blood: x / Protein: 30 mg/dL / Nitrite: Positive   Leuk Esterase: Moderate / RBC: 2-5 /HPF / WBC >50 /HPF   Sq Epi: x / Non Sq Epi: Moderate /HPF / Bacteria: Many /HPF        LIVER FUNCTIONS - ( 2022 05:40 )  Alb: 2.0 g/dL / Pro: 5.6 g/dL / ALK PHOS: 68 U/L / ALT: 34 U/L DA / AST: 23 U/L / GGT: x             PT/INR - ( 2022 07:25 )   PT: 26.4 sec;   INR: 2.20 ratio         PTT - ( 2022 07:25 )  PTT:35.6 sec    LACTATE:    ABG -     CULTURES:     Culture - Blood (collected 22 @ 07:21)  Source: .Blood Blood-Peripheral  Gram Stain (22 @ 05:28):    Growth in aerobic and anaerobic bottles: Gram Negative Rods  Preliminary Report (22 @ 05:29):    Growth in aerobic and anaerobic bottles: Gram Negative Rods    ***Blood Panel PCR results on this specimen are available    approximately 3 hours after the Gram stain result.***    Gram stain, PCR, and/or culture results may not always    correspond due todifference in methodologies.    ************************************************************    This PCR assay was performed by multiplex PCR. This    Assay tests for 66 bacterial and resistance gene targets.    Please refer to the [facility] testdirectory    at [email or web address] for details.  Organism: Blood Culture PCR (22 @ 06:55)  Organism: Blood Culture PCR (22 @ 06:55)      -  CTX-M Resistance Marker: Detec      -  ESBL: Detec      -  Escherichia coli: Detec      Method Type: PCR    Culture - Blood (collected 22 @ 07:14)  Source: .Blood Blood-Peripheral  Gram Stain (22 @ 08:26):    Growth in anaerobic bottle: Gram Negative Rods    Growth in aerobic bottle: Gram Negative Rods  Preliminary Report (22 @ 08:26):    Growth in anaerobic bottle: Gram Negative Rods    Growth in aerobic bottle: Gram Negative Rods        RADIOLOGY               HPI:  75 y/o female, from home, bedbound, lives with son and daughter with PMH of HTN, A fib on Eliquis and atenolol, constipation, CVA 20 years ago with residual right sided weakness, dementia, b/L ICA stenosis, recurrent UTIs came to ED c/o 24h hx of change in mental status from baseline, associated to fever and son was concerned about new UTI vs stroke.   As per son, she usually talks but when she becomes sick becomes non verbal and altered. He denies any nausea, vomiting, diarrhea, sob, chest pain, dizziness, abdominal pain, urinary symptoms or sick contacts.     In ED: Febrile T max 102.5, , /78   Labs: Lactate 3.7, UA +Ve, and white count of 14  CT abdomen pelvis showed distended gallbladder with stones, stool impaction and stercoral colitis, non obstructing left nephrolithiasis      PAST MEDICAL & SURGICAL HISTORY:  HTN (hypertension)  CVA (cerebrovascular accident)  Atrial fibrillation  Carotid stenosis    No significant past surgical history      MEDS:  apixaban 5 milliGRAM(s) Oral every 12 hours  aspirin  chewable 81 milliGRAM(s) Oral daily  atorvastatin 40 milliGRAM(s) Oral at bedtime  bisacodyl 5 milliGRAM(s) Oral at bedtime  cefTRIAXone   IVPB 1000 milliGRAM(s) IV Intermittent every 24 hours  levETIRAcetam  IVPB 750 milliGRAM(s) IV Intermittent every 12 hours  melatonin 3 milliGRAM(s) Oral at bedtime PRN  metoprolol tartrate Injectable 5 milliGRAM(s) IV Push every 6 hours PRN  sodium chloride 0.9%. 1000 milliLiter(s) IV Continuous <Continuous>      ALLERGIES  penicillin (Unknown)      SOCIAL HISTORY: as per son she used to be a social drinker and no smoking     FAMILY HISTORY:  No pertinent family history in first degree relatives        ROS: unable to obtain due to mental status     PHYSICAL EXAM:    Vital Signs Last 24 Hrs  T(C): 36.2 (2022 08:13), Max: 39.4 (2022 20:32)  T(F): 97.2 (2022 08:13), Max: 102.9 (2022 20:32)  HR: 95 (2022 08:13) (79 - 163)  BP: 101/60 (2022 08:13) (73/49 - 105/58)  BP(mean): --  RR: 20 (2022 08:13) (18 - 22)  SpO2: 100% (2022 08:13) (94% - 100%)    Parameters below as of 2022 08:13  Patient On (Oxygen Delivery Method): nasal cannula  O2 Flow (L/min): 3      GENERAL: NAD, obese, sat well on RA  HEAD:  Atraumatic, Normocephalic  EYES:  conjunctiva and sclera clear  NECK: Supple, No JVD, Normal thyroid  CHEST/LUNG: Clear to auscultation. Clear to percussion bilaterally; No rales, rhonchi, wheezing, or rubs  HEART: Regular rate and rhythm; No murmurs, rubs, or gallops  ABDOMEN: Soft, Nontender, Nondistended; Bowel sounds present, no pain or masses on palpation   NERVOUS SYSTEM:  Alert & Oriented X1, no facial droop, eating by herself, follows commands   EXTREMITIES:  2+ Peripheral Pulses, No clubbing, cyanosis, or edema, complains of LE pain on palpation, contracted LE  : voiding well   SKIN: warm, dry, no skin rashes    LABS/DIAGNOSTIC TESTS:                          11.1   15.62 )-----------( 142      ( 2022 05:40 )             36.8     WBC Count: 15.62 K/uL ( @ 05:40)  WBC Count: 14.94 K/uL ( @ 07:25)          144  |  115<H>  |  37<H>  ----------------------------<  186<H>  4.3   |  21<L>  |  1.21    Ca    8.9      2022 05:40  Phos  3.6       Mg     2.1         TPro  5.6<L>  /  Alb  2.0<L>  /  TBili  0.3  /  DBili  x   /  AST  23  /  ALT  34  /  AlkPhos  68        Urinalysis Basic - ( 2022 10:37 )    Color: Yellow / Appearance: Slightly Turbid / S.020 / pH: x  Gluc: x / Ketone: Negative  / Bili: Negative / Urobili: Negative   Blood: x / Protein: 30 mg/dL / Nitrite: Positive   Leuk Esterase: Moderate / RBC: 2-5 /HPF / WBC >50 /HPF   Sq Epi: x / Non Sq Epi: Moderate /HPF / Bacteria: Many /HPF        LIVER FUNCTIONS - ( 2022 05:40 )  Alb: 2.0 g/dL / Pro: 5.6 g/dL / ALK PHOS: 68 U/L / ALT: 34 U/L DA / AST: 23 U/L / GGT: x             PT/INR - ( 2022 07:25 )   PT: 26.4 sec;   INR: 2.20 ratio         PTT - ( 2022 07:25 )  PTT:35.6 sec      CULTURES:     Culture - Blood (collected 22 @ 07:21)  Source: .Blood Blood-Peripheral  Gram Stain (22 @ 05:28):    Growth in aerobic and anaerobic bottles: Gram Negative Rods  Preliminary Report (22 @ 05:29):    Growth in aerobic and anaerobic bottles: Gram Negative Rods    ***Blood Panel PCR results on this specimen are available    approximately 3 hours after the Gram stain result.***    Gram stain, PCR, and/or culture results may not always    correspond due todifference in methodologies.    ************************************************************    This PCR assay was performed by multiplex PCR. This    Assay tests for 66 bacterial and resistance gene targets.    Please refer to the [facility] testdirectory    at [email or web address] for details.  Organism: Blood Culture PCR (22 @ 06:55)  Organism: Blood Culture PCR (22 @ 06:55)      -  CTX-M Resistance Marker: Detec      -  ESBL: Detec      -  Escherichia coli: Detec      Method Type: PCR    Culture - Blood (collected 22 @ 07:14)  Source: .Blood Blood-Peripheral  Gram Stain (22 @ 08:26):    Growth in anaerobic bottle: Gram Negative Rods    Growth in aerobic bottle: Gram Negative Rods  Preliminary Report (22 @ 08:26):    Growth in anaerobic bottle: Gram Negative Rods    Growth in aerobic bottle: Gram Negative Rods        RADIOLOGY  ct< from: CT Abdomen and Pelvis No Cont (22 @ 09:57) >  Rectal fecal impaction with associated uncomplicated stercoral proctitis.  Cholelithiasis with markedly distended gallbladder. Correlate with right   upper quadrant ultrasound.  Nonobstructive left nephrolithiasis.    < end of copied text >  < from: CT Head No Cont (22 @ 09:57) >  No acute intracranial hemorrhage or acute territorial infarct.  If   symptoms persist, follow-up MRI exam recommended.    < end of copied text >  < from: Xray Chest 1 View-PORTABLE IMMEDIATE (22 @ 07:51) >  IMPRESSION: No evidence for focal infiltrate or lobar consolidation.    < end of copied text >    < from: US Abdomen Upper Quadrant Right (22 @ 12:24) >    INTERPRETATION:  CLINICAL INFORMATION: Fever and vomiting. Distended   gallbladder with cholelithiasis on CT    COMPARISON: No prior abdominal ultrasound is available for comparison.   Reference is made with a previous abdominal CT of the same day.    TECHNIQUE: Sonography of the right upper quadrant.    FINDINGS:  Liver: Within normal limits.  Bile ducts: Normal caliber. Common bile duct measures 5 mm in caliber   which is within normal limits..  Gallbladder: Gallstones in the gallbladder without evidence of thickened   gallbladder wall, pericholecystic fluid or ultrasonic Ho's sign.  Pancreas: Visualized portions are within normal limits.  Right kidney: 9.6 cm. No hydronephrosis. Apparent cortical thinning of   the right kidney.  Ascites: None.  IVC: Visualized portions are within normal limits.    IMPRESSION:  Gallstones in the gallbladder without evidence of thickened gallbladder   wall, pericholecystic fluid or ultrasonic Ho's sign.    < end of copied text >                   HPI (from chart):  75 y/o female, from home, bedbound, lives with son and daughter with PMH of HTN, A fib on Eliquis and atenolol, constipation, CVA 20 years ago with residual right sided weakness, dementia, b/L ICA stenosis, recurrent UTIs came to ED c/o 24h hx of change in mental status from baseline associated wiht fever. Pt's son was concerned about new UTI vs stroke.   As per son, she usually talks. However, when she gets sick, she becomes non verbal and altered. He denies any nausea, vomiting, diarrhea, sob, chest pain, dizziness, abdominal pain, urinary symptoms or sick contacts with respect to the patient. In ED: Febrile T max 102.5, , /78; lactate 3.7, UA +Ve, and white count of 14; CT abdomen pelvis showed distended gallbladder with stones, stool impaction and stercoral colitis, non obstructing left nephrolithiasis. Pt Dx with sepsis due to UTI and treated with broad-spectrum Ab.      PAST MEDICAL & SURGICAL HISTORY:  HTN (hypertension)  CVA (cerebrovascular accident)  Atrial fibrillation  Carotid stenosis    No significant past surgical history      MEDS:  apixaban 5 milliGRAM(s) Oral every 12 hours  aspirin  chewable 81 milliGRAM(s) Oral daily  atorvastatin 40 milliGRAM(s) Oral at bedtime  bisacodyl 5 milliGRAM(s) Oral at bedtime  cefTRIAXone   IVPB 1000 milliGRAM(s) IV Intermittent every 24 hours  levETIRAcetam  IVPB 750 milliGRAM(s) IV Intermittent every 12 hours  melatonin 3 milliGRAM(s) Oral at bedtime PRN  metoprolol tartrate Injectable 5 milliGRAM(s) IV Push every 6 hours PRN  sodium chloride 0.9%. 1000 milliLiter(s) IV Continuous <Continuous>      ALLERGIES  penicillin (Unknown)      SOCIAL HISTORY: as per son, she used to be a social drinker and no smoking     FAMILY HISTORY:  No pertinent family history in first degree relatives    ROS: unable to obtain due to mental status       PHYSICAL EXAM:    Vital Signs Last 24 Hrs  T(C): 36.2 (2022 08:13), Max: 39.4 (2022 20:32)  T(F): 97.2 (2022 08:13), Max: 102.9 (2022 20:32)  HR: 95 (2022 08:13) (79 - 163)  BP: 101/60 (2022 08:13) (73/49 - 105/58)  BP(mean): --  RR: 20 (2022 08:13) (18 - 22)  SpO2: 100% (2022 08:13) (94% - 100%)    Parameters below as of 2022 08:13  Patient On (Oxygen Delivery Method): nasal cannula  O2 Flow (L/min): 3      GENERAL: WD obese W female in NAD  HEAD:  Atraumatic, Normocephalic  EYES:  conjunctiva and sclera clear  MOUTH: partially edentulous; poor oral hygiene  NECK: Supple, No JVD, Normal thyroid  CHEST/LUNG: Clear to auscultation. Clear to percussion bilaterally  HEART: irregular rhythm; No murmurs, rubs, or gallops  ABDOMEN: Soft, Nontender, Nondistended; Bowel sounds present, no pain or masses on palpation; + umbilical hernia   NERVOUS SYSTEM:  Alert & Oriented X1 (place), no facial droop, eating by herself, follows commands   EXTREMITIES:  2+ Peripheral Pulses, No clubbing, cyanosis, or edema; + LE tenderness to palpation, contracted LE  : voiding well   SKIN: warm, dry, no skin rashes or breakdown    LABS/DIAGNOSTIC TESTS:                      11.1   15.62 )-----------( 142      ( 2022 05:40 )             36.8     WBC Count: 15.62 K/uL ( @ 05:40)  WBC Count: 14.94 K/uL ( @ 07:25)          144  |  115<H>  |  37<H>  ----------------------------<  186<H>  4.3   |  21<L>  |  1.21    Ca    8.9      2022 05:40  Phos  3.6       Mg     2.1         TPro  5.6<L>  /  Alb  2.0<L>  /  TBili  0.3  /  DBili  x   /  AST  23  /  ALT  34  /  AlkPhos  68        Urinalysis Basic - ( 2022 10:37 )    Color: Yellow / Appearance: Slightly Turbid / S.020 / pH: x  Gluc: x / Ketone: Negative  / Bili: Negative / Urobili: Negative   Blood: x / Protein: 30 mg/dL / Nitrite: Positive   Leuk Esterase: Moderate / RBC: 2-5 /HPF / WBC >50 /HPF   Sq Epi: x / Non Sq Epi: Moderate /HPF / Bacteria: Many /HPF        LIVER FUNCTIONS - ( 2022 05:40 )  Alb: 2.0 g/dL / Pro: 5.6 g/dL / ALK PHOS: 68 U/L / ALT: 34 U/L DA / AST: 23 U/L / GGT: x             PT/INR - ( 2022 07:25 )   PT: 26.4 sec;   INR: 2.20 ratio         PTT - ( 2022 07:25 )  PTT:35.6 sec      CULTURES:     Culture - Blood (collected 22 @ 07:21)  Source: .Blood Blood-Peripheral  Gram Stain (22 @ 05:28):    Growth in aerobic and anaerobic bottles: Gram Negative Rods  Preliminary Report (22 @ 05:29):    Growth in aerobic and anaerobic bottles: Gram Negative Rods    ***Blood Panel PCR results on this specimen are available    approximately 3 hours after the Gram stain result.***    Gram stain, PCR, and/or culture results may not always    correspond due todifference in methodologies.    ************************************************************    This PCR assay was performed by multiplex PCR. This    Assay tests for 66 bacterial and resistance gene targets.    Please refer to the [facility] Labs testdirectory    at [email or web address] for details.  Organism: Blood Culture PCR (22 @ 06:55)  Organism: Blood Culture PCR (22 @ 06:55)      -  CTX-M Resistance Marker: Detec      -  ESBL: Detec      -  Escherichia coli: Detec      Method Type: PCR    Culture - Blood (collected 22 @ 07:14)  Source: .Blood Blood-Peripheral  Gram Stain (22 @ 08:26):    Growth in anaerobic bottle: Gram Negative Rods    Growth in aerobic bottle: Gram Negative Rods  Preliminary Report (22 @ 08:26):    Growth in anaerobic bottle: Gram Negative Rods    Growth in aerobic bottle: Gram Negative Rods        RADIOLOGY  ct< from: CT Abdomen and Pelvis No Cont (22 @ 09:57) >  Rectal fecal impaction with associated uncomplicated stercoral proctitis.  Cholelithiasis with markedly distended gallbladder. Correlate with right   upper quadrant ultrasound.  Nonobstructive left nephrolithiasis.    < end of copied text >  < from: CT Head No Cont (22 @ 09:57) >  No acute intracranial hemorrhage or acute territorial infarct.  If   symptoms persist, follow-up MRI exam recommended.    < end of copied text >  < from: Xray Chest 1 View-PORTABLE IMMEDIATE (22 @ 07:51) >  IMPRESSION: No evidence for focal infiltrate or lobar consolidation.    < end of copied text >    < from: US Abdomen Upper Quadrant Right (22 @ 12:24) >    INTERPRETATION:  CLINICAL INFORMATION: Fever and vomiting. Distended   gallbladder with cholelithiasis on CT    COMPARISON: No prior abdominal ultrasound is available for comparison.   Reference is made with a previous abdominal CT of the same day.    TECHNIQUE: Sonography of the right upper quadrant.    FINDINGS:  Liver: Within normal limits.  Bile ducts: Normal caliber. Common bile duct measures 5 mm in caliber   which is within normal limits..  Gallbladder: Gallstones in the gallbladder without evidence of thickened   gallbladder wall, pericholecystic fluid or ultrasonic Ho's sign.  Pancreas: Visualized portions are within normal limits.  Right kidney: 9.6 cm. No hydronephrosis. Apparent cortical thinning of   the right kidney.  Ascites: None.  IVC: Visualized portions are within normal limits.    IMPRESSION:  Gallstones in the gallbladder without evidence of thickened gallbladder   wall, pericholecystic fluid or ultrasonic Ho's sign.    < end of copied text >

## 2022-07-27 NOTE — PATIENT PROFILE ADULT - LOCATION #1
HPI: 61yo male presenting with chest pain. Seen by pulmonologist and had outpatient CT scan which showed small left PE. Patient also noted to be in atrial flutter in ED.     PE:  Gen: NAD  Head: NCAT  HEENT: Oral mucosa moist, normal conjunctiva  CV: RRR no murmurs, normal perfusion  Resp: CTA b/l, no wheezing, rales, rhonchi, no respiratory distress  GI: Abd Soft NTND  Neuro: No focal neuro deficits  MSK: FROM all 4 extremities, no deformity  Skin: No rash, no bruising  Psych: Normal affect    MDM: Patient with new atrial flutter, PE, placed in obs for TTE, cardiac enzymes, and telemetry monitoring. However, patient decided to leave ED AMA. The patient has the capacity to refuse further medical evaluation and care. I had a lengthy discussion with the patient in which appropriate further evaluation and treatment was offered to the patient, and they declined. The patient understands that as a consequence of this decision they may be at risk for clinical deterioration including permanent disability and death, and the patient verbalized this understanding. Clear return precautions were discussed. The patient was urged to seek follow-up care, with appropriate referrals made as needed. Radha Farias DO     I performed a history and physical exam of the patient and discussed their management with the advanced care provider. I reviewed the advanced care provider's note and agree with the documented findings and plan of care. My medical decision making and objective findings are found above.
sacrum

## 2022-07-27 NOTE — PROGRESS NOTE ADULT - PROBLEM SELECTOR PLAN 5
Hx of CVA and has ICA stenosis  can c/w aspirin and statin   f/u A1c -Hx of HTN take atenolol   -now with sepsis and not hypertensive  -can hold home meds  -monitor VS

## 2022-07-27 NOTE — PROGRESS NOTE ADULT - PROBLEM SELECTOR PLAN 8
Patient has A fib and takes eliquis 5mg BID  can continue  INR showing AC -Patient has A fib   -comes in with sepsis HR 120s  -EKG Afib with RVR  -Pt takes Eliquis and Atenolol at home  -c/w Eliquis, hold BB for now as pt hypotensive.

## 2022-07-27 NOTE — PROGRESS NOTE ADULT - PROBLEM SELECTOR PLAN 3
baseline around 1.2 , no hx of CKD comes in with sepsis 2/2 UTI  DEJA found on labs with Cr of 1.38  Intra-renal unlikely: still early  Obtructive unlikely as the left renal stone is non obstructing  Likely pre renal with BUN/ Cr ratio >20  Patient has already received bolus of 2L  will recheck BMP in the AM -Bcx grew GNR 7/26  -on CTX  -ID dr. Khan  -repeat blood culture -Bcx grew ESBL from 7/26  -on CTX, will switch to Meropenem  -ID dr. Khan  -repeat blood culture -Bcx grew ESBL Ecoli from 7/26  -on CTX, will switch to Meropenem  -ID dr. Khan  -repeat blood culture

## 2022-07-27 NOTE — PHARMACOTHERAPY INTERVENTION NOTE - COMMENTS
[facility] results show that pt has ESBL E.coli.  Recommended to change antibiotic regimen.  (Pt also has penicillin allergy.)

## 2022-07-27 NOTE — SWALLOW BEDSIDE ASSESSMENT ADULT - COMMENTS
Pt seen HOB elevated to 90°. AA+Ox1 (first name only), open mouth posture at rest, minimally verbal, inconsistently answered yes/no questions; cooperative for duration of exam.  Trials included: ice, puree, minced & moist, mildly thick, and thin liquids. Post all thin liquid trials, pt demonstrated delayed (4 sec.) cough.

## 2022-07-27 NOTE — CHART NOTE - NSCHARTNOTEFT_GEN_A_CORE
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 and is now non verbal and bed bound, b/L ICA stenosis, Recurrent UTI up to 5x a year, who comes in due to 24h hx of altered mental status from baseline, associated with fever and concerns for dysphagia, found Febrile T max 102.5, , /78,  Lactate 3.7, UA +Ve, and white count of 14, CT abdomen pelvis showed distended gallbladder with stones, stool impaction and stercoral colitis, non obstructing left nephrolithiasis, RUQ- no cholecystitis, hence admitted for management of metabolic encephalopathy, 2/2 sepsis 2/2 UTI, DEJA       OBJECTIVE:  Vital Signs Last 24 Hrs  T(C): 36.3 (27 Jul 2022 00:00), Max: 39.4 (26 Jul 2022 20:32)  T(F): 97.3 (27 Jul 2022 00:00), Max: 102.9 (26 Jul 2022 20:32)  HR: 93 (27 Jul 2022 00:00) (82 - 163)  BP: 73/49 (27 Jul 2022 00:00) (73/49 - 123/81)  BP(mean): --  RR: 19 (27 Jul 2022 00:00) (18 - 22)  SpO2: 98% (27 Jul 2022 00:00) (94% - 100%)    Parameters below as of 27 Jul 2022 00:00  Patient On (Oxygen Delivery Method): nasal cannula  O2 Flow (L/min): 3    FOCUSED PHYSICAL EXAM:  General: Lethargic, noninteractive at present  Resp: airway patent,  unlabored, diminished at bases  CVS: irregular rate, S1 S2  Abdomen: obese, soft, nondistended  Extremities: LROM  Skin: warm, dry    LABS:                        14.5   14.94 )-----------( 205      ( 26 Jul 2022 07:25 )             45.7     07-26    140  |  108  |  30<H>  ----------------------------<  222<H>  4.5   |  23  |  1.38<H>    Ca    9.7      26 Jul 2022 07:25    TPro  7.2  /  Alb  2.7<L>  /  TBili  0.9  /  DBili  x   /  AST  31  /  ALT  39  /  AlkPhos  94  07-26    Urine Microscopic-Add On (NC) (07.26.22 @ 10:37)  Red Blood Cell - Urine: 2-5 /HPF  White Blood Cell - Urine: >50 /HPF  Bacteria: Many /HPF  Epithelial Cells: Moderate /HPF    Urinalysis (07.26.22 @ 10:37)    pH Urine: 5.0    Glucose Qualitative, Urine: Negative    Blood, Urine: Large    Color: Yellow    Urine Appearance: Slightly Turbid    Bilirubin: Negative    Ketone - Urine: Negative    Specific Gravity: 1.020    Protein, Urine: 30 mg/dL    Urobilinogen: Negative    Nitrite: Positive    Leukocyte Esterase Concentration: Moderate    IMAGING:  ACC: [id number] EXAM:  CT ABDOMEN AND PELVIS                        PROCEDURE DATE:  07/26/2022    IMPRESSION:  Rectal fecal impaction with associated uncomplicated stercoral proctitis.  Cholelithiasis with markedly distended gallbladder. Correlate with right   upper quadrant ultrasound.  Nonobstructive left nephrolithiasis.    PROBLEM SIRS probably due to UTI  PLAN:   1. Sodium chloride 0.9%. 1000 milliliter(s) increased (100 mL/Hr) IV Continuous  2. 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 and is now non verbal and bed bound, b/L ICA stenosis, Recurrent UTI up to 5x a year, who comes in due to 24h hx of altered mental status from baseline, associated with fever and concerns for dysphagia, found Febrile T max 102.5, , /78,  Lactate 3.7, UA +Ve, and white count of 14, CT abdomen pelvis showed distended gallbladder with stones, stool impaction and stercoral colitis, non obstructing left nephrolithiasis, RUQ- no cholecystitis, hence admitted for management of metabolic encephalopathy, 2/2 sepsis 2/2 UTI, DEJA       OBJECTIVE:  Vital Signs Last 24 Hrs  T(C): 36.3 (27 Jul 2022 00:00), Max: 39.4 (26 Jul 2022 20:32)  T(F): 97.3 (27 Jul 2022 00:00), Max: 102.9 (26 Jul 2022 20:32)  HR: 93 (27 Jul 2022 00:00) (82 - 163)  BP: 73/49 (27 Jul 2022 00:00) (73/49 - 123/81)  BP(mean): --  RR: 19 (27 Jul 2022 00:00) (18 - 22)  SpO2: 98% (27 Jul 2022 00:00) (94% - 100%)    Parameters below as of 27 Jul 2022 00:00  Patient On (Oxygen Delivery Method): nasal cannula  O2 Flow (L/min): 3    FOCUSED PHYSICAL EXAM:  General: Lethargic, noninteractive at present  Resp: airway patent,  unlabored, diminished at bases  CVS: irregular rate, S1 S2  Abdomen: obese, soft, nondistended  Extremities: LROM BLE  Skin: warm, dry    LABS:                        14.5   14.94 )-----------( 205      ( 26 Jul 2022 07:25 )             45.7     07-26    140  |  108  |  30<H>  ----------------------------<  222<H>  4.5   |  23  |  1.38<H>    Ca    9.7      26 Jul 2022 07:25    TPro  7.2  /  Alb  2.7<L>  /  TBili  0.9  /  DBili  x   /  AST  31  /  ALT  39  /  AlkPhos  94  07-26    Urine Microscopic-Add On (NC) (07.26.22 @ 10:37)  Red Blood Cell - Urine: 2-5 /HPF  White Blood Cell - Urine: >50 /HPF  Bacteria: Many /HPF  Epithelial Cells: Moderate /HPF    Urinalysis (07.26.22 @ 10:37)    pH Urine: 5.0    Glucose Qualitative, Urine: Negative    Blood, Urine: Large    Color: Yellow    Urine Appearance: Slightly Turbid    Bilirubin: Negative    Ketone - Urine: Negative    Specific Gravity: 1.020    Protein, Urine: 30 mg/dL    Urobilinogen: Negative    Nitrite: Positive    Leukocyte Esterase Concentration: Moderate    IMAGING:  ACC: [id number] EXAM:  CT ABDOMEN AND PELVIS                        PROCEDURE DATE:  07/26/2022    IMPRESSION:  Rectal fecal impaction with associated uncomplicated stercoral proctitis.  Cholelithiasis with markedly distended gallbladder. Correlate with right   upper quadrant ultrasound.  Nonobstructive left nephrolithiasis.    PROBLEM SIRS probably due to UTI  PLAN:   1. Sodium chloride 0.9%. 1000 milliliter(s) increased (100 mL/Hr) IV Continuous  2. Cont ceftriaxone  IVPB 1000 harvinder GRAM(s) IV Intermittent every 24 hours    FOLLOW UP / RESULT: BP, culture results

## 2022-07-27 NOTE — PATIENT PROFILE ADULT - FALL HARM RISK - HARM RISK INTERVENTIONS
Assistance with ambulation/Assistance OOB with selected safe patient handling equipment/Communicate Risk of Fall with Harm to all staff/Discuss with provider need for PT consult/Monitor gait and stability/Reinforce activity limits and safety measures with patient and family/Tailored Fall Risk Interventions/Visual Cue: Yellow wristband and red socks/Bed in lowest position, wheels locked, appropriate side rails in place/Call bell, personal items and telephone in reach/Instruct patient to call for assistance before getting out of bed or chair/Non-slip footwear when patient is out of bed/[place] to call system/Physically safe environment - no spills, clutter or unnecessary equipment/Purposeful Proactive Rounding/Room/bathroom lighting operational, light cord in reach

## 2022-07-27 NOTE — PROGRESS NOTE ADULT - PROBLEM SELECTOR PLAN 6
Patient has A fib   comes in with sepsis HR 120s  f/u EKG  Monitor HR -Hx of CVA and has ICA stenosis  -can c/w aspirin and statin

## 2022-07-27 NOTE — PROGRESS NOTE ADULT - PROBLEM SELECTOR PLAN 12
-from home  -f/u sepsis work up, c/w ABT, Id consult.  -family contacted as listed for GOC, no answer, will attempt another time, see family update note. -from home  -f/u sepsis work up, c/w ABT, Id consult.  -family updated at bedside, did not agree with SLP recommendation, requested back to soft diet. risks of aspiration discussed. He is adamant to change diet. informed SLP to follow up.

## 2022-07-27 NOTE — PROGRESS NOTE ADULT - SUBJECTIVE AND OBJECTIVE BOX
NP Note discussed with  Primary Attending    INTERVAL HPI/OVERNIGHT EVENTS: no new complaints    MEDICATIONS  (STANDING):  apixaban 5 milliGRAM(s) Oral every 12 hours  aspirin  chewable 81 milliGRAM(s) Oral daily  atorvastatin 40 milliGRAM(s) Oral at bedtime  bisacodyl 5 milliGRAM(s) Oral at bedtime  cefTRIAXone   IVPB 1000 milliGRAM(s) IV Intermittent every 24 hours  levETIRAcetam  IVPB 750 milliGRAM(s) IV Intermittent every 12 hours  sodium chloride 0.9%. 1000 milliLiter(s) (100 mL/Hr) IV Continuous <Continuous>    MEDICATIONS  (PRN):  melatonin 3 milliGRAM(s) Oral at bedtime PRN Insomnia  metoprolol tartrate Injectable 5 milliGRAM(s) IV Push every 6 hours PRN tachycardia      __________________________________________________  REVIEW OF SYSTEMS:    CONSTITUTIONAL: No fever,   EYES: no acute visual disturbances  NECK: No pain or stiffness  RESPIRATORY: No cough; No shortness of breath  CARDIOVASCULAR: No chest pain, no palpitations  GASTROINTESTINAL: No pain. No nausea or vomiting; No diarrhea   NEUROLOGICAL: No headache or numbness, no tremors  MUSCULOSKELETAL: No joint pain, no muscle pain  GENITOURINARY: no dysuria, no frequency, no hesitancy  PSYCHIATRY: no depression , no anxiety  ALL OTHER  ROS negative        Vital Signs Last 24 Hrs  T(C): 36.2 (2022 08:13), Max: 39.4 (2022 20:32)  T(F): 97.2 (2022 08:13), Max: 102.9 (2022 20:32)  HR: 95 (2022 08:13) (79 - 163)  BP: 101/60 (2022 08:13) (73/49 - 105/58)  BP(mean): --  RR: 20 (2022 08:13) (18 - 22)  SpO2: 100% (2022 08:13) (94% - 100%)    Parameters below as of 2022 08:13  Patient On (Oxygen Delivery Method): nasal cannula  O2 Flow (L/min): 3      ________________________________________________  PHYSICAL EXAM:  GENERAL: NAD  HEENT: Normocephalic;  conjunctivae and sclerae clear; moist mucous membranes;   NECK : supple  CHEST/LUNG: Clear to auscultation bilaterally with good air entry   HEART: S1 S2  regular; no murmurs, gallops or rubs  ABDOMEN: Soft, Nontender, Nondistended; Bowel sounds present  EXTREMITIES: no cyanosis; no edema; no calf tenderness  SKIN: warm and dry; no rash  NERVOUS SYSTEM:  Awake and alert; Oriented  to place, person and time ; no new deficits    _________________________________________________  LABS:                        11.1   15.62 )-----------( 142      ( 2022 05:40 )             36.8     07    144  |  115<H>  |  37<H>  ----------------------------<  186<H>  4.3   |  21<L>  |  1.21    Ca    8.9      2022 05:40  Phos  3.6       Mg     2.1         TPro  5.6<L>  /  Alb  2.0<L>  /  TBili  0.3  /  DBili  x   /  AST  23  /  ALT  34  /  AlkPhos  68  07-27    PT/INR - ( 2022 07:25 )   PT: 26.4 sec;   INR: 2.20 ratio         PTT - ( 2022 07:25 )  PTT:35.6 sec  Urinalysis Basic - ( 2022 10:37 )    Color: Yellow / Appearance: Slightly Turbid / S.020 / pH: x  Gluc: x / Ketone: Negative  / Bili: Negative / Urobili: Negative   Blood: x / Protein: 30 mg/dL / Nitrite: Positive   Leuk Esterase: Moderate / RBC: 2-5 /HPF / WBC >50 /HPF   Sq Epi: x / Non Sq Epi: Moderate /HPF / Bacteria: Many /HPF      CAPILLARY BLOOD GLUCOSE            RADIOLOGY & ADDITIONAL TESTS:    Imaging  Reviewed:  YES    Consultant(s) Notes Reviewed:   YES      Plan of care was discussed with patient and /or primary care giver; all questions and concerns were addressed  NP Note discussed with  Primary Attending    INTERVAL HPI/OVERNIGHT EVENTS: seen at bedside, unable to communicate, able to answer Y/N questions, confused periodically.     MEDICATIONS  (STANDING):  apixaban 5 milliGRAM(s) Oral every 12 hours  aspirin  chewable 81 milliGRAM(s) Oral daily  atorvastatin 40 milliGRAM(s) Oral at bedtime  bisacodyl 5 milliGRAM(s) Oral at bedtime  cefTRIAXone   IVPB 1000 milliGRAM(s) IV Intermittent every 24 hours  levETIRAcetam  IVPB 750 milliGRAM(s) IV Intermittent every 12 hours  sodium chloride 0.9%. 1000 milliLiter(s) (100 mL/Hr) IV Continuous <Continuous>    MEDICATIONS  (PRN):  melatonin 3 milliGRAM(s) Oral at bedtime PRN Insomnia  metoprolol tartrate Injectable 5 milliGRAM(s) IV Push every 6 hours PRN tachycardia      __________________________________________________  REVIEW OF SYSTEMS:  Limited ROS due to impaired cognition, but denies headache, chest pain, or abdominal pain.     Vital Signs Last 24 Hrs  T(C): 36.2 (2022 08:13), Max: 39.4 (2022 20:32)  T(F): 97.2 (2022 08:13), Max: 102.9 (2022 20:32)  HR: 95 (2022 08:13) (79 - 163)  BP: 101/60 (2022 08:13) (73/49 - 105/58)  BP(mean): --  RR: 20 (2022 08:13) (18 - 22)  SpO2: 100% (2022 08:13) (94% - 100%)    Parameters below as of 2022 08:13  Patient On (Oxygen Delivery Method): nasal cannula  O2 Flow (L/min): 3      ________________________________________________  PHYSICAL EXAM:  GENERAL: NAD, easily falling asleep on exam  HEENT: Normocephalic;  conjunctivae and sclerae clear; moist mucous membranes;   NECK : supple  CHEST/LUNG:   fair air entry poor expiratory effort. no wheezing or rhonchi.   HEART: S1 S2  regular; no murmurs, gallops or rubs  ABDOMEN: Soft, Nontender, Nondistended; Bowel sounds present  EXTREMITIES: no cyanosis; no edema; no calf tenderness  NERVOUS SYSTEM:  Awake and alert; Oriented  to self with periods of confusion, right side weakness from CVA.   SKIN: warm and dry; no rash   see wound care notes : multiple PI, stage 1 PI right hip, left heel, non blanchable erythema. DTI coccyx wiht epidermal separation, + blanchable erythema, Left hip stage 4 PI with pink tissue, purulent drainage. periwound erythema, undermining ( up to 0.6cm at 360 degrees)      _________________________________________________  LABS:                        11.1   15.62 )-----------( 142      ( 2022 05:40 )             36.8     07-27    144  |  115<H>  |  37<H>  ----------------------------<  186<H>  4.3   |  21<L>  |  1.21    Ca    8.9      2022 05:40  Phos  3.6     07-  Mg     2.1     -27    TPro  5.6<L>  /  Alb  2.0<L>  /  TBili  0.3  /  DBili  x   /  AST  23  /  ALT  34  /  AlkPhos  68  07-27    PT/INR - ( 2022 07:25 )   PT: 26.4 sec;   INR: 2.20 ratio         PTT - ( 2022 07:25 )  PTT:35.6 sec  Urinalysis Basic - ( 2022 10:37 )    Color: Yellow / Appearance: Slightly Turbid / S.020 / pH: x  Gluc: x / Ketone: Negative  / Bili: Negative / Urobili: Negative   Blood: x / Protein: 30 mg/dL / Nitrite: Positive   Leuk Esterase: Moderate / RBC: 2-5 /HPF / WBC >50 /HPF   Sq Epi: x / Non Sq Epi: Moderate /HPF / Bacteria: Many /HPF      CAPILLARY BLOOD GLUCOSE    RADIOLOGY & ADDITIONAL TESTS:    Imaging  Reviewed:  YES  < from: US Abdomen Upper Quadrant Right (22 @ 12:24) >    IMPRESSION:  Gallstones in the gallbladder without evidence of thickened gallbladder   wall, pericholecystic fluid or ultrasonic Ho's sign.    < end of copied text >  `  < from: CT Abdomen and Pelvis No Cont (22 @ 09:57) >  IMPRESSION:  Rectal fecal impaction with associated uncomplicated stercoral proctitis.  Cholelithiasis with markedly distended gallbladder. Correlate with right   upper quadrant ultrasound.  Nonobstructive left nephrolithiasis.    < end of copied text >  < from: CT Head No Cont (22 @ 09:57) >  IMPRESSION:  No acute intracranial hemorrhage or acute territorial infarct.  If   symptoms persist, follow-up MRI exam recommended.    < end of copied text >    Consultant(s) Notes Reviewed:   YES      Plan of care was discussed with patient and /or primary care giver; all questions and concerns were addressed

## 2022-07-27 NOTE — CONSULT NOTE ADULT - ATTENDING COMMENTS
Chart reviewed and patient examined at the bedside. Discussed Hx, PE, assessment and plan with resident on ID. Pt with sepsis due to ESBL E. coli. Pt to be treated with a carbapenem. Other recommendations as above.

## 2022-07-27 NOTE — SWALLOW BEDSIDE ASSESSMENT ADULT - PHARYNGEAL PHASE
Delayed pharyngeal swallow/Decreased laryngeal elevation/Delayed cough post oral intake Delayed pharyngeal swallow/Decreased laryngeal elevation

## 2022-07-27 NOTE — ADVANCED PRACTICE NURSE CONSULT - RECOMMEDATIONS
-Clean all wounds with normal saline and apply skin prep to the surrounding skin  -Pack the L. Hip wound with Iodoform gaze and cover with a Foam dressing Daily PRN  -Apply Calcium Alginate (Aquacel) to the Coccyx wound and cover with a Foam dressing Q 72hrs PRN  -Apply a Foam dressing to the R. Hip area Q 72hrs PRN  -Elevate/float the patients heels using heel protectors and reposition the patient Q 2hrs using wedges or pillows

## 2022-07-27 NOTE — SWALLOW BEDSIDE ASSESSMENT ADULT - ADDITIONAL RECOMMENDATIONS
Note: Pt has had multiple evaluations from this service during prior admissions. As Pt is known to have wax/waning mental status and h/o dysphagia, modified diet is recommended. Should family/caregiver present PO not in alignment with recommendend diet, they assume inherent risk of aspiration.

## 2022-07-27 NOTE — SWALLOW BEDSIDE ASSESSMENT ADULT - SWALLOW EVAL: DIAGNOSIS
Pt p/w s&s oropharyngeal dysphagia c/b weak lip seal, impaired bolus formation and mastication (inappropriate tongue protrusion), decreased A-P movement of bolus, lingual stasis, delayed swallow trigger, & decreased hyolaryngeal elevation. Overt s&s of aspiration/penetration AEB delayed cough of post oral intake thin liquids.

## 2022-07-27 NOTE — PROGRESS NOTE ADULT - PROBLEM SELECTOR PLAN 1
Patient is a 76 Y o with Hx Dementia of recurrent UTIs  about 5x a year with , now presenting with a 24 h Hx of encephalopathy noted at home off of baseline  CTH -ve for acute changes  Patient found to have sepsis with elevated lactate, HR and white count on admission  source found to be UTI  - s/p 2L bolus, lactate improved on repeat  - mental status also now improved and back to baseline  -c/w gentle hydration with NS at 80ml/h for 24h  -f/u Dysphagia screen   -c/w abx Patient is a 76 Y o with Hx Dementia of recurrent UTIs  about 5x a year with , now presenting with a 24 h Hx of encephalopathy noted at home off of baseline  CTH -ve for acute changes  Patient found to have sepsis with elevated lactate, HR and white count on admission  source found to be UTI  - s/p 2L bolus, lactate improved on repeat  - mental status also now improved and back to baseline  -c/w gentle hydration with NS at 100 ml/h for 24h  -SLP eval : started puree diet with mild thick per reccs.   -c/w abx  - ID dr. Khan consulted.

## 2022-07-27 NOTE — SWALLOW BEDSIDE ASSESSMENT ADULT - SLP PERTINENT HISTORY OF CURRENT PROBLEM
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 As per records, pt is now non verbal and bed bound, b/L ICA stenosis, recurrent UTI up to 5x a year, who comes in due to 24h hx of altered mental status from baseline, associated with fever and concerns for dysphagia. Records state, pt admitted febrile T max 102.5, , /78; CT abdomen pelvis showed distended gallbladder with stones, stool impaction and stercoral colitis, non obstructing left nephrolithiasis, RUQ- no cholecystitis, hence admitted for management of metabolic encephalopathy, 2/2 sepsis 2/2 UTI, DEJA Patient is a 77 Y/O from home with PMH of HTN, A fib on eliquis and atenolol, CVA 20 yr ago with residual right sided weakness. As per records, pt is now non verbal and bed bound, b/L ICA stenosis, recurrent UTI up to 5x a year, who comes in due to 24h hx of altered mental status from baseline, associated with fever and concerns for dysphagia. Records state, pt admitted febrile T max 102.5, , /78; CT abdomen pelvis showed distended gallbladder with stones, stool impaction and stercoral colitis, non obstructing left nephrolithiasis, RUQ- no cholecystitis, hence admitted for management of metabolic encephalopathy, 2/2 sepsis 2/2 UTI, DEJA

## 2022-07-27 NOTE — PROGRESS NOTE ADULT - PROBLEM SELECTOR PLAN 7
Seen on CT  with a chronic Hx of constipation   patient gets suppository and stool softener every morning  Missed today  will give dulcolax and mineral oil enema -wound care RN following for multiple PI  -There is an intact DTI to the R. Heel without drainage  -There is a Stage 1 Pressure Injury to the R. Hip and L. Heel, as evident by non-blanchable erythema  -There is a DTI to the Coccyx with epidermal separation, red tissue, drainage, and surrounding tissue blanchable erythema  -There is a L. Hip Stage 4 Pressure Injury with pink tissue, purulent drainage, periwound erythema, and undermining (up to 0.6cm at 360 degrees)    -continue topical wound care, off load pressures/reposition q2h as wound care reccs.

## 2022-07-27 NOTE — PROGRESS NOTE ADULT - PROBLEM SELECTOR PLAN 2
Patient is a 76 Y o with Hx Dementia of recurrent UTIs  about 5x a year with , now presenting with AMS  Patient found to have sepsis with elevated lactate on admission, leukocytosis,   Tachy, soft BP and fever recorder  source found to be UTI  -f/u EKG  - s/p 2L bolus  -c/w gentle hydration with NS at 80ml/h for 24h  -cbc with diff daily  -follow up blood cx, ucx  -c/w antibiotics- ceftriaxone for 7 days Patient is a 76 Y o with Hx Dementia of recurrent UTIs  about 5x a year with , now presenting with AMS  Patient found to have sepsis with elevated lactate on admission, leukocytosis,   Tachy, soft BP and fever recorder  source found to be UTI  - s/p 2L bolus  -c/w gentle hydration with NS at 100ml/h for 24h  -cbc with diff daily  -Bcx GNR  -Ucx sent. in progress  -c/w antibiotics- ceftriaxone for 7 days for now  -ID dr. Khan  -repeat Bcx

## 2022-07-27 NOTE — CONSULT NOTE ADULT - ASSESSMENT
Assessment and plan:  75 y/o female, from home, bedbound, lives with son and daughter with PMH of HTN, A fib on Eliquis and atenolol, constipation, CVA 20 years ago with residual right sided weakness, dementia, b/L ICA stenosis, recurrent UTIs is admitted to medicine for ESBL E Coli bacteremia 2/2 UTI. Patient has recurrent UTIs treated at home with Ciprofloxacin prescribed by PCP that predisposes patient to current resistant organism.     --- Will recommend to stop Rocephin and start Meropenem 1 gram IV every 8 hrs   --- Monitor mental status   --- Please repeat Bcx       Plan discussed with primary team including Dr Lopez.  Assessment and plan:  77 y/o female with PMH of HTN, A fib, constipation, CVA with residual right sided weakness, dementia, b/L ICA stenosis, recurrent UTIs is admitted for sepsis due to UTI. BCs growing ESBL E. coli. Patient has had recurrent UTIs treated in the past with Ciprofloxacin that predisposes her to having a resistant organism. Pt with reported h/o PCN allergy. She has had no AE to CTX and wd also be comfortable treating with a carbapenem.    # Sepsis due to UTI with ESBL E. coli-- see above  --- d/c CTX  -- start Meropenem 1 gram IV every 8 hrs   --- Monitor mental status   --- Please repeat Bcx to check for clearing    # PCN allergy-- see above      Discussed above with medicine team, including Dr Lopez.

## 2022-07-27 NOTE — CHART NOTE - NSCHARTNOTEFT_GEN_A_CORE
EVENT: Notified by nurse regarding patient with elevated temp 100.5    HPI: 75 y/o female with PMH of HTN, A fib, constipation, CVA with residual right sided weakness, dementia, b/L ICA stenosis, recurrent UTIs. Patient was admitted for management of metabolic encephalopathy, 2/2 sepsis 2/2 UTI, DEJA and for observation for cholelithiasis.   Blood CX growing ESBL E. coli. CTX d/c'd and patient started on Meropenem 1 gram IV every 8 hrs.     Patient with elevated 100.5    OBJECTIVE:  Vital Signs Last 24 Hrs  T(C): 38.1 (27 Jul 2022 22:08), Max: 38.1 (27 Jul 2022 22:08)  T(F): 100.5 (27 Jul 2022 22:08), Max: 100.5 (27 Jul 2022 22:08)  HR: 118 (27 Jul 2022 20:57) (79 - 118)  BP: 129/69 (27 Jul 2022 20:57) (73/49 - 129/69)  BP(mean): --  RR: 20 (27 Jul 2022 20:57) (18 - 20)  SpO2: 100% (27 Jul 2022 20:57) (94% - 100%)    Parameters below as of 27 Jul 2022 20:57  Patient On (Oxygen Delivery Method): nasal cannula  O2 Flow (L/min): 2    MEDS  acetaminophen     Tablet .. 650 milliGRAM(s) Oral once  apixaban 5 milliGRAM(s) Oral every 12 hours  aspirin  chewable 81 milliGRAM(s) Oral daily  atorvastatin 40 milliGRAM(s) Oral at bedtime  bisacodyl 5 milliGRAM(s) Oral at bedtime  chlorhexidine 2% Cloths 1 Application(s) Topical daily  levETIRAcetam  IVPB 750 milliGRAM(s) IV Intermittent every 12 hours  melatonin 3 milliGRAM(s) Oral at bedtime PRN  meropenem  IVPB      meropenem  IVPB 1000 milliGRAM(s) IV Intermittent every 8 hours  metoprolol tartrate Injectable 5 milliGRAM(s) IV Push every 6 hours PRN  mupirocin 2% Ointment 1 Application(s) Both Nostrils two times a day  sodium chloride 0.9%. 1000 milliLiter(s) IV Continuous <Continuous>      FOCUSED PHYSICAL EXAM:  General: Lethargic, noninteractive at present  Resp: airway patent,  unlabored, diminished at bases  CVS: irregular rate, S1 S2  Abdomen: obese, soft, nondistended  Extremities: LROM BLE  NERVOUS SYSTEM:  Alert & Oriented X1 (place), follows commands   Skin: warm, dry    LABS:                        11.1   15.62 )-----------( 142      ( 27 Jul 2022 05:40 )             36.8     07-27    144  |  115<H>  |  37<H>  ----------------------------<  186<H>  4.3   |  21<L>  |  1.21    Ca    8.9      27 Jul 2022 05:40  Phos  3.6     07-27  Mg     2.1     07-27    TPro  5.6<L>  /  Alb  2.0<L>  /  TBili  0.3  /  DBili  x   /  AST  23  /  ALT  34  /  AlkPhos  68  07-27      Urine Microscopic-Add On (NC) (07.26.22 @ 10:37)  Red Blood Cell - Urine: 2-5 /HPF  White Blood Cell - Urine: >50 /HPF  Bacteria: Many /HPF  Epithelial Cells: Moderate /HPF    Urinalysis (07.26.22 @ 10:37)    pH Urine: 5.0    Glucose Qualitative, Urine: Negative    Blood, Urine: Large    Color: Yellow    Urine Appearance: Slightly Turbid    Bilirubin: Negative    Ketone - Urine: Negative    Specific Gravity: 1.020    Protein, Urine: 30 mg/dL    Urobilinogen: Negative    Nitrite: Positive    Leukocyte Esterase Concentration: Moderate    CT abdomen pelvis showed distended gallbladder with stones, stool impaction and stercoral colitis, non obstructing left nephrolithiasis    PROBLEM: Fever 2/2 Sepsis due to UTI with ESBL E. coli  PLAN:   - Tylenol 650 mg PO x 1 dose ordered   - Cont Meropenem 1 gram IV every 8 hrs   - Continue IVF  - F/U repeat Bcx to check for clearing  - Followed by ID  FOLLOW UP / RESULT: Reassess vital signs, follow-up cultures

## 2022-07-27 NOTE — PROGRESS NOTE ADULT - ASSESSMENT
Patient is a 76 Y  O from home with PMH of HTN, A fib on eliquis and atenolol, CVA 20 y ago with residual right sided weakness and is now non verbal and bed bound, b/L ICA stenosis, Recurrent UTI up to 5x a year, who comes in due to 24h hx of altered mental status from baseline, associated with fever and concerns for dysphagia, found Febrile T max 102.5, , /78,  Lactate 3.7, UA +Ve, and white count of 14, CT abdomen pelvis showed distended gallbladder with stones, stool impaction and stercoral colitis, non obstructing left nephrolithiasis, RUQ- no cholecystitis, hence admitted for management of metabolic encephalopathy, 2/2 sepsis 2/2 UTI, DEJA  Patient is a 76 Y  O from home with PMH of HTN, A fib on eliquis and atenolol, CVA 20 y ago with residual right sided weakness and is now non verbal and bed bound, b/L ICA stenosis, Recurrent UTI up to 5x a year, who comes in due to 24h hx of altered mental status from baseline, associated with fever and concerns for dysphagia, found Febrile T max 102.5, , /78,  Lactate 3.7, UA +Ve, and white count of 14, CT abdomen pelvis showed distended gallbladder with stones, stool impaction and stercoral colitis, non obstructing left nephrolithiasis, RUQ- no cholecystitis, hence admitted for management of metabolic encephalopathy, 2/2 sepsis 2/2 UTI, DEJA. s/p bolus for hypotension. started on CTX.   Ucx in progress. Ucx GNR, ID consulted.  Patient is a 76 Y  O from home with PMH of HTN, A fib on eliquis and atenolol, CVA 20 y ago with residual right sided weakness and is now non verbal and bed bound, b/L ICA stenosis, Recurrent UTI up to 5x a year, who comes in due to 24h hx of altered mental status from baseline, associated with fever and concerns for dysphagia, found Febrile T max 102.5, , /78,  Lactate 3.7, UA +Ve, and white count of 14, CT abdomen pelvis showed distended gallbladder with stones, stool impaction and stercoral colitis, non obstructing left nephrolithiasis, RUQ- no cholecystitis, hence admitted for management of metabolic encephalopathy, 2/2 sepsis 2/2 UTI, DEJA. s/p bolus for hypotension.    Ucx in progress. Bcx grew ESBL, on ABT.  ID consulted.  Patient is a 76 Y  O from home with PMH of HTN, A fib on eliquis and atenolol, CVA 20 y ago with residual right sided weakness and is now non verbal and bed bound, b/L ICA stenosis, Recurrent UTI up to 5x a year, who comes in due to 24h hx of altered mental status from baseline, associated with fever and concerns for dysphagia, found Febrile T max 102.5, , /78,  Lactate 3.7, UA +Ve, and white count of 14, CT abdomen pelvis showed distended gallbladder with stones, stool impaction and stercoral colitis, non obstructing left nephrolithiasis, RUQ- no cholecystitis, hence admitted for management of metabolic encephalopathy, 2/2 sepsis 2/2 UTI, DEJA. s/p bolus for hypotension.    Ucx in progress. Bcx grew ESBL, on Meropenem,  ID consulted.

## 2022-07-27 NOTE — SWALLOW BEDSIDE ASSESSMENT ADULT - MODE OF PRESENTATION
x3 TSPN/spoon/fed by clinician x2 oz./cup/spoon/self fed x3 TSPN/cup/fed by clinician x4 oz./cup/spoon/self fed x6 TSPN/fed by clinician

## 2022-07-28 ENCOUNTER — TRANSCRIPTION ENCOUNTER (OUTPATIENT)
Age: 77
End: 2022-07-28

## 2022-07-28 LAB
ALBUMIN SERPL ELPH-MCNC: 1.8 G/DL — LOW (ref 3.5–5)
ALP SERPL-CCNC: 72 U/L — SIGNIFICANT CHANGE UP (ref 40–120)
ALT FLD-CCNC: 36 U/L DA — SIGNIFICANT CHANGE UP (ref 10–60)
ANION GAP SERPL CALC-SCNC: 7 MMOL/L — SIGNIFICANT CHANGE UP (ref 5–17)
AST SERPL-CCNC: 22 U/L — SIGNIFICANT CHANGE UP (ref 10–40)
BASOPHILS # BLD AUTO: 0.04 K/UL — SIGNIFICANT CHANGE UP (ref 0–0.2)
BASOPHILS NFR BLD AUTO: 0.4 % — SIGNIFICANT CHANGE UP (ref 0–2)
BILIRUB SERPL-MCNC: 0.4 MG/DL — SIGNIFICANT CHANGE UP (ref 0.2–1.2)
BUN SERPL-MCNC: 28 MG/DL — HIGH (ref 7–18)
CALCIUM SERPL-MCNC: 8.9 MG/DL — SIGNIFICANT CHANGE UP (ref 8.4–10.5)
CHLORIDE SERPL-SCNC: 113 MMOL/L — HIGH (ref 96–108)
CO2 SERPL-SCNC: 22 MMOL/L — SIGNIFICANT CHANGE UP (ref 22–31)
CREAT SERPL-MCNC: 0.82 MG/DL — SIGNIFICANT CHANGE UP (ref 0.5–1.3)
EGFR: 75 ML/MIN/1.73M2 — SIGNIFICANT CHANGE UP
EOSINOPHIL # BLD AUTO: 0.2 K/UL — SIGNIFICANT CHANGE UP (ref 0–0.5)
EOSINOPHIL NFR BLD AUTO: 2.1 % — SIGNIFICANT CHANGE UP (ref 0–6)
GLUCOSE SERPL-MCNC: 133 MG/DL — HIGH (ref 70–99)
HCT VFR BLD CALC: 37 % — SIGNIFICANT CHANGE UP (ref 34.5–45)
HGB BLD-MCNC: 11.5 G/DL — SIGNIFICANT CHANGE UP (ref 11.5–15.5)
IMM GRANULOCYTES NFR BLD AUTO: 1 % — SIGNIFICANT CHANGE UP (ref 0–1.5)
LYMPHOCYTES # BLD AUTO: 0.9 K/UL — LOW (ref 1–3.3)
LYMPHOCYTES # BLD AUTO: 9.5 % — LOW (ref 13–44)
MCHC RBC-ENTMCNC: 30.5 PG — SIGNIFICANT CHANGE UP (ref 27–34)
MCHC RBC-ENTMCNC: 31.1 GM/DL — LOW (ref 32–36)
MCV RBC AUTO: 98.1 FL — SIGNIFICANT CHANGE UP (ref 80–100)
MONOCYTES # BLD AUTO: 0.76 K/UL — SIGNIFICANT CHANGE UP (ref 0–0.9)
MONOCYTES NFR BLD AUTO: 8 % — SIGNIFICANT CHANGE UP (ref 2–14)
NEUTROPHILS # BLD AUTO: 7.48 K/UL — HIGH (ref 1.8–7.4)
NEUTROPHILS NFR BLD AUTO: 79 % — HIGH (ref 43–77)
NRBC # BLD: 0 /100 WBCS — SIGNIFICANT CHANGE UP (ref 0–0)
PLATELET # BLD AUTO: 90 K/UL — LOW (ref 150–400)
POTASSIUM SERPL-MCNC: 3.8 MMOL/L — SIGNIFICANT CHANGE UP (ref 3.5–5.3)
POTASSIUM SERPL-SCNC: 3.8 MMOL/L — SIGNIFICANT CHANGE UP (ref 3.5–5.3)
PROT SERPL-MCNC: 5.5 G/DL — LOW (ref 6–8.3)
RBC # BLD: 3.77 M/UL — LOW (ref 3.8–5.2)
RBC # FLD: 14.4 % — SIGNIFICANT CHANGE UP (ref 10.3–14.5)
SODIUM SERPL-SCNC: 142 MMOL/L — SIGNIFICANT CHANGE UP (ref 135–145)
WBC # BLD: 9.47 K/UL — SIGNIFICANT CHANGE UP (ref 3.8–10.5)
WBC # FLD AUTO: 9.47 K/UL — SIGNIFICANT CHANGE UP (ref 3.8–10.5)

## 2022-07-28 PROCEDURE — 99233 SBSQ HOSP IP/OBS HIGH 50: CPT

## 2022-07-28 PROCEDURE — 99232 SBSQ HOSP IP/OBS MODERATE 35: CPT | Mod: GC

## 2022-07-28 RX ORDER — ERTAPENEM SODIUM 1 G/1
1000 INJECTION, POWDER, LYOPHILIZED, FOR SOLUTION INTRAMUSCULAR; INTRAVENOUS ONCE
Refills: 0 | Status: COMPLETED | OUTPATIENT
Start: 2022-07-28 | End: 2022-07-28

## 2022-07-28 RX ORDER — ZINC SULFATE TAB 220 MG (50 MG ZINC EQUIVALENT) 220 (50 ZN) MG
220 TAB ORAL DAILY
Refills: 0 | Status: DISCONTINUED | OUTPATIENT
Start: 2022-07-28 | End: 2022-08-09

## 2022-07-28 RX ORDER — SODIUM CHLORIDE 9 MG/ML
1000 INJECTION INTRAMUSCULAR; INTRAVENOUS; SUBCUTANEOUS
Refills: 0 | Status: DISCONTINUED | OUTPATIENT
Start: 2022-07-28 | End: 2022-07-31

## 2022-07-28 RX ORDER — ACETAMINOPHEN 500 MG
650 TABLET ORAL EVERY 6 HOURS
Refills: 0 | Status: DISCONTINUED | OUTPATIENT
Start: 2022-07-28 | End: 2022-08-09

## 2022-07-28 RX ORDER — METOPROLOL TARTRATE 50 MG
25 TABLET ORAL EVERY 12 HOURS
Refills: 0 | Status: DISCONTINUED | OUTPATIENT
Start: 2022-07-28 | End: 2022-08-09

## 2022-07-28 RX ORDER — NYSTATIN CREAM 100000 [USP'U]/G
1 CREAM TOPICAL THREE TIMES A DAY
Refills: 0 | Status: DISCONTINUED | OUTPATIENT
Start: 2022-07-28 | End: 2022-08-09

## 2022-07-28 RX ORDER — LEVETIRACETAM 250 MG/1
750 TABLET, FILM COATED ORAL
Refills: 0 | Status: DISCONTINUED | OUTPATIENT
Start: 2022-07-28 | End: 2022-08-09

## 2022-07-28 RX ORDER — ERTAPENEM SODIUM 1 G/1
1000 INJECTION, POWDER, LYOPHILIZED, FOR SOLUTION INTRAMUSCULAR; INTRAVENOUS EVERY 24 HOURS
Refills: 0 | Status: COMPLETED | OUTPATIENT
Start: 2022-07-29 | End: 2022-08-04

## 2022-07-28 RX ORDER — ERTAPENEM SODIUM 1 G/1
INJECTION, POWDER, LYOPHILIZED, FOR SOLUTION INTRAMUSCULAR; INTRAVENOUS
Refills: 0 | Status: COMPLETED | OUTPATIENT
Start: 2022-07-28 | End: 2022-08-05

## 2022-07-28 RX ORDER — ASCORBIC ACID 60 MG
500 TABLET,CHEWABLE ORAL DAILY
Refills: 0 | Status: DISCONTINUED | OUTPATIENT
Start: 2022-07-28 | End: 2022-08-09

## 2022-07-28 RX ADMIN — Medication 25 MILLIGRAM(S): at 17:10

## 2022-07-28 RX ADMIN — Medication 650 MILLIGRAM(S): at 11:00

## 2022-07-28 RX ADMIN — CHLORHEXIDINE GLUCONATE 1 APPLICATION(S): 213 SOLUTION TOPICAL at 12:41

## 2022-07-28 RX ADMIN — Medication 500 MILLIGRAM(S): at 17:22

## 2022-07-28 RX ADMIN — NYSTATIN CREAM 1 APPLICATION(S): 100000 CREAM TOPICAL at 14:16

## 2022-07-28 RX ADMIN — NYSTATIN CREAM 1 APPLICATION(S): 100000 CREAM TOPICAL at 22:11

## 2022-07-28 RX ADMIN — LEVETIRACETAM 750 MILLIGRAM(S): 250 TABLET, FILM COATED ORAL at 17:09

## 2022-07-28 RX ADMIN — ERTAPENEM SODIUM 120 MILLIGRAM(S): 1 INJECTION, POWDER, LYOPHILIZED, FOR SOLUTION INTRAMUSCULAR; INTRAVENOUS at 12:41

## 2022-07-28 RX ADMIN — APIXABAN 5 MILLIGRAM(S): 2.5 TABLET, FILM COATED ORAL at 05:21

## 2022-07-28 RX ADMIN — ATORVASTATIN CALCIUM 40 MILLIGRAM(S): 80 TABLET, FILM COATED ORAL at 22:11

## 2022-07-28 RX ADMIN — SODIUM CHLORIDE 75 MILLILITER(S): 9 INJECTION INTRAMUSCULAR; INTRAVENOUS; SUBCUTANEOUS at 11:01

## 2022-07-28 RX ADMIN — Medication 650 MILLIGRAM(S): at 00:45

## 2022-07-28 RX ADMIN — Medication 81 MILLIGRAM(S): at 12:41

## 2022-07-28 RX ADMIN — Medication 1 TABLET(S): at 17:22

## 2022-07-28 RX ADMIN — MEROPENEM 100 MILLIGRAM(S): 1 INJECTION INTRAVENOUS at 05:20

## 2022-07-28 RX ADMIN — MUPIROCIN 1 APPLICATION(S): 20 OINTMENT TOPICAL at 17:10

## 2022-07-28 RX ADMIN — Medication 5 MILLIGRAM(S): at 22:11

## 2022-07-28 RX ADMIN — APIXABAN 5 MILLIGRAM(S): 2.5 TABLET, FILM COATED ORAL at 17:09

## 2022-07-28 RX ADMIN — LEVETIRACETAM 400 MILLIGRAM(S): 250 TABLET, FILM COATED ORAL at 06:22

## 2022-07-28 RX ADMIN — Medication 650 MILLIGRAM(S): at 10:33

## 2022-07-28 RX ADMIN — MUPIROCIN 1 APPLICATION(S): 20 OINTMENT TOPICAL at 05:21

## 2022-07-28 RX ADMIN — ZINC SULFATE TAB 220 MG (50 MG ZINC EQUIVALENT) 220 MILLIGRAM(S): 220 (50 ZN) TAB at 17:22

## 2022-07-28 NOTE — DISCHARGE NOTE PROVIDER - NSDCCPCAREPLAN_GEN_ALL_CORE_FT
PRINCIPAL DISCHARGE DIAGNOSIS  Diagnosis: Sepsis due to urinary tract infection  Assessment and Plan of Treatment: Admitted for change in mental status in the setting of sepsis, bacteremia and UTI.   Take all antibiotics as ordered.  Call you Health care provider upon arrival home to make a one week follow up appointment.  If you develop fever, chills, malaise, or change in mental status call your Health Care Provider or go to the Emergency Department.  Nutrition is important, eat small frequent meals to help ensure you get adequate calories.  Do not stay in bed all day!  Increase your activity daily as tolerated.        SECONDARY DISCHARGE DIAGNOSES  Diagnosis: Bacteremia due to Escherichia coli  Assessment and Plan of Treatment: You were treated with IV antibiotic for ESBL Ecoli Bacteremia.   You were followed by Infectious disease doctor. Repeated blood culture from 7/28------.  continue antibiotic as prescirbed. Follow up with your primary MD after discharge.    Diagnosis: E-coli UTI  Assessment and Plan of Treatment: Continue antibiotic as prescribed.   Drink enough water and fluids to keep your urine clear or pale yellow.  Avoid caffeine, tea, and carbonated beverages. They tend to irritate your bladder.  Empty your bladder often. Avoid holding urine for long periods of time.  After a bowel movement, women should cleanse from front to back. Use each tissue only once.  SEEK MEDICAL CARE IF:  You have back pain.  You develop a fever.  Your symptoms do not begin to resolve within 3 days.  SEEK IMMEDIATE MEDICAL CARE IF:  You have severe back pain or lower abdominal pain.  You develop chills.  You have nausea or vomiting.  You have continued burning or discomfort with urination.      Diagnosis: Metabolic encephalopathy  Assessment and Plan of Treatment: Changed in mental status, due to sepsis with bacteremia and UTI. treated with Antibiotic and IV fluid. now mental status remains at baseline. Evaluated by speech language pathologist and started diet.    Diagnosis: HTN (hypertension)  Assessment and Plan of Treatment: Low salt diet  Activity as tolerated.  Your blood pressure medication was held due to hypotension in the setting of sepsis. Now resolved.   continue home medication as prescribed.   Follow up with your medical doctor for routine blood pressure monitoring at your next visit.  Notify your doctor if you have any of the following symptoms:   Dizziness, Lightheadedness, Blurry vision, Headache, Chest pain, Shortness of breath      Diagnosis: Atrial fibrillation  Assessment and Plan of Treatment: Continue blood thinner and Atenolol home dose.   Atrial fibrillation is the most common heart rhythm problem.  The condition puts you at risk for has stroke and heart attack  It helps if you control your blood pressure, not drink more than 1-2 alcohol drinks per day, cut down on caffeine, getting treatment for over active thyroid gland, and get regular exercise  Follow up [facility] primary MD and report if you feel your heart racing or beating unusually, chest tightness or pain, lightheaded, faint, shortness of breath especially with exercise  It is important to take your heart medication as prescribed  You may be on anticoagulation which is very important to take as directed - you may need blood work to monitor drug levels      Diagnosis: CVA (cerebrovascular accident)  Assessment and Plan of Treatment: contineu aspiration precaution and fall precaution.   maintian skin integrity.   continue home medications for blood pressure and cholesterol.   A stroke is a brain attack that occurs when an artery or a blood vessel becomes occluded breaks, interrupting blood flow to an area of the brain cells begin to die. Please call 911 for facial droop slurring speech, unable to move a limb or sudden weakness in one limb or one side of the body or confusion.      Diagnosis: DEJA (acute kidney injury)  Assessment and Plan of Treatment: Your kidney functions are deteriorated on admission in the setting with sepsis, bacteremia and UTI. now resolved.   Maintian good oral hydration.   Acute kidney injury (DEJA) is when the kidneys suddenly stop working effectively.  This can happen when there is less blood flow to the kidneys, there is kidney damage or the path for urine to leave the body is blocked.  DEJA can cause decreased urination, blood in the urine, swelling, vomiting, weakness, confusion and/or seisures.  The treatment depends on the cause and severity of the injury.  In any case, while your kidneys are healing you should avoid agents that can cause kidney injury.  Some of these agents include NSAIDs, Gadolinium contrast, and Phosphate containing enemas.      Diagnosis: Pressure injury of skin  Assessment and Plan of Treatment: Followed by wound care specialist for pressure injury and deep tissue injury.   -There is an intact DTI to the R. Heel without drainage  -There is a Stage 1 Pressure Injury to the R. Hip and L. Heel, as evident by non-blanchable erythema  -There is a DTI to the Coccyx with epidermal separation, red tissue, drainage, and surrounding tissue blanchable erythema  -There is a L. Hip Stage 4 Pressure Injury with pink tissue, purulent drainage, periwound erythema, and undermining (up to 0.6cm at 360 degrees)  -continue topical wound care, off load pressures/reposition q2h as wound care reccs.  -continue multivitamin, zinc, Vt C for wound healing.      Diagnosis: Abnormal CT scan  Assessment and Plan of Treatment: CT abd/pelvis resulted Rectal fecal impaction with associated uncomplicated stercoral proctitis.  Cholelithiasis with markedly distended gallbladder. Correlate with right upper quadrant ultrasound.  Nonobstructive left nephrolithiasis.  -RUQ US : Gallstones in the gallbladder without evidence of thickened gallbladder wall, pericholecystic fluid or ultrasonic Ho's sign.  -no urgent intervention needed this time.   Follow up as outpatient with PCP.       Diagnosis: Fecal impaction  Assessment and Plan of Treatment: CT resulted fecal impaction, on bowel regimen  Continue with medications as prescribed.  Follow-up with your primary care physician.  Call your physician if you develop nausea/vomiting, abdominal pain not relieved with pain regimen, constipation not relieved with bowel  regimen.      Diagnosis: Dementia  Assessment and Plan of Treatment: Dementia care: Create a safe environment.  Falls precautions, free from clutter, remove throw rugs.  Provide 24hr /7 day per week supervision Reduce confusion. Keep familiar objects and people around. Use night lights or dim lights at night.  Keep a simple, consistent routine for waking, meals, bathing, dressing, and bedtime. Create a calm, quiet environment. Limit caffeine. Attend social events that stimulate rather than overwhelm the senses. Encourage good nutrition and hydration. Reduce distractions during meal times and snacks. Monitor chewing and swallowing ability.  Speech and swallowing pathologist recommends puree diet with mild thick liquid, but family refused. continued soft bite size diet. risk of aspiration is discussed with family. Maintain Aspiration precaution.   Continue with routine vision, hearing, dental, and medical screenings. All medications per MD only. If change in behavior or patient becomes more confused or letharic seek medical attention.    Any new problem with brain function happens. This includes problems with balance, speech, or falling a lot.   New or worsened confusion develops. New or worsened sleepiness develops. Staying awake becomes hard to do. This could indicate an infection if fever develops.       PRINCIPAL DISCHARGE DIAGNOSIS  Diagnosis: Sepsis due to urinary tract infection  Assessment and Plan of Treatment: Admitted for change in mental status in the setting of sepsis, bacteremia and UTI.   Take all antibiotics as ordered.  Call you Health care provider upon arrival home to make a one week follow up appointment.  If you develop fever, chills, malaise, or change in mental status call your Health Care Provider or go to the Emergency Department.  Nutrition is important, eat small frequent meals to help ensure you get adequate calories.  Do not stay in bed all day!  Increase your activity daily as tolerated.        SECONDARY DISCHARGE DIAGNOSES  Diagnosis: Metabolic encephalopathy  Assessment and Plan of Treatment: Changed in mental status, due to sepsis with bacteremia and UTI. treated with Antibiotic and IV fluid. now mental status remains at baseline. Evaluated by speech language pathologist and started diet.    Diagnosis: Bacteremia due to Escherichia coli  Assessment and Plan of Treatment: You were treated with IV antibiotic for ESBL Ecoli Bacteremia.   You were followed by Infectious disease doctor. Repeated blood culture from 7/28 were negative. Follow up with your primary MD after discharge.    Diagnosis: DEJA (acute kidney injury)  Assessment and Plan of Treatment: Your kidney functions are deteriorated on admission in the setting with sepsis, bacteremia and UTI. now resolved.   Maintian good oral hydration.   Acute kidney injury (DEJA) is when the kidneys suddenly stop working effectively.  This can happen when there is less blood flow to the kidneys, there is kidney damage or the path for urine to leave the body is blocked.  DEJA can cause decreased urination, blood in the urine, swelling, vomiting, weakness, confusion and/or seisures.  The treatment depends on the cause and severity of the injury.  In any case, while your kidneys are healing you should avoid agents that can cause kidney injury.  Some of these agents include NSAIDs, Gadolinium contrast, and Phosphate containing enemas.      Diagnosis: HTN (hypertension)  Assessment and Plan of Treatment: Low salt diet  Activity as tolerated.  Your blood pressure medication was held due to hypotension in the setting of sepsis. Now resolved.   continue home medication as prescribed.   Follow up with your medical doctor for routine blood pressure monitoring at your next visit.  Notify your doctor if you have any of the following symptoms:   Dizziness, Lightheadedness, Blurry vision, Headache, Chest pain, Shortness of breath      Diagnosis: CVA (cerebrovascular accident)  Assessment and Plan of Treatment: contineu aspiration precaution and fall precaution.   maintian skin integrity.   continue home medications for blood pressure and cholesterol.   A stroke is a brain attack that occurs when an artery or a blood vessel becomes occluded breaks, interrupting blood flow to an area of the brain cells begin to die. Please call 911 for facial droop slurring speech, unable to move a limb or sudden weakness in one limb or one side of the body or confusion.      Diagnosis: Pressure injury of skin  Assessment and Plan of Treatment: Followed by wound care specialist for pressure injury and deep tissue injury.   -There is an intact DTI to the R. Heel without drainage  -There is a Stage 1 Pressure Injury to the R. Hip and L. Heel, as evident by non-blanchable erythema  -There is a DTI to the Coccyx with epidermal separation, red tissue, drainage, and surrounding tissue blanchable erythema  -There is a L. Hip Stage 4 Pressure Injury with pink tissue, purulent drainage, periwound erythema, and undermining (up to 0.6cm at 360 degrees)  -continue topical wound care, off load pressures/reposition q2h as wound care reccs.  -continue multivitamin, zinc, Vt C for wound healing.      Diagnosis: Atrial fibrillation  Assessment and Plan of Treatment: Continue blood thinner and Atenolol home dose.   Atrial fibrillation is the most common heart rhythm problem.  The condition puts you at risk for has stroke and heart attack  It helps if you control your blood pressure, not drink more than 1-2 alcohol drinks per day, cut down on caffeine, getting treatment for over active thyroid gland, and get regular exercise  Follow up [facility] primary MD and report if you feel your heart racing or beating unusually, chest tightness or pain, lightheaded, faint, shortness of breath especially with exercise  It is important to take your heart medication as prescribed  You may be on anticoagulation which is very important to take as directed - you may need blood work to monitor drug levels      Diagnosis: Abnormal CT scan  Assessment and Plan of Treatment: CT abd/pelvis resulted Rectal fecal impaction with associated uncomplicated stercoral proctitis.  Cholelithiasis with markedly distended gallbladder. Correlate with right upper quadrant ultrasound.  Nonobstructive left nephrolithiasis.  -RUQ US : Gallstones in the gallbladder without evidence of thickened gallbladder wall, pericholecystic fluid or ultrasonic Ho's sign.  -no urgent intervention needed this time.   Follow up as outpatient with PCP.       Diagnosis: Fecal impaction  Assessment and Plan of Treatment: CT resulted fecal impaction, on bowel regimen  Continue with medications as prescribed.  Follow-up with your primary care physician.  Call your physician if you develop nausea/vomiting, abdominal pain not relieved with pain regimen, constipation not relieved with bowel  regimen.      Diagnosis: E-coli UTI  Assessment and Plan of Treatment: Continue antibiotic as prescribed.   Drink enough water and fluids to keep your urine clear or pale yellow.  Avoid caffeine, tea, and carbonated beverages. They tend to irritate your bladder.  Empty your bladder often. Avoid holding urine for long periods of time.  After a bowel movement, women should cleanse from front to back. Use each tissue only once.  SEEK MEDICAL CARE IF:  You have back pain.  You develop a fever.  Your symptoms do not begin to resolve within 3 days.  SEEK IMMEDIATE MEDICAL CARE IF:  You have severe back pain or lower abdominal pain.  You develop chills.  You have nausea or vomiting.  You have continued burning or discomfort with urination.      Diagnosis: Dementia  Assessment and Plan of Treatment: Dementia care: Create a safe environment.  Falls precautions, free from clutter, remove throw rugs.  Provide 24hr /7 day per week supervision Reduce confusion. Keep familiar objects and people around. Use night lights or dim lights at night.  Keep a simple, consistent routine for waking, meals, bathing, dressing, and bedtime. Create a calm, quiet environment. Limit caffeine. Attend social events that stimulate rather than overwhelm the senses. Encourage good nutrition and hydration. Reduce distractions during meal times and snacks. Monitor chewing and swallowing ability.  Speech and swallowing pathologist recommends puree diet with mild thick liquid, but family refused. continued soft bite size diet. risk of aspiration is discussed with family. Maintain Aspiration precaution.   Continue with routine vision, hearing, dental, and medical screenings. All medications per MD only. If change in behavior or patient becomes more confused or letharic seek medical attention.    Any new problem with brain function happens. This includes problems with balance, speech, or falling a lot.   New or worsened confusion develops. New or worsened sleepiness develops. Staying awake becomes hard to do. This could indicate an infection if fever develops.       PRINCIPAL DISCHARGE DIAGNOSIS  Diagnosis: Sepsis due to urinary tract infection  Assessment and Plan of Treatment: Admitted for change in mental status in the setting of sepsis, bacteremia and UTI.   Take all antibiotics as ordered.  Call you Health care provider upon arrival home to make a one week follow up appointment.  If you develop fever, chills, malaise, or change in mental status call your Health Care Provider or go to the Emergency Department.  Nutrition is important, eat small frequent meals to help ensure you get adequate calories.  Do not stay in bed all day!  Increase your activity daily as tolerated.        SECONDARY DISCHARGE DIAGNOSES  Diagnosis: Bacteremia due to Escherichia coli  Assessment and Plan of Treatment: You were treated with IV antibiotic for ESBL Ecoli Bacteremia.   You were followed by Infectious disease doctor. Repeated blood culture from 8/2 were negative. Last day of antibiotic was given on 8/9.  Follow up with your primary MD after discharge.    Diagnosis: Metabolic encephalopathy  Assessment and Plan of Treatment: You had change in mental status due to sepsis with bacteremia and UTI. You were treated with Antibiotic and IV fluid. Now,  mental status remains at baseline. Evaluated by speech language pathologist and started diet.    Diagnosis: DEJA (acute kidney injury)  Assessment and Plan of Treatment: Your kidney functions are deteriorated on admission in the setting with sepsis, bacteremia and UTI. now resolved.   Maintian good oral hydration.   Acute kidney injury (DEJA) is when the kidneys suddenly stop working effectively.  This can happen when there is less blood flow to the kidneys, there is kidney damage or the path for urine to leave the body is blocked.  DEJA can cause decreased urination, blood in the urine, swelling, vomiting, weakness, confusion and/or seisures.  The treatment depends on the cause and severity of the injury.  In any case, while your kidneys are healing you should avoid agents that can cause kidney injury.  Some of these agents include NSAIDs, Gadolinium contrast, and Phosphate containing enemas.      Diagnosis: HTN (hypertension)  Assessment and Plan of Treatment: Low salt diet  Activity as tolerated.  Your blood pressure medication was held due to hypotension in the setting of sepsis. Now resolved.   continue home medication as prescribed.   Follow up with your medical doctor for routine blood pressure monitoring at your next visit.  Notify your doctor if you have any of the following symptoms:   Dizziness, Lightheadedness, Blurry vision, Headache, Chest pain, Shortness of breath      Diagnosis: CVA (cerebrovascular accident)  Assessment and Plan of Treatment: You have hx of CVA.   continue home medications for blood pressure and cholesterol.       Diagnosis: Pressure injury of skin  Assessment and Plan of Treatment: Followed by wound care specialist for pressure injury and deep tissue injury.   -There is an intact DTI to the R. Heel without drainage  -There is a Stage 1 Pressure Injury to the R. Hip and L. Heel, as evident by non-blanchable erythema  -There is a DTI to the Coccyx with epidermal separation, red tissue, drainage, and surrounding tissue blanchable erythema  -There is a L. Hip Stage 4 Pressure Injury with pink tissue, purulent drainage, periwound erythema, and undermining (up to 0.6cm at 360 degrees)  -continue topical wound care, off load pressures/reposition q2h as wound care reccs.  -continue multivitamin, zinc, Vt C for wound healing.      Diagnosis: Atrial fibrillation  Assessment and Plan of Treatment: Continue blood thinner and Atenolol home dose.   Atrial fibrillation is the most common heart rhythm problem.  The condition puts you at risk for has stroke and heart attack  It helps if you control your blood pressure, not drink more than 1-2 alcohol drinks per day, cut down on caffeine, getting treatment for over active thyroid gland, and get regular exercise  Follow up [facility] primary MD and report if you feel your heart racing or beating unusually, chest tightness or pain, lightheaded, faint, shortness of breath especially with exercise  It is important to take your heart medication as prescribed  You may be on anticoagulation which is very important to take as directed - you may need blood work to monitor drug levels      Diagnosis: Fecal impaction  Assessment and Plan of Treatment: CT resulted fecal impaction, on bowel regimen  Continue with medications as prescribed.  Follow-up with your primary care physician.  Call your physician if you develop nausea/vomiting, abdominal pain not relieved with pain regimen, constipation not relieved with bowel  regimen.      Diagnosis: Dementia  Assessment and Plan of Treatment: Dementia care: Create a safe environment.  Falls precautions, free from clutter, remove throw rugs.  Provide 24hr /7 day per week supervision Reduce confusion. Keep familiar objects and people around. Use night lights or dim lights at night.  Keep a simple, consistent routine for waking, meals, bathing, dressing, and bedtime. Create a calm, quiet environment. Limit caffeine. Attend social events that stimulate rather than overwhelm the senses. Encourage good nutrition and hydration. Reduce distractions during meal times and snacks. Monitor chewing and swallowing ability.  Speech and swallowing pathologist recommends puree diet with mild thick liquid, but family refused. continued soft bite size diet. risk of aspiration is discussed with family. Maintain Aspiration precaution.   Continue with routine vision, hearing, dental, and medical screenings. All medications per MD only. If change in behavior or patient becomes more confused or letharic seek medical attention.    Any new problem with brain function happens. This includes problems with balance, speech, or falling a lot.   New or worsened confusion develops. New or worsened sleepiness develops. Staying awake becomes hard to do. This could indicate an infection if fever develops.

## 2022-07-28 NOTE — DISCHARGE NOTE PROVIDER - HOSPITAL COURSE
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 ID consulted. Started on Meropenem, now changed to Ertapenem per ID.   Repeat blood culture sent 7/28.   s/p bolus, maintenance IVF for hypotension and DEJA, resolved this time.  Resumed beta blocker for rate control with Afib.     7/28 pt is noted shivering this am, low grade temp 100.2F rectally.      incomplete 7/28.       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 ID consulted Dr. Vyas. s/p bolus, maintenance IVF for hypotension and DEJA, resolved this time.  Resumed beta blocker for rate control with Afib. Started on Meropenem, now changed to Ertapenem per ID. Repeat blood culture sent 7/28.  culture form 8/2/22 negative. DISPO planning for AM. discussed with medical attending pt. medically stable for d/c home.        Please note that this a brief summary of hospital course please refer to daily progress notes and consult notes for full course and events

## 2022-07-28 NOTE — DIETITIAN INITIAL EVALUATION ADULT - OTHER INFO
Pt visited. Pt asleep. On  O2.  D/W RN Pt is assisted w Meall.Pt eating ~75- 100 % of meal. Pt tolerating PO.  D/W Pt son at bedside on 7/27. Pt c/o not liking  Pureed food. Son   reports Pt not eating because of Pureed food. SLP  Notes Noted and Diet was changed to  Soft and  Bite sized. Pt W H/O CVA. Pt with Multiple Pressure ulcer Stage  4 @ L Hip, Suspected DTI @ R Heel, and sacrum. stage 1 @ R Hip.

## 2022-07-28 NOTE — PROGRESS NOTE ADULT - PROBLEM SELECTOR PLAN 8
-Patient has A fib   -comes in with sepsis HR 120s, EKG Afib with RVR in ED  -HR bet 100s-110s  -Pt takes Eliquis and Atenolol at home  -c/w Eliquis, restarted Metoprolol today.

## 2022-07-28 NOTE — PROGRESS NOTE ADULT - PROBLEM SELECTOR PLAN 5
-Hx of HTN take atenolol   -held with sepsis, hypotensive  -Now BP improving 120s/70s, HR 100s  -start Metoprolol 25mg BID with holding parameter for now  -monitor BP/HR -Hx of HTN take atenolol   -held with sepsis, hypotensive  -Now BP improving 120s/70s, HR 100s  -start Metoprolol 25mg BID with holding parameter    -monitor BP/HR

## 2022-07-28 NOTE — PROGRESS NOTE ADULT - ASSESSMENT
77 y/o female with PMH of HTN, A fib, constipation, CVA with residual right sided weakness, dementia, b/L ICA stenosis, recurrent UTIs is admitted for sepsis due to UTI. BCs growing ESBL E. coli. Patient has had recurrent UTIs treated in the past with Ciprofloxacin that predisposes her to having a resistant organism. Pt with reported h/o PCN allergy. She has had no AE to CTX and wd also be comfortable treating with a carbapenem.    # Sepsis due to UTI with ESBL E. coli-- see above  --- Switch Meropenem to Ertapenem 1 gram IV every 24 hrs   --- Monitor mental status   --- Please repeat Bcx to check for clearing    # PCN allergy-- see above      Discussed above with medicine team, including Dr Lopez.  75 y/o female with PMH of HTN, A fib, constipation, CVA with residual right sided weakness, dementia, b/L ICA stenosis, recurrent UTIs admitted for sepsis due to UTI. BCs growing ESBL E. coli. Patient has had recurrent UTIs treated in the past with Ciprofloxacin that predisposes her to having a resistant organism. Pt with reported h/o PCN allergy. She had no AE to CTX. Given meropenem yesterday for Rx of ESBL in BC. Ertapenem recommended rather than meropenem for ease of administration while cont. to provide appropriate coverage for the organism in the BC.    # Sepsis due to UTI with ESBL E. coli-- see above  --- d/c Meropenem   -- start Ertapenem 1 gram IV every 24 hrs    --- Monitor mental status   --- F/U repeat Bcx     # PCN allergy-- see above      Discussed above with medicine team, including Dr Lopez.

## 2022-07-28 NOTE — DIETITIAN INITIAL EVALUATION ADULT - PERTINENT MEDS FT
MEDICATIONS  (STANDING):  apixaban 5 milliGRAM(s) Oral every 12 hours  aspirin  chewable 81 milliGRAM(s) Oral daily  atorvastatin 40 milliGRAM(s) Oral at bedtime  bisacodyl 5 milliGRAM(s) Oral at bedtime  chlorhexidine 2% Cloths 1 Application(s) Topical daily  ertapenem  IVPB      levETIRAcetam 750 milliGRAM(s) Oral two times a day  metoprolol tartrate 25 milliGRAM(s) Oral every 12 hours  mupirocin 2% Ointment 1 Application(s) Both Nostrils two times a day  nystatin Powder 1 Application(s) Topical three times a day  sodium chloride 0.9%. 1000 milliLiter(s) (75 mL/Hr) IV Continuous <Continuous>    MEDICATIONS  (PRN):  acetaminophen     Tablet .. 650 milliGRAM(s) Oral every 6 hours PRN Temp greater or equal to 38C (100.4F)  melatonin 3 milliGRAM(s) Oral at bedtime PRN Insomnia

## 2022-07-28 NOTE — PROGRESS NOTE ADULT - PROBLEM SELECTOR PLAN 3
-Bcx grew ESBL Ecoli from 7/26  -Ucx Ecoli  -d/c'd CTX  -started Meropenem 1g Q8h  -ID dr. Khan  -repeat blood culture sent 7/28  -ID will decide duration of ABT from second Bcx result -Bcx grew ESBL Ecoli from 7/26  -Ucx Ecoli  -d/c'd CTX  -s/p Meropenem 3 doses  -changed to Ertapenem 1g Q 24h x 7 days per ID.   -ID dr. Khan  -repeat blood culture sent 7/28  -ID will decide duration of ABT from second Bcx result

## 2022-07-28 NOTE — PROGRESS NOTE ADULT - PROBLEM SELECTOR PLAN 7
-wound care RN following for multiple PI  -There is an intact DTI to the R. Heel without drainage  -There is a Stage 1 Pressure Injury to the R. Hip and L. Heel, as evident by non-blanchable erythema  -There is a DTI to the Coccyx with epidermal separation, red tissue, drainage, and surrounding tissue blanchable erythema  -There is a L. Hip Stage 4 Pressure Injury with pink tissue, purulent drainage, periwound erythema, and undermining (up to 0.6cm at 360 degrees)    -continue topical wound care, off load pressures/reposition q2h as wound care reccs. -wound care RN following for PI, DTI  -There is an intact DTI to the R. Heel without drainage  -There is a Stage 1 Pressure Injury to the R. Hip and L. Heel, as evident by non-blanchable erythema  -There is a DTI to the Coccyx with epidermal separation, red tissue, drainage, and surrounding tissue blanchable erythema  -There is a L. Hip Stage 4 Pressure Injury with pink tissue, purulent drainage, periwound erythema, and undermining (up to 0.6cm at 360 degrees)    -continue topical wound care, off load pressures/reposition q2h as wound care reccs.  -started MV, zinc, Vt C for wound healing

## 2022-07-28 NOTE — DIETITIAN INITIAL EVALUATION ADULT - BODY MASS INDEX
Problem: Adult Inpatient Plan of Care  Goal: Plan of Care Review  Received pt from PACU at 6:50. Pleasant upon arrival, denies pain at the time. WCATA       36.7

## 2022-07-28 NOTE — PROGRESS NOTE ADULT - PROBLEM SELECTOR PLAN 1
Patient is a 76 Y o with Hx Dementia of recurrent UTIs  about 5x a year with , now presenting with a 24 h Hx of encephalopathy noted at home off of baseline  CTH -ve for acute changes  Patient found to have sepsis with elevated lactate, HR and white count on admission  source found to be UTI  - s/p 2L bolus, lactate improved on repeat  - mental status also now improved and back to baseline  -c/w gentle hydration with NS at 100 ml/h for 24h  -SLP reccs: puree diet with mild thick per reccs. family refuses, on soft diet now.   -c/w abx  - ID dr. Khan consulted. Due to sepsis in the setting of ESBL E coli bacteremia, UTI.   hx dementia AOx 1 at baseline.    CTH -ve for acute changes  - s/p 2L bolus, maintenance IVF  - mental status also now improved and back to baseline  -SLP reccs: puree diet with mild thick per reccs. family refuses, on soft diet now.   -c/w abx per ID  -ID dr. Khan consulted. f/u repeat Bcx

## 2022-07-28 NOTE — PROGRESS NOTE ADULT - PROBLEM SELECTOR PLAN 12
-from home  -on Meropenem, repeat culture sent 7/28, ID will decide duration -from home  -f/u repeat culture sent 7/28, c/w Ertapenem x 7 days per ID if Repeat Bcx is negative.  -son updated treatment plan and agreeable

## 2022-07-28 NOTE — PROGRESS NOTE ADULT - ASSESSMENT
Patient is a 76 Y  O from home with PMH of HTN, A fib on eliquis and atenolol, CVA 20 y ago with residual right sided weakness and is now non verbal and bed bound, b/L ICA stenosis, Recurrent UTI up to 5x a year, who comes in due to 24h hx of altered mental status from baseline, associated with fever and concerns for dysphagia, found Febrile T max 102.5, , /78,  Lactate 3.7, UA +Ve, and white count of 14, CT abdomen pelvis showed distended gallbladder with stones, stool impaction and stercoral colitis, non obstructing left nephrolithiasis, RUQ- no cholecystitis, hence admitted for management of metabolic encephalopathy, 2/2 sepsis 2/2 UTI, DEJA. s/p bolus for hypotension.    Ucx in progress. Bcx grew ESBL, on Meropenem,  ID consulted.  Patient is a 76 Y  O from home with PMH of HTN, A fib on eliquis and atenolol, CVA 20 y ago with residual right sided weakness and is now non verbal and bed bound, b/L ICA stenosis, Recurrent UTI up to 5x a year, who comes in due to 24h hx of altered mental status from baseline, associated with fever and concerns for dysphagia, found Febrile T max 102.5, , /78,  Lactate 3.7, UA +Ve, and white count of 14, CT abdomen pelvis showed distended gallbladder with stones, stool impaction and stercoral colitis, non obstructing left nephrolithiasis, RUQ- no cholecystitis, hence admitted for management of metabolic encephalopathy, 2/2 sepsis 2/2 ESBL Ecoli Bacteremia, UTI. Started on Meropenem, ID consulted.  Repeat blood culture sent 7/28.   s/p bolus, maintenance IVF for hypotension, resolved this time.     , DEJA. s/p bolus for hypotension.    Ucx Grew E coli. ESBL E nakul progress. Bcx grew ESBL, on Meropenem,  ID consulted.  Patient is a 76 Y  O from home with PMH of HTN, A fib on eliquis and atenolol, CVA 20 y ago with residual right sided weakness and is now non verbal and bed bound, b/L ICA stenosis, Recurrent UTI up to 5x a year, who comes in due to 24h hx of altered mental status from baseline, associated with fever and concerns for dysphagia, found Febrile T max 102.5, , /78,  Lactate 3.7, UA +Ve, and white count of 14, CT abdomen pelvis showed distended gallbladder with stones, stool impaction and stercoral colitis, non obstructing left nephrolithiasis, RUQ- no cholecystitis, hence admitted for management of metabolic encephalopathy, 2/2 sepsis 2/2 ESBL Ecoli Bacteremia, UTI. Started on Meropenem, ID consulted.  Repeat blood culture sent 7/28.   s/p bolus, maintenance IVF for hypotension and DEJA, resolved this time.   resumed beta blocker for rate control with Afib.     Patient is a 76 Y  O from home with PMH of HTN, A fib on eliquis and atenolol, CVA 20 y ago with residual right sided weakness and is now non verbal and bed bound, b/L ICA stenosis, Recurrent UTI up to 5x a year, who comes in due to 24h hx of altered mental status from baseline, associated with fever and concerns for dysphagia, found Febrile T max 102.5, , /78,  Lactate 3.7, UA +Ve, and white count of 14, CT abdomen pelvis showed distended gallbladder with stones, stool impaction and stercoral colitis, non obstructing left nephrolithiasis, RUQ- no cholecystitis, hence admitted for management of metabolic encephalopathy, 2/2 sepsis 2/2 ESBL Ecoli Bacteremia, UTI. Started on Meropenem, ID consulted.  Repeat blood culture sent 7/28.   s/p bolus, maintenance IVF for hypotension and DEJA, resolved this time.   resumed beta blocker for rate control with Afib.   low grade temp 100.5F, remains afebrile today.     Patient is a 76 Y  O from home with PMH of HTN, A fib on eliquis and atenolol, CVA 20 y ago with residual right sided weakness and is now non verbal and bed bound, b/L ICA stenosis, Recurrent UTI up to 5x a year, who comes in due to 24h hx of altered mental status from baseline, associated with fever and concerns for dysphagia, found Febrile T max 102.5, , /78,  Lactate 3.7, UA +Ve, and white count of 14, CT abdomen pelvis showed distended gallbladder with stones, stool impaction and stercoral colitis, non obstructing left nephrolithiasis, RUQ- no cholecystitis, hence admitted for management of metabolic encephalopathy, 2/2 sepsis 2/2 ESBL Ecoli Bacteremia, UTI. ID consulted. Started on Meropenem, now changed to Ertapenem per ID.   Repeat blood culture sent 7/28.   s/p bolus, maintenance IVF for hypotension and DEJA, resolved this time.   resumed beta blocker for rate control with Afib.   pt is noted shivering this am, low grade temp 100.2F rectally.

## 2022-07-28 NOTE — PROGRESS NOTE ADULT - PROBLEM SELECTOR PLAN 4
-baseline around 1.2 , no hx of CKD comes in with sepsis 2/2 UTI  -DEJA found on labs with Cr of 1.38  -Intra-renal unlikely: still early  -Obtructive unlikely as the left renal stone is non obstructing  -Likely pre renal with BUN/ Cr ratio >20  -Patient has already received bolus of 2L  -Cr 1.2 today  -monitor BMP -baseline around 1.2 , no hx of CKD comes in with sepsis 2/2 UTI  -DEJA found on labs with Cr of 1.38  -Likely pre renal with BUN/ Cr ratio >20  -Obtructive unlikely as the left renal stone is non obstructing  -s/p bolus, on IVF  -DEJA resolved.   -monitor BMP

## 2022-07-28 NOTE — DIETITIAN INITIAL EVALUATION ADULT - NSFNSPHYEXAMSKINFT_GEN_A_CORE
Pressure Injury 1: sacrum, Suspected deep tissue injury  Pressure Injury 2: Left:,hip, Stage IV  Pressure Injury 3: Right:,hip, Stage I  Pressure Injury 4: Left:,heel, Stage I  Pressure Injury 5: Right:,heel, Suspected deep tissue injury  Pressure Injury 6: none, none  Pressure Injury 7: none, none  Pressure Injury 8: none, none  Pressure Injury 9: none, none  Pressure Injury 10: none, none  Pressure Injury 11: none, none

## 2022-07-28 NOTE — PROGRESS NOTE ADULT - PROBLEM SELECTOR PLAN 2
Patient is a 76 Y o with Hx Dementia of recurrent UTIs  about 5x a year with , now presenting with AMS  Patient found to have sepsis with elevated lactate on admission, leukocytosis,   Tachy, soft BP and fever recorder  source found to be UTI  - s/p 2L bolus  -c/w gentle hydration with NS at 100ml/h for 24h  -cbc with diff daily  -Bcx GNR  -Ucx sent. in progress  -c/w antibiotics- ceftriaxone for 7 days for now  -ID dr. Khan  -repeat Bcx -source E coli UTI, ESBL e coli bacteremia  -leukocytosis, fever, hypotensive on admission, now resolving.   -s/p IV bolus, c/w maintenance IVF  -daily lab  -ID dr. Khan  -c/w Meropenem 1g Q8h. (pt has PCN allergy, no reaction with CTX or Carbapenem in the past, OK to continue per ID).   -follow up repeat Bcx sent 7/28. -source E coli UTI, ESBL e coli bacteremia  -leukocytosis, fever, hypotensive on admission, now resolving.   -s/p IV bolus, c/w maintenance IVF  -daily lab  -ID dr. Khan  -Meropenem changed to Ertapenem 1g q24h per ID.    (pt has PCN allergy, no reaction with CTX or Carbapenem in the past, OK to continue per ID).   -follow up repeat Bcx sent 7/28.  -if Bcx negative, complete total 7 days Ertapenem per ID.

## 2022-07-28 NOTE — PROGRESS NOTE ADULT - PROBLEM SELECTOR PLAN 6
-Hx of CVA and has ICA stenosis  -can c/w aspirin and statin  -SLP evaluation, reccs puree diet/mild thick liquid, but son refuses, requesting soft bite size, risk of aspiration discussed. verbalized understanding, but still he wants soft diet. SLP made aware. -Hx of CVA and has ICA stenosis  -hx dementia, AOx1  -PCM, low albumin    -can c/w aspirin and statin  -SLP evaluation, reccs puree diet/mild thick liquid, but son refuses, requesting soft bite size, risk of aspiration discussed. verbalized understanding, but still he wants soft diet. SLP made aware.

## 2022-07-28 NOTE — PROGRESS NOTE ADULT - SUBJECTIVE AND OBJECTIVE BOX
Subjective and Objective: patient seen and examined at bedside. She is comfortable, denies any pain or discomfort. On labs, WBC normalized, on Meropenem switched to Ertapenem.       MEDS  acetaminophen     Tablet .. 650 milliGRAM(s) Oral every 6 hours PRN  apixaban 5 milliGRAM(s) Oral every 12 hours  aspirin  chewable 81 milliGRAM(s) Oral daily  atorvastatin 40 milliGRAM(s) Oral at bedtime  bisacodyl 5 milliGRAM(s) Oral at bedtime  chlorhexidine 2% Cloths 1 Application(s) Topical daily  ertapenem  IVPB      levETIRAcetam 750 milliGRAM(s) Oral two times a day  melatonin 3 milliGRAM(s) Oral at bedtime PRN  metoprolol tartrate 25 milliGRAM(s) Oral every 12 hours  mupirocin 2% Ointment 1 Application(s) Both Nostrils two times a day  nystatin Powder 1 Application(s) Topical three times a day  sodium chloride 0.9%. 1000 milliLiter(s) IV Continuous <Continuous>      PHYSICAL EXAM:    Vital Signs Last 24 Hrs  T(C): 37.6 (28 Jul 2022 12:57), Max: 38.1 (27 Jul 2022 22:08)  T(F): 99.7 (28 Jul 2022 12:57), Max: 100.5 (27 Jul 2022 22:08)  HR: 76 (28 Jul 2022 12:57) (76 - 118)  BP: 111/62 (28 Jul 2022 12:57) (105/69 - 129/69)  BP(mean): --  RR: 18 (28 Jul 2022 12:57) (18 - 20)  SpO2: 98% (28 Jul 2022 12:57) (98% - 100%)    Parameters below as of 28 Jul 2022 12:57  Patient On (Oxygen Delivery Method): nasal cannula  O2 Flow (L/min): 2      GENERAL: WD obese W female in NAD  HEAD:  Atraumatic, Normocephalic  EYES:  conjunctiva and sclera clear  MOUTH: partially edentulous; poor oral hygiene  NECK: Supple, No JVD, Normal thyroid  CHEST/LUNG: Clear to auscultation. Clear to percussion bilaterally  HEART: irregular rhythm; No murmurs, rubs, or gallops  ABDOMEN: Soft, Nontender, Nondistended; Bowel sounds present, no pain or masses on palpation; + umbilical hernia   NERVOUS SYSTEM:  Alert & Oriented X1 (place), no facial droop, follows commands   EXTREMITIES:  2+ Peripheral Pulses, No clubbing, cyanosis, or edema; + LE tenderness to palpation, contracted LE and contracted RUE  : voiding well   SKIN: warm, dry, no skin rashes or breakdown      LABS/DIAGNOSTIC TESTS                            11.5   9.47  )-----------( 90       ( 28 Jul 2022 08:35 )             37.0       WBC Count: 9.47 K/uL (07-28 @ 08:35)  WBC Count: 15.62 K/uL (07-27 @ 05:40)  WBC Count: 14.94 K/uL (07-26 @ 07:25)      07-28    142  |  113<H>  |  28<H>  ----------------------------<  133<H>  3.8   |  22  |  0.82    Ca    8.9      28 Jul 2022 08:35  Phos  3.6     07-27  Mg     2.1     07-27    TPro  5.5<L>  /  Alb  1.8<L>  /  TBili  0.4  /  DBili  x   /  AST  22  /  ALT  36  /  AlkPhos  72  07-28      CULTURES      Culture - Urine (collected 07-26-22 @ 10:37)  Source: Clean Catch Clean Catch (Midstream)  Preliminary Report (07-28-22 @ 07:45):    >100,000 CFU/ml Escherichia coli    Culture - Blood (collected 07-26-22 @ 07:21)  Source: .Blood Blood-Peripheral  Gram Stain (07-27-22 @ 05:28):    Growth in aerobic and anaerobic bottles: Gram Negative Rods  Preliminary Report (07-28-22 @ 12:50):    Growth in aerobic bottle: Escherichia coli    ***Blood Panel PCR results on this specimen are available    approximately 3 hours after the Gram stain result.***    Gram stain, PCR, and/or culture results may not always    correspond due to difference in methodologies.    ************************************************************    This PCR assay was performed by multiplex PCR. This    Assay tests for 66 bacterial and resistance gene targets.    Please refer to the [facility] test directory    at [email or web address] for details.  Organism: Blood Culture PCR (07-27-22 @ 06:55)  Organism: Blood Culture PCR (07-27-22 @ 06:55)      -  CTX-M Resistance Marker: Detec      -  ESBL: Detec      -  Escherichia coli: Detec      Method Type: PCR    Culture - Blood (collected 07-26-22 @ 07:14)  Source: .Blood Blood-Peripheral  Gram Stain (07-27-22 @ 08:26):    Growth in anaerobic bottle: Gram Negative Rods    Growth in aerobic bottle: Gram Negative Rods  Preliminary Report (07-28-22 @ 12:49):    Growth in aerobic and anaerobic bottles: Escherichia coli    See previous culture [id number]      RADIOLOGY  ct< from: CT Abdomen and Pelvis No Cont (07.26.22 @ 09:57) >  Rectal fecal impaction with associated uncomplicated stercoral proctitis.  Cholelithiasis with markedly distended gallbladder. Correlate with right   upper quadrant ultrasound.  Nonobstructive left nephrolithiasis.    < end of copied text >  < from: CT Head No Cont (07.26.22 @ 09:57) >  No acute intracranial hemorrhage or acute territorial infarct.  If   symptoms persist, follow-up MRI exam recommended.    < end of copied text >  < from: Xray Chest 1 View-PORTABLE IMMEDIATE (07.26.22 @ 07:51) >  IMPRESSION: No evidence for focal infiltrate or lobar consolidation.    < end of copied text >    < from: US Abdomen Upper Quadrant Right (07.26.22 @ 12:24) >    INTERPRETATION:  CLINICAL INFORMATION: Fever and vomiting. Distended   gallbladder with cholelithiasis on CT    COMPARISON: No prior abdominal ultrasound is available for comparison.   Reference is made with a previous abdominal CT of the same day.    TECHNIQUE: Sonography of the right upper quadrant.    FINDINGS:  Liver: Within normal limits.  Bile ducts: Normal caliber. Common bile duct measures 5 mm in caliber   which is within normal limits..  Gallbladder: Gallstones in the gallbladder without evidence of thickened   gallbladder wall, pericholecystic fluid or ultrasonic Ho's sign.  Pancreas: Visualized portions are within normal limits.  Right kidney: 9.6 cm. No hydronephrosis. Apparent cortical thinning of   the right kidney.  Ascites: None.  IVC: Visualized portions are within normal limits.    IMPRESSION:  Gallstones in the gallbladder without evidence of thickened gallbladder   wall, pericholecystic fluid or ultrasonic Ho's sign.    < end of copied text >                           Subjective and Objective: patient seen and examined at bedside. She is comfortable, denies any pain or discomfort. On labs, WBC normalized, on Meropenem switched to Ertapenem for easier administration while maintaining appropriate coverage.       MEDS  acetaminophen     Tablet .. 650 milliGRAM(s) Oral every 6 hours PRN  apixaban 5 milliGRAM(s) Oral every 12 hours  aspirin  chewable 81 milliGRAM(s) Oral daily  atorvastatin 40 milliGRAM(s) Oral at bedtime  bisacodyl 5 milliGRAM(s) Oral at bedtime  chlorhexidine 2% Cloths 1 Application(s) Topical daily  ertapenem  IVPB   (D1)   levETIRAcetam 750 milliGRAM(s) Oral two times a day  melatonin 3 milliGRAM(s) Oral at bedtime PRN  metoprolol tartrate 25 milliGRAM(s) Oral every 12 hours  mupirocin 2% Ointment 1 Application(s) Both Nostrils two times a day  nystatin Powder 1 Application(s) Topical three times a day  sodium chloride 0.9%. 1000 milliLiter(s) IV Continuous <Continuous>  Meropenem 7/27      PHYSICAL EXAM:    Vital Signs Last 24 Hrs  T(C): 37.6 (28 Jul 2022 12:57), Max: 38.1 (27 Jul 2022 22:08)  T(F): 99.7 (28 Jul 2022 12:57), Max: 100.5 (27 Jul 2022 22:08)  HR: 76 (28 Jul 2022 12:57) (76 - 118)  BP: 111/62 (28 Jul 2022 12:57) (105/69 - 129/69)  BP(mean): --  RR: 18 (28 Jul 2022 12:57) (18 - 20)  SpO2: 98% (28 Jul 2022 12:57) (98% - 100%)    Parameters below as of 28 Jul 2022 12:57  Patient On (Oxygen Delivery Method): nasal cannula  O2 Flow (L/min): 2      GENERAL: WD obese W female in NAD  HEAD:  Atraumatic, Normocephalic  EYES:  conjunctivae and sclerae clear  MOUTH: partially edentulous; poor oral hygiene  NECK: Supple, No JVD,   CHEST/LUNG: Clear to auscultation ant.  HEART: irregular rhythm; No murmurs, rubs, or gallops  ABDOMEN: Soft, Nontender, Nondistended; Bowel sounds present; mild.mod tenderness around umbilical hernia   NERVOUS SYSTEM:  Alert & Oriented X1 (place), no facial droop, follows commands  EXTREMITIES:  2+ Peripheral Pulses, No clubbing, cyanosis; 1+ LE and bilat hand edema; + LE tenderness to palpation, contracted LE and contracted RUE  : voiding well; primafit in place  SKIN: warm, dry, no skin rashes or breakdown      LABS/DIAGNOSTIC TESTS                        11.5   9.47  )-----------( 90       ( 28 Jul 2022 08:35 )             37.0       WBC Count: 9.47 K/uL (07-28 @ 08:35)  WBC Count: 15.62 K/uL (07-27 @ 05:40)  WBC Count: 14.94 K/uL (07-26 @ 07:25)      07-28    142  |  113<H>  |  28<H>  ----------------------------<  133<H>  3.8   |  22  |  0.82    Ca    8.9      28 Jul 2022 08:35  Phos  3.6     07-27  Mg     2.1     07-27    TPro  5.5<L>  /  Alb  1.8<L>  /  TBili  0.4  /  DBili  x   /  AST  22  /  ALT  36  /  AlkPhos  72  07-28      CULTURES      Culture - Urine (collected 07-26-22 @ 10:37)  Source: Clean Catch Clean Catch (Midstream)  Preliminary Report (07-28-22 @ 07:45):    >100,000 CFU/ml Escherichia coli    Culture - Blood (collected 07-26-22 @ 07:21)  Source: .Blood Blood-Peripheral  Gram Stain (07-27-22 @ 05:28):    Growth in aerobic and anaerobic bottles: Gram Negative Rods  Preliminary Report (07-28-22 @ 12:50):    Growth in aerobic bottle: Escherichia coli    ***Blood Panel PCR results on this specimen are available    approximately 3 hours after the Gram stain result.***    Gram stain, PCR, and/or culture results may not always    correspond due to difference in methodologies.    ************************************************************    This PCR assay was performed by multiplex PCR. This    Assay tests for 66 bacterial and resistance gene targets.    Please refer to the [facility] test directory    at [email or web address] for details.  Organism: Blood Culture PCR (07-27-22 @ 06:55)  Organism: Blood Culture PCR (07-27-22 @ 06:55)      -  CTX-M Resistance Marker: Detec      -  ESBL: Detec      -  Escherichia coli: Detec      Method Type: PCR    Culture - Blood (collected 07-26-22 @ 07:14)  Source: .Blood Blood-Peripheral  Gram Stain (07-27-22 @ 08:26):    Growth in anaerobic bottle: Gram Negative Rods    Growth in aerobic bottle: Gram Negative Rods  Preliminary Report (07-28-22 @ 12:49):    Growth in aerobic and anaerobic bottles: Escherichia coli    See previous culture [id number]      RADIOLOGY  ct< from: CT Abdomen and Pelvis No Cont (07.26.22 @ 09:57) >  Rectal fecal impaction with associated uncomplicated stercoral proctitis.  Cholelithiasis with markedly distended gallbladder. Correlate with right   upper quadrant ultrasound.  Nonobstructive left nephrolithiasis.    < end of copied text >  < from: CT Head No Cont (07.26.22 @ 09:57) >  No acute intracranial hemorrhage or acute territorial infarct.  If   symptoms persist, follow-up MRI exam recommended.    < end of copied text >  < from: Xray Chest 1 View-PORTABLE IMMEDIATE (07.26.22 @ 07:51) >  IMPRESSION: No evidence for focal infiltrate or lobar consolidation.    ____________________________________________________________________________    < from: US Abdomen Upper Quadrant Right (07.26.22 @ 12:24) >    INTERPRETATION:  CLINICAL INFORMATION: Fever and vomiting. Distended   gallbladder with cholelithiasis on CT    COMPARISON: No prior abdominal ultrasound is available for comparison.   Reference is made with a previous abdominal CT of the same day.    TECHNIQUE: Sonography of the right upper quadrant.    FINDINGS:  Liver: Within normal limits.  Bile ducts: Normal caliber. Common bile duct measures 5 mm in caliber   which is within normal limits..  Gallbladder: Gallstones in the gallbladder without evidence of thickened   gallbladder wall, pericholecystic fluid or ultrasonic Ho's sign.  Pancreas: Visualized portions are within normal limits.  Right kidney: 9.6 cm. No hydronephrosis. Apparent cortical thinning of   the right kidney.  Ascites: None.  IVC: Visualized portions are within normal limits.    IMPRESSION:  Gallstones in the gallbladder without evidence of thickened gallbladder   wall, pericholecystic fluid or ultrasonic Ho's sign.    < end of copied text >

## 2022-07-28 NOTE — DIETITIAN INITIAL EVALUATION ADULT - PERTINENT LABORATORY DATA
07-28    142  |  113<H>  |  28<H>  ----------------------------<  133<H>  3.8   |  22  |  0.82    Ca    8.9      28 Jul 2022 08:35  Phos  3.6     07-27  Mg     2.1     07-27    TPro  5.5<L>  /  Alb  1.8<L>  /  TBili  0.4  /  DBili  x   /  AST  22  /  ALT  36  /  AlkPhos  72  07-28

## 2022-07-28 NOTE — DISCHARGE NOTE PROVIDER - CARE PROVIDER_API CALL
PETRONA KIM  Internal Medicine  [address] [place] [address]  Phone: [phone]  Fax: [phone]  Follow Up Time: 1 week

## 2022-07-28 NOTE — DISCHARGE NOTE PROVIDER - ATTENDING DISCHARGE PHYSICAL EXAMINATION:
Vital Signs Last 24 Hrs  T(C): 36.7 (09 Aug 2022 10:21), Max: 36.7 (09 Aug 2022 10:21)  T(F): 98 (09 Aug 2022 10:21), Max: 98 (09 Aug 2022 10:21)  HR: 102 (09 Aug 2022 10:21) (96 - 120)  BP: 132/67 (09 Aug 2022 10:21) (112/56 - 142/76)  BP(mean): --  RR: 18 (09 Aug 2022 10:21) (17 - 18)  SpO2: 95% (09 Aug 2022 10:21) (95% - 97%)    Parameters below as of 09 Aug 2022 10:21  Patient On (Oxygen Delivery Method): room air    GENERAL: NAD  HEAD:  Atraumatic, Normocephalic  EYES: conjunctiva and sclera clear  ENMT: Moist mucous membranes  NECK: Supple, No JVD  NERVOUS SYSTEM:  Alert & Oriented X 1  CHEST/LUNG: CTA bilaterally; No rales, rhonchi, wheezing, or rubs  HEART: Regular rate and rhythm  ABDOMEN: Soft, Nontender, Nondistended; Bowel sounds present  EXTREMITIES:  2+ Peripheral Pulses, No clubbing, cyanosis  SKIN: DTI

## 2022-07-28 NOTE — PROGRESS NOTE ADULT - SUBJECTIVE AND OBJECTIVE BOX
NP Note discussed with  Primary Attending    INTERVAL HPI/OVERNIGHT EVENTS: no new complaints    MEDICATIONS  (STANDING):  apixaban 5 milliGRAM(s) Oral every 12 hours  aspirin  chewable 81 milliGRAM(s) Oral daily  atorvastatin 40 milliGRAM(s) Oral at bedtime  bisacodyl 5 milliGRAM(s) Oral at bedtime  chlorhexidine 2% Cloths 1 Application(s) Topical daily  levETIRAcetam 750 milliGRAM(s) Oral two times a day  meropenem  IVPB      meropenem  IVPB 1000 milliGRAM(s) IV Intermittent every 8 hours  metoprolol tartrate 25 milliGRAM(s) Oral every 12 hours  mupirocin 2% Ointment 1 Application(s) Both Nostrils two times a day  nystatin Powder 1 Application(s) Topical three times a day  sodium chloride 0.9%. 1000 milliLiter(s) (100 mL/Hr) IV Continuous <Continuous>    MEDICATIONS  (PRN):  acetaminophen     Tablet .. 650 milliGRAM(s) Oral every 6 hours PRN Temp greater or equal to 38C (100.4F)  melatonin 3 milliGRAM(s) Oral at bedtime PRN Insomnia      __________________________________________________  REVIEW OF SYSTEMS:    CONSTITUTIONAL: No fever,   EYES: no acute visual disturbances  NECK: No pain or stiffness  RESPIRATORY: No cough; No shortness of breath  CARDIOVASCULAR: No chest pain, no palpitations  GASTROINTESTINAL: No pain. No nausea or vomiting; No diarrhea   NEUROLOGICAL: No headache or numbness, no tremors  MUSCULOSKELETAL: No joint pain, no muscle pain  GENITOURINARY: no dysuria, no frequency, no hesitancy  PSYCHIATRY: no depression , no anxiety  ALL OTHER  ROS negative        Vital Signs Last 24 Hrs  T(C): 36.8 (2022 04:44), Max: 38.1 (2022 22:08)  T(F): 98.3 (2022 04:44), Max: 100.5 (2022 22:08)  HR: 108 (2022 04:44) (97 - 118)  BP: 121/76 (2022 04:44) (105/69 - 129/69)  BP(mean): --  RR: 18 (2022 04:44) (18 - 20)  SpO2: 99% (2022 04:44) (99% - 100%)    Parameters below as of 2022 04:44  Patient On (Oxygen Delivery Method): nasal cannula  O2 Flow (L/min): 2      ________________________________________________  PHYSICAL EXAM:  GENERAL: NAD  HEENT: Normocephalic;  conjunctivae and sclerae clear; moist mucous membranes;   NECK : supple  CHEST/LUNG: Clear to auscultation bilaterally with good air entry   HEART: S1 S2  regular; no murmurs, gallops or rubs  ABDOMEN: Soft, Nontender, Nondistended; Bowel sounds present  EXTREMITIES: no cyanosis; no edema; no calf tenderness  SKIN: warm and dry; no rash  NERVOUS SYSTEM:  Awake and alert; Oriented  to place, person and time ; no new deficits    _________________________________________________  LABS:                        11.1   15.62 )-----------( 142      ( 2022 05:40 )             36.8     07-    144  |  115<H>  |  37<H>  ----------------------------<  186<H>  4.3   |  21<L>  |  1.21    Ca    8.9      2022 05:40  Phos  3.6     -  Mg     2.1     -    TPro  5.6<L>  /  Alb  2.0<L>  /  TBili  0.3  /  DBili  x   /  AST  23  /  ALT  34  /  AlkPhos  68        Urinalysis Basic - ( 2022 10:37 )    Color: Yellow / Appearance: Slightly Turbid / S.020 / pH: x  Gluc: x / Ketone: Negative  / Bili: Negative / Urobili: Negative   Blood: x / Protein: 30 mg/dL / Nitrite: Positive   Leuk Esterase: Moderate / RBC: 2-5 /HPF / WBC >50 /HPF   Sq Epi: x / Non Sq Epi: Moderate /HPF / Bacteria: Many /HPF      CAPILLARY BLOOD GLUCOSE    RADIOLOGY & ADDITIONAL TESTS:    Imaging  Reviewed:  YES    Consultant(s) Notes Reviewed:   YES      Plan of care was discussed with patient and /or primary care giver; all questions and concerns were addressed  NP Note discussed with  Primary Attending    INTERVAL HPI/OVERNIGHT EVENTS: seen at bedside, noted low grade temp 100.5F last night, afebrile this morning. Eats breakfast very well.     MEDICATIONS  (STANDING):  apixaban 5 milliGRAM(s) Oral every 12 hours  aspirin  chewable 81 milliGRAM(s) Oral daily  atorvastatin 40 milliGRAM(s) Oral at bedtime  bisacodyl 5 milliGRAM(s) Oral at bedtime  chlorhexidine 2% Cloths 1 Application(s) Topical daily  levETIRAcetam 750 milliGRAM(s) Oral two times a day  meropenem  IVPB      meropenem  IVPB 1000 milliGRAM(s) IV Intermittent every 8 hours  metoprolol tartrate 25 milliGRAM(s) Oral every 12 hours  mupirocin 2% Ointment 1 Application(s) Both Nostrils two times a day  nystatin Powder 1 Application(s) Topical three times a day  sodium chloride 0.9%. 1000 milliLiter(s) (100 mL/Hr) IV Continuous <Continuous>    MEDICATIONS  (PRN):  acetaminophen     Tablet .. 650 milliGRAM(s) Oral every 6 hours PRN Temp greater or equal to 38C (100.4F)  melatonin 3 milliGRAM(s) Oral at bedtime PRN Insomnia      __________________________________________________  REVIEW OF SYSTEMS:  unable to access due to impaired cognition, only answers simple y/n questions, denies chest pain, abdominal pain.         Vital Signs Last 24 Hrs  T(C): 36.8 (2022 04:44), Max: 38.1 (2022 22:08)  T(F): 98.3 (2022 04:44), Max: 100.5 (2022 22:08)  HR: 108 (2022 04:44) (97 - 118)  BP: 121/76 (2022 04:44) (105/69 - 129/69)  BP(mean): --  RR: 18 (2022 04:44) (18 - 20)  SpO2: 99% (2022 04:44) (99% - 100%)    Parameters below as of 2022 04:44  Patient On (Oxygen Delivery Method): nasal cannula  O2 Flow (L/min): 2    ________________________________________________  PHYSICAL EXAM:  GENERAL: NAD, obese, more alert and tolerates po diet.   HEENT: Normocephalic;  conjunctivae and sclerae clear; moist mucous membranes;   NECK : supple  CHEST/LUNG:   fair air entry poor expiratory effort. no wheezing or rhonchi.   HEART: S1 S2  regular; no murmurs, gallops or rubs  ABDOMEN: Soft, Nontender, Nondistended; Bowel sounds present  EXTREMITIES: no cyanosis; no edema; no calf tenderness  NERVOUS SYSTEM:  Awake and alert; Oriented  to self with periods of confusion, right side weakness from CVA.   SKIN: warm and dry; no rash   see wound care notes : multiple PI, stage 1 PI right hip, left heel, non blanchable erythema. DTI coccyx wiht epidermal separation, + blanchable erythema, Left hip stage 4 PI with pink tissue, purulent drainage. periwound erythema, undermining ( up to 0.6cm at 360 degrees)    _________________________________________________  LABS:                        11.1   15.62 )-----------( 142      ( 2022 05:40 )             36.8         144  |  115<H>  |  37<H>  ----------------------------<  186<H>  4.3   |  21<L>  |  1.21    Ca    8.9      2022 05:40  Phos  3.6       Mg     2.1         TPro  5.6<L>  /  Alb  2.0<L>  /  TBili  0.3  /  DBili  x   /  AST  23  /  ALT  34  /  AlkPhos  68        Urinalysis Basic - ( 2022 10:37 )    Color: Yellow / Appearance: Slightly Turbid / S.020 / pH: x  Gluc: x / Ketone: Negative  / Bili: Negative / Urobili: Negative   Blood: x / Protein: 30 mg/dL / Nitrite: Positive   Leuk Esterase: Moderate / RBC: 2-5 /HPF / WBC >50 /HPF   Sq Epi: x / Non Sq Epi: Moderate /HPF / Bacteria: Many /HPF      CAPILLARY BLOOD GLUCOSE    RADIOLOGY & ADDITIONAL TESTS:    Imaging  Reviewed:  YES  < from: CT Abdomen and Pelvis No Cont (22 @ 09:57) >  IMPRESSION:  Rectal fecal impaction with associated uncomplicated stercoral proctitis.  Cholelithiasis with markedly distended gallbladder. Correlate with right   upper quadrant ultrasound.  Nonobstructive left nephrolithiasis.    < end of copied text >    < from: Xray Chest 1 View-PORTABLE IMMEDIATE (22 @ 07:51) >  IMPRESSION: No evidence for focal infiltrate or lobar consolidation.    < end of copied text >    Consultant(s) Notes Reviewed:   YES      Plan of care was discussed with Raúl Churchill over the phone  [phone]; all questions and concerns were addressed and discussed.

## 2022-07-28 NOTE — DISCHARGE NOTE PROVIDER - NSDCMRMEDTOKEN_GEN_ALL_CORE_FT
aspirin 81 mg oral tablet:   ATENOLOL 100MG TABLETS: TAKE 1 TABLET BY MOUTH EVERY DAY  atorvastatin 40 mg oral tablet: 1 tab(s) orally once a day (at bedtime)  Eliquis 5 mg oral tablet: 1 tab(s) orally 2 times a day   levETIRAcetam 750 mg oral tablet: 1 tab(s) orally 2 times a day   ascorbic acid 500 mg oral tablet: 1 tab(s) orally once a day  aspirin 81 mg oral tablet: 1 tab(s) orally once a day   ATENOLOL 100MG TABLETS: TAKE 1 TABLET BY MOUTH EVERY DAY  atorvastatin 40 mg oral tablet: 1 tab(s) orally once a day (at bedtime)  bisacodyl 5 mg oral delayed release tablet: 1 tab(s) orally once a day (at bedtime)  Eliquis 5 mg oral tablet: 1 tab(s) orally 2 times a day   levETIRAcetam 750 mg oral tablet: 1 tab(s) orally 2 times a day  Multiple Vitamins oral tablet: 1 tab(s) orally once a day  nystatin 100,000 units/g topical powder: 1 application topically 3 times a day  polyethylene glycol 3350 oral powder for reconstitution: 17 gram(s) orally 2 times a day  senna leaf extract oral tablet: 2 tab(s) orally once a day (at bedtime)  tamsulosin 0.4 mg oral capsule: 1 cap(s) orally once a day (at bedtime)  zinc sulfate 220 mg oral capsule: 1 cap(s) orally once a day   ascorbic acid 500 mg oral tablet: 1 tab(s) orally once a day  aspirin 81 mg oral tablet: 1 tab(s) orally once a day   ATENOLOL 100MG TABLETS: TAKE 1 TABLET BY MOUTH EVERY DAY  atorvastatin 40 mg oral tablet: 1 tab(s) orally once a day (at bedtime)  Eliquis 5 mg oral tablet: 1 tab(s) orally 2 times a day   levETIRAcetam 750 mg oral tablet: 1 tab(s) orally 2 times a day  Multiple Vitamins oral tablet: 1 tab(s) orally once a day  nystatin 100,000 units/g topical powder: 1 application topically 3 times a day  polyethylene glycol 3350 oral powder for reconstitution: 17 gram(s) orally 2 times a day  senna leaf extract oral tablet: 2 tab(s) orally once a day (at bedtime)  tamsulosin 0.4 mg oral capsule: 1 cap(s) orally once a day (at bedtime)  zinc sulfate 220 mg oral capsule: 1 cap(s) orally once a day

## 2022-07-28 NOTE — PROGRESS NOTE ADULT - ATTENDING COMMENTS
Chart reviewed and pt re-evaluated at the bedside with the resident on ID. Hx, PE, assessment and plan discussed in detail. Pt adm with sepsis due to ESBL E. coli. Pt to cont. Rx with a carbapenem for now.

## 2022-07-29 LAB
-  AMIKACIN: SIGNIFICANT CHANGE UP
-  AMIKACIN: SIGNIFICANT CHANGE UP
-  AMOXICILLIN/CLAVULANIC ACID: SIGNIFICANT CHANGE UP
-  AMPICILLIN/SULBACTAM: SIGNIFICANT CHANGE UP
-  AMPICILLIN/SULBACTAM: SIGNIFICANT CHANGE UP
-  AMPICILLIN: SIGNIFICANT CHANGE UP
-  AMPICILLIN: SIGNIFICANT CHANGE UP
-  AZTREONAM: SIGNIFICANT CHANGE UP
-  AZTREONAM: SIGNIFICANT CHANGE UP
-  CEFAZOLIN: SIGNIFICANT CHANGE UP
-  CEFAZOLIN: SIGNIFICANT CHANGE UP
-  CEFEPIME: SIGNIFICANT CHANGE UP
-  CEFEPIME: SIGNIFICANT CHANGE UP
-  CEFOXITIN: SIGNIFICANT CHANGE UP
-  CEFTRIAXONE: SIGNIFICANT CHANGE UP
-  CEFTRIAXONE: SIGNIFICANT CHANGE UP
-  CIPROFLOXACIN: SIGNIFICANT CHANGE UP
-  CIPROFLOXACIN: SIGNIFICANT CHANGE UP
-  ERTAPENEM: SIGNIFICANT CHANGE UP
-  ERTAPENEM: SIGNIFICANT CHANGE UP
-  GENTAMICIN: SIGNIFICANT CHANGE UP
-  GENTAMICIN: SIGNIFICANT CHANGE UP
-  IMIPENEM: SIGNIFICANT CHANGE UP
-  IMIPENEM: SIGNIFICANT CHANGE UP
-  LEVOFLOXACIN: SIGNIFICANT CHANGE UP
-  LEVOFLOXACIN: SIGNIFICANT CHANGE UP
-  MEROPENEM: SIGNIFICANT CHANGE UP
-  MEROPENEM: SIGNIFICANT CHANGE UP
-  NITROFURANTOIN: SIGNIFICANT CHANGE UP
-  PIPERACILLIN/TAZOBACTAM: SIGNIFICANT CHANGE UP
-  PIPERACILLIN/TAZOBACTAM: SIGNIFICANT CHANGE UP
-  TIGECYCLINE: SIGNIFICANT CHANGE UP
-  TOBRAMYCIN: SIGNIFICANT CHANGE UP
-  TOBRAMYCIN: SIGNIFICANT CHANGE UP
-  TRIMETHOPRIM/SULFAMETHOXAZOLE: SIGNIFICANT CHANGE UP
-  TRIMETHOPRIM/SULFAMETHOXAZOLE: SIGNIFICANT CHANGE UP
ALBUMIN SERPL ELPH-MCNC: 1.9 G/DL — LOW (ref 3.5–5)
ALP SERPL-CCNC: 95 U/L — SIGNIFICANT CHANGE UP (ref 40–120)
ALT FLD-CCNC: 44 U/L DA — SIGNIFICANT CHANGE UP (ref 10–60)
ANION GAP SERPL CALC-SCNC: 8 MMOL/L — SIGNIFICANT CHANGE UP (ref 5–17)
AST SERPL-CCNC: 37 U/L — SIGNIFICANT CHANGE UP (ref 10–40)
BASOPHILS # BLD AUTO: 0.03 K/UL — SIGNIFICANT CHANGE UP (ref 0–0.2)
BASOPHILS NFR BLD AUTO: 0.3 % — SIGNIFICANT CHANGE UP (ref 0–2)
BILIRUB SERPL-MCNC: 0.4 MG/DL — SIGNIFICANT CHANGE UP (ref 0.2–1.2)
BUN SERPL-MCNC: 24 MG/DL — HIGH (ref 7–18)
CALCIUM SERPL-MCNC: 8.8 MG/DL — SIGNIFICANT CHANGE UP (ref 8.4–10.5)
CHLORIDE SERPL-SCNC: 113 MMOL/L — HIGH (ref 96–108)
CO2 SERPL-SCNC: 20 MMOL/L — LOW (ref 22–31)
CREAT SERPL-MCNC: 0.8 MG/DL — SIGNIFICANT CHANGE UP (ref 0.5–1.3)
CULTURE RESULTS: SIGNIFICANT CHANGE UP
EGFR: 77 ML/MIN/1.73M2 — SIGNIFICANT CHANGE UP
EOSINOPHIL # BLD AUTO: 0.12 K/UL — SIGNIFICANT CHANGE UP (ref 0–0.5)
EOSINOPHIL NFR BLD AUTO: 1.3 % — SIGNIFICANT CHANGE UP (ref 0–6)
GLUCOSE SERPL-MCNC: 145 MG/DL — HIGH (ref 70–99)
HCT VFR BLD CALC: 37.9 % — SIGNIFICANT CHANGE UP (ref 34.5–45)
HGB BLD-MCNC: 11.7 G/DL — SIGNIFICANT CHANGE UP (ref 11.5–15.5)
IMM GRANULOCYTES NFR BLD AUTO: 0.3 % — SIGNIFICANT CHANGE UP (ref 0–1.5)
LYMPHOCYTES # BLD AUTO: 0.86 K/UL — LOW (ref 1–3.3)
LYMPHOCYTES # BLD AUTO: 9.2 % — LOW (ref 13–44)
MCHC RBC-ENTMCNC: 29.5 PG — SIGNIFICANT CHANGE UP (ref 27–34)
MCHC RBC-ENTMCNC: 30.9 GM/DL — LOW (ref 32–36)
MCV RBC AUTO: 95.7 FL — SIGNIFICANT CHANGE UP (ref 80–100)
METHOD TYPE: SIGNIFICANT CHANGE UP
METHOD TYPE: SIGNIFICANT CHANGE UP
MONOCYTES # BLD AUTO: 0.91 K/UL — HIGH (ref 0–0.9)
MONOCYTES NFR BLD AUTO: 9.8 % — SIGNIFICANT CHANGE UP (ref 2–14)
NEUTROPHILS # BLD AUTO: 7.37 K/UL — SIGNIFICANT CHANGE UP (ref 1.8–7.4)
NEUTROPHILS NFR BLD AUTO: 79.1 % — HIGH (ref 43–77)
NRBC # BLD: 0 /100 WBCS — SIGNIFICANT CHANGE UP (ref 0–0)
ORGANISM # SPEC MICROSCOPIC CNT: SIGNIFICANT CHANGE UP
PLATELET # BLD AUTO: 103 K/UL — LOW (ref 150–400)
POTASSIUM SERPL-MCNC: 4.2 MMOL/L — SIGNIFICANT CHANGE UP (ref 3.5–5.3)
POTASSIUM SERPL-SCNC: 4.2 MMOL/L — SIGNIFICANT CHANGE UP (ref 3.5–5.3)
PROT SERPL-MCNC: 5.6 G/DL — LOW (ref 6–8.3)
RBC # BLD: 3.96 M/UL — SIGNIFICANT CHANGE UP (ref 3.8–5.2)
RBC # FLD: 14.6 % — HIGH (ref 10.3–14.5)
SODIUM SERPL-SCNC: 141 MMOL/L — SIGNIFICANT CHANGE UP (ref 135–145)
SPECIMEN SOURCE: SIGNIFICANT CHANGE UP
WBC # BLD: 9.32 K/UL — SIGNIFICANT CHANGE UP (ref 3.8–10.5)
WBC # FLD AUTO: 9.32 K/UL — SIGNIFICANT CHANGE UP (ref 3.8–10.5)

## 2022-07-29 PROCEDURE — 99232 SBSQ HOSP IP/OBS MODERATE 35: CPT | Mod: GC

## 2022-07-29 PROCEDURE — 99233 SBSQ HOSP IP/OBS HIGH 50: CPT

## 2022-07-29 RX ADMIN — ERTAPENEM SODIUM 120 MILLIGRAM(S): 1 INJECTION, POWDER, LYOPHILIZED, FOR SOLUTION INTRAMUSCULAR; INTRAVENOUS at 11:03

## 2022-07-29 RX ADMIN — MUPIROCIN 1 APPLICATION(S): 20 OINTMENT TOPICAL at 17:35

## 2022-07-29 RX ADMIN — APIXABAN 5 MILLIGRAM(S): 2.5 TABLET, FILM COATED ORAL at 05:11

## 2022-07-29 RX ADMIN — Medication 25 MILLIGRAM(S): at 17:35

## 2022-07-29 RX ADMIN — MUPIROCIN 1 APPLICATION(S): 20 OINTMENT TOPICAL at 05:12

## 2022-07-29 RX ADMIN — CHLORHEXIDINE GLUCONATE 1 APPLICATION(S): 213 SOLUTION TOPICAL at 11:02

## 2022-07-29 RX ADMIN — LEVETIRACETAM 750 MILLIGRAM(S): 250 TABLET, FILM COATED ORAL at 05:11

## 2022-07-29 RX ADMIN — Medication 5 MILLIGRAM(S): at 22:09

## 2022-07-29 RX ADMIN — ATORVASTATIN CALCIUM 40 MILLIGRAM(S): 80 TABLET, FILM COATED ORAL at 22:09

## 2022-07-29 RX ADMIN — LEVETIRACETAM 750 MILLIGRAM(S): 250 TABLET, FILM COATED ORAL at 17:35

## 2022-07-29 RX ADMIN — ZINC SULFATE TAB 220 MG (50 MG ZINC EQUIVALENT) 220 MILLIGRAM(S): 220 (50 ZN) TAB at 11:03

## 2022-07-29 RX ADMIN — Medication 81 MILLIGRAM(S): at 11:03

## 2022-07-29 RX ADMIN — APIXABAN 5 MILLIGRAM(S): 2.5 TABLET, FILM COATED ORAL at 17:35

## 2022-07-29 RX ADMIN — Medication 1 TABLET(S): at 11:03

## 2022-07-29 RX ADMIN — NYSTATIN CREAM 1 APPLICATION(S): 100000 CREAM TOPICAL at 05:12

## 2022-07-29 RX ADMIN — NYSTATIN CREAM 1 APPLICATION(S): 100000 CREAM TOPICAL at 22:09

## 2022-07-29 RX ADMIN — NYSTATIN CREAM 1 APPLICATION(S): 100000 CREAM TOPICAL at 14:01

## 2022-07-29 RX ADMIN — Medication 25 MILLIGRAM(S): at 05:11

## 2022-07-29 RX ADMIN — Medication 500 MILLIGRAM(S): at 11:03

## 2022-07-29 NOTE — PROGRESS NOTE ADULT - PROBLEM SELECTOR PLAN 4
-baseline around 1.2 , no hx of CKD comes in with sepsis 2/2 UTI  -DEJA found on labs with Cr of 1.38  -Likely pre renal with BUN/ Cr ratio >20  -Obstructive unlikely as the left renal stone is non obstructing  -s/p bolus, on IVF  -DEJA resolved.   -monitor BMP

## 2022-07-29 NOTE — PROGRESS NOTE ADULT - PROBLEM SELECTOR PLAN 1
Due to sepsis in the setting of ESBL E coli bacteremia, UTI.   hx dementia AOx 1 at baseline.    CTH -ve for acute changes  - s/p 2L bolus, maintenance IVF  - mental status also now improved and back to baseline  -SLP reccs: puree diet with mild thick per reccs. family refuses, on soft diet now.   -c/w abx per ID- Currently on Ertapenem   -ID Dr. Khan consulted. f/u repeat Bcx

## 2022-07-29 NOTE — PROGRESS NOTE ADULT - PROBLEM SELECTOR PLAN 3
-Bcx grew ESBL Ecoli from 7/26  -Ucx Ecoli  -d/c'd CTX  -s/p Meropenem 3 doses  -changed to Ertapenem on (7/28) 1g Q 24h x 7 days per ID.   -ID Dr. Khan  -repeat blood culture from 7/28 in lab  -ID will decide duration of ABT from second Bcx result

## 2022-07-29 NOTE — PROGRESS NOTE ADULT - SUBJECTIVE AND OBJECTIVE BOX
Subjective and Objective: patient was seen and examined at bedside. She looks comfortable not in any distress, WBC count normalized, repeat Bcx testing.       MEDS  acetaminophen     Tablet .. 650 milliGRAM(s) Oral every 6 hours PRN  apixaban 5 milliGRAM(s) Oral every 12 hours  ascorbic acid 500 milliGRAM(s) Oral daily  aspirin  chewable 81 milliGRAM(s) Oral daily  atorvastatin 40 milliGRAM(s) Oral at bedtime  bisacodyl 5 milliGRAM(s) Oral at bedtime  chlorhexidine 2% Cloths 1 Application(s) Topical daily  ertapenem  IVPB      ertapenem  IVPB 1000 milliGRAM(s) IV Intermittent every 24 hours  levETIRAcetam 750 milliGRAM(s) Oral two times a day  melatonin 3 milliGRAM(s) Oral at bedtime PRN  metoprolol tartrate 25 milliGRAM(s) Oral every 12 hours  multivitamin 1 Tablet(s) Oral daily  mupirocin 2% Ointment 1 Application(s) Both Nostrils two times a day  nystatin Powder 1 Application(s) Topical three times a day  sodium chloride 0.9%. 1000 milliLiter(s) IV Continuous <Continuous>  zinc sulfate 220 milliGRAM(s) Oral daily      PHYSICAL EXAM:    Vital Signs Last 24 Hrs  T(C): 36.6 (29 Jul 2022 04:47), Max: 37.6 (28 Jul 2022 12:57)  T(F): 97.9 (29 Jul 2022 04:47), Max: 99.7 (28 Jul 2022 12:57)  HR: 107 (29 Jul 2022 04:47) (76 - 114)  BP: 122/78 (29 Jul 2022 04:47) (103/77 - 122/78)  BP(mean): --  RR: 19 (29 Jul 2022 04:47) (18 - 20)  SpO2: 100% (29 Jul 2022 04:47) (98% - 100%)    Parameters below as of 29 Jul 2022 04:47  Patient On (Oxygen Delivery Method): nasal cannula        GENERAL: WD obese W female in NAD, on 2L NC  HEAD:  Atraumatic, Normocephalic  EYES:  conjunctivae and sclerae clear  MOUTH: partially edentulous; poor oral hygiene  NECK: Supple, No JVD,   CHEST/LUNG: Clear to auscultation ant.  HEART: irregular rhythm; tachycardic, No murmurs, rubs, or gallops  ABDOMEN: Soft, Nontender, Nondistended; Bowel sounds present; mild to mod tenderness around umbilical hernia   NERVOUS SYSTEM:  Alert & Oriented X1 (place), no facial droop, follows commands  EXTREMITIES:  2+ Peripheral Pulses, No clubbing, cyanosis; 1+ LE and bilat hand edema; + LE tenderness to palpation, contracted LE and contracted RUE  : voiding well; prima fit in place  SKIN: warm, dry, no skin rashes or breakdown    LABS/DIAGNOSTIC TESTS                            11.7   9.32  )-----------( 103      ( 29 Jul 2022 06:00 )             37.9       WBC Count: 9.32 K/uL (07-29 @ 06:00)  WBC Count: 9.47 K/uL (07-28 @ 08:35)  WBC Count: 15.62 K/uL (07-27 @ 05:40)      07-29    141  |  113<H>  |  24<H>  ----------------------------<  145<H>  4.2   |  20<L>  |  0.80    Ca    8.8      29 Jul 2022 06:00    TPro  5.6<L>  /  Alb  1.9<L>  /  TBili  0.4  /  DBili  x   /  AST  37  /  ALT  44  /  AlkPhos  95  07-29      CULTURES      Culture - Urine (collected 07-26-22 @ 10:37)  Source: Clean Catch Clean Catch (Midstream)  Preliminary Report (07-28-22 @ 07:45):    >100,000 CFU/ml Escherichia coli    Culture - Blood (collected 07-26-22 @ 07:21)  Source: .Blood Blood-Peripheral  Gram Stain (07-27-22 @ 05:28):    Growth in aerobic and anaerobic bottles: Gram Negative Rods  Final Report (07-29-22 @ 09:12):    Growth in aerobic and anaerobic bottles: Escherichia coli ESBL    ***Blood Panel PCR results on this specimen are available    approximately 3 hours after the Gram stain result.***    Gram stain, PCR, and/or culture results may not always    correspond dueto difference in methodologies.    ************************************************************    This PCR assay was performed by multiplex PCR. This    Assay tests for 66 bacterial and resistance gene targets.    Please refer to the [facility] test directory    at [email or web address] for details.  Organism: Blood Culture PCR  Escherichia coli ESBL (07-29-22 @ 09:12)  Organism: Escherichia coli ESBL (07-29-22 @ 09:12)      -  Amikacin: S <=16      -  Ampicillin: R >16 These ampicillin results predict results for amoxicillin      -  Ampicillin/Sulbactam: R 16/8 Enterobacter, Klebsiella aerogenes, Citrobacter, and Serratia may develop resistance during prolonged therapy (3-4 days)      -  Aztreonam: R >16      -  Cefazolin: R >16 Enterobacter, Klebsiella aerogenes, Citrobacter, and Serratia may develop resistance during prolonged therapy (3-4 days)      -  Cefepime: R >16      -  Cefoxitin: S <=8      -  Ceftriaxone: R >32 Enterobacter, Klebsiella aerogenes, Citrobacter, and Serratia may develop resistance during prolonged therapy      -  Ciprofloxacin: R >2      -  Ertapenem: S <=0.5      -  Gentamicin: S <=2      -  Imipenem: S <=1      -  Levofloxacin: R >4      -  Meropenem: S <=1      -  Piperacillin/Tazobactam: R <=8      -  Tobramycin: I 8      -  Trimethoprim/Sulfamethoxazole: S <=0.5/9.5      Method Type: BRIGHT  Organism: Blood Culture PCR (07-29-22 @ 09:12)      -  CTX-M Resistance Marker: Detec      -  ESBL: Detec      -  Escherichia coli: Detec      Method Type: PCR    Culture - Blood (collected 07-26-22 @ 07:14)  Source: .Blood Blood-Peripheral  Gram Stain (07-27-22 @ 08:26):    Growth in anaerobic bottle: Gram Negative Rods    Growth in aerobic bottle: Gram Negative Rods  Final Report (07-29-22 @ 09:15):    Growth in aerobic and anaerobic bottles: Escherichia coli ESBL    See previous culture [id number]                   Subjective and Objective: patient was seen and examined at bedside. She looks comfortable --not in any distress. Patient's AIDE at bedside confirms pt is at baseline. WBC count normalized, repeat Bcx testing.       MEDS  acetaminophen     Tablet .. 650 milliGRAM(s) Oral every 6 hours PRN  apixaban 5 milliGRAM(s) Oral every 12 hours  ascorbic acid 500 milliGRAM(s) Oral daily  aspirin  chewable 81 milliGRAM(s) Oral daily  atorvastatin 40 milliGRAM(s) Oral at bedtime  bisacodyl 5 milliGRAM(s) Oral at bedtime  chlorhexidine 2% Cloths 1 Application(s) Topical daily  ertapenem  IVPB (D2)     ertapenem  IVPB 1000 milliGRAM(s) IV Intermittent every 24 hours  levETIRAcetam 750 milliGRAM(s) Oral two times a day  melatonin 3 milliGRAM(s) Oral at bedtime PRN  metoprolol tartrate 25 milliGRAM(s) Oral every 12 hours  multivitamin 1 Tablet(s) Oral daily  mupirocin 2% Ointment 1 Application(s) Both Nostrils two times a day  nystatin Powder 1 Application(s) Topical three times a day  sodium chloride 0.9%. 1000 milliLiter(s) IV Continuous <Continuous>  zinc sulfate 220 milliGRAM(s) Oral daily  Meropenem 7/27      PHYSICAL EXAM:    Vital Signs Last 24 Hrs  T(C): 36.6 (29 Jul 2022 04:47), Max: 37.6 (28 Jul 2022 12:57)  T(F): 97.9 (29 Jul 2022 04:47), Max: 99.7 (28 Jul 2022 12:57)  HR: 107 (29 Jul 2022 04:47) (76 - 114)  BP: 122/78 (29 Jul 2022 04:47) (103/77 - 122/78)  BP(mean): --  RR: 19 (29 Jul 2022 04:47) (18 - 20)  SpO2: 100% (29 Jul 2022 04:47) (98% - 100%)    Parameters below as of 29 Jul 2022 04:47  Patient On (Oxygen Delivery Method): nasal cannula      GENERAL: WD obese W female in NAD, on 2L NC  HEAD:  Atraumatic, Normocephalic  EYES:  conjunctivae and sclerae clear  MOUTH: partially edentulous; poor oral hygiene  NECK: Supple, No JVD,   CHEST/LUNG: Clear to auscultation ant.  HEART: irregular rhythm; tachycardic, No murmurs, rubs, or gallops  ABDOMEN: Soft, Nontender, Nondistended; Bowel sounds present; mild to mod tenderness around umbilical hernia   NERVOUS SYSTEM:  Alert & Oriented X1 (place)  EXTREMITIES:  2+ Peripheral Pulses, No clubbing, cyanosis; 1+ LE and bilat hand edema; + LE tenderness to palpation, contracted LE and contracted RUE  : voiding well; prima fit in place  SKIN: warm, dry, no skin rashes or breakdown      LABS/DIAGNOSTIC TESTS                        11.7   9.32  )-----------( 103      ( 29 Jul 2022 06:00 )             37.9       WBC Count: 9.32 K/uL (07-29 @ 06:00)  WBC Count: 9.47 K/uL (07-28 @ 08:35)  WBC Count: 15.62 K/uL (07-27 @ 05:40)      07-29    141  |  113<H>  |  24<H>  ----------------------------<  145<H>  4.2   |  20<L>  |  0.80    Ca    8.8      29 Jul 2022 06:00    TPro  5.6<L>  /  Alb  1.9<L>  /  TBili  0.4  /  DBili  x   /  AST  37  /  ALT  44  /  AlkPhos  95  07-29      CULTURES      Culture - Urine (collected 07-26-22 @ 10:37)  Source: Clean Catch Clean Catch (Midstream)  Preliminary Report (07-28-22 @ 07:45):    >100,000 CFU/ml Escherichia coli    Culture - Blood (collected 07-26-22 @ 07:21)  Source: .Blood Blood-Peripheral  Gram Stain (07-27-22 @ 05:28):    Growth in aerobic and anaerobic bottles: Gram Negative Rods  Final Report (07-29-22 @ 09:12):    Growth in aerobic and anaerobic bottles: Escherichia coli ESBL    ***Blood Panel PCR results on this specimen are available    approximately 3 hours after the Gram stain result.***    Gram stain, PCR, and/or culture results may not always    correspond dueto difference in methodologies.    ************************************************************    This PCR assay was performed by multiplex PCR. This    Assay tests for 66 bacterial and resistance gene targets.    Please refer to the [facility] Labs test directory    at [email or web address] for details.  Organism: Blood Culture PCR  Escherichia coli ESBL (07-29-22 @ 09:12)  Organism: Escherichia coli ESBL (07-29-22 @ 09:12)      -  Amikacin: S <=16      -  Ampicillin: R >16 These ampicillin results predict results for amoxicillin      -  Ampicillin/Sulbactam: R 16/8 Enterobacter, Klebsiella aerogenes, Citrobacter, and Serratia may develop resistance during prolonged therapy (3-4 days)      -  Aztreonam: R >16      -  Cefazolin: R >16 Enterobacter, Klebsiella aerogenes, Citrobacter, and Serratia may develop resistance during prolonged therapy (3-4 days)      -  Cefepime: R >16      -  Cefoxitin: S <=8      -  Ceftriaxone: R >32 Enterobacter, Klebsiella aerogenes, Citrobacter, and Serratia may develop resistance during prolonged therapy      -  Ciprofloxacin: R >2      -  Ertapenem: S <=0.5      -  Gentamicin: S <=2      -  Imipenem: S <=1      -  Levofloxacin: R >4      -  Meropenem: S <=1      -  Piperacillin/Tazobactam: R <=8      -  Tobramycin: I 8      -  Trimethoprim/Sulfamethoxazole: S <=0.5/9.5      Method Type: BRIGHT  Organism: Blood Culture PCR (07-29-22 @ 09:12)      -  CTX-M Resistance Marker: Detec      -  ESBL: Detec      -  Escherichia coli: Detec      Method Type: PCR    Culture - Blood (collected 07-26-22 @ 07:14)  Source: .Blood Blood-Peripheral  Gram Stain (07-27-22 @ 08:26):    Growth in anaerobic bottle: Gram Negative Rods    Growth in aerobic bottle: Gram Negative Rods  Final Report (07-29-22 @ 09:15):    Growth in aerobic and anaerobic bottles: Escherichia coli ESBL    See previous culture [id number]

## 2022-07-29 NOTE — PROGRESS NOTE ADULT - PROBLEM SELECTOR PLAN 6
-Hx of CVA and has ICA stenosis  -hx dementia, AOx1  -PCM, low albumin    -can c/w aspirin and statin  -SLP evaluation, reccs puree diet/mild thick liquid, but son refuses, requesting soft bite size, risk of aspiration discussed. verbalized understanding, but still he wants soft diet. SLP made aware.

## 2022-07-29 NOTE — PROGRESS NOTE ADULT - ATTENDING COMMENTS
Chart reviewed and patient re-evaluated at the bedside with the resident on ID. Hx, PE, assessment and plan as above. Pt admitted with sepsis due to ESBL E. coli. Complete 7d of IV Ab Rx with carbapenem.

## 2022-07-29 NOTE — PROGRESS NOTE ADULT - ASSESSMENT
77 y/o female with PMH of HTN, A fib, constipation, CVA with residual right sided weakness, dementia, b/L ICA stenosis, recurrent UTIs admitted for sepsis due to UTI. BCs growing ESBL E. coli. Patient has had recurrent UTIs treated in the past with Ciprofloxacin that predisposes her to having a resistant organism. Pt with reported h/o PCN allergy. She had no AE to CTX. Patient is s/p Meropenem 7/27 and Ertapenem 7/28 with appropriate response     # Sepsis due to UTI with ESBL E. coli-- see above  -- C/w Ertapenem 1 gram IV every 24 hrs to complete 7 days of treatment from 7/27  --- Monitor mental status   --- F/U repeat Bcx     # PCN allergy-- see above      Discussed above with medicine team, including Dr Lopez.  77 y/o female with PMH of HTN, A fib, constipation, CVA with residual right sided weakness, dementia, b/L ICA stenosis, recurrent UTIs admitted for sepsis due to UTI. BCs growing ESBL E. coli. Patient has had recurrent UTIs treated in the past with Ciprofloxacin that predisposes her to having a resistant organism. Pt with reported h/o PCN allergy. She had no AE to initial Ab Rx with CTX. Patient is s/p Meropenem 7/27; Ertapenem substituted 7/28 for ease of administration and continued coverage for the BC organism. Pt improved during course in hospital.     # Sepsis due to UTI with ESBL E. coli-- see above  -- C/w Ertapenem 1 gram IV every 24 hrs to complete 7 days of carbapenem treatment    --- F/U repeat Bcx     # PCN allergy-- see above      Discussed above with medicine team, including Dr Lopez. Please call again if needed.

## 2022-07-29 NOTE — PROGRESS NOTE ADULT - PROBLEM SELECTOR PLAN 7
-wound care RN following for PI, DTI  -There is an intact DTI to the R. Heel without drainage  -There is a Stage 1 Pressure Injury to the R. Hip and L. Heel, as evident by non-blanchable erythema  -There is a DTI to the Coccyx with epidermal separation, red tissue, drainage, and surrounding tissue blanchable erythema  -There is a L. Hip Stage 4 Pressure Injury with pink tissue, purulent drainage, periwound erythema, and undermining (up to 0.6cm at 360 degrees)    -continue topical wound care, off load pressures/reposition q2h as wound care reccs.  -started MV, zinc, Vt C for wound healing

## 2022-07-29 NOTE — PROGRESS NOTE ADULT - PROBLEM SELECTOR PLAN 12
-from home  -f/u repeat culture sent 7/28, c/w Ertapenem x 7 days per ID if Repeat Bcx is negative.  -son updated treatment plan and agreeable. All questions answered.

## 2022-07-29 NOTE — PROGRESS NOTE ADULT - ASSESSMENT
Patient is a 76 Y  O from home with PMH of HTN, A fib on eliquis and atenolol, CVA 20 y ago with residual right sided weakness and is now non verbal and bed bound, b/L ICA stenosis, Recurrent UTI up to 5x a year, who comes in due to 24h hx of altered mental status from baseline, associated with fever and concerns for dysphagia, found Febrile T max 102.5, , /78,  Lactate 3.7, UA +Ve, and white count of 14, CT abdomen pelvis showed distended gallbladder with stones, stool impaction and stercoral colitis, non obstructing left nephrolithiasis, RUQ- no cholecystitis, hence admitted for management of metabolic encephalopathy, 2/2 sepsis 2/2 ESBL Ecoli Bacteremia, UTI. ID consulted. Started on Meropenem, now changed to Ertapenem per ID.   Repeat blood culture from 7/28 in lab.    Case discussed with pt's son Sim Redman and all questions answered.

## 2022-07-29 NOTE — PROGRESS NOTE ADULT - SUBJECTIVE AND OBJECTIVE BOX
NP Note discussed with  Primary Attending    Patient is a 76y old Female who presents with a chief complaint of Altered mental status (29 Jul 2022 09:30AM)      INTERVAL HPI/OVERNIGHT EVENTS: no new complaints-evaluated at bedside, labs and VS reviewed, afebrile.     MEDICATIONS  (STANDING):  apixaban 5 milliGRAM(s) Oral every 12 hours  ascorbic acid 500 milliGRAM(s) Oral daily  aspirin  chewable 81 milliGRAM(s) Oral daily  atorvastatin 40 milliGRAM(s) Oral at bedtime  bisacodyl 5 milliGRAM(s) Oral at bedtime  chlorhexidine 2% Cloths 1 Application(s) Topical daily  ertapenem  IVPB      ertapenem  IVPB 1000 milliGRAM(s) IV Intermittent every 24 hours  levETIRAcetam 750 milliGRAM(s) Oral two times a day  metoprolol tartrate 25 milliGRAM(s) Oral every 12 hours  multivitamin 1 Tablet(s) Oral daily  mupirocin 2% Ointment 1 Application(s) Both Nostrils two times a day  nystatin Powder 1 Application(s) Topical three times a day  sodium chloride 0.9%. 1000 milliLiter(s) (75 mL/Hr) IV Continuous <Continuous>  zinc sulfate 220 milliGRAM(s) Oral daily    MEDICATIONS  (PRN):  acetaminophen     Tablet .. 650 milliGRAM(s) Oral every 6 hours PRN Temp greater or equal to 38C (100.4F)  melatonin 3 milliGRAM(s) Oral at bedtime PRN Insomnia      __________________________________________________  REVIEW OF SYSTEMS:    CONSTITUTIONAL: No fever,   EYES: no acute visual disturbances  NECK: No pain or stiffness  RESPIRATORY: No cough; No shortness of breath  CARDIOVASCULAR: No chest pain, no palpitations  GASTROINTESTINAL: No pain. No nausea or vomiting; No diarrhea   NEUROLOGICAL: No headache or numbness, no tremors  MUSCULOSKELETAL: No joint pain, no muscle pain  GENITOURINARY: no dysuria, no frequency, no hesitancy  PSYCHIATRY: no depression , no anxiety  ALL OTHER  ROS negative        Vital Signs Last 24 Hrs  T(C): 36.6 (29 Jul 2022 04:47), Max: 37.6 (28 Jul 2022 12:57)  T(F): 97.9 (29 Jul 2022 04:47), Max: 99.7 (28 Jul 2022 12:57)  HR: 107 (29 Jul 2022 04:47) (76 - 114)  BP: 122/78 (29 Jul 2022 04:47) (103/77 - 122/78)  BP(mean): --  RR: 19 (29 Jul 2022 04:47) (18 - 20)  SpO2: 100% (29 Jul 2022 04:47) (98% - 100%)    Parameters below as of 29 Jul 2022 04:47  Patient On (Oxygen Delivery Method): nasal cannula        ________________________________________________  PHYSICAL EXAM:  GENERAL: NAD, resting in bed comfortably  HEENT: Normocephalic; conjunctivae and sclerae clear; moist mucous membranes;   NECK : supple  CHEST/LUNG: Clear to auscultation bilaterally with good air entry   HEART: S1 S2  regular; no murmurs, gallops or rubs  ABDOMEN: obese abd soft, Nontender, Nondistended; Bowel sounds present  EXTREMITIES: no cyanosis; no edema; no calf tenderness  SKIN:There is an intact DTI to the R. Heel without drainage  -There is a Stage 1 Pressure Injury to the R. Hip and L. Heel, as evident by non-blanchable erythema  -There is a DTI to the Coccyx with epidermal separation, red tissue, drainage, and surrounding tissue blanchable erythema  -There is a L. Hip Stage 4 Pressure Injury with pink tissue, purulent drainage, periwound erythema, and undermining (up to 0.6cm at 360 degrees   NERVOUS SYSTEM:  Awake and alert; Oriented to self and following instructions     _________________________________________________  LABS:                        11.7   9.32  )-----------( 103      ( 29 Jul 2022 06:00 )             37.9     07-29    141  |  113<H>  |  24<H>  ----------------------------<  145<H>  4.2   |  20<L>  |  0.80    Ca    8.8      29 Jul 2022 06:00    TPro  5.6<L>  /  Alb  1.9<L>  /  TBili  0.4  /  DBili  x   /  AST  37  /  ALT  44  /  AlkPhos  95  07-29        CAPILLARY BLOOD GLUCOSE            RADIOLOGY & ADDITIONAL TESTS:    Imaging  Reviewed:  YES/NO    Consultant(s) Notes Reviewed:   YES/ No      Plan of care was discussed with patient and /or primary care giver; all questions and concerns were addressed  NP Note discussed with Primary Attending    Patient is a 76y old Female who presents with a chief complaint of Altered mental status (29 Jul 2022 09:30AM)      INTERVAL HPI/OVERNIGHT EVENTS: no new complaints-evaluated at bedside, labs and VS reviewed, afebrile.     MEDICATIONS  (STANDING):  apixaban 5 milliGRAM(s) Oral every 12 hours  ascorbic acid 500 milliGRAM(s) Oral daily  aspirin  chewable 81 milliGRAM(s) Oral daily  atorvastatin 40 milliGRAM(s) Oral at bedtime  bisacodyl 5 milliGRAM(s) Oral at bedtime  chlorhexidine 2% Cloths 1 Application(s) Topical daily  ertapenem  IVPB      ertapenem  IVPB 1000 milliGRAM(s) IV Intermittent every 24 hours  levETIRAcetam 750 milliGRAM(s) Oral two times a day  metoprolol tartrate 25 milliGRAM(s) Oral every 12 hours  multivitamin 1 Tablet(s) Oral daily  mupirocin 2% Ointment 1 Application(s) Both Nostrils two times a day  nystatin Powder 1 Application(s) Topical three times a day  sodium chloride 0.9%. 1000 milliLiter(s) (75 mL/Hr) IV Continuous <Continuous>  zinc sulfate 220 milliGRAM(s) Oral daily    MEDICATIONS  (PRN):  acetaminophen     Tablet .. 650 milliGRAM(s) Oral every 6 hours PRN Temp greater or equal to 38C (100.4F)  melatonin 3 milliGRAM(s) Oral at bedtime PRN Insomnia      __________________________________________________  REVIEW OF SYSTEMS:    CONSTITUTIONAL: No fever,   EYES: no acute visual disturbances  NECK: No pain or stiffness  RESPIRATORY: No cough; No shortness of breath  CARDIOVASCULAR: No chest pain, no palpitations  GASTROINTESTINAL: No pain. No nausea or vomiting; No diarrhea   NEUROLOGICAL: No headache or numbness, no tremors  MUSCULOSKELETAL: No joint pain, no muscle pain  GENITOURINARY: no dysuria, no frequency, no hesitancy  PSYCHIATRY: no depression , no anxiety  ALL OTHER  ROS negative        Vital Signs Last 24 Hrs  T(C): 36.6 (29 Jul 2022 04:47), Max: 37.6 (28 Jul 2022 12:57)  T(F): 97.9 (29 Jul 2022 04:47), Max: 99.7 (28 Jul 2022 12:57)  HR: 107 (29 Jul 2022 04:47) (76 - 114)  BP: 122/78 (29 Jul 2022 04:47) (103/77 - 122/78)  BP(mean): --  RR: 19 (29 Jul 2022 04:47) (18 - 20)  SpO2: 100% (29 Jul 2022 04:47) (98% - 100%)    Parameters below as of 29 Jul 2022 04:47  Patient On (Oxygen Delivery Method): nasal cannula        ________________________________________________  PHYSICAL EXAM:  GENERAL: NAD, resting in bed comfortably  HEENT: Normocephalic; conjunctivae and sclerae clear; moist mucous membranes;   NECK : supple  CHEST/LUNG: Clear to auscultation bilaterally with good air entry   HEART: S1 S2  regular; no murmurs, gallops or rubs  ABDOMEN: obese abd soft, Nontender, Nondistended; Bowel sounds present  EXTREMITIES: no cyanosis; no edema; no calf tenderness  SKIN:There is an intact DTI to the R. Heel without drainage  -There is a Stage 1 Pressure Injury to the R. Hip and L. Heel, as evident by non-blanchable erythema  -There is a DTI to the Coccyx with epidermal separation, red tissue, drainage, and surrounding tissue blanchable erythema  -There is a L. Hip Stage 4 Pressure Injury with pink tissue, purulent drainage, periwound erythema, and undermining (up to 0.6cm at 360 degrees   NERVOUS SYSTEM:  Awake and alert; Oriented to self and following instructions     _________________________________________________  LABS:                        11.7   9.32  )-----------( 103      ( 29 Jul 2022 06:00 )             37.9     07-29    141  |  113<H>  |  24<H>  ----------------------------<  145<H>  4.2   |  20<L>  |  0.80    Ca    8.8      29 Jul 2022 06:00    TPro  5.6<L>  /  Alb  1.9<L>  /  TBili  0.4  /  DBili  x   /  AST  37  /  ALT  44  /  AlkPhos  95  07-29        CAPILLARY BLOOD GLUCOSE            RADIOLOGY & ADDITIONAL TESTS:    Imaging  Reviewed:  YES/NO    Consultant(s) Notes Reviewed:   YES/ No      Plan of care was discussed with patient and /or primary care giver; all questions and concerns were addressed

## 2022-07-29 NOTE — PROGRESS NOTE ADULT - PROBLEM SELECTOR PLAN 5
-Hx of HTN take atenolol   -held with sepsis, hypotensive  -Now BP improving 120s/70s, HR 100s  -Continue w/ Metoprolol 25mg BID with holding parameter    -monitor BP/HR

## 2022-07-29 NOTE — PROGRESS NOTE ADULT - PROBLEM SELECTOR PLAN 2
-source E coli UTI, ESBL e coli bacteremia  -leukocytosis, fever, hypotensive on admission, now resolving.   -s/p IV bolus, c/w maintenance IVF  -daily lab  -ID Dr. Khan following   -C/w Ertapenem 1g q24h per ID.    (pt has PCN allergy, no reaction with CTX or Carbapenem in the past, OK to continue per ID).   -follow up repeat Bcx sent 7/28.  -if Bcx negative, complete total 7 days Ertapenem per ID.

## 2022-07-30 LAB
ANION GAP SERPL CALC-SCNC: 6 MMOL/L — SIGNIFICANT CHANGE UP (ref 5–17)
BUN SERPL-MCNC: 22 MG/DL — HIGH (ref 7–18)
CALCIUM SERPL-MCNC: 9.2 MG/DL — SIGNIFICANT CHANGE UP (ref 8.4–10.5)
CHLORIDE SERPL-SCNC: 111 MMOL/L — HIGH (ref 96–108)
CO2 SERPL-SCNC: 25 MMOL/L — SIGNIFICANT CHANGE UP (ref 22–31)
CREAT SERPL-MCNC: 0.79 MG/DL — SIGNIFICANT CHANGE UP (ref 0.5–1.3)
EGFR: 78 ML/MIN/1.73M2 — SIGNIFICANT CHANGE UP
GLUCOSE SERPL-MCNC: 141 MG/DL — HIGH (ref 70–99)
HCT VFR BLD CALC: 35.3 % — SIGNIFICANT CHANGE UP (ref 34.5–45)
HGB BLD-MCNC: 10.9 G/DL — LOW (ref 11.5–15.5)
MAGNESIUM SERPL-MCNC: 2 MG/DL — SIGNIFICANT CHANGE UP (ref 1.6–2.6)
MCHC RBC-ENTMCNC: 29.8 PG — SIGNIFICANT CHANGE UP (ref 27–34)
MCHC RBC-ENTMCNC: 30.9 GM/DL — LOW (ref 32–36)
MCV RBC AUTO: 96.4 FL — SIGNIFICANT CHANGE UP (ref 80–100)
NRBC # BLD: 0 /100 WBCS — SIGNIFICANT CHANGE UP (ref 0–0)
PHOSPHATE SERPL-MCNC: 2 MG/DL — LOW (ref 2.5–4.5)
PLATELET # BLD AUTO: 139 K/UL — LOW (ref 150–400)
POTASSIUM SERPL-MCNC: 3.9 MMOL/L — SIGNIFICANT CHANGE UP (ref 3.5–5.3)
POTASSIUM SERPL-SCNC: 3.9 MMOL/L — SIGNIFICANT CHANGE UP (ref 3.5–5.3)
RBC # BLD: 3.66 M/UL — LOW (ref 3.8–5.2)
RBC # FLD: 14.1 % — SIGNIFICANT CHANGE UP (ref 10.3–14.5)
SODIUM SERPL-SCNC: 142 MMOL/L — SIGNIFICANT CHANGE UP (ref 135–145)
WBC # BLD: 9.47 K/UL — SIGNIFICANT CHANGE UP (ref 3.8–10.5)
WBC # FLD AUTO: 9.47 K/UL — SIGNIFICANT CHANGE UP (ref 3.8–10.5)

## 2022-07-30 PROCEDURE — 99232 SBSQ HOSP IP/OBS MODERATE 35: CPT

## 2022-07-30 RX ORDER — SODIUM,POTASSIUM PHOSPHATES 278-250MG
1 POWDER IN PACKET (EA) ORAL ONCE
Refills: 0 | Status: COMPLETED | OUTPATIENT
Start: 2022-07-30 | End: 2022-07-30

## 2022-07-30 RX ADMIN — Medication 1 TABLET(S): at 11:47

## 2022-07-30 RX ADMIN — APIXABAN 5 MILLIGRAM(S): 2.5 TABLET, FILM COATED ORAL at 17:45

## 2022-07-30 RX ADMIN — LEVETIRACETAM 750 MILLIGRAM(S): 250 TABLET, FILM COATED ORAL at 17:45

## 2022-07-30 RX ADMIN — ZINC SULFATE TAB 220 MG (50 MG ZINC EQUIVALENT) 220 MILLIGRAM(S): 220 (50 ZN) TAB at 11:48

## 2022-07-30 RX ADMIN — MUPIROCIN 1 APPLICATION(S): 20 OINTMENT TOPICAL at 17:45

## 2022-07-30 RX ADMIN — LEVETIRACETAM 750 MILLIGRAM(S): 250 TABLET, FILM COATED ORAL at 06:06

## 2022-07-30 RX ADMIN — ERTAPENEM SODIUM 120 MILLIGRAM(S): 1 INJECTION, POWDER, LYOPHILIZED, FOR SOLUTION INTRAMUSCULAR; INTRAVENOUS at 13:53

## 2022-07-30 RX ADMIN — Medication 1 PACKET(S): at 10:28

## 2022-07-30 RX ADMIN — MUPIROCIN 1 APPLICATION(S): 20 OINTMENT TOPICAL at 06:07

## 2022-07-30 RX ADMIN — CHLORHEXIDINE GLUCONATE 1 APPLICATION(S): 213 SOLUTION TOPICAL at 11:46

## 2022-07-30 RX ADMIN — Medication 25 MILLIGRAM(S): at 17:46

## 2022-07-30 RX ADMIN — APIXABAN 5 MILLIGRAM(S): 2.5 TABLET, FILM COATED ORAL at 06:07

## 2022-07-30 RX ADMIN — Medication 500 MILLIGRAM(S): at 11:48

## 2022-07-30 RX ADMIN — NYSTATIN CREAM 1 APPLICATION(S): 100000 CREAM TOPICAL at 17:44

## 2022-07-30 RX ADMIN — Medication 81 MILLIGRAM(S): at 11:47

## 2022-07-30 RX ADMIN — Medication 25 MILLIGRAM(S): at 06:06

## 2022-07-30 RX ADMIN — NYSTATIN CREAM 1 APPLICATION(S): 100000 CREAM TOPICAL at 06:07

## 2022-07-30 NOTE — PROGRESS NOTE ADULT - ASSESSMENT
77 y/o female with PMH of HTN, A fib, constipation, CVA with residual right sided weakness, dementia, b/L ICA stenosis, recurrent UTIs admitted for sepsis due to UTI. BCs growing ESBL E. coli.     A/P:  #Sepsis 2/2 ESBL UTI  #ESBL Bacteremia  #Chronic atrial fibrillation  #H/o CVA w/ residual weakness   #HTN    Plan:   77 y/o female with PMH of HTN, A fib, constipation, CVA with residual right sided weakness, dementia, b/L ICA stenosis, recurrent UTIs admitted for sepsis due to UTI. BCs growing ESBL E. coli.     A/P:  #Sepsis 2/2 ESBL UTI  #ESBL Bacteremia  #Chronic atrial fibrillation  #H/o CVA w/ residual weakness   #HTN  #DEJA- resolved   #Fecal impaction- resolved     Plan:  -C/w IV ertapenem till 8/2 to complete 7 days.   -Repeat blood cx NTD  -weaned off O2, saturating well on RA.   -Spoke with son, discussed DC planning.

## 2022-07-30 NOTE — PROGRESS NOTE ADULT - SUBJECTIVE AND OBJECTIVE BOX
Patient is a 76y old  Female who presents with a chief complaint of Altered mental status (29 Jul 2022 10:53)      INTERVAL HPI/OVERNIGHT EVENTS: Patient was seen and examined, son was present at bedside. She feels well, son was concerned that patient doesn't like hospital food and is not eating well. She eats well when he brings food from home.     MEDICATIONS  (STANDING):  apixaban 5 milliGRAM(s) Oral every 12 hours  ascorbic acid 500 milliGRAM(s) Oral daily  aspirin  chewable 81 milliGRAM(s) Oral daily  atorvastatin 40 milliGRAM(s) Oral at bedtime  bisacodyl 5 milliGRAM(s) Oral at bedtime  chlorhexidine 2% Cloths 1 Application(s) Topical daily  ertapenem  IVPB      ertapenem  IVPB 1000 milliGRAM(s) IV Intermittent every 24 hours  levETIRAcetam 750 milliGRAM(s) Oral two times a day  metoprolol tartrate 25 milliGRAM(s) Oral every 12 hours  multivitamin 1 Tablet(s) Oral daily  mupirocin 2% Ointment 1 Application(s) Both Nostrils two times a day  nystatin Powder 1 Application(s) Topical three times a day  sodium chloride 0.9%. 1000 milliLiter(s) (75 mL/Hr) IV Continuous <Continuous>  zinc sulfate 220 milliGRAM(s) Oral daily    MEDICATIONS  (PRN):  acetaminophen     Tablet .. 650 milliGRAM(s) Oral every 6 hours PRN Temp greater or equal to 38C (100.4F)  melatonin 3 milliGRAM(s) Oral at bedtime PRN Insomnia      __________________________________________________  REVIEW OF SYSTEMS:    CONSTITUTIONAL: No fever,   EYES: no acute visual disturbances  NECK: No pain or stiffness  RESPIRATORY: No cough; No shortness of breath  CARDIOVASCULAR: No chest pain, no palpitations  GASTROINTESTINAL: No pain. No nausea or vomiting; No diarrhea   NEUROLOGICAL: No headache or numbness, no tremors  MUSCULOSKELETAL: No joint pain, no muscle pain  GENITOURINARY: no dysuria, no frequency, no hesitancy  PSYCHIATRY: no depression , no anxiety  ALL OTHER  ROS negative        Vital Signs Last 24 Hrs  T(C): 37.2 (30 Jul 2022 13:28), Max: 37.2 (30 Jul 2022 13:28)  T(F): 99 (30 Jul 2022 13:28), Max: 99 (30 Jul 2022 13:28)  HR: 107 (30 Jul 2022 13:28) (66 - 110)  BP: 116/76 (30 Jul 2022 13:28) (116/76 - 127/90)  BP(mean): --  RR: 18 (30 Jul 2022 13:28) (18 - 20)  SpO2: 98% (30 Jul 2022 13:28) (97% - 99%)    Parameters below as of 30 Jul 2022 13:28  Patient On (Oxygen Delivery Method): nasal cannula  O2 Flow (L/min): 2      ________________________________________________  PHYSICAL EXAM:  GENERAL: NAD, elderly female   HEENT: Normocephalic;  conjunctivae and sclerae clear; moist mucous membranes;   NECK : supple  CHEST/LUNG: Clear to auscultation bilaterally with good air entry   HEART: S1 S2  regular; no murmurs, gallops or rubs  ABDOMEN: Soft, Nontender, Nondistended; Bowel sounds present  EXTREMITIES: no cyanosis; no edema; no calf tenderness, SCD   SKIN: warm and dry; multiple PI, stage 1 PI right hip, left heel, non blanchable erythema. DTI coccyx wiht epidermal separation, + blanchable erythema, Left hip stage 4 PI with pink tissue,   NERVOUS SYSTEM: aaox 2 to name and place     _________________________________________________  LABS:                        10.9   9.47  )-----------( 139      ( 30 Jul 2022 05:20 )             35.3     07-30    142  |  111<H>  |  22<H>  ----------------------------<  141<H>  3.9   |  25  |  0.79    Ca    9.2      30 Jul 2022 05:20  Phos  2.0     07-30  Mg     2.0     07-30    TPro  5.6<L>  /  Alb  1.9<L>  /  TBili  0.4  /  DBili  x   /  AST  37  /  ALT  44  /  AlkPhos  95  07-29        CAPILLARY BLOOD GLUCOSE            RADIOLOGY & ADDITIONAL TESTS:    Imaging Personally Reviewed:  YES    Consultant(s) Notes Reviewed:   YES    Care Discussed with Consultants : YES     Plan of care was discussed with patient and /or primary care giver; all questions and concerns were addressed and care was aligned with patient's wishes.

## 2022-07-31 LAB
ANION GAP SERPL CALC-SCNC: 8 MMOL/L — SIGNIFICANT CHANGE UP (ref 5–17)
BUN SERPL-MCNC: 16 MG/DL — SIGNIFICANT CHANGE UP (ref 7–18)
CALCIUM SERPL-MCNC: 9 MG/DL — SIGNIFICANT CHANGE UP (ref 8.4–10.5)
CHLORIDE SERPL-SCNC: 110 MMOL/L — HIGH (ref 96–108)
CO2 SERPL-SCNC: 24 MMOL/L — SIGNIFICANT CHANGE UP (ref 22–31)
CREAT SERPL-MCNC: 0.69 MG/DL — SIGNIFICANT CHANGE UP (ref 0.5–1.3)
EGFR: 91 ML/MIN/1.73M2 — SIGNIFICANT CHANGE UP
GLUCOSE SERPL-MCNC: 147 MG/DL — HIGH (ref 70–99)
HCT VFR BLD CALC: 36.5 % — SIGNIFICANT CHANGE UP (ref 34.5–45)
HGB BLD-MCNC: 11.5 G/DL — SIGNIFICANT CHANGE UP (ref 11.5–15.5)
MAGNESIUM SERPL-MCNC: 2 MG/DL — SIGNIFICANT CHANGE UP (ref 1.6–2.6)
MCHC RBC-ENTMCNC: 30.1 PG — SIGNIFICANT CHANGE UP (ref 27–34)
MCHC RBC-ENTMCNC: 31.5 GM/DL — LOW (ref 32–36)
MCV RBC AUTO: 95.5 FL — SIGNIFICANT CHANGE UP (ref 80–100)
NRBC # BLD: 0 /100 WBCS — SIGNIFICANT CHANGE UP (ref 0–0)
PHOSPHATE SERPL-MCNC: 2.4 MG/DL — LOW (ref 2.5–4.5)
PLATELET # BLD AUTO: 169 K/UL — SIGNIFICANT CHANGE UP (ref 150–400)
POTASSIUM SERPL-MCNC: 4.1 MMOL/L — SIGNIFICANT CHANGE UP (ref 3.5–5.3)
POTASSIUM SERPL-SCNC: 4.1 MMOL/L — SIGNIFICANT CHANGE UP (ref 3.5–5.3)
RBC # BLD: 3.82 M/UL — SIGNIFICANT CHANGE UP (ref 3.8–5.2)
RBC # FLD: 14.2 % — SIGNIFICANT CHANGE UP (ref 10.3–14.5)
SODIUM SERPL-SCNC: 142 MMOL/L — SIGNIFICANT CHANGE UP (ref 135–145)
WBC # BLD: 10.29 K/UL — SIGNIFICANT CHANGE UP (ref 3.8–10.5)
WBC # FLD AUTO: 10.29 K/UL — SIGNIFICANT CHANGE UP (ref 3.8–10.5)

## 2022-07-31 PROCEDURE — 99232 SBSQ HOSP IP/OBS MODERATE 35: CPT

## 2022-07-31 RX ORDER — SODIUM,POTASSIUM PHOSPHATES 278-250MG
1 POWDER IN PACKET (EA) ORAL ONCE
Refills: 0 | Status: COMPLETED | OUTPATIENT
Start: 2022-07-31 | End: 2022-07-31

## 2022-07-31 RX ADMIN — NYSTATIN CREAM 1 APPLICATION(S): 100000 CREAM TOPICAL at 06:38

## 2022-07-31 RX ADMIN — MUPIROCIN 1 APPLICATION(S): 20 OINTMENT TOPICAL at 17:17

## 2022-07-31 RX ADMIN — ERTAPENEM SODIUM 120 MILLIGRAM(S): 1 INJECTION, POWDER, LYOPHILIZED, FOR SOLUTION INTRAMUSCULAR; INTRAVENOUS at 11:07

## 2022-07-31 RX ADMIN — LEVETIRACETAM 750 MILLIGRAM(S): 250 TABLET, FILM COATED ORAL at 17:17

## 2022-07-31 RX ADMIN — Medication 1 TABLET(S): at 11:08

## 2022-07-31 RX ADMIN — Medication 81 MILLIGRAM(S): at 11:08

## 2022-07-31 RX ADMIN — ATORVASTATIN CALCIUM 40 MILLIGRAM(S): 80 TABLET, FILM COATED ORAL at 02:19

## 2022-07-31 RX ADMIN — APIXABAN 5 MILLIGRAM(S): 2.5 TABLET, FILM COATED ORAL at 06:37

## 2022-07-31 RX ADMIN — LEVETIRACETAM 750 MILLIGRAM(S): 250 TABLET, FILM COATED ORAL at 06:38

## 2022-07-31 RX ADMIN — Medication 1 PACKET(S): at 10:07

## 2022-07-31 RX ADMIN — Medication 5 MILLIGRAM(S): at 02:19

## 2022-07-31 RX ADMIN — NYSTATIN CREAM 1 APPLICATION(S): 100000 CREAM TOPICAL at 13:34

## 2022-07-31 RX ADMIN — MUPIROCIN 1 APPLICATION(S): 20 OINTMENT TOPICAL at 06:38

## 2022-07-31 RX ADMIN — Medication 25 MILLIGRAM(S): at 17:17

## 2022-07-31 RX ADMIN — Medication 500 MILLIGRAM(S): at 11:08

## 2022-07-31 RX ADMIN — CHLORHEXIDINE GLUCONATE 1 APPLICATION(S): 213 SOLUTION TOPICAL at 11:09

## 2022-07-31 RX ADMIN — Medication 25 MILLIGRAM(S): at 06:39

## 2022-07-31 RX ADMIN — NYSTATIN CREAM 1 APPLICATION(S): 100000 CREAM TOPICAL at 02:24

## 2022-07-31 RX ADMIN — ZINC SULFATE TAB 220 MG (50 MG ZINC EQUIVALENT) 220 MILLIGRAM(S): 220 (50 ZN) TAB at 11:08

## 2022-07-31 NOTE — PROGRESS NOTE ADULT - ASSESSMENT
75 y/o female with PMH of HTN, A fib, constipation, CVA with residual right sided weakness, dementia, b/L ICA stenosis, recurrent UTIs admitted for sepsis due to UTI. BCs growing ESBL E. coli.     A/P:  #Sepsis - resolved   #ESBL Bacteremia  #Chronic atrial fibrillation  #H/o CVA w/ residual weakness   #HTN  #DEJA- resolved   #Fecal impaction- resolved     Plan:  -C/w IV ertapenem till 8/2 to complete 7 days.   -Repeat blood cx NTD  -Weaned off O2, saturating around 96% on RA. Advised RN to document.

## 2022-07-31 NOTE — PROGRESS NOTE ADULT - SUBJECTIVE AND OBJECTIVE BOX
Patient is a 76y old  Female who presents with a chief complaint of Altered mental status (30 Jul 2022 17:43)      INTERVAL HPI/OVERNIGHT EVENTS: no events noted overnight. Patient was seen, aid present at bedside. She was sleeping comfortably this am.     MEDICATIONS  (STANDING):  ascorbic acid 500 milliGRAM(s) Oral daily  aspirin  chewable 81 milliGRAM(s) Oral daily  atorvastatin 40 milliGRAM(s) Oral at bedtime  bisacodyl 5 milliGRAM(s) Oral at bedtime  chlorhexidine 2% Cloths 1 Application(s) Topical daily  ertapenem  IVPB      ertapenem  IVPB 1000 milliGRAM(s) IV Intermittent every 24 hours  levETIRAcetam 750 milliGRAM(s) Oral two times a day  metoprolol tartrate 25 milliGRAM(s) Oral every 12 hours  multivitamin 1 Tablet(s) Oral daily  mupirocin 2% Ointment 1 Application(s) Both Nostrils two times a day  nystatin Powder 1 Application(s) Topical three times a day  zinc sulfate 220 milliGRAM(s) Oral daily    MEDICATIONS  (PRN):  acetaminophen     Tablet .. 650 milliGRAM(s) Oral every 6 hours PRN Temp greater or equal to 38C (100.4F)  melatonin 3 milliGRAM(s) Oral at bedtime PRN Insomnia      __________________________________________________  REVIEW OF SYSTEMS:    not able to illicit as patient sleeping at this time.       Vital Signs Last 24 Hrs  T(C): 36.4 (31 Jul 2022 13:06), Max: 37 (31 Jul 2022 05:09)  T(F): 97.5 (31 Jul 2022 13:06), Max: 98.6 (31 Jul 2022 05:09)  HR: 95 (31 Jul 2022 13:06) (71 - 128)  BP: 113/76 (31 Jul 2022 13:06) (113/76 - 147/83)  BP(mean): --  RR: 19 (31 Jul 2022 13:06) (19 - 20)  SpO2: 97% (31 Jul 2022 13:06) (96% - 97%)    Parameters below as of 31 Jul 2022 13:06  Patient On (Oxygen Delivery Method): nasal cannula  O2 Flow (L/min): 2      ________________________________________________  PHYSICAL EXAM:  GENERAL: NAD, elderly female   HEENT: Normocephalic;  conjunctivae and sclerae clear; moist mucous membranes;   NECK : supple  CHEST/LUNG: Clear to auscultation bilaterally with good air entry   HEART: S1 S2  regular; no murmurs, gallops or rubs  ABDOMEN: Soft, Nontender, Nondistended; Bowel sounds present  EXTREMITIES: no cyanosis; no edema; no calf tenderness, SCD   SKIN: warm and dry; multiple PI, stage 1 PI right hip, left heel, non blanchable erythema. DTI coccyx wiht epidermal separation, + blanchable erythema, Left hip stage 4 PI with pink tissue,   NERVOUS SYSTEM: pt sleeping but arouse able     _________________________________________________  LABS:                        11.5   10.29 )-----------( 169      ( 31 Jul 2022 06:05 )             36.5     07-31    142  |  110<H>  |  16  ----------------------------<  147<H>  4.1   |  24  |  0.69    Ca    9.0      31 Jul 2022 06:05  Phos  2.4     07-31  Mg     2.0     07-31          CAPILLARY BLOOD GLUCOSE            RADIOLOGY & ADDITIONAL TESTS:    Imaging Personally Reviewed:  YES    Consultant(s) Notes Reviewed:   YES    Care Discussed with Consultants : YES     Plan of care was discussed with patient and /or primary care giver; all questions and concerns were addressed and care was aligned with patient's wishes.

## 2022-08-01 LAB
ANION GAP SERPL CALC-SCNC: 8 MMOL/L — SIGNIFICANT CHANGE UP (ref 5–17)
BUN SERPL-MCNC: 18 MG/DL — SIGNIFICANT CHANGE UP (ref 7–18)
CALCIUM SERPL-MCNC: 8.9 MG/DL — SIGNIFICANT CHANGE UP (ref 8.4–10.5)
CHLORIDE SERPL-SCNC: 110 MMOL/L — HIGH (ref 96–108)
CO2 SERPL-SCNC: 25 MMOL/L — SIGNIFICANT CHANGE UP (ref 22–31)
CREAT SERPL-MCNC: 0.69 MG/DL — SIGNIFICANT CHANGE UP (ref 0.5–1.3)
EGFR: 91 ML/MIN/1.73M2 — SIGNIFICANT CHANGE UP
GLUCOSE SERPL-MCNC: 144 MG/DL — HIGH (ref 70–99)
HCT VFR BLD CALC: 35.9 % — SIGNIFICANT CHANGE UP (ref 34.5–45)
HGB BLD-MCNC: 11.4 G/DL — LOW (ref 11.5–15.5)
MAGNESIUM SERPL-MCNC: 2.1 MG/DL — SIGNIFICANT CHANGE UP (ref 1.6–2.6)
MCHC RBC-ENTMCNC: 30.3 PG — SIGNIFICANT CHANGE UP (ref 27–34)
MCHC RBC-ENTMCNC: 31.8 GM/DL — LOW (ref 32–36)
MCV RBC AUTO: 95.5 FL — SIGNIFICANT CHANGE UP (ref 80–100)
NRBC # BLD: 0 /100 WBCS — SIGNIFICANT CHANGE UP (ref 0–0)
PHOSPHATE SERPL-MCNC: 3 MG/DL — SIGNIFICANT CHANGE UP (ref 2.5–4.5)
PLATELET # BLD AUTO: 204 K/UL — SIGNIFICANT CHANGE UP (ref 150–400)
POTASSIUM SERPL-MCNC: 4.2 MMOL/L — SIGNIFICANT CHANGE UP (ref 3.5–5.3)
POTASSIUM SERPL-SCNC: 4.2 MMOL/L — SIGNIFICANT CHANGE UP (ref 3.5–5.3)
RBC # BLD: 3.76 M/UL — LOW (ref 3.8–5.2)
RBC # FLD: 14.3 % — SIGNIFICANT CHANGE UP (ref 10.3–14.5)
SODIUM SERPL-SCNC: 143 MMOL/L — SIGNIFICANT CHANGE UP (ref 135–145)
WBC # BLD: 9.32 K/UL — SIGNIFICANT CHANGE UP (ref 3.8–10.5)
WBC # FLD AUTO: 9.32 K/UL — SIGNIFICANT CHANGE UP (ref 3.8–10.5)

## 2022-08-01 RX ADMIN — Medication 81 MILLIGRAM(S): at 11:59

## 2022-08-01 RX ADMIN — CHLORHEXIDINE GLUCONATE 1 APPLICATION(S): 213 SOLUTION TOPICAL at 11:59

## 2022-08-01 RX ADMIN — MUPIROCIN 1 APPLICATION(S): 20 OINTMENT TOPICAL at 07:36

## 2022-08-01 RX ADMIN — Medication 25 MILLIGRAM(S): at 07:37

## 2022-08-01 RX ADMIN — NYSTATIN CREAM 1 APPLICATION(S): 100000 CREAM TOPICAL at 21:35

## 2022-08-01 RX ADMIN — ATORVASTATIN CALCIUM 40 MILLIGRAM(S): 80 TABLET, FILM COATED ORAL at 00:27

## 2022-08-01 RX ADMIN — NYSTATIN CREAM 1 APPLICATION(S): 100000 CREAM TOPICAL at 07:36

## 2022-08-01 RX ADMIN — Medication 5 MILLIGRAM(S): at 00:27

## 2022-08-01 RX ADMIN — Medication 1 TABLET(S): at 11:58

## 2022-08-01 RX ADMIN — ATORVASTATIN CALCIUM 40 MILLIGRAM(S): 80 TABLET, FILM COATED ORAL at 21:22

## 2022-08-01 RX ADMIN — NYSTATIN CREAM 1 APPLICATION(S): 100000 CREAM TOPICAL at 14:11

## 2022-08-01 RX ADMIN — NYSTATIN CREAM 1 APPLICATION(S): 100000 CREAM TOPICAL at 00:27

## 2022-08-01 RX ADMIN — Medication 500 MILLIGRAM(S): at 11:58

## 2022-08-01 RX ADMIN — LEVETIRACETAM 750 MILLIGRAM(S): 250 TABLET, FILM COATED ORAL at 17:44

## 2022-08-01 RX ADMIN — LEVETIRACETAM 750 MILLIGRAM(S): 250 TABLET, FILM COATED ORAL at 07:37

## 2022-08-01 RX ADMIN — ERTAPENEM SODIUM 120 MILLIGRAM(S): 1 INJECTION, POWDER, LYOPHILIZED, FOR SOLUTION INTRAMUSCULAR; INTRAVENOUS at 11:58

## 2022-08-01 RX ADMIN — Medication 5 MILLIGRAM(S): at 21:21

## 2022-08-01 RX ADMIN — ZINC SULFATE TAB 220 MG (50 MG ZINC EQUIVALENT) 220 MILLIGRAM(S): 220 (50 ZN) TAB at 11:58

## 2022-08-01 RX ADMIN — Medication 25 MILLIGRAM(S): at 17:44

## 2022-08-01 NOTE — PROGRESS NOTE ADULT - PROBLEM SELECTOR PLAN 12
- from home  -f/u repeat culture sent 7/28, c/w Ertapenem x 7 days per ID until tomorrow 08/2 then home

## 2022-08-01 NOTE — PROGRESS NOTE ADULT - SUBJECTIVE AND OBJECTIVE BOX
NP Note discussed with  Primary Attending    Patient is a 76y old  Female who presents with a chief complaint of Altered mental status (31 Jul 2022 14:55)      INTERVAL HPI/OVERNIGHT EVENTS: no new complaints    MEDICATIONS  (STANDING):  ascorbic acid 500 milliGRAM(s) Oral daily  aspirin  chewable 81 milliGRAM(s) Oral daily  atorvastatin 40 milliGRAM(s) Oral at bedtime  bisacodyl 5 milliGRAM(s) Oral at bedtime  chlorhexidine 2% Cloths 1 Application(s) Topical daily  ertapenem  IVPB      ertapenem  IVPB 1000 milliGRAM(s) IV Intermittent every 24 hours  levETIRAcetam 750 milliGRAM(s) Oral two times a day  metoprolol tartrate 25 milliGRAM(s) Oral every 12 hours  multivitamin 1 Tablet(s) Oral daily  nystatin Powder 1 Application(s) Topical three times a day  zinc sulfate 220 milliGRAM(s) Oral daily    MEDICATIONS  (PRN):  acetaminophen     Tablet .. 650 milliGRAM(s) Oral every 6 hours PRN Temp greater or equal to 38C (100.4F)  melatonin 3 milliGRAM(s) Oral at bedtime PRN Insomnia      __________________________________________________  REVIEW OF SYSTEMS:    CONSTITUTIONAL: No fever,   EYES: no acute visual disturbances  NECK: No pain or stiffness  RESPIRATORY: No cough; No shortness of breath  CARDIOVASCULAR: No chest pain, no palpitations  GASTROINTESTINAL: No pain. No nausea or vomiting; No diarrhea   NEUROLOGICAL: No headache or numbness, no tremors  MUSCULOSKELETAL: No joint pain, no muscle pain  GENITOURINARY: no dysuria, no frequency, no hesitancy  PSYCHIATRY: no depression , no anxiety  ALL OTHER  ROS negative        Vital Signs Last 24 Hrs  T(C): 36.7 (01 Aug 2022 05:46), Max: 37.4 (31 Jul 2022 22:50)  T(F): 98 (01 Aug 2022 05:46), Max: 99.3 (31 Jul 2022 22:50)  HR: 105 (01 Aug 2022 05:46) (95 - 108)  BP: 130/88 (01 Aug 2022 05:46) (113/76 - 130/88)  BP(mean): --  RR: 21 (01 Aug 2022 05:46) (19 - 21)  SpO2: 98% (01 Aug 2022 05:46) (97% - 98%)    Parameters below as of 01 Aug 2022 05:46  Patient On (Oxygen Delivery Method): nasal cannula  O2 Flow (L/min): 2      ________________________________________________  PHYSICAL EXAM:  GENERAL: NAD; resting comfortably in place  HEENT: Normocephalic;  conjunctivae and sclerae clear   NECK : supple  CHEST/LUNG: Clear to auscultation bilaterally   HEART: S1 S2  regular; no murmurs,  ABDOMEN: Soft, Nontender, Nondistended; Bowel sounds present in all 4 quadrants  EXTREMITIES: no cyanosis; no edema; no calf tenderness; + multipodos boots in place  SKIN: warm and dry; no rash  NERVOUS SYSTEM:  Awake and alert; Oriented  to person    _________________________________________________  LABS:                        11.4   9.32  )-----------( 204      ( 01 Aug 2022 05:41 )             35.9     08-01    143  |  110<H>  |  18  ----------------------------<  144<H>  4.2   |  25  |  0.69    Ca    8.9      01 Aug 2022 05:41  Phos  3.0     08-01  Mg     2.1     08-01          CAPILLARY BLOOD GLUCOSE            RADIOLOGY & ADDITIONAL TESTS:  < from: Xray Chest 1 View-PORTABLE IMMEDIATE (07.26.22 @ 07:51) >  IMPRESSION: No evidence for focal infiltrate or lobar consolidation.    --- End of Report ---    < end of copied text >      < from: CT Head No Cont (07.26.22 @ 09:57) >  IMPRESSION:  No acute intracranial hemorrhage or acute territorial infarct.  If   symptoms persist, follow-up MRI exam recommended.    --- End of Report ---    < end of copied text >      < from: CT Abdomen and Pelvis No Cont (07.26.22 @ 09:57) >  IMPRESSION:  Rectal fecal impaction with associated uncomplicated stercoral proctitis.  Cholelithiasis with markedly distended gallbladder. Correlate with right   upper quadrant ultrasound.  Nonobstructive left nephrolithiasis.        --- End of Report ---    < end of copied text >      < from: US Abdomen Upper Quadrant Right (07.26.22 @ 12:24) >    IMPRESSION:  Gallstones in the gallbladder without evidence of thickened gallbladder   wall, pericholecystic fluid or ultrasonic Ho's sign.    --- End of Report ---    < end of copied text >      Imaging Personally Reviewed:  YES        Plan of care was discussed with patient and /or primary care giver; all questions and concerns were addressed and care was aligned with patient's wishes.     NP Note discussed with  Primary Attending    Patient is a 76y old  Female who presents with a chief complaint of Altered mental status (31 Jul 2022 14:55)      INTERVAL HPI/OVERNIGHT EVENTS: no acute events overnight    MEDICATIONS  (STANDING):  ascorbic acid 500 milliGRAM(s) Oral daily  aspirin  chewable 81 milliGRAM(s) Oral daily  atorvastatin 40 milliGRAM(s) Oral at bedtime  bisacodyl 5 milliGRAM(s) Oral at bedtime  chlorhexidine 2% Cloths 1 Application(s) Topical daily  ertapenem  IVPB      ertapenem  IVPB 1000 milliGRAM(s) IV Intermittent every 24 hours  levETIRAcetam 750 milliGRAM(s) Oral two times a day  metoprolol tartrate 25 milliGRAM(s) Oral every 12 hours  multivitamin 1 Tablet(s) Oral daily  nystatin Powder 1 Application(s) Topical three times a day  zinc sulfate 220 milliGRAM(s) Oral daily    MEDICATIONS  (PRN):  acetaminophen     Tablet .. 650 milliGRAM(s) Oral every 6 hours PRN Temp greater or equal to 38C (100.4F)  melatonin 3 milliGRAM(s) Oral at bedtime PRN Insomnia      __________________________________________________  REVIEW OF SYSTEMS:    CONSTITUTIONAL: No fever,   EYES: no acute visual disturbances  NECK: No pain or stiffness  RESPIRATORY: No cough; No shortness of breath  CARDIOVASCULAR: No chest pain, no palpitations  GASTROINTESTINAL: No pain. No nausea or vomiting; No diarrhea   NEUROLOGICAL: No headache or numbness, no tremors  MUSCULOSKELETAL: No joint pain, no muscle pain  GENITOURINARY: no dysuria, no frequency, no hesitancy  PSYCHIATRY: no depression , no anxiety  ALL OTHER  ROS negative        Vital Signs Last 24 Hrs  T(C): 36.7 (01 Aug 2022 05:46), Max: 37.4 (31 Jul 2022 22:50)  T(F): 98 (01 Aug 2022 05:46), Max: 99.3 (31 Jul 2022 22:50)  HR: 105 (01 Aug 2022 05:46) (95 - 108)  BP: 130/88 (01 Aug 2022 05:46) (113/76 - 130/88)  BP(mean): --  RR: 21 (01 Aug 2022 05:46) (19 - 21)  SpO2: 98% (01 Aug 2022 05:46) (97% - 98%)    Parameters below as of 01 Aug 2022 05:46  Patient On (Oxygen Delivery Method): nasal cannula  O2 Flow (L/min): 2      ________________________________________________  PHYSICAL EXAM:  GENERAL: NAD; resting comfortably in place  HEENT: Normocephalic;  conjunctivae and sclerae clear   NECK : supple  CHEST/LUNG: Clear to auscultation bilaterally   HEART: S1 S2  regular; no murmurs,  ABDOMEN: Soft, Nontender, Nondistended; Bowel sounds present in all 4 quadrants  EXTREMITIES: no cyanosis; no edema; no calf tenderness; + multipodos boots in place  SKIN: warm and dry; no rash  NERVOUS SYSTEM:  Awake and alert; Oriented  to person    _________________________________________________  LABS:                        11.4   9.32  )-----------( 204      ( 01 Aug 2022 05:41 )             35.9     08-01    143  |  110<H>  |  18  ----------------------------<  144<H>  4.2   |  25  |  0.69    Ca    8.9      01 Aug 2022 05:41  Phos  3.0     08-01  Mg     2.1     08-01          CAPILLARY BLOOD GLUCOSE            RADIOLOGY & ADDITIONAL TESTS:  < from: Xray Chest 1 View-PORTABLE IMMEDIATE (07.26.22 @ 07:51) >  IMPRESSION: No evidence for focal infiltrate or lobar consolidation.    --- End of Report ---    < end of copied text >      < from: CT Head No Cont (07.26.22 @ 09:57) >  IMPRESSION:  No acute intracranial hemorrhage or acute territorial infarct.  If   symptoms persist, follow-up MRI exam recommended.    --- End of Report ---    < end of copied text >      < from: CT Abdomen and Pelvis No Cont (07.26.22 @ 09:57) >  IMPRESSION:  Rectal fecal impaction with associated uncomplicated stercoral proctitis.  Cholelithiasis with markedly distended gallbladder. Correlate with right   upper quadrant ultrasound.  Nonobstructive left nephrolithiasis.        --- End of Report ---    < end of copied text >      < from: US Abdomen Upper Quadrant Right (07.26.22 @ 12:24) >    IMPRESSION:  Gallstones in the gallbladder without evidence of thickened gallbladder   wall, pericholecystic fluid or ultrasonic Ho's sign.    --- End of Report ---    < end of copied text >      Imaging Personally Reviewed:  YES        Plan of care was discussed with patient and /or primary care giver; all questions and concerns were addressed and care was aligned with patient's wishes.

## 2022-08-01 NOTE — PROGRESS NOTE ADULT - PROBLEM SELECTOR PLAN 7
- appreciate wound care reccs  - continue local wound care  - continue MV, zinc, Vt C for wound healing

## 2022-08-01 NOTE — PROGRESS NOTE ADULT - PROBLEM SELECTOR PLAN 1
Due to sepsis in the setting of ESBL E coli bacteremia, UTI.   hx dementia AOx 1 at baseline.    - CTH negative for acute changes  - mental status improved and back to baseline  -SLP reccs: puree diet with mild thick per reccs. family refuses, on soft diet now.   -c/w abx per ID- Currently on Ertapenem   -ID Dr. Khan following Due to sepsis in the setting of ESBL E coli bacteremia, UTI.   hx dementia AOx 1 at baseline.    - CTH negative for acute changes  - mental status improved and back to baseline  -SLP reccs: puree diet with mild thick per reccs. family refuses, on soft diet now.   -c/w abx per ID- Currently on Ertapenem until 8/2  -ID Dr. Khan following

## 2022-08-01 NOTE — PROGRESS NOTE ADULT - PROBLEM SELECTOR PLAN 8
- controlled HR 70s-100s, patient has history A fib   - comes in with sepsis HR 120s, EKG Afib with RVR in ED  - c/w Eliquis, c/w Metoprolol for rate control  - monitor HR

## 2022-08-01 NOTE — PROGRESS NOTE ADULT - PROBLEM SELECTOR PLAN 4
- resolved, likely in the setting of sepsis 2/2 UTI  - Obstructive unlikely as the left renal stone is non obstructing seen on CT  - s/p bolus, on IVF  - trend BMP

## 2022-08-01 NOTE — PROGRESS NOTE ADULT - PROBLEM SELECTOR PLAN 2
-resolved: leukocytosis, fever, hypotensive on admission,  -source E coli UTI, ESBL & E. Coli bacteremia  -s/p IV bolus, c/w maintenance IVF  -C/w Ertapenem 1g q24h until 08/2   (pt has PCN allergy, no reaction with CTX or Carbapenem in the past, OK to continue per ID).   -follow up repeat Bcx sent 7/28.  - ID Dr. Shafer following -resolved: leukocytosis, fever, hypotensive on admission,  -source E coli UTI, ESBL & E. Coli bacteremia  -s/p IV bolus, c/w maintenance IVF  -C/w Ertapenem 1g q24h until 08/2   (pt has PCN allergy, no reaction with CTX or Carbapenem in the past, OK to continue per ID).   - repeat Bcx sent 7/28 NGTD  - ID Dr. Shafer following

## 2022-08-01 NOTE — PROGRESS NOTE ADULT - PROBLEM SELECTOR PLAN 5
- controlled home dose; Atenolol held in the setting of Sepsis  - continue Metoprolol with parameters  - monitor BP/HR

## 2022-08-01 NOTE — PROGRESS NOTE ADULT - ASSESSMENT
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 Admitted to medicine for metabolic encephalopathy, 2/2 sepsis in the setting of ESBL Ecoli Bacteremia & UTI. ID consulted. Started on Meropenem, now changed to Ertapenem per ID Dr. Khan, repeat blood cultures from 07/28 pending     Patient is a 76 year old female from home with PMH of HTN, A fib on eliquis and atenolol, CVA 20 y ago with residual right sided weakness and is now non verbal and bed bound, b/L ICA stenosis, Recurrent UTI up to 5x a year, who comes in due to 24h hx of altered mental status from baseline, associated with fever and concerns for dysphagia, found Febrile T max 102.5, , /78,  Lactate 3.7, UA +Ve, and white count of 14, CT abdomen pelvis showed distended gallbladder with stones, stool impaction and stercoral colitis, non obstructing left nephrolithiasis, RUQ w/o cholecystitis. Admitted to medicine for metabolic encephalopathy, 2/2 sepsis in the setting of ESBL Ecoli Bacteremia & UTI. ID consulted. Started on Meropenem, now changed to Ertapenem per ID Dr. Khan, repeat blood cultures from 07/28 NGTD, plan for patient to return home tomorrow 8/2 pending complete of abx course.

## 2022-08-02 LAB
CRP SERPL-MCNC: 30 MG/L — HIGH
CULTURE RESULTS: SIGNIFICANT CHANGE UP
GRAM STN FLD: SIGNIFICANT CHANGE UP
SARS-COV-2 RNA SPEC QL NAA+PROBE: SIGNIFICANT CHANGE UP
SPECIMEN SOURCE: SIGNIFICANT CHANGE UP

## 2022-08-02 PROCEDURE — 99233 SBSQ HOSP IP/OBS HIGH 50: CPT

## 2022-08-02 RX ADMIN — Medication 1 TABLET(S): at 11:25

## 2022-08-02 RX ADMIN — LEVETIRACETAM 750 MILLIGRAM(S): 250 TABLET, FILM COATED ORAL at 05:21

## 2022-08-02 RX ADMIN — Medication 25 MILLIGRAM(S): at 05:21

## 2022-08-02 RX ADMIN — ERTAPENEM SODIUM 120 MILLIGRAM(S): 1 INJECTION, POWDER, LYOPHILIZED, FOR SOLUTION INTRAMUSCULAR; INTRAVENOUS at 11:27

## 2022-08-02 RX ADMIN — Medication 500 MILLIGRAM(S): at 11:25

## 2022-08-02 RX ADMIN — NYSTATIN CREAM 1 APPLICATION(S): 100000 CREAM TOPICAL at 13:27

## 2022-08-02 RX ADMIN — NYSTATIN CREAM 1 APPLICATION(S): 100000 CREAM TOPICAL at 05:21

## 2022-08-02 RX ADMIN — NYSTATIN CREAM 1 APPLICATION(S): 100000 CREAM TOPICAL at 22:37

## 2022-08-02 RX ADMIN — Medication 5 MILLIGRAM(S): at 22:35

## 2022-08-02 RX ADMIN — LEVETIRACETAM 750 MILLIGRAM(S): 250 TABLET, FILM COATED ORAL at 18:15

## 2022-08-02 RX ADMIN — CHLORHEXIDINE GLUCONATE 1 APPLICATION(S): 213 SOLUTION TOPICAL at 11:26

## 2022-08-02 RX ADMIN — Medication 25 MILLIGRAM(S): at 18:15

## 2022-08-02 RX ADMIN — Medication 81 MILLIGRAM(S): at 11:26

## 2022-08-02 RX ADMIN — ATORVASTATIN CALCIUM 40 MILLIGRAM(S): 80 TABLET, FILM COATED ORAL at 22:35

## 2022-08-02 RX ADMIN — ZINC SULFATE TAB 220 MG (50 MG ZINC EQUIVALENT) 220 MILLIGRAM(S): 220 (50 ZN) TAB at 11:26

## 2022-08-02 NOTE — PROGRESS NOTE ADULT - PROBLEM SELECTOR PLAN 1
Due to sepsis in the setting of ESBL E coli bacteremia, UTI.   hx dementia AOx 1 at baseline.    - CTH negative for acute changes  - mental status improved and back to baseline  -SLP reccs: puree diet with mild thick per reccs. family refuses, on soft diet now.   -c/w abx per ID- Currently on Ertapenem   -ID Dr. Khan following

## 2022-08-02 NOTE — PROGRESS NOTE ADULT - PROBLEM SELECTOR PROBLEM 7
Called home # first,  said need to call cell phone.  Unable to leave message for patient at    Telephone Information:   Mobile [phone]    to schedule Consult.     VM not set up.  Contact letter mailed.   Pressure injury of skin

## 2022-08-02 NOTE — PROGRESS NOTE ADULT - ASSESSMENT
Subjective:    ALLERGIES penicillin (Unknown)    REVIEW OF SYSTEMS:  CONSTITUTIONAL:  No weakness, fevers or chills  EYES/ENT:  No visual changes;  No vertigo or throat pain   NECK:  No pain or stiffness  RESPIRATORY:  No cough, wheezing, hemoptysis; No shortness of breath  CARDIOVASCULAR:  No chest pain or palpitations  GASTROINTESTINAL:  No abdominal or epigastric pain. No nausea, vomiting, or hematemesis; No diarrhea or constipation. No melena or hematochezia.  GENITOURINARY:  No dysuria, frequency or hematuria  NEUROLOGICAL:  No numbness or weakness  MSK: no back pain, no joint pain  SKIN:  No itching, rashes    PHYSICAL EXAM:  Vital Signs Last 24 Hrs  T(C): 36.7 (02 Aug 2022 04:55), Max: 37.3 (01 Aug 2022 13:35)  T(F): 98 (02 Aug 2022 04:55), Max: 99.1 (01 Aug 2022 13:35)  HR: 105 (02 Aug 2022 04:55) (88 - 126)  BP: 128/77 (02 Aug 2022 04:55) (127/72 - 148/96)  BP(mean): --  RR: 20 (02 Aug 2022 04:55) (16 - 20)  SpO2: 95% (02 Aug 2022 04:55) (95% - 100%)    Parameters below as of 02 Aug 2022 04:55  Patient On (Oxygen Delivery Method): room air    Gen:  Skin:  HEENT:  Neck:  Chest/Thorax:  Cardiovascular:  Abdomen:  Genitourinary:  Extremities:  Vascular:  MSK:  Neurological:    LABS/DIAGNOSTIC TESTS:                        11.4   9.32  )-----------( 204      ( 01 Aug 2022 05:41 )             35.9     WBC Count: 9.32 K/uL (08-01 @ 05:41)  WBC Count: 10.29 K/uL (07-31 @ 06:05)    08-01    143  |  110<H>  |  18  ----------------------------<  144<H>  4.2   |  25  |  0.69    Ca    8.9      01 Aug 2022 05:41  Phos  3.0     08-01  Mg     2.1     08-01    CULTURES:     Culture - Blood (collected 07-28-22 @ 08:45)  Source: .Blood Blood  Preliminary Report (07-29-22 @ 15:01):    No growth to date.    Culture - Blood (collected 07-28-22 @ 08:35)  Source: .Blood Blood-Peripheral  Gram Stain (08-02-22 @ 06:33):    Growth in anaerobic bottle: Gram Negative Rods  Preliminary Report (08-02-22 @ 06:33):    Growth in anaerobic bottle: Gram Negative Rods    Culture - Urine (collected 07-26-22 @ 10:37)  Source: Clean Catch Clean Catch (Midstream)  Final Report (07-29-22 @ 14:17):    >100,000 CFU/ml Escherichia coli ESBL    Culture - Blood (collected 07-26-22 @ 07:21)  Source: .Blood Blood-Peripheral  Gram Stain (07-27-22 @ 05:28):    Growth in aerobic and anaerobic bottles: Gram Negative Rods  Final Report (07-29-22 @ 09:12):    Growth in aerobic and anaerobic bottles: Escherichia coli ESBL   Organism: Blood Culture PCR  Escherichia coli ESBL (07-29-22 @ 09:12)  Organism: Escherichia coli ESBL (07-29-22 @ 09:12)      -  Amikacin: S <=16      -  Ampicillin: R >16 These ampicillin results predict results for amoxicillin      -  Ampicillin/Sulbactam: R 16/8 Enterobacter, Klebsiella aerogenes, Citrobacter, and Serratia may develop resistance during prolonged therapy (3-4 days)      -  Aztreonam: R >16      -  Cefazolin: R >16 Enterobacter, Klebsiella aerogenes, Citrobacter, and Serratia may develop resistance during prolonged therapy (3-4 days)      -  Cefepime: R >16      -  Cefoxitin: S <=8      -  Ceftriaxone: R >32 Enterobacter, Klebsiella aerogenes, Citrobacter, and Serratia may develop resistance during prolonged therapy      -  Ciprofloxacin: R >2      -  Ertapenem: S <=0.5      -  Gentamicin: S <=2      -  Imipenem: S <=1      -  Levofloxacin: R >4      -  Meropenem: S <=1      -  Piperacillin/Tazobactam: R <=8      -  Tobramycin: I 8      -  Trimethoprim/Sulfamethoxazole: S <=0.5/9.5      Method Type: BRIGHT  Organism: Blood Culture PCR (07-29-22 @ 09:12)      -  CTX-M Resistance Marker: Detec      -  ESBL: Detec      -  Escherichia coli: Detec      Method Type: PCR    Culture - Blood (collected 07-26-22 @ 07:14)  Source: .Blood Blood-Peripheral  Gram Stain (07-27-22 @ 08:26):    Growth in anaerobic bottle: Gram Negative Rods    Growth in aerobic bottle: Gram Negative Rods  Final Report (07-29-22 @ 09:15):    Growth in aerobic and anaerobic bottles: Escherichia coli ESBL    See previous culture [id number]    RADIOLOGY: < from: CT Abdomen and Pelvis No Cont (07.26.22 @ 09:57) >Rectal fecal impaction with associated uncomplicated stercoral proctitis.  Cholelithiasis with markedly distended gallbladder. Correlate with right upper quadrant ultrasound. Nonobstructive left nephrolithiasis    < US Abdomen Upper Quadrant Right (07.26.22 @ 12:24) >  Gallstones in the gallbladder without evidence of thickened gallbladder wall, pericholecystic fluid or ultrasonic Ho's sign.    ANTIBIOTICS:  ertapenem  IVPB  7/28-    IV LINES:     IMPRESSION AND PLAN:  75 yo female with history of HTN. 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 No BC repeated on 7/28 growing GNR in one set, ID follow up requested. [facility] called, no PCR obtained this time since the positive cx is within 14 days.  PCN allergy(tolerated CTX and carbapenem)    In progres.....    Please reach ID with any questions or concerns  Dr. Lyndsay Collins  Tel. [phone]  Available in Teams               Subjective: The pt was seen on the bedside, pleasant lady, more alert today as per HHA on the bedside, afebrile, eating lunch. No cough, no SOB, no N/V or diarrhea.    ALLERGIES penicillin (Unknown)    REVIEW OF SYSTEMS: The pt makes some gutural noises but does not verbalize words  R side weakness at baseline, no fevers or chills, afebrile, no resp distress, no cough, eating well    PHYSICAL EXAM:  Vital Signs Last 24 Hrs  T(C): 36.7 (02 Aug 2022 04:55), Max: 37.3 (01 Aug 2022 13:35)  T(F): 98 (02 Aug 2022 04:55), Max: 99.1 (01 Aug 2022 13:35)  HR: 105 (02 Aug 2022 04:55) (88 - 126)  BP: 128/77 (02 Aug 2022 04:55) (127/72 - 148/96)  BP(mean): --  RR: 20 (02 Aug 2022 04:55) (16 - 20)  SpO2: 95% (02 Aug 2022 04:55) (95% - 100%)    Parameters below as of 02 Aug 2022 04:55  Patient On (Oxygen Delivery Method): room air  Gen: NAD, afebrile  Skin: warm and dry  Head: AT  Neck: Supple  Chest/Thorax: CTA, no rales  Cardiovascular: S1S2 no murmurs  Abdomen: soft, + umbilical hernia reduced with no difficulty, no tenderness on palpation, BS +  Genitourinary: no dysuria, + PrimaFit, yellow urine in bag  Extremities: no edema, no cyanosis  Vascular: pulses +  Neurological: AAOx0-1, at baseline, no new neurologic deficit, R side hemiplegia     LABS/DIAGNOSTIC TESTS:                      11.4   9.32  )-----------( 204      ( 01 Aug 2022 05:41 )             35.9     WBC Count: 9.32 K/uL (08-01 @ 05:41)  WBC Count: 10.29 K/uL (07-31 @ 06:05)    08-01    143  |  110<H>  |  18  ----------------------------<  144<H>  4.2   |  25  |  0.69    Ca    8.9      01 Aug 2022 05:41  Phos  3.0     08-01  Mg     2.1     08-01    CULTURES:   Culture - Blood (collected 07-28-22 @ 08:45)  Source: .Blood Blood  Preliminary Report (07-29-22 @ 15:01):    No growth to date.    Culture - Blood (collected 07-28-22 @ 08:35)  Source: .Blood Blood-Peripheral  Gram Stain (08-02-22 @ 06:33):    Growth in anaerobic bottle: Gram Negative Rods  Preliminary Report (08-02-22 @ 06:33):    Growth in anaerobic bottle: Gram Negative Rods    Culture - Urine (collected 07-26-22 @ 10:37)  Source: Clean Catch Clean Catch (Midstream)  Final Report (07-29-22 @ 14:17):    >100,000 CFU/ml Escherichia coli ESBL    Culture - Blood (collected 07-26-22 @ 07:21)  Source: .Blood Blood-Peripheral  Gram Stain (07-27-22 @ 05:28):    Growth in aerobic and anaerobic bottles: Gram Negative Rods  Final Report (07-29-22 @ 09:12):    Growth in aerobic and anaerobic bottles: Escherichia coli ESBL   Organism: Blood Culture PCR  Escherichia coli ESBL (07-29-22 @ 09:12)  Organism: Escherichia coli ESBL (07-29-22 @ 09:12)      -  Amikacin: S <=16      -  Ampicillin: R >16 These ampicillin results predict results for amoxicillin      -  Ampicillin/Sulbactam: R 16/8 Enterobacter, Klebsiella aerogenes, Citrobacter, and Serratia may develop resistance during prolonged therapy (3-4 days)      -  Aztreonam: R >16      -  Cefazolin: R >16 Enterobacter, Klebsiella aerogenes, Citrobacter, and Serratia may develop resistance during prolonged therapy (3-4 days)      -  Cefepime: R >16      -  Cefoxitin: S <=8      -  Ceftriaxone: R >32 Enterobacter, Klebsiella aerogenes, Citrobacter, and Serratia may develop resistance during prolonged therapy      -  Ciprofloxacin: R >2      -  Ertapenem: S <=0.5      -  Gentamicin: S <=2      -  Imipenem: S <=1      -  Levofloxacin: R >4      -  Meropenem: S <=1      -  Piperacillin/Tazobactam: R <=8      -  Tobramycin: I 8      -  Trimethoprim/Sulfamethoxazole: S <=0.5/9.5      Method Type: BRIGHT  Organism: Blood Culture PCR (07-29-22 @ 09:12)      -  CTX-M Resistance Marker: Detec      -  ESBL: Detec      -  Escherichia coli: Detec      Method Type: PCR    Culture - Blood (collected 07-26-22 @ 07:14)  Source: .Blood Blood-Peripheral  Gram Stain (07-27-22 @ 08:26):    Growth in anaerobic bottle: Gram Negative Rods    Growth in aerobic bottle: Gram Negative Rods  Final Report (07-29-22 @ 09:15):    Growth in aerobic and anaerobic bottles: Escherichia coli ESBL    See previous culture [id number]    RADIOLOGY: < from: CT Abdomen and Pelvis No Cont (07.26.22 @ 09:57) >Rectal fecal impaction with associated uncomplicated stercoral proctitis.  Cholelithiasis with markedly distended gallbladder. Correlate with right upper quadrant ultrasound. Nonobstructive left nephrolithiasis    < US Abdomen Upper Quadrant Right (07.26.22 @ 12:24) >  Gallstones in the gallbladder without evidence of thickened gallbladder wall, pericholecystic fluid or ultrasonic Ho's sign.    ANTIBIOTICS:  CTX 7/26-7/27  Meropenem 7/27-7/28  ertapenem  IVPB  7/28-    IV LINES: pIV, site OK    IMPRESSION AND PLAN:  77 yo female with history of HTN. 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 No BC repeated on 7/28 growing GNR in one set, ID follow up requested. [facility] called, no PCR obtained this time since the positive cx is within 14 days.  PCN allergy(tolerated CTX and carbapenem)    -ESBL E. coli BSI 2/2 Urinary Infection (BC + 4/4 7/26 ESBL E. coli/+ Urine cx ESBL E. coli) repeated BC 7/28 + GNR. No urinary symptoms currently on CT non obstructive left nephrolithiasis also cholelithiasis with markedly distended gallbladder, US RUQ did not revealed thickened gallbladder, no clinical findings, LFTS and TBILI wnl, pt doing much better clinically better.    Continue Ertapenem 1g q/24 hrs IV  Repeated 2 sets BC  Obtain 2D ECHO  Repeat Abd US  At this point is unclear why the follow up BC still + GNR (1 set anaerobic bottle),doubt abx failure since the bacteria was sensitive to carbapenem since she only received 2 doses of meropenem before the cultures were obtained on 7/28.   Will follow up [facility]  Discussed with medicine attending and NP    Please reach ID with any questions or concerns  Dr. Lyndsay Collins  Tel. [phone]  Available in Teams               Subjective: The pt was seen on the bedside, pleasant lady, more alert today as per HHA on the bedside, afebrile, eating lunch. No cough, no SOB, no N/V or diarrhea.    ALLERGIES penicillin (Unknown)    REVIEW OF SYSTEMS: The pt makes some gutural noises but does not verbalize words  R side weakness at baseline, no fevers or chills, afebrile, no resp distress, no cough, eating well    PHYSICAL EXAM:  Vital Signs Last 24 Hrs  T(C): 36.7 (02 Aug 2022 04:55), Max: 37.3 (01 Aug 2022 13:35)  T(F): 98 (02 Aug 2022 04:55), Max: 99.1 (01 Aug 2022 13:35)  HR: 105 (02 Aug 2022 04:55) (88 - 126)  BP: 128/77 (02 Aug 2022 04:55) (127/72 - 148/96)  BP(mean): --  RR: 20 (02 Aug 2022 04:55) (16 - 20)  SpO2: 95% (02 Aug 2022 04:55) (95% - 100%)    Parameters below as of 02 Aug 2022 04:55  Patient On (Oxygen Delivery Method): room air  Gen: NAD, afebrile  Skin: warm and dry  Head: AT  Neck: Supple  Chest/Thorax: CTA, no rales  Cardiovascular: S1S2 no murmurs  Abdomen: soft, + umbilical hernia reduced with no difficulty, no tenderness on palpation, BS +  Genitourinary: no dysuria, + PrimaFit, yellow urine in bag  Extremities: no edema, no cyanosis  Vascular: pulses +  Neurological: AAOx0-1, at baseline, no new neurologic deficit, R side hemiplegia     LABS/DIAGNOSTIC TESTS:                      11.4   9.32  )-----------( 204      ( 01 Aug 2022 05:41 )             35.9     WBC Count: 9.32 K/uL (08-01 @ 05:41)  WBC Count: 10.29 K/uL (07-31 @ 06:05)    08-01    143  |  110<H>  |  18  ----------------------------<  144<H>  4.2   |  25  |  0.69    Ca    8.9      01 Aug 2022 05:41  Phos  3.0     08-01  Mg     2.1     08-01    CULTURES:   Culture - Blood (collected 07-28-22 @ 08:45)  Source: .Blood Blood  Preliminary Report (07-29-22 @ 15:01):    No growth to date.    Culture - Blood (collected 07-28-22 @ 08:35)  Source: .Blood Blood-Peripheral  Gram Stain (08-02-22 @ 06:33):    Growth in anaerobic bottle: Gram Negative Rods  Preliminary Report (08-02-22 @ 06:33):    Growth in anaerobic bottle: Gram Negative Rods    Culture - Urine (collected 07-26-22 @ 10:37)  Source: Clean Catch Clean Catch (Midstream)  Final Report (07-29-22 @ 14:17):    >100,000 CFU/ml Escherichia coli ESBL    Culture - Blood (collected 07-26-22 @ 07:21)  Source: .Blood Blood-Peripheral  Gram Stain (07-27-22 @ 05:28):    Growth in aerobic and anaerobic bottles: Gram Negative Rods  Final Report (07-29-22 @ 09:12):    Growth in aerobic and anaerobic bottles: Escherichia coli ESBL   Organism: Blood Culture PCR  Escherichia coli ESBL (07-29-22 @ 09:12)  Organism: Escherichia coli ESBL (07-29-22 @ 09:12)      -  Amikacin: S <=16      -  Ampicillin: R >16 These ampicillin results predict results for amoxicillin      -  Ampicillin/Sulbactam: R 16/8 Enterobacter, Klebsiella aerogenes, Citrobacter, and Serratia may develop resistance during prolonged therapy (3-4 days)      -  Aztreonam: R >16      -  Cefazolin: R >16 Enterobacter, Klebsiella aerogenes, Citrobacter, and Serratia may develop resistance during prolonged therapy (3-4 days)      -  Cefepime: R >16      -  Cefoxitin: S <=8      -  Ceftriaxone: R >32 Enterobacter, Klebsiella aerogenes, Citrobacter, and Serratia may develop resistance during prolonged therapy      -  Ciprofloxacin: R >2      -  Ertapenem: S <=0.5      -  Gentamicin: S <=2      -  Imipenem: S <=1      -  Levofloxacin: R >4      -  Meropenem: S <=1      -  Piperacillin/Tazobactam: R <=8      -  Tobramycin: I 8      -  Trimethoprim/Sulfamethoxazole: S <=0.5/9.5      Method Type: BRIGHT  Organism: Blood Culture PCR (07-29-22 @ 09:12)      -  CTX-M Resistance Marker: Detec      -  ESBL: Detec      -  Escherichia coli: Detec      Method Type: PCR    Culture - Blood (collected 07-26-22 @ 07:14)  Source: .Blood Blood-Peripheral  Gram Stain (07-27-22 @ 08:26):    Growth in anaerobic bottle: Gram Negative Rods    Growth in aerobic bottle: Gram Negative Rods  Final Report (07-29-22 @ 09:15):    Growth in aerobic and anaerobic bottles: Escherichia coli ESBL    See previous culture [id number]    RADIOLOGY: < from: CT Abdomen and Pelvis No Cont (07.26.22 @ 09:57) >Rectal fecal impaction with associated uncomplicated stercoral proctitis.  Cholelithiasis with markedly distended gallbladder. Correlate with right upper quadrant ultrasound. Nonobstructive left nephrolithiasis    < US Abdomen Upper Quadrant Right (07.26.22 @ 12:24) >  Gallstones in the gallbladder without evidence of thickened gallbladder wall, pericholecystic fluid or ultrasonic Ho's sign.    ANTIBIOTICS:  CTX 7/26-7/27  Meropenem 7/27-7/28  ertapenem  IVPB  7/28-    IV LINES: pIV, site OK    IMPRESSION AND PLAN:  77 yo female with history of HTN. 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 No BC repeated on 7/28 growing GNR in one set, ID follow up requested. [facility] called, no PCR obtained this time since the positive cx is within 14 days.  PCN allergy(tolerated CTX and carbapenem)    -ESBL E. coli BSI 2/2 Urinary Infection (BC + 4/4 7/26 ESBL E. coli/+ Urine cx ESBL E. coli) repeated BC 7/28 + GNR. No urinary symptoms currently on CT non obstructive left nephrolithiasis also cholelithiasis with markedly distended gallbladder, US RUQ did not revealed thickened gallbladder, no clinical findings, LFTS and TBILI wnl, pt doing much better clinically better.    Continue Ertapenem 1g q/24 hrs IV  Repeat 2 sets BC  Obtain 2D ECHO  Repeat Abd US  At this point is unclear why the follow up BC still + GNR (1 set anaerobic bottle),doubt abx failure since the bacteria was sensitive to carbapenem since she only received 2 doses of meropenem before the cultures were obtained on 7/28.   Will follow up campbell  Discussed with medicine attending and NP    Please reach ID with any questions or concerns  Dr. Lyndsay Collins  Tel. [phone]  Available in Teams

## 2022-08-02 NOTE — PROGRESS NOTE ADULT - PROBLEM SELECTOR PLAN 2
-resolved: leukocytosis, fever, hypotensive on admission,  -source E coli UTI, ESBL & E. Coli bacteremia  -s/p IV bolus, c/w maintenance IVF  -C/w Ertapenem 1g q24h until 08/2   (pt has PCN allergy, no reaction with CTX or Carbapenem in the past, OK to continue per ID)  - repeat blood cultures from 07/28 growing gram negative rods in anaerobic bottle on day 5  - ID reccs Echo, ABD US, repeat blood cultures pending  - ID Dr. Khan following

## 2022-08-02 NOTE — PROGRESS NOTE ADULT - ASSESSMENT
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 Admitted to medicine for metabolic encephalopathy, 2/2 sepsis in the setting of ESBL Ecoli Bacteremia & UTI. ID consulted. Started on Meropenem, now changed to Ertapenem per ID Dr. Khan, repeat blood cultures from 07/28 were NGTD. Hospital course c/b repeat blood cultures from 07/28 growing gram negative rods in anaerobic bottle on day 5. ID reccs Echo, US Abd, repeat blood cultures pending. Patient can remain on Ertapenem per ID.

## 2022-08-02 NOTE — PROGRESS NOTE ADULT - PROBLEM SELECTOR PLAN 12
- from home with services  - repeat cultures were NGTD until 8/2  - now growing gram negative rods in aneorobic bottle on day 5

## 2022-08-02 NOTE — PROGRESS NOTE ADULT - SUBJECTIVE AND OBJECTIVE BOX
NP Note discussed with  Primary Attending    Patient is a 76y old  Female who presents with a chief complaint of Altered mental status (02 Aug 2022 09:34)      INTERVAL HPI/OVERNIGHT EVENTS: no acute events overnight     MEDICATIONS  (STANDING):  ascorbic acid 500 milliGRAM(s) Oral daily  aspirin  chewable 81 milliGRAM(s) Oral daily  atorvastatin 40 milliGRAM(s) Oral at bedtime  bisacodyl 5 milliGRAM(s) Oral at bedtime  chlorhexidine 2% Cloths 1 Application(s) Topical daily  ertapenem  IVPB      ertapenem  IVPB 1000 milliGRAM(s) IV Intermittent every 24 hours  levETIRAcetam 750 milliGRAM(s) Oral two times a day  metoprolol tartrate 25 milliGRAM(s) Oral every 12 hours  multivitamin 1 Tablet(s) Oral daily  nystatin Powder 1 Application(s) Topical three times a day  zinc sulfate 220 milliGRAM(s) Oral daily    MEDICATIONS  (PRN):  acetaminophen     Tablet .. 650 milliGRAM(s) Oral every 6 hours PRN Temp greater or equal to 38C (100.4F)  melatonin 3 milliGRAM(s) Oral at bedtime PRN Insomnia      __________________________________________________  REVIEW OF SYSTEMS:    CONSTITUTIONAL: No fever,   EYES: no acute visual disturbances  NECK: No pain or stiffness  RESPIRATORY: No cough; No shortness of breath  CARDIOVASCULAR: No chest pain, no palpitations  GASTROINTESTINAL: No pain. No nausea or vomiting; No diarrhea   NEUROLOGICAL: No headache or numbness, no tremors  MUSCULOSKELETAL: No joint pain, no muscle pain  GENITOURINARY: no dysuria, no frequency, no hesitancy  PSYCHIATRY: no depression , no anxiety  ALL OTHER  ROS negative        Vital Signs Last 24 Hrs  T(C): 37.1 (02 Aug 2022 11:54), Max: 37.1 (01 Aug 2022 20:58)  T(F): 98.7 (02 Aug 2022 11:54), Max: 98.7 (01 Aug 2022 20:58)  HR: 125 (02 Aug 2022 11:54) (88 - 125)  BP: 145/87 (02 Aug 2022 11:54) (127/78 - 148/96)  BP(mean): --  RR: 18 (02 Aug 2022 11:54) (16 - 20)  SpO2: 95% (02 Aug 2022 11:54) (95% - 100%)    Parameters below as of 02 Aug 2022 11:54  Patient On (Oxygen Delivery Method): room air        ________________________________________________  PHYSICAL EXAM:  GENERAL: NAD; resting comfortably in place  HEENT: Normocephalic;  conjunctivae and sclerae clear   NECK : supple  CHEST/LUNG: Clear to auscultation bilaterally   HEART: S1 S2  regular; no murmurs,  ABDOMEN: Soft, Nontender, Nondistended; Bowel sounds present in all 4 quadrants  EXTREMITIES: no cyanosis; no edema; no calf tenderness;  SKIN: warm and dry; no rash  NERVOUS SYSTEM:  Awake and alert; Oriented  to person and place    _________________________________________________  LABS:                        11.4   9.32  )-----------( 204      ( 01 Aug 2022 05:41 )             35.9     08-01    143  |  110<H>  |  18  ----------------------------<  144<H>  4.2   |  25  |  0.69    Ca    8.9      01 Aug 2022 05:41  Phos  3.0     08-01  Mg     2.1     08-01          CAPILLARY BLOOD GLUCOSE            RADIOLOGY & ADDITIONAL TESTS:  < from: Xray Chest 1 View-PORTABLE IMMEDIATE (07.26.22 @ 07:51) >  IMPRESSION: No evidence for focal infiltrate or lobar consolidation.    --- End of Report ---    < end of copied text >      < from: CT Head No Cont (07.26.22 @ 09:57) >  IMPRESSION:  No acute intracranial hemorrhage or acute territorial infarct.  If   symptoms persist, follow-up MRI exam recommended.    --- End of Report ---    < end of copied text >      < from: CT Abdomen and Pelvis No Cont (07.26.22 @ 09:57) >  IMPRESSION:  Rectal fecal impaction with associated uncomplicated stercoral proctitis.  Cholelithiasis with markedly distended gallbladder. Correlate with right   upper quadrant ultrasound.  Nonobstructive left nephrolithiasis.        --- End of Report ---    < end of copied text >      < from: US Abdomen Upper Quadrant Right (07.26.22 @ 12:24) >    IMPRESSION:  Gallstones in the gallbladder without evidence of thickened gallbladder   wall, pericholecystic fluid or ultrasonic Ho's sign.    --- End of Report ---    < end of copied text >    Imaging Personally Reviewed:  YES    Consultant(s) Notes Reviewed:   YES    Care Discussed with Consultants : Infectious Disease    Plan of care was discussed with patient and /or primary care giver; all questions and concerns were addressed and care was aligned with patient's wishes.

## 2022-08-03 LAB
ANION GAP SERPL CALC-SCNC: 9 MMOL/L — SIGNIFICANT CHANGE UP (ref 5–17)
BUN SERPL-MCNC: 19 MG/DL — HIGH (ref 7–18)
CALCIUM SERPL-MCNC: 9.3 MG/DL — SIGNIFICANT CHANGE UP (ref 8.4–10.5)
CHLORIDE SERPL-SCNC: 107 MMOL/L — SIGNIFICANT CHANGE UP (ref 96–108)
CO2 SERPL-SCNC: 27 MMOL/L — SIGNIFICANT CHANGE UP (ref 22–31)
CREAT SERPL-MCNC: 0.8 MG/DL — SIGNIFICANT CHANGE UP (ref 0.5–1.3)
CULTURE RESULTS: SIGNIFICANT CHANGE UP
EGFR: 77 ML/MIN/1.73M2 — SIGNIFICANT CHANGE UP
GLUCOSE SERPL-MCNC: 146 MG/DL — HIGH (ref 70–99)
HCT VFR BLD CALC: 41 % — SIGNIFICANT CHANGE UP (ref 34.5–45)
HGB BLD-MCNC: 13.1 G/DL — SIGNIFICANT CHANGE UP (ref 11.5–15.5)
MCHC RBC-ENTMCNC: 29.7 PG — SIGNIFICANT CHANGE UP (ref 27–34)
MCHC RBC-ENTMCNC: 32 GM/DL — SIGNIFICANT CHANGE UP (ref 32–36)
MCV RBC AUTO: 93 FL — SIGNIFICANT CHANGE UP (ref 80–100)
NRBC # BLD: 0 /100 WBCS — SIGNIFICANT CHANGE UP (ref 0–0)
PLATELET # BLD AUTO: 276 K/UL — SIGNIFICANT CHANGE UP (ref 150–400)
POTASSIUM SERPL-MCNC: 4.2 MMOL/L — SIGNIFICANT CHANGE UP (ref 3.5–5.3)
POTASSIUM SERPL-SCNC: 4.2 MMOL/L — SIGNIFICANT CHANGE UP (ref 3.5–5.3)
RBC # BLD: 4.41 M/UL — SIGNIFICANT CHANGE UP (ref 3.8–5.2)
RBC # FLD: 14.6 % — HIGH (ref 10.3–14.5)
SODIUM SERPL-SCNC: 143 MMOL/L — SIGNIFICANT CHANGE UP (ref 135–145)
SPECIMEN SOURCE: SIGNIFICANT CHANGE UP
WBC # BLD: 11.88 K/UL — HIGH (ref 3.8–10.5)
WBC # FLD AUTO: 11.88 K/UL — HIGH (ref 3.8–10.5)

## 2022-08-03 PROCEDURE — 99232 SBSQ HOSP IP/OBS MODERATE 35: CPT

## 2022-08-03 RX ADMIN — LEVETIRACETAM 750 MILLIGRAM(S): 250 TABLET, FILM COATED ORAL at 05:15

## 2022-08-03 RX ADMIN — Medication 25 MILLIGRAM(S): at 05:15

## 2022-08-03 RX ADMIN — NYSTATIN CREAM 1 APPLICATION(S): 100000 CREAM TOPICAL at 21:58

## 2022-08-03 RX ADMIN — NYSTATIN CREAM 1 APPLICATION(S): 100000 CREAM TOPICAL at 13:45

## 2022-08-03 RX ADMIN — ZINC SULFATE TAB 220 MG (50 MG ZINC EQUIVALENT) 220 MILLIGRAM(S): 220 (50 ZN) TAB at 12:05

## 2022-08-03 RX ADMIN — Medication 25 MILLIGRAM(S): at 17:53

## 2022-08-03 RX ADMIN — NYSTATIN CREAM 1 APPLICATION(S): 100000 CREAM TOPICAL at 05:17

## 2022-08-03 RX ADMIN — Medication 5 MILLIGRAM(S): at 21:55

## 2022-08-03 RX ADMIN — LEVETIRACETAM 750 MILLIGRAM(S): 250 TABLET, FILM COATED ORAL at 17:53

## 2022-08-03 RX ADMIN — Medication 500 MILLIGRAM(S): at 12:04

## 2022-08-03 RX ADMIN — ATORVASTATIN CALCIUM 40 MILLIGRAM(S): 80 TABLET, FILM COATED ORAL at 21:55

## 2022-08-03 RX ADMIN — CHLORHEXIDINE GLUCONATE 1 APPLICATION(S): 213 SOLUTION TOPICAL at 12:05

## 2022-08-03 RX ADMIN — Medication 81 MILLIGRAM(S): at 12:04

## 2022-08-03 RX ADMIN — Medication 3 MILLIGRAM(S): at 21:54

## 2022-08-03 RX ADMIN — Medication 1 TABLET(S): at 12:05

## 2022-08-03 RX ADMIN — ERTAPENEM SODIUM 120 MILLIGRAM(S): 1 INJECTION, POWDER, LYOPHILIZED, FOR SOLUTION INTRAMUSCULAR; INTRAVENOUS at 12:06

## 2022-08-03 NOTE — PROGRESS NOTE ADULT - PROBLEM SELECTOR PLAN 12
- from home with services  - repeat cultures were NGTD until 8/2  - now growing gram negative rods in aneorobic bottle on day 5  - blood cultures shows ESBL E.coli

## 2022-08-03 NOTE — PROGRESS NOTE ADULT - SUBJECTIVE AND OBJECTIVE BOX
NP Note discussed with  Primary Attending    Patient is a 76y old  Female who presents with a chief complaint of Altered mental status (02 Aug 2022 17:22)      INTERVAL HPI/OVERNIGHT EVENTS: No acute events overnight    MEDICATIONS  (STANDING):  ascorbic acid 500 milliGRAM(s) Oral daily  aspirin  chewable 81 milliGRAM(s) Oral daily  atorvastatin 40 milliGRAM(s) Oral at bedtime  bisacodyl 5 milliGRAM(s) Oral at bedtime  chlorhexidine 2% Cloths 1 Application(s) Topical daily  ertapenem  IVPB      ertapenem  IVPB 1000 milliGRAM(s) IV Intermittent every 24 hours  levETIRAcetam 750 milliGRAM(s) Oral two times a day  metoprolol tartrate 25 milliGRAM(s) Oral every 12 hours  multivitamin 1 Tablet(s) Oral daily  nystatin Powder 1 Application(s) Topical three times a day  zinc sulfate 220 milliGRAM(s) Oral daily    MEDICATIONS  (PRN):  acetaminophen     Tablet .. 650 milliGRAM(s) Oral every 6 hours PRN Temp greater or equal to 38C (100.4F)  melatonin 3 milliGRAM(s) Oral at bedtime PRN Insomnia      __________________________________________________  REVIEW OF SYSTEMS:    CONSTITUTIONAL: No fever,   EYES: no acute visual disturbances  NECK: No pain or stiffness  RESPIRATORY: No cough; No shortness of breath  CARDIOVASCULAR: No chest pain, no palpitations  GASTROINTESTINAL: No pain. No nausea or vomiting; No diarrhea   NEUROLOGICAL: No headache or numbness, no tremors  MUSCULOSKELETAL: No joint pain, no muscle pain  GENITOURINARY: no dysuria, no frequency, no hesitancy  PSYCHIATRY: no depression , no anxiety  ALL OTHER  ROS negative        Vital Signs Last 24 Hrs  T(C): 36.8 (03 Aug 2022 12:50), Max: 36.9 (02 Aug 2022 20:10)  T(F): 98.3 (03 Aug 2022 12:50), Max: 98.4 (02 Aug 2022 20:10)  HR: 111 (03 Aug 2022 12:50) (84 - 111)  BP: 125/65 (03 Aug 2022 12:50) (125/65 - 144/67)  BP(mean): --  RR: 18 (03 Aug 2022 12:50) (17 - 20)  SpO2: 95% (03 Aug 2022 12:50) (95% - 98%)    Parameters below as of 03 Aug 2022 12:50  Patient On (Oxygen Delivery Method): room air        ________________________________________________  PHYSICAL EXAM:  GENERAL: NAD; resting comfortably in place  HEENT: Normocephalic;  conjunctivae and sclerae clear   NECK : supple  CHEST/LUNG: Clear to auscultation bilaterally   HEART: S1 S2  regular; no murmurs,  ABDOMEN: Soft, Nontender, Nondistended; Bowel sounds present in all 4 quadrants  EXTREMITIES: no cyanosis; no edema; no calf tenderness;   SKIN: warm and dry; + erythema to posterior back  NERVOUS SYSTEM:  Awake and alert; Oriented  to person and place    _________________________________________________  LABS:                        13.1   11.88 )-----------( 276      ( 03 Aug 2022 08:29 )             41.0     08-03    143  |  107  |  19<H>  ----------------------------<  146<H>  4.2   |  27  |  0.80    Ca    9.3      03 Aug 2022 08:29          CAPILLARY BLOOD GLUCOSE            RADIOLOGY & ADDITIONAL TESTS:  < from: Xray Chest 1 View-PORTABLE IMMEDIATE (07.26.22 @ 07:51) >  IMPRESSION: No evidence for focal infiltrate or lobar consolidation.    --- End of Report ---    < end of copied text >    < from: CT Head No Cont (07.26.22 @ 09:57) >  IMPRESSION:  No acute intracranial hemorrhage or acute territorial infarct.  If   symptoms persist, follow-up MRI exam recommended.    --- End of Report ---    < end of copied text >    < from: CT Abdomen and Pelvis No Cont (07.26.22 @ 09:57) >  IMPRESSION:  Rectal fecal impaction with associated uncomplicated stercoral proctitis.  Cholelithiasis with markedly distended gallbladder. Correlate with right   upper quadrant ultrasound.  Nonobstructive left nephrolithiasis.      --- End of Report ---    < end of copied text >      < from: US Abdomen Upper Quadrant Right (07.26.22 @ 12:24) >    IMPRESSION:  Gallstones in the gallbladder without evidence of thickened gallbladder   wall, pericholecystic fluid or ultrasonic Ho's sign.    --- End of Report ---    < end of copied text >    Imaging Personally Reviewed:  YES    Consultant(s) Notes Reviewed:   YES    Care Discussed with Consultants : Infectious Disease    Plan of care was discussed with patient and /or primary care giver; all questions and concerns were addressed and care was aligned with patient's wishes.

## 2022-08-03 NOTE — PROGRESS NOTE ADULT - PROBLEM SELECTOR PLAN 1
- was suppose to continue Ertapenem 1g q24h until 08/2   (pt has PCN allergy, no reaction with CTX or Carbapenem in the past, OK to continue per ID)  - repeat blood cultures from 07/28 growing gram negative rods in anaerobic bottle on day 5  - ID reccs ABD US, repeat blood cultures pending  - Echo shows no vegetation  - to remain on Ertapenem until workup is complete  - ID Dr. Wayne following

## 2022-08-03 NOTE — PROGRESS NOTE ADULT - PROBLEM SELECTOR PLAN 3
- 2/2 to sepsis in the setting of ESBL E coli bacteremia, UTI.   hx dementia AOx 1 at baseline.    - CTH negative for acute changes  - mental status improved and back to baseline  -SLP reccs: puree diet with mild thick per reccs. family refuses, on soft diet now.   -c/w abx per ID- Currently on Ertapenem   -ID Dr. Wayne following

## 2022-08-03 NOTE — PROGRESS NOTE ADULT - PROBLEM SELECTOR PLAN 2
-resolved: leukocytosis, fever, hypotensive on admission,  -source E coli UTI, ESBL & E. Coli bacteremia  -s/p IV bolus, c/w maintenance IVF  - rest of plan as above

## 2022-08-04 LAB
ANION GAP SERPL CALC-SCNC: 8 MMOL/L — SIGNIFICANT CHANGE UP (ref 5–17)
BUN SERPL-MCNC: 25 MG/DL — HIGH (ref 7–18)
CALCIUM SERPL-MCNC: 8.8 MG/DL — SIGNIFICANT CHANGE UP (ref 8.4–10.5)
CHLORIDE SERPL-SCNC: 106 MMOL/L — SIGNIFICANT CHANGE UP (ref 96–108)
CO2 SERPL-SCNC: 28 MMOL/L — SIGNIFICANT CHANGE UP (ref 22–31)
CREAT SERPL-MCNC: 0.87 MG/DL — SIGNIFICANT CHANGE UP (ref 0.5–1.3)
EGFR: 70 ML/MIN/1.73M2 — SIGNIFICANT CHANGE UP
GLUCOSE SERPL-MCNC: 135 MG/DL — HIGH (ref 70–99)
HCT VFR BLD CALC: 38 % — SIGNIFICANT CHANGE UP (ref 34.5–45)
HGB BLD-MCNC: 12.2 G/DL — SIGNIFICANT CHANGE UP (ref 11.5–15.5)
MCHC RBC-ENTMCNC: 29.5 PG — SIGNIFICANT CHANGE UP (ref 27–34)
MCHC RBC-ENTMCNC: 32.1 GM/DL — SIGNIFICANT CHANGE UP (ref 32–36)
MCV RBC AUTO: 92 FL — SIGNIFICANT CHANGE UP (ref 80–100)
NRBC # BLD: 0 /100 WBCS — SIGNIFICANT CHANGE UP (ref 0–0)
PLATELET # BLD AUTO: 298 K/UL — SIGNIFICANT CHANGE UP (ref 150–400)
POTASSIUM SERPL-MCNC: 4 MMOL/L — SIGNIFICANT CHANGE UP (ref 3.5–5.3)
POTASSIUM SERPL-SCNC: 4 MMOL/L — SIGNIFICANT CHANGE UP (ref 3.5–5.3)
RBC # BLD: 4.13 M/UL — SIGNIFICANT CHANGE UP (ref 3.8–5.2)
RBC # FLD: 14.9 % — HIGH (ref 10.3–14.5)
SODIUM SERPL-SCNC: 142 MMOL/L — SIGNIFICANT CHANGE UP (ref 135–145)
WBC # BLD: 11.91 K/UL — HIGH (ref 3.8–10.5)
WBC # FLD AUTO: 11.91 K/UL — HIGH (ref 3.8–10.5)

## 2022-08-04 PROCEDURE — 99232 SBSQ HOSP IP/OBS MODERATE 35: CPT

## 2022-08-04 PROCEDURE — ZZZZZ: CPT

## 2022-08-04 RX ORDER — MEROPENEM 1 G/30ML
1000 INJECTION INTRAVENOUS EVERY 8 HOURS
Refills: 0 | Status: COMPLETED | OUTPATIENT
Start: 2022-08-04 | End: 2022-08-08

## 2022-08-04 RX ADMIN — Medication 500 MILLIGRAM(S): at 12:11

## 2022-08-04 RX ADMIN — NYSTATIN CREAM 1 APPLICATION(S): 100000 CREAM TOPICAL at 05:16

## 2022-08-04 RX ADMIN — Medication 5 MILLIGRAM(S): at 21:31

## 2022-08-04 RX ADMIN — ERTAPENEM SODIUM 120 MILLIGRAM(S): 1 INJECTION, POWDER, LYOPHILIZED, FOR SOLUTION INTRAMUSCULAR; INTRAVENOUS at 12:11

## 2022-08-04 RX ADMIN — LEVETIRACETAM 750 MILLIGRAM(S): 250 TABLET, FILM COATED ORAL at 05:17

## 2022-08-04 RX ADMIN — Medication 25 MILLIGRAM(S): at 05:17

## 2022-08-04 RX ADMIN — MEROPENEM 100 MILLIGRAM(S): 1 INJECTION INTRAVENOUS at 21:31

## 2022-08-04 RX ADMIN — Medication 1 TABLET(S): at 12:11

## 2022-08-04 RX ADMIN — ZINC SULFATE TAB 220 MG (50 MG ZINC EQUIVALENT) 220 MILLIGRAM(S): 220 (50 ZN) TAB at 12:11

## 2022-08-04 RX ADMIN — Medication 81 MILLIGRAM(S): at 12:11

## 2022-08-04 RX ADMIN — CHLORHEXIDINE GLUCONATE 1 APPLICATION(S): 213 SOLUTION TOPICAL at 15:25

## 2022-08-04 RX ADMIN — Medication 25 MILLIGRAM(S): at 18:05

## 2022-08-04 RX ADMIN — LEVETIRACETAM 750 MILLIGRAM(S): 250 TABLET, FILM COATED ORAL at 18:05

## 2022-08-04 RX ADMIN — ATORVASTATIN CALCIUM 40 MILLIGRAM(S): 80 TABLET, FILM COATED ORAL at 21:31

## 2022-08-04 RX ADMIN — NYSTATIN CREAM 1 APPLICATION(S): 100000 CREAM TOPICAL at 15:31

## 2022-08-04 RX ADMIN — NYSTATIN CREAM 1 APPLICATION(S): 100000 CREAM TOPICAL at 21:37

## 2022-08-04 NOTE — PROGRESS NOTE ADULT - SUBJECTIVE AND OBJECTIVE BOX
NP Note discussed with  Primary Attending    Patient is a 76y old  Female who presents with a chief complaint of ESBL E.coli UTI/bacteremia (04 Aug 2022 09:13)      INTERVAL HPI/OVERNIGHT EVENTS: no new complaints    MEDICATIONS  (STANDING):  ascorbic acid 500 milliGRAM(s) Oral daily  aspirin  chewable 81 milliGRAM(s) Oral daily  atorvastatin 40 milliGRAM(s) Oral at bedtime  bisacodyl 5 milliGRAM(s) Oral at bedtime  chlorhexidine 2% Cloths 1 Application(s) Topical daily  levETIRAcetam 750 milliGRAM(s) Oral two times a day  LORazepam     Tablet 0.5 milliGRAM(s) Oral once  metoprolol tartrate 25 milliGRAM(s) Oral every 12 hours  multivitamin 1 Tablet(s) Oral daily  nystatin Powder 1 Application(s) Topical three times a day  zinc sulfate 220 milliGRAM(s) Oral daily    MEDICATIONS  (PRN):  acetaminophen     Tablet .. 650 milliGRAM(s) Oral every 6 hours PRN Temp greater or equal to 38C (100.4F)  melatonin 3 milliGRAM(s) Oral at bedtime PRN Insomnia      __________________________________________________  REVIEW OF SYSTEMS:    CONSTITUTIONAL: No fever,   EYES: no acute visual disturbances  NECK: No pain or stiffness  RESPIRATORY: No cough; No shortness of breath  CARDIOVASCULAR: No chest pain, no palpitations  GASTROINTESTINAL: No pain. No nausea or vomiting; No diarrhea   NEUROLOGICAL: No headache or numbness, no tremors  MUSCULOSKELETAL: No joint pain, no muscle pain  GENITOURINARY: no dysuria, no frequency, no hesitancy  PSYCHIATRY: no depression , no anxiety  ALL OTHER  ROS negative        Vital Signs Last 24 Hrs  T(C): 36.8 (04 Aug 2022 11:51), Max: 37.3 (03 Aug 2022 20:48)  T(F): 98.3 (04 Aug 2022 11:51), Max: 99.1 (03 Aug 2022 20:48)  HR: 74 (04 Aug 2022 11:51) (70 - 111)  BP: 101/62 (04 Aug 2022 11:51) (101/62 - 128/90)  BP(mean): --  RR: 18 (04 Aug 2022 11:51) (18 - 19)  SpO2: 96% (04 Aug 2022 11:51) (95% - 97%)    Parameters below as of 04 Aug 2022 11:51  Patient On (Oxygen Delivery Method): room air        ________________________________________________  PHYSICAL EXAM:  GENERAL: NAD  HEENT: Normocephalic;  conjunctivae and sclerae clear; moist mucous membranes;   NECK : supple  CHEST/LUNG: Clear to auscultation bilaterally with good air entry   HEART: S1 S2  regular; no murmurs, gallops or rubs  ABDOMEN: Soft, Nontender, Nondistended; Bowel sounds present  EXTREMITIES: no cyanosis; no edema; no calf tenderness  SKIN: warm and dry; no rash  NERVOUS SYSTEM:  Awake and alert; Oriented  to place, person and time ; no new deficits    _________________________________________________  LABS:                        12.2   11.91 )-----------( 298      ( 04 Aug 2022 05:30 )             38.0     08-04    142  |  106  |  25<H>  ----------------------------<  135<H>  4.0   |  28  |  0.87    Ca    8.8      04 Aug 2022 05:30          CAPILLARY BLOOD GLUCOSE            RADIOLOGY & ADDITIONAL TESTS:    Imaging  Reviewed:  YES/NO    Consultant(s) Notes Reviewed:   YES/ No      Plan of care was discussed with patient and /or primary care giver; all questions and concerns were addressed  NP Note discussed with  Primary Attending    Patient is a 76y old  Female who presents with a chief complaint of ESBL E.coli UTI/bacteremia (04 Aug 2022 09:13)      INTERVAL HPI/OVERNIGHT EVENTS: no new complaints    MEDICATIONS  (STANDING):  ascorbic acid 500 milliGRAM(s) Oral daily  aspirin  chewable 81 milliGRAM(s) Oral daily  atorvastatin 40 milliGRAM(s) Oral at bedtime  bisacodyl 5 milliGRAM(s) Oral at bedtime  chlorhexidine 2% Cloths 1 Application(s) Topical daily  levETIRAcetam 750 milliGRAM(s) Oral two times a day  LORazepam     Tablet 0.5 milliGRAM(s) Oral once  metoprolol tartrate 25 milliGRAM(s) Oral every 12 hours  multivitamin 1 Tablet(s) Oral daily  nystatin Powder 1 Application(s) Topical three times a day  zinc sulfate 220 milliGRAM(s) Oral daily    MEDICATIONS  (PRN):  acetaminophen     Tablet .. 650 milliGRAM(s) Oral every 6 hours PRN Temp greater or equal to 38C (100.4F)  melatonin 3 milliGRAM(s) Oral at bedtime PRN Insomnia      __________________________________________________  REVIEW OF SYSTEMS: unable to assess, nonverbal at baseline, nods or shakes head yes/no for questions. denies any pain/discomfort     Vital Signs Last 24 Hrs  T(C): 36.8 (04 Aug 2022 11:51), Max: 37.3 (03 Aug 2022 20:48)  T(F): 98.3 (04 Aug 2022 11:51), Max: 99.1 (03 Aug 2022 20:48)  HR: 74 (04 Aug 2022 11:51) (70 - 111)  BP: 101/62 (04 Aug 2022 11:51) (101/62 - 128/90)  BP(mean): --  RR: 18 (04 Aug 2022 11:51) (18 - 19)  SpO2: 96% (04 Aug 2022 11:51) (95% - 97%)    Parameters below as of 04 Aug 2022 11:51  Patient On (Oxygen Delivery Method): room air    ________________________________________________  PHYSICAL EXAM:  GENERAL: NAD  HEENT: Normocephalic;  conjunctivae and sclerae clear; moist mucous membranes;   NECK : supple  CHEST/LUNG: Clear to auscultation bilaterally with good air entry   HEART: S1 S2  regular; no murmurs, gallops or rubs  ABDOMEN: Soft, Nontender, Nondistended; Bowel sounds present  EXTREMITIES: no cyanosis; no edema; no calf tenderness  SKIN: warm and dry; no rash  NERVOUS SYSTEM:  Awake and alert ; no new deficits    _________________________________________________  LABS:                        12.2   11.91 )-----------( 298      ( 04 Aug 2022 05:30 )             38.0     08-04    142  |  106  |  25<H>  ----------------------------<  135<H>  4.0   |  28  |  0.87    Ca    8.8      04 Aug 2022 05:30          CAPILLARY BLOOD GLUCOSE      RADIOLOGY & ADDITIONAL TESTS:  < from: US Abdomen Upper Quadrant Right (07.26.22 @ 12:24) >    ACC: [id number] EXAM:  US ABDOMEN RT UPR QUADRANT                          PROCEDURE DATE:  07/26/2022          INTERPRETATION:  CLINICAL INFORMATION: Fever and vomiting. Distended   gallbladder with cholelithiasis on CT    COMPARISON: No prior abdominal ultrasound is available for comparison.   Reference is made with a previous abdominal CT of the same day.    TECHNIQUE: Sonography of the right upper quadrant.    FINDINGS:  Liver: Within normal limits.  Bile ducts: Normal caliber. Common bile duct measures 5 mm in caliber   which is within normal limits..  Gallbladder: Gallstones in the gallbladder without evidence of thickened   gallbladder wall, pericholecystic fluid or ultrasonic Ho's sign.  Pancreas: Visualized portions are within normal limits.  Right kidney: 9.6 cm. No hydronephrosis. Apparent cortical thinning of   the right kidney.  Ascites: None.  IVC: Visualized portions are within normal limits.    IMPRESSION:  Gallstones in the gallbladder without evidence of thickened gallbladder   wall, pericholecystic fluid or ultrasonic Ho's sign.    --- End of Report ---      SOTERO AGUIAR MD; Attending Radiologist  This document has been electronically signed. Jul 26 2022 12:37PM    < end of copied text >    Imaging  Reviewed:  YES     Consultant(s) Notes Reviewed:   YES

## 2022-08-04 NOTE — CHART NOTE - NSCHARTNOTEFT_GEN_A_CORE
Assessment:   76yFemalePatient is a 76y old  Female who presents with a chief complaint of ESBL E.coli UTI/bacteremia (04 Aug 2022 09:13) Pt Visited. Pt asleep. D/W Private aide ( Dale) at bedside.  Per aide Pt eats if she gets what she likes. Food choices Updated. Pt Receiving Prosource 1 PKt TID w meals. Aide explained usage and  Direction. Aide receptive. Meds Noted ON MVi, Vit c,  Zinc.       Factors impacting intake: [ ] none [ ] nausea  [ ] vomiting [ ] diarrhea [ ] constipation  [ ]chewing problems [ ] swallowing issues  [ ] other:     Diet Prescription:  Soft and Bite Sized.   Intake: 50-75 % of meal.     Current Weight:   % Weight Change Bed scale 195 lb.    Pertinent Medications: MEDICATIONS  (STANDING):  ascorbic acid 500 milliGRAM(s) Oral daily  aspirin  chewable 81 milliGRAM(s) Oral daily  atorvastatin 40 milliGRAM(s) Oral at bedtime  bisacodyl 5 milliGRAM(s) Oral at bedtime  chlorhexidine 2% Cloths 1 Application(s) Topical daily  levETIRAcetam 750 milliGRAM(s) Oral two times a day  LORazepam     Tablet 0.5 milliGRAM(s) Oral once  metoprolol tartrate 25 milliGRAM(s) Oral every 12 hours  multivitamin 1 Tablet(s) Oral daily  nystatin Powder 1 Application(s) Topical three times a day  zinc sulfate 220 milliGRAM(s) Oral daily    MEDICATIONS  (PRN):  acetaminophen     Tablet .. 650 milliGRAM(s) Oral every 6 hours PRN Temp greater or equal to 38C (100.4F)  melatonin 3 milliGRAM(s) Oral at bedtime PRN Insomnia    Pertinent Labs: 08-04 Na142 mmol/L Glu 135 mg/dL<H> K+ 4.0 mmol/L Cr  0.87 mg/dL BUN 25 mg/dL<H> 08-01 Phos 3.0 mg/dL 07-29 Alb 1.9 g/dL<L>     CAPILLARY BLOOD GLUCOSE        Skin: Stage 4 @ L Hip, Stage 1 @ R hip and L heel, Suspected DTI @ R heel.    Estimated Needs:   [ ] no change since previous assessment  [ ] recalculated:     Previous Nutrition Diagnosis:   [ ] Inadequate Energy Intake [ ]Inadequate Oral Intake [ ] Excessive Energy Intake   [ ] Underweight [x ] Increased Nutrient Needs [ ] Overweight/Obesity   [ ] Altered GI Function [ ] Unintended Weight Loss [ ] Food & Nutrition Related Knowledge Deficit [ ] Malnutrition     Nutrition Diagnosis is [x ] ongoing  [ ] resolved [ ] not applicable     New Nutrition Diagnosis: [ ] not applicable       Interventions:   Recommend  [ ] Change Diet To:  [x ] Nutrition Supplement  Ensure clear  BID.  [ ] Nutrition Support  [ x] Other: Continue with Prosource 1 PKt TID     Monitoring and Evaluation:   [ ] PO intake [ x ] Tolerance to diet prescription [ x ] weights [ x ] labs[ x ] follow up per protocol  [ ] other:

## 2022-08-04 NOTE — PROGRESS NOTE ADULT - PROBLEM SELECTOR PLAN 12
- from home with services  - now growing gram negative rods in anaerobic bottle on day 5 from bld cx 7/28  - blood cultures shows ESBL E.coli  - repeat cultures 8/2 NGTD   -to continue ertapenem QD until 8/9 - from home with services  - now growing gram negative rods in anaerobic bottle on day 5 from bld cx 7/28  - blood cultures shows ESBL E.coli  - repeat cultures 8/2 NGTD   -to continue ertapenem QD until 8/9    --spoke with son at length regarding plan of care

## 2022-08-04 NOTE — PROGRESS NOTE ADULT - ASSESSMENT
Subjective: *****IN PROGRESS*****    ALLERGIES penicillin (Unknown), tolerating CTX and carbapenem    REVIEW OF SYSTEMS:    PHYSICAL EXAM:  Vital Signs Last 24 Hrs  T(C): 36.8 (04 Aug 2022 04:55), Max: 37.3 (03 Aug 2022 20:48)  T(F): 98.2 (04 Aug 2022 04:55), Max: 99.1 (03 Aug 2022 20:48)  HR: 94 (04 Aug 2022 04:55) (70 - 111)  BP: 122/86 (04 Aug 2022 04:55) (122/69 - 128/90)  RR: 19 (04 Aug 2022 04:55) (18 - 19)  SpO2: 96% (04 Aug 2022 04:55) (95% - 97%)    Parameters below as of 04 Aug 2022 04:55  Patient On (Oxygen Delivery Method): room air  Gen:  Skin:  HEENT:  Neck:  Chest/Thorax:  Cardiovascular:  Abdomen:  Genitourinary:  Extremities:  Vascular:  MSK:  Neurological:    LABS/DIAGNOSTIC TESTS:                        12.2   11.91 )-----------( 298      ( 04 Aug 2022 05:30 )             38.0     WBC Count: 11.91 K/uL (08-04 @ 05:30)  WBC Count: 11.88 K/uL (08-03 @ 08:29)    08-04    142  |  106  |  25<H>  ----------------------------<  135<H>  4.0   |  28  |  0.87    CULTURES:   Culture - Blood (collected 08-02-22 @ 10:41)  Source: .Blood Blood-Peripheral  Preliminary Report (08-03-22 @ 19:02):    No growth to date.    Culture - Blood (collected 08-02-22 @ 10:05)  Source: .Blood Blood-Peripheral  Preliminary Report (08-03-22 @ 19:02):    No growth to date.    Culture - Blood (collected 07-28-22 @ 08:45)  Source: .Blood Blood  Final Report (08-02-22 @ 15:00):    No Growth Final    Culture - Blood (collected 07-28-22 @ 08:35)  Source: .Blood Blood-Peripheral  Gram Stain (08-02-22 @ 06:33):    Growth in anaerobic bottle: Gram Negative Rods  Final Report (08-03-22 @ 11:24):    Growth in anaerobic bottle: Escherichia coli ESBL See previous culture    [id number]    Culture - Urine (collected 07-26-22 @ 10:37)  Source: Clean Catch Clean Catch (Midstream)  Final Report (07-29-22 @ 14:17):    >100,000 CFU/ml Escherichia coli ESBL  Organism: Escherichia coli ESBL (07-29-22 @ 14:17)  Organism: Escherichia coli ESBL (07-29-22 @ 14:17)         Culture - Blood (collected 07-26-22 @ 07:21)  Source: .Blood Blood-Peripheral  Gram Stain (07-27-22 @ 05:28):    Growth in aerobic and anaerobic bottles: Gram Negative Rods  Final Report (07-29-22 @ 09:12):    Growth in aerobic and anaerobic bottles: Escherichia coli ESBL    Organism: Blood Culture PCR (07-29-22 @ 09:12)      -  CTX-M Resistance Marker: Detec      -  ESBL: Detec      -  Escherichia coli: Detec      Method Type: PCR    Culture - Blood (collected 07-26-22 @ 07:14)  Source: .Blood Blood-Peripheral  Gram Stain (07-27-22 @ 08:26):    Growth in anaerobic bottle: Gram Negative Rods    Growth in aerobic bottle: Gram Negative Rods  Final Report (07-29-22 @ 09:15):    Growth in aerobic and anaerobic bottles: Escherichia coli ESBL    See previous culture [id number]    < from: Transthoracic Echocardiogram (08.02.22 @ 09:36) >  1. Mild posterior mitral annular calcification. Trace mitral regurgitation.  2. Calcified trileaflet aortic valve with normal opening.  3. Aortic Root: 3.2 cm.  4. Mildly dilated left atrium.  LA volume index = 38 cc/m2.  5. Normal left ventricular internal dimensions and wall thicknesses.  6. Endocardium not well visualized; grossly normal left ventricular systolic function.  7. Unable to adequately assess diastolic function due to technical aspects of this study.  8. Normal right atrium.  9. Normal right ventricular size and systolic function (TAPSE  1.8cm).  10. Unable to estimate RVSP.  11. There is mild tricuspid regurgitation.  12. There is trace pulmonic regurgitation.  13. Normal pericardium with no pericardial effusion.  14.No vegetations seen on this study, consider EMIR for further evaluation if clinically indicated.    ANTIBIOTICS:  CTX 7/26-7/27  Meropenem 7/27-7/28  ertapenem  IVPB  7/28-    IV LINES:    IMPRESSION AND PLAN:  77 yo female with history of HTN. 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 No BC repeated on 7/28 + ESBL E coli anaerobic bottle, she was already doing much better and had received only 2 doses of meropenem when the BC were obtained, repeat blood cultures on 8/2 NGTD, 2D ECHO with no vegetations, she does not have PPM or other prosthetic devices, doing much better clinically.     PCN allergy(tolerated CTX and carbapenem)    -ESBL E. coli BSI 2/2 Urinary Infection (BC + 4/4 7/26 ESBL E. coli/+ Urine cx ESBL E. coli) repeat BC 7/28 + ESBL E coli, BC 8/2 NGTD. 2D ECHO with no vegetations. CT A/P showed non obstructive left nephrolithiasis also cholelithiasis with markedly distended gallbladder, US RUQ did not revealed thickened gallbladder, no clinical or labs findings of acute manuela.   C-Reactive Protein, Serum: 30 mg/L (08-02-22 @ 10:05)    ***********In progress.....      Please reach ID with any questions or concerns  Dr. Lyndsay Collins  Tel. [phone]  Available in Teams               Subjective: The looks much better, seen on the bed side with her HHA who states she is doing well, she is awake and alert and is able to nod when I asked if she is feeling better or nod no when I asked if she has pain. Afebrile, no cough, no sob, no diarrhea.    ALLERGIES penicillin (Unknown), tolerating CTX and carbapenem    REVIEW OF SYSTEMS: Pt does not verbalize words but nodding yes or no to simple questions    PHYSICAL EXAM:  Vital Signs Last 24 Hrs  T(C): 36.8 (04 Aug 2022 04:55), Max: 37.3 (03 Aug 2022 20:48)  T(F): 98.2 (04 Aug 2022 04:55), Max: 99.1 (03 Aug 2022 20:48)  HR: 94 (04 Aug 2022 04:55) (70 - 111)  BP: 122/86 (04 Aug 2022 04:55) (122/69 - 128/90)  RR: 19 (04 Aug 2022 04:55) (18 - 19)  SpO2: 96% (04 Aug 2022 04:55) (95% - 97%)    Parameters below as of 04 Aug 2022 04:55  Patient On (Oxygen Delivery Method): room air  Gen: NAD  Skin: warm and dry  Head: AT  Neck: Supple  Chest/Thorax: CTA no rales  Cardiovascular: S1S2 no murmurs  Abdomen: soft, no distended, no tenderness, BS +  Genitourinary: no dysuria, no Ly catheter  Extremities: no edema, no cyanosois  Vascular: pulses +  MSK: no joint pain or swelling  Neurological: AAOx3, R side hemiplegia, at baseline    LABS/DIAGNOSTIC TESTS:                      12.2   11.91 )-----------( 298      ( 04 Aug 2022 05:30 )             38.0     WBC Count: 11.91 K/uL (08-04 @ 05:30)  WBC Count: 11.88 K/uL (08-03 @ 08:29)    08-04    142  |  106  |  25<H>  ----------------------------<  135<H>  4.0   |  28  |  0.87    CULTURES:   Culture - Blood (collected 08-02-22 @ 10:41)  Source: .Blood Blood-Peripheral  Preliminary Report (08-03-22 @ 19:02):    No growth to date.    Culture - Blood (collected 08-02-22 @ 10:05)  Source: .Blood Blood-Peripheral  Preliminary Report (08-03-22 @ 19:02):    No growth to date.    Culture - Blood (collected 07-28-22 @ 08:45)  Source: .Blood Blood  Final Report (08-02-22 @ 15:00):    No Growth Final    Culture - Blood (collected 07-28-22 @ 08:35)  Source: .Blood Blood-Peripheral  Gram Stain (08-02-22 @ 06:33):    Growth in anaerobic bottle: Gram Negative Rods  Final Report (08-03-22 @ 11:24):    Growth in anaerobic bottle: Escherichia coli ESBL See previous culture    [id number]    Culture - Urine (collected 07-26-22 @ 10:37)  Source: Clean Catch Clean Catch (Midstream)  Final Report (07-29-22 @ 14:17):    >100,000 CFU/ml Escherichia coli ESBL  Organism: Escherichia coli ESBL (07-29-22 @ 14:17)  Organism: Escherichia coli ESBL (07-29-22 @ 14:17)         Culture - Blood (collected 07-26-22 @ 07:21)  Source: .Blood Blood-Peripheral  Gram Stain (07-27-22 @ 05:28):    Growth in aerobic and anaerobic bottles: Gram Negative Rods  Final Report (07-29-22 @ 09:12):    Growth in aerobic and anaerobic bottles: Escherichia coli ESBL    Organism: Blood Culture PCR (07-29-22 @ 09:12)      -  CTX-M Resistance Marker: Detec      -  ESBL: Detec      -  Escherichia coli: Detec      Method Type: PCR    Culture - Blood (collected 07-26-22 @ 07:14)  Source: .Blood Blood-Peripheral  Gram Stain (07-27-22 @ 08:26):    Growth in anaerobic bottle: Gram Negative Rods    Growth in aerobic bottle: Gram Negative Rods  Final Report (07-29-22 @ 09:15):    Growth in aerobic and anaerobic bottles: Escherichia coli ESBL    See previous culture [id number]    < from: Transthoracic Echocardiogram (08.02.22 @ 09:36) >  1. Mild posterior mitral annular calcification. Trace mitral regurgitation.  2. Calcified trileaflet aortic valve with normal opening.  3. Aortic Root: 3.2 cm.  4. Mildly dilated left atrium.  LA volume index = 38 cc/m2.  5. Normal left ventricular internal dimensions and wall thicknesses.  6. Endocardium not well visualized; grossly normal left ventricular systolic function.  7. Unable to adequately assess diastolic function due to technical aspects of this study.  8. Normal right atrium.  9. Normal right ventricular size and systolic function (TAPSE  1.8cm).  10. Unable to estimate RVSP.  11. There is mild tricuspid regurgitation.  12. There is trace pulmonic regurgitation.  13. Normal pericardium with no pericardial effusion.  14.No vegetations seen on this study, consider EMIR for further evaluation if clinically indicated.    ANTIBIOTICS:  CTX 7/26-7/27  Meropenem 7/27-7/28  ertapenem  IVPB  7/28-    IV LINES:    IMPRESSION AND PLAN:  75 yo female with history of HTN. 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 No BC repeated on 7/28 + ESBL E coli anaerobic bottle, she was already doing much better and had received only 2 doses of meropenem when the BC were obtained, repeat blood cultures on 8/2 NGTD, 2D ECHO with no vegetations, she does not have PPM or other prosthetic devices, doing much better clinically.     PCN allergy(tolerated CTX and carbapenem)    -ESBL E. coli BSI 2/2 Urinary Infection (BC + 4/4 7/26 ESBL E. coli/+ Urine cx ESBL E. coli) repeat BC 7/28 + ESBL E coli, BC 8/2 NGTD. 2D ECHO with no vegetations. CT A/P showed non obstructive left nephrolithiasis also cholelithiasis with markedly distended gallbladder, US RUQ did not revealed thickened gallbladder, no clinical or labs findings of acute manuela.   C-Reactive Protein, Serum: 30 mg/L (08-02-22 @ 10:05)    -Continue Ertapenem 1g every 24 hrs IV for total 7 days( 8/2-8/9)  -Labs while on IV antibiotics CBC with Diff, CMP, ESR and CRP  -ID outpatient follow up if patient/family agrees with Dr. Apoorva Birmingham/ Dr. Ismael San @ [address] [facility], [address], [place] [address]   Phone: [phone] or [facility] @ [facility]: [address] [place] [id number]  Tel: [phone]  -Discussed with primary team    Please reach ID with any questions or concerns  Dr. Lyndsay Collins  Tel. [phone]  Available in Teams

## 2022-08-04 NOTE — PROGRESS NOTE ADULT - PROBLEM SELECTOR PLAN 6
-Hx of CVA and has ICA stenosis  -hx dementia, nonverbal  -PCM, low albumin    - c/w aspirin and statin  -SLP evaluation, reccs puree diet/mild thick liquid, but son refuses, requesting soft bite size, risk of aspiration discussed. verbalized understanding, but still he wants soft diet. SLP made aware.

## 2022-08-04 NOTE — PROGRESS NOTE ADULT - PROBLEM SELECTOR PLAN 3
--resolved---  - 2/2 to sepsis in the setting of ESBL E coli bacteremia, UTI.   hx dementia AOx 1 at baseline.    - CTH negative for acute changes  - mental status improved, back to baseline  -SLP reccs: puree diet with mild thick per reccs. family refuses, on soft diet now.   -c/w abx per ID- Currently on Ertapenem until 8/9  -ID Dr. Wayne following

## 2022-08-04 NOTE — PROGRESS NOTE ADULT - PROBLEM SELECTOR PLAN 1
(pt has PCN allergy, no reaction with CTX or Carbapenem in the past, OK to continue per ID)  - repeat blood cultures from 07/28 growing gram negative rods in anaerobic bottle on day 5  -blood cx 8/2 NGTD  - Echo shows no vegetation  - ID reccs ABD US  - to continue on Ertapenem for 7 days from negative blood culture, 8/2-8/9.   - ID Dr. Wayne following

## 2022-08-04 NOTE — PROVIDER CONTACT NOTE (CRITICAL VALUE NOTIFICATION) - TEST AND RESULT REPORTED:
blood culture from 7/28/2022- preliminary- growth in anaerobic bottle gram negative rods.
growth in anerobic bottle Ecoli ESBL

## 2022-08-04 NOTE — PROGRESS NOTE ADULT - ASSESSMENT
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 Admitted to medicine for metabolic encephalopathy, 2/2 sepsis in the setting of ESBL Ecoli Bacteremia & UTI. ID consulted. Started on Meropenem, now changed to Ertapenem per ID Dr. Khan, repeat blood cultures from 07/28 were NGTD. Hospital course c/b repeat blood cultures from 07/28 growing gram negative rods in anaerobic bottle on day 5. Echo without vegetations. Blood cultures 8/2 no growth to date, US Abd pending to r/o source. Patient to continue on Ertapenem per ID for 7 days from negative Bld cx, until 8/9.

## 2022-08-05 LAB
ALBUMIN SERPL ELPH-MCNC: 1.8 G/DL — LOW (ref 3.5–5)
ALP SERPL-CCNC: 133 U/L — HIGH (ref 40–120)
ALT FLD-CCNC: 31 U/L DA — SIGNIFICANT CHANGE UP (ref 10–60)
ANION GAP SERPL CALC-SCNC: 9 MMOL/L — SIGNIFICANT CHANGE UP (ref 5–17)
AST SERPL-CCNC: 17 U/L — SIGNIFICANT CHANGE UP (ref 10–40)
BILIRUB SERPL-MCNC: 0.4 MG/DL — SIGNIFICANT CHANGE UP (ref 0.2–1.2)
BUN SERPL-MCNC: 30 MG/DL — HIGH (ref 7–18)
CALCIUM SERPL-MCNC: 8.9 MG/DL — SIGNIFICANT CHANGE UP (ref 8.4–10.5)
CHLORIDE SERPL-SCNC: 106 MMOL/L — SIGNIFICANT CHANGE UP (ref 96–108)
CO2 SERPL-SCNC: 26 MMOL/L — SIGNIFICANT CHANGE UP (ref 22–31)
CREAT SERPL-MCNC: 0.9 MG/DL — SIGNIFICANT CHANGE UP (ref 0.5–1.3)
EGFR: 67 ML/MIN/1.73M2 — SIGNIFICANT CHANGE UP
GLUCOSE SERPL-MCNC: 140 MG/DL — HIGH (ref 70–99)
HCT VFR BLD CALC: 38.3 % — SIGNIFICANT CHANGE UP (ref 34.5–45)
HGB BLD-MCNC: 12.1 G/DL — SIGNIFICANT CHANGE UP (ref 11.5–15.5)
MCHC RBC-ENTMCNC: 29.7 PG — SIGNIFICANT CHANGE UP (ref 27–34)
MCHC RBC-ENTMCNC: 31.6 GM/DL — LOW (ref 32–36)
MCV RBC AUTO: 93.9 FL — SIGNIFICANT CHANGE UP (ref 80–100)
NRBC # BLD: 0 /100 WBCS — SIGNIFICANT CHANGE UP (ref 0–0)
PLATELET # BLD AUTO: 301 K/UL — SIGNIFICANT CHANGE UP (ref 150–400)
POTASSIUM SERPL-MCNC: 3.7 MMOL/L — SIGNIFICANT CHANGE UP (ref 3.5–5.3)
POTASSIUM SERPL-SCNC: 3.7 MMOL/L — SIGNIFICANT CHANGE UP (ref 3.5–5.3)
PROT SERPL-MCNC: 6 G/DL — SIGNIFICANT CHANGE UP (ref 6–8.3)
RBC # BLD: 4.08 M/UL — SIGNIFICANT CHANGE UP (ref 3.8–5.2)
RBC # FLD: 14.9 % — HIGH (ref 10.3–14.5)
SODIUM SERPL-SCNC: 141 MMOL/L — SIGNIFICANT CHANGE UP (ref 135–145)
WBC # BLD: 13.05 K/UL — HIGH (ref 3.8–10.5)
WBC # FLD AUTO: 13.05 K/UL — HIGH (ref 3.8–10.5)

## 2022-08-05 PROCEDURE — 76775 US EXAM ABDO BACK WALL LIM: CPT | Mod: 26

## 2022-08-05 RX ORDER — TAMSULOSIN HYDROCHLORIDE 0.4 MG/1
0.4 CAPSULE ORAL AT BEDTIME
Refills: 0 | Status: DISCONTINUED | OUTPATIENT
Start: 2022-08-05 | End: 2022-08-09

## 2022-08-05 RX ADMIN — LEVETIRACETAM 750 MILLIGRAM(S): 250 TABLET, FILM COATED ORAL at 05:45

## 2022-08-05 RX ADMIN — ATORVASTATIN CALCIUM 40 MILLIGRAM(S): 80 TABLET, FILM COATED ORAL at 21:50

## 2022-08-05 RX ADMIN — Medication 81 MILLIGRAM(S): at 13:07

## 2022-08-05 RX ADMIN — Medication 5 MILLIGRAM(S): at 21:50

## 2022-08-05 RX ADMIN — ZINC SULFATE TAB 220 MG (50 MG ZINC EQUIVALENT) 220 MILLIGRAM(S): 220 (50 ZN) TAB at 13:07

## 2022-08-05 RX ADMIN — NYSTATIN CREAM 1 APPLICATION(S): 100000 CREAM TOPICAL at 21:50

## 2022-08-05 RX ADMIN — NYSTATIN CREAM 1 APPLICATION(S): 100000 CREAM TOPICAL at 13:09

## 2022-08-05 RX ADMIN — TAMSULOSIN HYDROCHLORIDE 0.4 MILLIGRAM(S): 0.4 CAPSULE ORAL at 21:52

## 2022-08-05 RX ADMIN — NYSTATIN CREAM 1 APPLICATION(S): 100000 CREAM TOPICAL at 05:46

## 2022-08-05 RX ADMIN — MEROPENEM 100 MILLIGRAM(S): 1 INJECTION INTRAVENOUS at 21:49

## 2022-08-05 RX ADMIN — MEROPENEM 100 MILLIGRAM(S): 1 INJECTION INTRAVENOUS at 05:46

## 2022-08-05 RX ADMIN — Medication 1 TABLET(S): at 13:07

## 2022-08-05 RX ADMIN — Medication 500 MILLIGRAM(S): at 13:07

## 2022-08-05 RX ADMIN — LEVETIRACETAM 750 MILLIGRAM(S): 250 TABLET, FILM COATED ORAL at 17:16

## 2022-08-05 RX ADMIN — CHLORHEXIDINE GLUCONATE 1 APPLICATION(S): 213 SOLUTION TOPICAL at 13:07

## 2022-08-05 RX ADMIN — Medication 0.5 MILLIGRAM(S): at 11:26

## 2022-08-05 RX ADMIN — Medication 25 MILLIGRAM(S): at 05:45

## 2022-08-05 RX ADMIN — MEROPENEM 100 MILLIGRAM(S): 1 INJECTION INTRAVENOUS at 13:09

## 2022-08-05 RX ADMIN — Medication 25 MILLIGRAM(S): at 17:16

## 2022-08-05 NOTE — CHART NOTE - NSCHARTNOTEFT_GEN_A_CORE
EVENT: urinary retention     SUBJECTIVE: called by ultrasound tech, noted  Markedly distended bladder with volume of 1196 cc and Inability to urinate     OBJECTIVE:  Vital Signs Last 24 Hrs  T(C): 36.3 (05 Aug 2022 05:21), Max: 36.6 (04 Aug 2022 21:02)  T(F): 97.4 (05 Aug 2022 05:21), Max: 97.9 (04 Aug 2022 21:02)  HR: 103 (05 Aug 2022 05:21) (98 - 103)  BP: 123/82 (05 Aug 2022 05:21) (111/78 - 123/82)  BP(mean): --  RR: 20 (05 Aug 2022 05:21) (18 - 20)  SpO2: 95% (05 Aug 2022 05:21) (95% - 95%)    Parameters below as of 05 Aug 2022 05:21  Patient On (Oxygen Delivery Method): room air        LABS:                        12.1   13.05 )-----------( 301      ( 05 Aug 2022 06:09 )             38.3     08-05    141  |  106  |  30<H>  ----------------------------<  140<H>  3.7   |  26  |  0.90    Ca    8.9      05 Aug 2022 06:09    TPro  6.0  /  Alb  1.8<L>  /  TBili  0.4  /  DBili  x   /  AST  17  /  ALT  31  /  AlkPhos  133<H>  08-05      EKG:   IMAGING:   < from: US Renal (08.05.22 @ 12:14) >      ACC: [id number] EXAM:  US KIDNEY(S)                          PROCEDURE DATE:  08/05/2022      INTERPRETATION:  CLINICAL INFORMATION: Bacteremia.  Urinary tract   infection.    COMPARISON: Right upper quadrant ultrasound from 07/26/2022.    TECHNIQUE: Sonography of the kidneys and bladder.    FINDINGS:  Right kidney: 8.4 cm. No renal mass, hydronephrosis or calculi.    Left kidney: Not visualized.    Urinary bladder: Markedly distended bladder with volume of 1196 cc    IMPRESSION:  1.  The patient's left kidney cannot be visualized.  2.  No renal mass, hydronephrosis or calculus is visualized in the right   kidney  3.  Markedly distended bladder.  The patient was unable to urinate.  A   Ferrari catheter may be placed as clinically warranted.    --- End of Report ---    LAURA GOLDEN MD; Attending Radiologist  This document has been electronically signed. Aug  5 2022 12:21PM    < end of copied text >        ASSESSMENT:  HPI:  Patient is a 76 Y  O from home with PMH of HTN, A fib on Eliquis and atenolol, CVA 20 y ago with residual right sided weakness and is now non verbal and bed bound, b/L ICA stenosis, Recurrent UTI up to 5x a year, who comes in due to 24h hx of altered mental status from baseline, associated with fever and concerns for dysphagia, prompting the son to induce vomiting.     Son states that mother has Hx of recurrent UTI treated mostly at home with cipro, however this time, mental status were concerning to him for another stroke, which prompted ED admission.    Reports chronic constipation, relieved by docusate 2tablets and a dulcolax suppository every morning, No reported urinary changes, No chest pain, shortness of breath, no LOC, reported.    In the ED: Febrile T max 102.5, , /78   Exam: LLQ and RLQ tenderness to palpation  Labs: Lactate 3.7, UA +Ve, and white count of 14  CT abdomen pelvis showed distended gallbladder with stones, stool impaction and stercoral colitis, non obstructing left nephrolithiasis    Patient was admitted for management of metabolic encephalopathy, 2/2 sepsis 2/2 UTI, MARIA ISABEL and for observation for cholelithiasis.   (26 Jul 2022 14:36)      PLAN: place indwelling ferrari catheter   continue Iv abx   monitor bmp for maria isabel

## 2022-08-05 NOTE — PROGRESS NOTE ADULT - PROBLEM SELECTOR PLAN 1
(pt has PCN allergy, no reaction with CTX or Carbapenem in the past, OK to continue per ID)  - repeat blood cultures from 07/28 growing gram negative rods in anaerobic bottle on day 5  -blood cx 8/2 NGTD  - Echo shows no vegetation  - ID reccs ABD US  - to continue on Ertapenem for 7 days from negative blood culture, 8/2-8/9.   - ID Dr. Wayne following (pt has PCN allergy, no reaction with CTX or Carbapenem in the past, OK to continue per ID)  - repeat blood cultures from 07/28 growing gram negative rods in anaerobic bottle on day 5  -blood cx 8/2 NGTD  - Echo shows no vegetation  - ID reccs ABD US: Gallstones in the gallbladder without evidence of thickened gallbladder   wall, pericholecystic fluid or ultrasonic Ho's sign.   -f/u Renal US   - to continue on Ertapenem for 7 days from negative blood culture, 8/2-8/9.   - ID Dr. Wayne following

## 2022-08-05 NOTE — PROGRESS NOTE ADULT - PROBLEM SELECTOR PLAN 2
-resolved: leukocytosis, fever, hypotensive on admission,  -source E coli UTI, ESBL & E. Coli bacteremia  -s/p IV bolus, c/w maintenance IVF  - rest of plan as above leukocytosis, fever, hypotensive on admission,  -source E coli UTI, ESBL & E. Coli bacteremia  -s/p IV bolus, c/w maintenance IVF  - rest of plan as above  --resolved----

## 2022-08-05 NOTE — PROGRESS NOTE ADULT - TIME BILLING
77 y/o female with PMH of HTN, A fib, constipation, CVA with residual right sided weakness, dementia, b/L ICA stenosis, recurrent UTIs admitted for sepsis due to UTI and ESBL E. coli bacteremia. Repeat Bcx on 7/28 growing gram negative rajinder after 5 days.     #Sepsis - resolved   #ESBL Bacteremia - repeat Bcx 7/28 positive for ESBL, repeat blood cx NGTD  #Chronic atrial fibrillation  #Metabolic encephalopathy, delerium vs infectious  #H/o CVA w/ residual weakness   #HTN  #DEJA- resolved   #Fecal impaction- resolved   - C/w IV ertapenem from aug 2 for 7 day course until 8/9  - TTE with no vegetations  - abdominal US pending  - Wound care for pressure injury of skin  - Weaned off O2, on RA   - f/u ID Dr. Wayne
75 y/o female with PMH of HTN, A fib, constipation, CVA with residual right sided weakness, dementia, b/L ICA stenosis, recurrent UTIs admitted for sepsis due to UTI and ESBL E. coli bacteremia. Repeat Bcx on 7/28 growing gram negative rajinder after 5 days.     #Urinary retention, acute new  #Sepsis - resolved   #ESBL Bacteremia - repeat Bcx 7/28 positive for gram neg rajinder  #Chronic atrial fibrillation  #Metabolic encephalopathy, delerium vs infectious  #H/o CVA w/ residual weakness   #HTN  #DEJA- resolved   #Fecal impaction- resolved   - will start patient on flomax, ferrari catheter placed  - C/w IV ertapenem for 7 days from last negative blood cx until 8/9  - TTE with no vegetations  - abdominal US pending  - Wound care for pressure injury of skin  - Weaned off O2, on RA   - f/u ID Dr. Wayne.

## 2022-08-05 NOTE — PROGRESS NOTE ADULT - SUBJECTIVE AND OBJECTIVE BOX
NP Note discussed with  Primary Attending    Patient is a 76y old  Female who presents with a chief complaint of Altered mental status (04 Aug 2022 12:26)      INTERVAL HPI/OVERNIGHT EVENTS: no new complaints    MEDICATIONS  (STANDING):  ascorbic acid 500 milliGRAM(s) Oral daily  aspirin  chewable 81 milliGRAM(s) Oral daily  atorvastatin 40 milliGRAM(s) Oral at bedtime  bisacodyl 5 milliGRAM(s) Oral at bedtime  chlorhexidine 2% Cloths 1 Application(s) Topical daily  levETIRAcetam 750 milliGRAM(s) Oral two times a day  LORazepam     Tablet 0.5 milliGRAM(s) Oral once  meropenem  IVPB 1000 milliGRAM(s) IV Intermittent every 8 hours  metoprolol tartrate 25 milliGRAM(s) Oral every 12 hours  multivitamin 1 Tablet(s) Oral daily  nystatin Powder 1 Application(s) Topical three times a day  zinc sulfate 220 milliGRAM(s) Oral daily    MEDICATIONS  (PRN):  acetaminophen     Tablet .. 650 milliGRAM(s) Oral every 6 hours PRN Temp greater or equal to 38C (100.4F)  melatonin 3 milliGRAM(s) Oral at bedtime PRN Insomnia      __________________________________________________  REVIEW OF SYSTEMS: nonverbal at baseline, able to nod/shake head yes/no. denies pain/discomfort this am     Vital Signs Last 24 Hrs  T(C): 36.3 (05 Aug 2022 05:21), Max: 36.8 (04 Aug 2022 11:51)  T(F): 97.4 (05 Aug 2022 05:21), Max: 98.3 (04 Aug 2022 11:51)  HR: 103 (05 Aug 2022 05:21) (74 - 103)  BP: 123/82 (05 Aug 2022 05:21) (101/62 - 123/82)  BP(mean): --  RR: 20 (05 Aug 2022 05:21) (18 - 20)  SpO2: 95% (05 Aug 2022 05:21) (95% - 96%)    Parameters below as of 05 Aug 2022 05:21  Patient On (Oxygen Delivery Method): room air        ________________________________________________  PHYSICAL EXAM:  GENERAL: NAD  HEENT: Normocephalic;  conjunctivae and sclerae clear; moist mucous membranes;   NECK : supple  CHEST/LUNG: Clear to auscultation bilaterally with good air entry   HEART: S1 S2  regular; no murmurs, gallops or rubs  ABDOMEN: Soft, Nontender, Nondistended; Bowel sounds present  EXTREMITIES: no cyanosis; no edema; no calf tenderness  SKIN: warm and dry; no rash  NERVOUS SYSTEM:  Awake and alert ; no new deficits    _________________________________________________  LABS:                        12.1   13.05 )-----------( 301      ( 05 Aug 2022 06:09 )             38.3     08-05    141  |  106  |  30<H>  ----------------------------<  140<H>  3.7   |  26  |  0.90    Ca    8.9      05 Aug 2022 06:09    TPro  6.0  /  Alb  1.8<L>  /  TBili  0.4  /  DBili  x   /  AST  17  /  ALT  31  /  AlkPhos  133<H>  08-05        CAPILLARY BLOOD GLUCOSE      RADIOLOGY & ADDITIONAL TESTS:  < from: US Abdomen Upper Quadrant Right (07.26.22 @ 12:24) >    ACC: [id number] EXAM:  US ABDOMEN RT UPR QUADRANT                          PROCEDURE DATE:  07/26/2022          INTERPRETATION:  CLINICAL INFORMATION: Fever and vomiting. Distended   gallbladder with cholelithiasis on CT    COMPARISON: No prior abdominal ultrasound is available for comparison.   Reference is made with a previous abdominal CT of the same day.    TECHNIQUE: Sonography of the right upper quadrant.    FINDINGS:  Liver: Within normal limits.  Bile ducts: Normal caliber. Common bile duct measures 5 mm in caliber   which is within normal limits..  Gallbladder: Gallstones in the gallbladder without evidence of thickened   gallbladder wall, pericholecystic fluid or ultrasonic Ho's sign.  Pancreas: Visualized portions are within normal limits.  Right kidney: 9.6 cm. No hydronephrosis. Apparent cortical thinning of   the right kidney.  Ascites: None.  IVC: Visualized portions are within normal limits.    IMPRESSION:  Gallstones in the gallbladder without evidence of thickened gallbladder   wall, pericholecystic fluid or ultrasonic Ho's sign.    --- End of Report ---      SOTERO AGUIAR MD; Attending Radiologist  This document has been electronically signed. Jul 26 2022 12:37PM    < end of copied text >    Imaging  Reviewed:  YES    Consultant(s) Notes Reviewed:   YES

## 2022-08-05 NOTE — PROGRESS NOTE ADULT - PROBLEM SELECTOR PLAN 7
- appreciate wound care reccs  - continue local wound care  - continue MV, zinc, Vt C for wound healing --intact DTI to the R. Heel without drainage  -Stage 1 Pressure Injury to the R. Hip and L. Heel, as evident by non-blanchable erythema  -DTI to the Coccyx with epidermal separation, red tissue, drainage, and surrounding tissue blanchable erythema  -L. Hip Stage 4 Pressure Injury with pink tissue, purulent drainage, periwound erythema, and undermining (up to 0.6cm at 360 degrees)   - continue MV, zinc, Vt C for wound healing  wound care reccs:   -Clean all wounds with normal saline and apply skin prep to the surrounding skin  -Pack the L. Hip wound with Iodoform gaze and cover with a Foam dressing Daily PRN  -Apply Calcium Alginate (Aquacel) to the Coccyx wound and cover with a Foam dressing Q 72hrs PRN  -Apply a Foam dressing to the R. Hip area Q 72hrs PRN  -Elevate/float the patients heels using heel protectors and reposition the patient Q 2hrs using wedges or pillows

## 2022-08-05 NOTE — PROGRESS NOTE ADULT - PROBLEM SELECTOR PLAN 12
- from home with services  - now growing gram negative rods in anaerobic bottle on day 5 from bld cx 7/28  - blood cultures shows ESBL E.coli  - repeat cultures 8/2 NGTD   -to continue ertapenem QD until 8/9 - from home with services  - now growing gram negative rods in anaerobic bottle on day 5 from bld cx 7/28  - blood cultures shows ESBL E.coli  - repeat cultures 8/2 NGTD   -to continue meropenem until 8/9

## 2022-08-05 NOTE — PROGRESS NOTE ADULT - ASSESSMENT
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 Admitted to medicine for metabolic encephalopathy, 2/2 sepsis in the setting of ESBL Ecoli Bacteremia & UTI. ID consulted. Started on Meropenem, now changed to Ertapenem per ID Dr. Khan, repeat blood cultures from 07/28 were NGTD. Hospital course c/b repeat blood cultures from 07/28 growing gram negative rods in anaerobic bottle on day 5. Echo without vegetations. Blood cultures 8/2 no growth to date, US Abd pending to r/o source. Ertapenem changed to meropenem 2/2 availability per pharmacy, to continue for 7 days from negative blood Patient to continue on Ertapenem per ID for 7 days from negative Bld cx, until 8/9.     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 Admitted to medicine for metabolic encephalopathy, 2/2 sepsis in the setting of ESBL Ecoli Bacteremia & UTI. ID consulted. Started on Meropenem, now changed to Ertapenem per ID Dr. Khan, repeat blood cultures from 07/28 were NGTD. Hospital course c/b repeat blood cultures from 07/28 growing gram negative rods in anaerobic bottle on day 5. Echo without vegetations. Blood cultures 8/2 no growth to date, Ertapenem changed to meropenem 2/2 availability per pharmacy, to continue for 7 days from negative bld cx, until 8/9. pending renal US.

## 2022-08-05 NOTE — PROGRESS NOTE ADULT - PROBLEM SELECTOR PLAN 4
- resolved, likely in the setting of sepsis 2/2 UTI  - Obstructive unlikely as the left renal stone is non obstructing seen on CT  - s/p bolus, on IVF  - trend BMP - likely in the setting of sepsis 2/2 UTI  - Obstructive unlikely as the left renal stone is non obstructing seen on CT  - s/p bolus, on IVF  - trend BMP  --resolved---

## 2022-08-06 LAB
HCT VFR BLD CALC: 36.6 % — SIGNIFICANT CHANGE UP (ref 34.5–45)
HGB BLD-MCNC: 11.6 G/DL — SIGNIFICANT CHANGE UP (ref 11.5–15.5)
MCHC RBC-ENTMCNC: 29.8 PG — SIGNIFICANT CHANGE UP (ref 27–34)
MCHC RBC-ENTMCNC: 31.7 GM/DL — LOW (ref 32–36)
MCV RBC AUTO: 94.1 FL — SIGNIFICANT CHANGE UP (ref 80–100)
NRBC # BLD: 0 /100 WBCS — SIGNIFICANT CHANGE UP (ref 0–0)
PLATELET # BLD AUTO: 306 K/UL — SIGNIFICANT CHANGE UP (ref 150–400)
RBC # BLD: 3.89 M/UL — SIGNIFICANT CHANGE UP (ref 3.8–5.2)
RBC # FLD: 14.8 % — HIGH (ref 10.3–14.5)
WBC # BLD: 11.78 K/UL — HIGH (ref 3.8–10.5)
WBC # FLD AUTO: 11.78 K/UL — HIGH (ref 3.8–10.5)

## 2022-08-06 PROCEDURE — 74018 RADEX ABDOMEN 1 VIEW: CPT | Mod: 26

## 2022-08-06 PROCEDURE — 99232 SBSQ HOSP IP/OBS MODERATE 35: CPT

## 2022-08-06 RX ADMIN — Medication 500 MILLIGRAM(S): at 12:13

## 2022-08-06 RX ADMIN — TAMSULOSIN HYDROCHLORIDE 0.4 MILLIGRAM(S): 0.4 CAPSULE ORAL at 21:40

## 2022-08-06 RX ADMIN — Medication 5 MILLIGRAM(S): at 21:41

## 2022-08-06 RX ADMIN — MEROPENEM 100 MILLIGRAM(S): 1 INJECTION INTRAVENOUS at 21:41

## 2022-08-06 RX ADMIN — Medication 1 TABLET(S): at 12:14

## 2022-08-06 RX ADMIN — ATORVASTATIN CALCIUM 40 MILLIGRAM(S): 80 TABLET, FILM COATED ORAL at 21:40

## 2022-08-06 RX ADMIN — MEROPENEM 100 MILLIGRAM(S): 1 INJECTION INTRAVENOUS at 13:44

## 2022-08-06 RX ADMIN — CHLORHEXIDINE GLUCONATE 1 APPLICATION(S): 213 SOLUTION TOPICAL at 12:14

## 2022-08-06 RX ADMIN — NYSTATIN CREAM 1 APPLICATION(S): 100000 CREAM TOPICAL at 06:04

## 2022-08-06 RX ADMIN — ZINC SULFATE TAB 220 MG (50 MG ZINC EQUIVALENT) 220 MILLIGRAM(S): 220 (50 ZN) TAB at 12:14

## 2022-08-06 RX ADMIN — LEVETIRACETAM 750 MILLIGRAM(S): 250 TABLET, FILM COATED ORAL at 06:04

## 2022-08-06 RX ADMIN — Medication 25 MILLIGRAM(S): at 17:56

## 2022-08-06 RX ADMIN — Medication 25 MILLIGRAM(S): at 06:04

## 2022-08-06 RX ADMIN — NYSTATIN CREAM 1 APPLICATION(S): 100000 CREAM TOPICAL at 21:40

## 2022-08-06 RX ADMIN — NYSTATIN CREAM 1 APPLICATION(S): 100000 CREAM TOPICAL at 13:44

## 2022-08-06 RX ADMIN — LEVETIRACETAM 750 MILLIGRAM(S): 250 TABLET, FILM COATED ORAL at 17:56

## 2022-08-06 RX ADMIN — Medication 81 MILLIGRAM(S): at 12:13

## 2022-08-06 RX ADMIN — MEROPENEM 100 MILLIGRAM(S): 1 INJECTION INTRAVENOUS at 06:04

## 2022-08-06 NOTE — PROGRESS NOTE ADULT - ASSESSMENT
75 y/o female with PMH of HTN, A fib, constipation, CVA with residual right sided weakness, dementia, b/L ICA stenosis, recurrent UTIs admitted for sepsis due to UTI and ESBL E. coli bacteremia. Repeat Bcx on 7/28 growing gram negative rajinder after 5 days.     #Urinary retention, acute new  #Sepsis - resolved   #ESBL Bacteremia - repeat Bcx 7/28 positive for gram negative rajinder  #Chronic atrial fibrillation  #Metabolic encephalopathy, delerium vs infectious  #H/o CVA w/ residual weakness   #HTN  #DEJA- resolved   #Fecal impaction  - patient complaining of abdominal pain, will order abdominal Xray. Patient having bowel movements  - will start patient on flomax, ferrari catheter placed  - C/w IV ertapenem for 7 days from last negative blood cx until 8/9  - last negative blood cx 8/2  - TTE with no vegetations  - abdominal US   - Wound care for pressure injury of skin  - Weaned off O2, on RA   - f/u ID Dr. Wayne

## 2022-08-06 NOTE — PROGRESS NOTE ADULT - SUBJECTIVE AND OBJECTIVE BOX
Patient is a 76y old  Female who presents with a chief complaint of Altered mental status (05 Aug 2022 10:37)      SUBJECTIVE / OVERNIGHT EVENTS:  ADDITIONAL REVIEW OF SYSTEMS:    MEDICATIONS  (STANDING):  ascorbic acid 500 milliGRAM(s) Oral daily  aspirin  chewable 81 milliGRAM(s) Oral daily  atorvastatin 40 milliGRAM(s) Oral at bedtime  bisacodyl 5 milliGRAM(s) Oral at bedtime  chlorhexidine 2% Cloths 1 Application(s) Topical daily  levETIRAcetam 750 milliGRAM(s) Oral two times a day  meropenem  IVPB 1000 milliGRAM(s) IV Intermittent every 8 hours  metoprolol tartrate 25 milliGRAM(s) Oral every 12 hours  multivitamin 1 Tablet(s) Oral daily  nystatin Powder 1 Application(s) Topical three times a day  tamsulosin 0.4 milliGRAM(s) Oral at bedtime  zinc sulfate 220 milliGRAM(s) Oral daily    MEDICATIONS  (PRN):  acetaminophen     Tablet .. 650 milliGRAM(s) Oral every 6 hours PRN Temp greater or equal to 38C (100.4F)  melatonin 3 milliGRAM(s) Oral at bedtime PRN Insomnia      CAPILLARY BLOOD GLUCOSE        I&O's Summary    05 Aug 2022 07:01  -  06 Aug 2022 07:00  --------------------------------------------------------  IN: 0 mL / OUT: 1300 mL / NET: -1300 mL        PHYSICAL EXAM:  Vital Signs Last 24 Hrs  T(C): 37.2 (06 Aug 2022 05:24), Max: 37.6 (05 Aug 2022 21:46)  T(F): 98.9 (06 Aug 2022 05:24), Max: 99.6 (05 Aug 2022 21:46)  HR: 105 (06 Aug 2022 05:24) (91 - 105)  BP: 127/86 (06 Aug 2022 05:24) (124/90 - 128/74)  BP(mean): --  RR: 19 (06 Aug 2022 05:24) (18 - 20)  SpO2: 95% (06 Aug 2022 05:24) (94% - 98%)    Parameters below as of 06 Aug 2022 05:24  Patient On (Oxygen Delivery Method): room air      CONSTITUTIONAL: NAD, well-developed, well-groomed  EYES: PERRLA; conjunctiva and sclera clear  ENMT: Moist oral mucosa, no pharyngeal injection or exudates; normal dentition  NECK: Supple, no palpable masses; no thyromegaly  RESPIRATORY: Normal respiratory effort; lungs are clear to auscultation bilaterally  CARDIOVASCULAR: Regular rate and rhythm, normal S1 and S2, no murmur/rub/gallop; No lower extremity edema; Peripheral pulses are 2+ bilaterally  ABDOMEN: Nontender to palpation, normoactive bowel sounds, no rebound/guarding; No hepatosplenomegaly  MUSCLOSKELETAL:  Normal gait; no clubbing or cyanosis of digits; no joint swelling or tenderness to palpation  PSYCH: A+O to person, place, and time; affect appropriate  NEUROLOGY: CN 2-12 are intact and symmetric; no gross sensory deficits;   SKIN: No rashes; no palpable lesions    LABS:                        12.1   13.05 )-----------( 301      ( 05 Aug 2022 06:09 )             38.3     08-05    141  |  106  |  30<H>  ----------------------------<  140<H>  3.7   |  26  |  0.90    Ca    8.9      05 Aug 2022 06:09    TPro  6.0  /  Alb  1.8<L>  /  TBili  0.4  /  DBili  x   /  AST  17  /  ALT  31  /  AlkPhos  133<H>  08-05              Trend Procalcitonin      C-Reactive Protein, Serum: 30 mg/L (08-02-22 @ 10:05)      D-Dimer:      RADIOLOGY & ADDITIONAL TESTS:  Results Reviewed:   Imaging Personally Reviewed:  Electrocardiogram Personally Reviewed:    COORDINATION OF CARE:  Care Discussed with Consultants/Other Providers [Y/N]:  Prior or Outpatient Records Reviewed [Y/N]:   Patient is a 76y old  Female who presents with a chief complaint of Altered mental status (05 Aug 2022 10:37)      SUBJECTIVE / OVERNIGHT EVENT: VICKI, complaining of abdominal pain. Per nursing staff patient had a BM this morning.   ADDITIONAL REVIEW OF SYSTEMS: negative as per above    MEDICATIONS  (STANDING):  ascorbic acid 500 milliGRAM(s) Oral daily  aspirin  chewable 81 milliGRAM(s) Oral daily  atorvastatin 40 milliGRAM(s) Oral at bedtime  bisacodyl 5 milliGRAM(s) Oral at bedtime  chlorhexidine 2% Cloths 1 Application(s) Topical daily  levETIRAcetam 750 milliGRAM(s) Oral two times a day  meropenem  IVPB 1000 milliGRAM(s) IV Intermittent every 8 hours  metoprolol tartrate 25 milliGRAM(s) Oral every 12 hours  multivitamin 1 Tablet(s) Oral daily  nystatin Powder 1 Application(s) Topical three times a day  tamsulosin 0.4 milliGRAM(s) Oral at bedtime  zinc sulfate 220 milliGRAM(s) Oral daily    MEDICATIONS  (PRN):  acetaminophen     Tablet .. 650 milliGRAM(s) Oral every 6 hours PRN Temp greater or equal to 38C (100.4F)  melatonin 3 milliGRAM(s) Oral at bedtime PRN Insomnia      CAPILLARY BLOOD GLUCOSE        I&O's Summary    05 Aug 2022 07:01  -  06 Aug 2022 07:00  --------------------------------------------------------  IN: 0 mL / OUT: 1300 mL / NET: -1300 mL        PHYSICAL EXAM:  Vital Signs Last 24 Hrs  T(C): 37.2 (06 Aug 2022 05:24), Max: 37.6 (05 Aug 2022 21:46)  T(F): 98.9 (06 Aug 2022 05:24), Max: 99.6 (05 Aug 2022 21:46)  HR: 105 (06 Aug 2022 05:24) (91 - 105)  BP: 127/86 (06 Aug 2022 05:24) (124/90 - 128/74)  BP(mean): --  RR: 19 (06 Aug 2022 05:24) (18 - 20)  SpO2: 95% (06 Aug 2022 05:24) (94% - 98%)    Parameters below as of 06 Aug 2022 05:24  Patient On (Oxygen Delivery Method): room air      CONSTITUTIONAL: NAD, well-developed, well-groomed  EYES: PERRLA; conjunctiva and sclera clear  ENMT: Moist oral mucosa, no pharyngeal injection or exudates; normal dentition  NECK: Supple, no palpable masses; no thyromegaly  RESPIRATORY: Normal respiratory effort; lungs are clear to auscultation bilaterally  CARDIOVASCULAR: Regular rate and rhythm, normal S1 and S2, no murmur/rub/gallop; No lower extremity edema; Peripheral pulses are 2+ bilaterally  ABDOMEN: Nontender to palpation, normoactive bowel sounds, no rebound/guarding; No hepatosplenomegaly  MUSCLOSKELETAL:  Normal gait; no clubbing or cyanosis of digits; no joint swelling or tenderness to palpation  PSYCH: A+O to person, place, and time; affect appropriate  NEUROLOGY: CN 2-12 are intact and symmetric; no gross sensory deficits;   SKIN: No rashes; no palpable lesions    LABS:                        12.1   13.05 )-----------( 301      ( 05 Aug 2022 06:09 )             38.3     08-05    141  |  106  |  30<H>  ----------------------------<  140<H>  3.7   |  26  |  0.90    Ca    8.9      05 Aug 2022 06:09    TPro  6.0  /  Alb  1.8<L>  /  TBili  0.4  /  DBili  x   /  AST  17  /  ALT  31  /  AlkPhos  133<H>  08-05              Trend Procalcitonin      C-Reactive Protein, Serum: 30 mg/L (08-02-22 @ 10:05)      D-Dimer:      RADIOLOGY & ADDITIONAL TESTS:  Results Reviewed:   Imaging Personally Reviewed:  Electrocardiogram Personally Reviewed:    COORDINATION OF CARE:  Care Discussed with Consultants/Other Providers [Y/N]:  Prior or Outpatient Records Reviewed [Y/N]:

## 2022-08-07 LAB
ANION GAP SERPL CALC-SCNC: 7 MMOL/L — SIGNIFICANT CHANGE UP (ref 5–17)
BUN SERPL-MCNC: 24 MG/DL — HIGH (ref 7–18)
CALCIUM SERPL-MCNC: 9 MG/DL — SIGNIFICANT CHANGE UP (ref 8.4–10.5)
CHLORIDE SERPL-SCNC: 105 MMOL/L — SIGNIFICANT CHANGE UP (ref 96–108)
CO2 SERPL-SCNC: 30 MMOL/L — SIGNIFICANT CHANGE UP (ref 22–31)
CREAT SERPL-MCNC: 0.84 MG/DL — SIGNIFICANT CHANGE UP (ref 0.5–1.3)
CULTURE RESULTS: SIGNIFICANT CHANGE UP
CULTURE RESULTS: SIGNIFICANT CHANGE UP
EGFR: 73 ML/MIN/1.73M2 — SIGNIFICANT CHANGE UP
GLUCOSE SERPL-MCNC: 162 MG/DL — HIGH (ref 70–99)
HCT VFR BLD CALC: 37.6 % — SIGNIFICANT CHANGE UP (ref 34.5–45)
HGB BLD-MCNC: 12 G/DL — SIGNIFICANT CHANGE UP (ref 11.5–15.5)
MCHC RBC-ENTMCNC: 30.5 PG — SIGNIFICANT CHANGE UP (ref 27–34)
MCHC RBC-ENTMCNC: 31.9 GM/DL — LOW (ref 32–36)
MCV RBC AUTO: 95.7 FL — SIGNIFICANT CHANGE UP (ref 80–100)
NRBC # BLD: 0 /100 WBCS — SIGNIFICANT CHANGE UP (ref 0–0)
PLATELET # BLD AUTO: 284 K/UL — SIGNIFICANT CHANGE UP (ref 150–400)
POTASSIUM SERPL-MCNC: 4.3 MMOL/L — SIGNIFICANT CHANGE UP (ref 3.5–5.3)
POTASSIUM SERPL-SCNC: 4.3 MMOL/L — SIGNIFICANT CHANGE UP (ref 3.5–5.3)
RBC # BLD: 3.93 M/UL — SIGNIFICANT CHANGE UP (ref 3.8–5.2)
RBC # FLD: 14.7 % — HIGH (ref 10.3–14.5)
SODIUM SERPL-SCNC: 142 MMOL/L — SIGNIFICANT CHANGE UP (ref 135–145)
SPECIMEN SOURCE: SIGNIFICANT CHANGE UP
SPECIMEN SOURCE: SIGNIFICANT CHANGE UP
WBC # BLD: 12.26 K/UL — HIGH (ref 3.8–10.5)
WBC # FLD AUTO: 12.26 K/UL — HIGH (ref 3.8–10.5)

## 2022-08-07 PROCEDURE — 99232 SBSQ HOSP IP/OBS MODERATE 35: CPT

## 2022-08-07 RX ORDER — POLYETHYLENE GLYCOL 3350 17 G/17G
17 POWDER, FOR SOLUTION ORAL
Refills: 0 | Status: DISCONTINUED | OUTPATIENT
Start: 2022-08-07 | End: 2022-08-09

## 2022-08-07 RX ORDER — SENNA PLUS 8.6 MG/1
2 TABLET ORAL AT BEDTIME
Refills: 0 | Status: DISCONTINUED | OUTPATIENT
Start: 2022-08-07 | End: 2022-08-09

## 2022-08-07 RX ADMIN — LEVETIRACETAM 750 MILLIGRAM(S): 250 TABLET, FILM COATED ORAL at 17:37

## 2022-08-07 RX ADMIN — NYSTATIN CREAM 1 APPLICATION(S): 100000 CREAM TOPICAL at 14:00

## 2022-08-07 RX ADMIN — Medication 25 MILLIGRAM(S): at 05:52

## 2022-08-07 RX ADMIN — NYSTATIN CREAM 1 APPLICATION(S): 100000 CREAM TOPICAL at 05:51

## 2022-08-07 RX ADMIN — SENNA PLUS 2 TABLET(S): 8.6 TABLET ORAL at 22:17

## 2022-08-07 RX ADMIN — MEROPENEM 100 MILLIGRAM(S): 1 INJECTION INTRAVENOUS at 14:00

## 2022-08-07 RX ADMIN — LEVETIRACETAM 750 MILLIGRAM(S): 250 TABLET, FILM COATED ORAL at 05:51

## 2022-08-07 RX ADMIN — Medication 25 MILLIGRAM(S): at 17:37

## 2022-08-07 RX ADMIN — ZINC SULFATE TAB 220 MG (50 MG ZINC EQUIVALENT) 220 MILLIGRAM(S): 220 (50 ZN) TAB at 12:05

## 2022-08-07 RX ADMIN — MEROPENEM 100 MILLIGRAM(S): 1 INJECTION INTRAVENOUS at 05:52

## 2022-08-07 RX ADMIN — Medication 1 TABLET(S): at 12:06

## 2022-08-07 RX ADMIN — ATORVASTATIN CALCIUM 40 MILLIGRAM(S): 80 TABLET, FILM COATED ORAL at 22:17

## 2022-08-07 RX ADMIN — POLYETHYLENE GLYCOL 3350 17 GRAM(S): 17 POWDER, FOR SOLUTION ORAL at 17:37

## 2022-08-07 RX ADMIN — Medication 500 MILLIGRAM(S): at 12:05

## 2022-08-07 RX ADMIN — CHLORHEXIDINE GLUCONATE 1 APPLICATION(S): 213 SOLUTION TOPICAL at 12:05

## 2022-08-07 RX ADMIN — Medication 5 MILLIGRAM(S): at 22:17

## 2022-08-07 RX ADMIN — TAMSULOSIN HYDROCHLORIDE 0.4 MILLIGRAM(S): 0.4 CAPSULE ORAL at 22:17

## 2022-08-07 RX ADMIN — Medication 81 MILLIGRAM(S): at 12:05

## 2022-08-07 RX ADMIN — MEROPENEM 100 MILLIGRAM(S): 1 INJECTION INTRAVENOUS at 22:16

## 2022-08-07 RX ADMIN — NYSTATIN CREAM 1 APPLICATION(S): 100000 CREAM TOPICAL at 22:25

## 2022-08-07 NOTE — PROGRESS NOTE ADULT - SUBJECTIVE AND OBJECTIVE BOX
Patient is a 76y old  Female who presents with a chief complaint of Altered mental status (06 Aug 2022 09:18)      SUBJECTIVE / OVERNIGHT EVENTS: VICKI overnight, patient's son by bedside. Patient appears comfortable  ADDITIONAL REVIEW OF SYSTEMS: negative as per above    MEDICATIONS  (STANDING):  ascorbic acid 500 milliGRAM(s) Oral daily  aspirin  chewable 81 milliGRAM(s) Oral daily  atorvastatin 40 milliGRAM(s) Oral at bedtime  bisacodyl 5 milliGRAM(s) Oral at bedtime  chlorhexidine 2% Cloths 1 Application(s) Topical daily  levETIRAcetam 750 milliGRAM(s) Oral two times a day  meropenem  IVPB 1000 milliGRAM(s) IV Intermittent every 8 hours  metoprolol tartrate 25 milliGRAM(s) Oral every 12 hours  multivitamin 1 Tablet(s) Oral daily  nystatin Powder 1 Application(s) Topical three times a day  polyethylene glycol 3350 17 Gram(s) Oral two times a day  senna 2 Tablet(s) Oral at bedtime  tamsulosin 0.4 milliGRAM(s) Oral at bedtime  zinc sulfate 220 milliGRAM(s) Oral daily    MEDICATIONS  (PRN):  acetaminophen     Tablet .. 650 milliGRAM(s) Oral every 6 hours PRN Temp greater or equal to 38C (100.4F)  melatonin 3 milliGRAM(s) Oral at bedtime PRN Insomnia      CAPILLARY BLOOD GLUCOSE        I&O's Summary    06 Aug 2022 07:01  -  07 Aug 2022 07:00  --------------------------------------------------------  IN: 100 mL / OUT: 1300 mL / NET: -1200 mL        PHYSICAL EXAM:  Vital Signs Last 24 Hrs  T(C): 36.6 (07 Aug 2022 12:58), Max: 36.8 (06 Aug 2022 20:49)  T(F): 97.8 (07 Aug 2022 12:58), Max: 98.2 (06 Aug 2022 20:49)  HR: 64 (07 Aug 2022 17:35) (64 - 109)  BP: 103/52 (07 Aug 2022 17:35) (103/52 - 129/74)  BP(mean): --  RR: 17 (07 Aug 2022 17:35) (17 - 18)  SpO2: 97% (07 Aug 2022 17:35) (96% - 97%)    Parameters below as of 07 Aug 2022 17:35  Patient On (Oxygen Delivery Method): room air      CONSTITUTIONAL: NAD, well-developed, well-groomed  EYES: PERRLA; conjunctiva and sclera clear  ENMT: Moist oral mucosa, no pharyngeal injection or exudates; normal dentition  NECK: Supple, no palpable masses; no thyromegaly  RESPIRATORY: Normal respiratory effort; lungs are clear to auscultation bilaterally  CARDIOVASCULAR: Regular rate and rhythm, normal S1 and S2, no murmur/rub/gallop; No lower extremity edema; Peripheral pulses are 2+ bilaterally  ABDOMEN: Nontender to palpation, normoactive bowel sounds, no rebound/guarding; No hepatosplenomegaly  MUSCLOSKELETAL:  Normal gait; no clubbing or cyanosis of digits; no joint swelling or tenderness to palpation  PSYCH: A+O to person, place, and time; affect appropriate  NEUROLOGY: CN 2-12 are intact and symmetric; no gross sensory deficits;   SKIN: No rashes; no palpable lesions    LABS:                        12.0   12.26 )-----------( 284      ( 07 Aug 2022 06:55 )             37.6     08-07    142  |  105  |  24<H>  ----------------------------<  162<H>  4.3   |  30  |  0.84    Ca    9.0      07 Aug 2022 06:55                Trend Procalcitonin      C-Reactive Protein, Serum: 30 mg/L (08-02-22 @ 10:05)      D-Dimer:      RADIOLOGY & ADDITIONAL TESTS:  Results Reviewed:   Imaging Personally Reviewed:  Electrocardiogram Personally Reviewed:    COORDINATION OF CARE:  Care Discussed with Consultants/Other Providers [Y/N]:  Prior or Outpatient Records Reviewed [Y/N]:   Patient is a 76y old  Female who presents with a chief complaint of Altered mental status (06 Aug 2022 09:18)      SUBJECTIVE / OVERNIGHT EVENTS: VICKI overnight, patient's son by bedside. Patient appears comfortable. Discussed with patient's son extensively regarding the plan of care.  He had several concerns. Did not want ferrari catheter on discharge, discussed with him regarding trial of void tomorrow     ADDITIONAL REVIEW OF SYSTEMS: negative as per above    MEDICATIONS  (STANDING):  ascorbic acid 500 milliGRAM(s) Oral daily  aspirin  chewable 81 milliGRAM(s) Oral daily  atorvastatin 40 milliGRAM(s) Oral at bedtime  bisacodyl 5 milliGRAM(s) Oral at bedtime  chlorhexidine 2% Cloths 1 Application(s) Topical daily  levETIRAcetam 750 milliGRAM(s) Oral two times a day  meropenem  IVPB 1000 milliGRAM(s) IV Intermittent every 8 hours  metoprolol tartrate 25 milliGRAM(s) Oral every 12 hours  multivitamin 1 Tablet(s) Oral daily  nystatin Powder 1 Application(s) Topical three times a day  polyethylene glycol 3350 17 Gram(s) Oral two times a day  senna 2 Tablet(s) Oral at bedtime  tamsulosin 0.4 milliGRAM(s) Oral at bedtime  zinc sulfate 220 milliGRAM(s) Oral daily    MEDICATIONS  (PRN):  acetaminophen     Tablet .. 650 milliGRAM(s) Oral every 6 hours PRN Temp greater or equal to 38C (100.4F)  melatonin 3 milliGRAM(s) Oral at bedtime PRN Insomnia      CAPILLARY BLOOD GLUCOSE        I&O's Summary    06 Aug 2022 07:01  -  07 Aug 2022 07:00  --------------------------------------------------------  IN: 100 mL / OUT: 1300 mL / NET: -1200 mL        PHYSICAL EXAM:  Vital Signs Last 24 Hrs  T(C): 36.6 (07 Aug 2022 12:58), Max: 36.8 (06 Aug 2022 20:49)  T(F): 97.8 (07 Aug 2022 12:58), Max: 98.2 (06 Aug 2022 20:49)  HR: 64 (07 Aug 2022 17:35) (64 - 109)  BP: 103/52 (07 Aug 2022 17:35) (103/52 - 129/74)  BP(mean): --  RR: 17 (07 Aug 2022 17:35) (17 - 18)  SpO2: 97% (07 Aug 2022 17:35) (96% - 97%)    Parameters below as of 07 Aug 2022 17:35  Patient On (Oxygen Delivery Method): room air      CONSTITUTIONAL: NAD, well-developed, well-groomed  EYES: PERRLA; conjunctiva and sclera clear  ENMT: Moist oral mucosa, no pharyngeal injection or exudates; normal dentition  NECK: Supple, no palpable masses; no thyromegaly  RESPIRATORY: Normal respiratory effort; lungs are clear to auscultation bilaterally  CARDIOVASCULAR: Regular rate and rhythm, normal S1 and S2, no murmur/rub/gallop; No lower extremity edema; Peripheral pulses are 2+ bilaterally  ABDOMEN: Nontender to palpation, normoactive bowel sounds, no rebound/guarding; No hepatosplenomegaly  MUSCLOSKELETAL:  Normal gait; no clubbing or cyanosis of digits; no joint swelling or tenderness to palpation  PSYCH: A+O to person, place, and time; affect appropriate  NEUROLOGY: CN 2-12 are intact and symmetric; no gross sensory deficits;   SKIN: No rashes; no palpable lesions    LABS:                        12.0   12.26 )-----------( 284      ( 07 Aug 2022 06:55 )             37.6     08-07    142  |  105  |  24<H>  ----------------------------<  162<H>  4.3   |  30  |  0.84    Ca    9.0      07 Aug 2022 06:55                Trend Procalcitonin      C-Reactive Protein, Serum: 30 mg/L (08-02-22 @ 10:05)      D-Dimer:      RADIOLOGY & ADDITIONAL TESTS:  Results Reviewed:   Imaging Personally Reviewed:  Electrocardiogram Personally Reviewed:    COORDINATION OF CARE:  Care Discussed with Consultants/Other Providers [Y/N]:  Prior or Outpatient Records Reviewed [Y/N]:

## 2022-08-07 NOTE — CHART NOTE - NSCHARTNOTEFT_GEN_A_CORE
Lengthy discussing with patient's son at bedside by Dr. Menchaca.  Son would like to be called and confirm patient will be ready for d/c as soon as pt is stable for d/c.       PLAN:  - trial of void on 8/8 in anticipation of d/c 8/9  - last dose of IV Abx on 8/9 prior to d/c  - please have nursing staff administer IV Abx as early as possible for early d/c on 8/9  - please have CM contact pt's son to make arrangements for d/c on 8/9  - also found with stool burden on abd xray 8/6  - BM regimen intensified  - daily tap water enema ordered   - above discussed with Dr. Menchaca

## 2022-08-07 NOTE — PROGRESS NOTE ADULT - ASSESSMENT
77 y/o female with PMH of HTN, A fib, constipation, CVA with residual right sided weakness, dementia, b/L ICA stenosis, recurrent UTIs admitted for sepsis due to UTI and ESBL E. coli bacteremia. Repeat Bcx on 7/28 growing gram negative rajinder after 5 days.     #Urinary retention, acute new  #Sepsis - resolved   #ESBL Bacteremia - repeat Bcx 7/28 positive for gram negative rajinder  #Chronic atrial fibrillation  #Metabolic encephalopathy, delerium vs infectious  #H/o CVA w/ residual weakness   #HTN  #DEJA- resolved   #Fecal impaction  - Xray with large stool burden  - uptitrate bowel regimen   - will start patient on flomax, ferrari catheter placed  - C/w IV ertapenem for 7 days from last negative blood cx until 8/9  - last negative blood cx 8/2  - TTE with no vegetations  - abdominal US   - Wound care for pressure injury of skin  - Weaned off O2, on RA   - f/u ID Dr. Wayne   77 y/o female with PMH of HTN, A fib, constipation, CVA with residual right sided weakness, dementia, b/L ICA stenosis, recurrent UTIs admitted for sepsis due to UTI and ESBL E. coli bacteremia. Repeat Bcx on 7/28 growing gram negative rajinder after 5 days.     #Urinary retention, acute new  #Sepsis - resolved   #ESBL Bacteremia - repeat Bcx 7/28 positive for gram negative rajinder  #Chronic atrial fibrillation  #Metabolic encephalopathy, delerium vs infectious  #H/o CVA w/ residual weakness   #HTN  #DEJA- resolved   #Fecal impaction  - Xray with large stool burden  - uptitrate bowel regimen with enema, miralax BID  - continue flomax, ferrari catheter had been placed, attempt TOV tomorrow  - C/w IV ertapenem for 7 days from last negative blood cx until 8/8  - last negative blood cx 8/2  - TTE with no vegetations  - abdominal US   - Wound care for pressure injury of skin  - Weaned off O2, on RA   - f/u ID Dr. Wayne  - discussed plan of care extensively with patient's son answered all his questions. Son requesting ferrari to be discontinued. Will attempt TOV tmrw.   Also requesting for timing of discharge to be set up and coordinated with him with CM for tuesday

## 2022-08-08 LAB
ANION GAP SERPL CALC-SCNC: 5 MMOL/L — SIGNIFICANT CHANGE UP (ref 5–17)
BUN SERPL-MCNC: 21 MG/DL — HIGH (ref 7–18)
CALCIUM SERPL-MCNC: 9 MG/DL — SIGNIFICANT CHANGE UP (ref 8.4–10.5)
CHLORIDE SERPL-SCNC: 106 MMOL/L — SIGNIFICANT CHANGE UP (ref 96–108)
CO2 SERPL-SCNC: 31 MMOL/L — SIGNIFICANT CHANGE UP (ref 22–31)
CREAT SERPL-MCNC: 0.73 MG/DL — SIGNIFICANT CHANGE UP (ref 0.5–1.3)
EGFR: 86 ML/MIN/1.73M2 — SIGNIFICANT CHANGE UP
GLUCOSE SERPL-MCNC: 104 MG/DL — HIGH (ref 70–99)
POTASSIUM SERPL-MCNC: 3.9 MMOL/L — SIGNIFICANT CHANGE UP (ref 3.5–5.3)
POTASSIUM SERPL-SCNC: 3.9 MMOL/L — SIGNIFICANT CHANGE UP (ref 3.5–5.3)
SARS-COV-2 RNA SPEC QL NAA+PROBE: SIGNIFICANT CHANGE UP
SODIUM SERPL-SCNC: 142 MMOL/L — SIGNIFICANT CHANGE UP (ref 135–145)

## 2022-08-08 RX ORDER — APIXABAN 2.5 MG/1
5 TABLET, FILM COATED ORAL EVERY 12 HOURS
Refills: 0 | Status: DISCONTINUED | OUTPATIENT
Start: 2022-08-08 | End: 2022-08-09

## 2022-08-08 RX ADMIN — TAMSULOSIN HYDROCHLORIDE 0.4 MILLIGRAM(S): 0.4 CAPSULE ORAL at 21:34

## 2022-08-08 RX ADMIN — Medication 81 MILLIGRAM(S): at 11:15

## 2022-08-08 RX ADMIN — Medication 500 MILLIGRAM(S): at 11:15

## 2022-08-08 RX ADMIN — NYSTATIN CREAM 1 APPLICATION(S): 100000 CREAM TOPICAL at 05:20

## 2022-08-08 RX ADMIN — APIXABAN 5 MILLIGRAM(S): 2.5 TABLET, FILM COATED ORAL at 17:51

## 2022-08-08 RX ADMIN — Medication 25 MILLIGRAM(S): at 05:14

## 2022-08-08 RX ADMIN — LEVETIRACETAM 750 MILLIGRAM(S): 250 TABLET, FILM COATED ORAL at 05:15

## 2022-08-08 RX ADMIN — Medication 1 TABLET(S): at 11:15

## 2022-08-08 RX ADMIN — Medication 25 MILLIGRAM(S): at 17:49

## 2022-08-08 RX ADMIN — NYSTATIN CREAM 1 APPLICATION(S): 100000 CREAM TOPICAL at 14:51

## 2022-08-08 RX ADMIN — Medication 5 MILLIGRAM(S): at 21:33

## 2022-08-08 RX ADMIN — ZINC SULFATE TAB 220 MG (50 MG ZINC EQUIVALENT) 220 MILLIGRAM(S): 220 (50 ZN) TAB at 11:14

## 2022-08-08 RX ADMIN — SENNA PLUS 2 TABLET(S): 8.6 TABLET ORAL at 21:34

## 2022-08-08 RX ADMIN — POLYETHYLENE GLYCOL 3350 17 GRAM(S): 17 POWDER, FOR SOLUTION ORAL at 17:50

## 2022-08-08 RX ADMIN — LEVETIRACETAM 750 MILLIGRAM(S): 250 TABLET, FILM COATED ORAL at 17:49

## 2022-08-08 RX ADMIN — CHLORHEXIDINE GLUCONATE 1 APPLICATION(S): 213 SOLUTION TOPICAL at 11:25

## 2022-08-08 RX ADMIN — MEROPENEM 100 MILLIGRAM(S): 1 INJECTION INTRAVENOUS at 14:51

## 2022-08-08 RX ADMIN — MEROPENEM 100 MILLIGRAM(S): 1 INJECTION INTRAVENOUS at 05:15

## 2022-08-08 RX ADMIN — ATORVASTATIN CALCIUM 40 MILLIGRAM(S): 80 TABLET, FILM COATED ORAL at 21:33

## 2022-08-08 RX ADMIN — POLYETHYLENE GLYCOL 3350 17 GRAM(S): 17 POWDER, FOR SOLUTION ORAL at 05:16

## 2022-08-08 RX ADMIN — NYSTATIN CREAM 1 APPLICATION(S): 100000 CREAM TOPICAL at 21:35

## 2022-08-08 NOTE — PROGRESS NOTE ADULT - NS ATTEND AMEND GEN_ALL_CORE FT
75 y/o female with PMH of HTN, A fib, constipation, CVA with residual right sided weakness, dementia, b/L ICA stenosis, recurrent UTIs admitted for sepsis due to UTI and ESBL E. coli bacteremia. Repeat Bcx on 7/28 growing gram negative rajinder after 5 days.     #Sepsis - resolved   #ESBL Bacteremia - repeat Bcx 7/28 positive for gram neg rajinder  #Chronic atrial fibrillation  #Metabolic encephalopathy, delerium vs infectious  #H/o CVA w/ residual weakness   #HTN  #DEJA- resolved   #Fecal impaction- resolved   - C/w IV ertapenem  - Repeat blood cx pending from august 2nd  - TTE with no vegetations  - abdominal US pending  - Wound care for pressure injury of skin  - Weaned off O2, on RA   - f/u ID Dr. Wayne.
75 y/o female with PMH of HTN, A fib, constipation, CVA with residual right sided weakness, dementia, b/L ICA stenosis, recurrent UTIs admitted for sepsis due to UTI and ESBL E. coli bacteremia. Repeat Bcx on 7/28 growing gram negative rajinder after 5 days.     A/P  #Urinary retention, acute new  #Sepsis - resolved   #ESBL Bacteremia - repeat Bcx 7/28 positive for gram negative rajinder  #Chronic atrial fibrillation  #Metabolic encephalopathy, delerium vs infectious  #H/o CVA w/ residual weakness   #HTN  #DEJA- resolved   #Fecal impaction, resolved  - continue bowel regimen  - patient passed TOV, ferrari discontinued  - C/w IV ertapenem for 7 days from last negative blood cx until 8/8  - last negative blood cx 8/2  - TTE with no vegetations  - f/u ID Dr. Wayne  - plan for discharge tomorrow at 10am per CM, transport set up
discussed the case with the ID -Dr. Khan, will need total of 7 days of iv atbx  c/w Ertapenem till 8/2 - last dose- d/c home after last dose   rpt blood cx- negative  Vitals stable  wbc normal now  wean off oxygen
75 y/o female with PMH of HTN, A fib, constipation, CVA with residual right sided weakness, dementia, b/L ICA stenosis, recurrent UTIs admitted for sepsis due to UTI and ESBL E. coli bacteremia. Repeat Bcx on 7/28 growing gram negative rajinder after 5 days.     #Sepsis - resolved   #ESBL Bacteremia - repeat Bcx 7/28 positive for gram neg rajinder  #Chronic atrial fibrillation  #Metabolic encephalopathy, delerium vs infectious  #H/o CVA w/ residual weakness   #HTN  #DEJA- resolved   #Fecal impaction- resolved   - C/w IV ertapenem  - Repeat blood cx   - f/u TTE, abdominal US  - Wound care for pressure injury of skin  - Weaned off O2, on RA   - f/u ID Dr. Wayne
75 y/o female with PMH of HTN, A fib, constipation, CVA with residual right sided weakness, dementia, b/L ICA stenosis, recurrent UTIs admitted for sepsis due to UTI. BCs growing ESBL E. coli.       Pt seen and examined, no new complaints. Watching TV in bed.     Labs reviewed    PE as above       A/P:  #Sepsis - resolved   #ESBL Bacteremia  #Chronic atrial fibrillation  #H/o CVA w/ residual weakness   #HTN  #DEJA- resolved   #Fecal impaction- resolved     Plan:  -C/w IV ertapenem till 8/2 to complete 7 days.   -Repeat blood cx NTD  -Weaned off O2, on RA   -DC home in am
vitals stable, wbc trended down, rpt blood cx- pending, atbx changed to Ertepenem  add metprolol 25 mg q12hr instead of atenolol for HR control  monitor labs  c/w rest of medical mx  wean off oxygen
a/p# Sepsis 2/2 ESBL Bacteremia # Metabolic Encephalopathy # DEJA # CVA w/ rt sided hemiplegia # Dysarthria    -Change atbx to Meropenem- ID consult.   - cr improving, fu BMP  -mental status improved  -d/w son at bedside- he wants soft diet and not pureed.  - patient will likely need PICC line for abtx prior to d/c- will d/w ID

## 2022-08-08 NOTE — PROGRESS NOTE ADULT - REASON FOR ADMISSION
Altered mental status
ESBL E.coli UTI/bacteremia
Altered mental status

## 2022-08-08 NOTE — PROGRESS NOTE ADULT - PROBLEM SELECTOR PLAN 4
- likely in the setting of sepsis 2/2 UTI  - renal us noted as above  - s/p bolus, on IVF  - Scr 0.73  - trend BMP  --resolved---

## 2022-08-08 NOTE — PROGRESS NOTE ADULT - PROVIDER SPECIALTY LIST ADULT
Internal Medicine
Internal Medicine
Hospitalist
Hospitalist
Infectious Disease
Internal Medicine
Internal Medicine
Infectious Disease
Internal Medicine

## 2022-08-08 NOTE — PROGRESS NOTE ADULT - PROBLEM SELECTOR PLAN 10
-Seen on CT  -with a chronic Hx of constipation   -patient gets suppository and stool softener every morning  -s/p dulcolax and mineral oil enema  -on dulcolax daily
-Seen on CT  -with a chronic Hx of constipation   -patient gets suppository and stool softener every morning  -s/p dulcolax and mineral oil enema  -on dulcolax daily
-Seen on CT  -with a chronic Hx of constipation   -patient gets suppository and stool softener every morning  -give dulcolax and mineral oil enema
-Seen on CT  -with a chronic Hx of constipation   -s/p dulcolax and mineral oil enema  -on dulcolax daily
-Seen on CT  -with a chronic Hx of constipation   -patient gets suppository and stool softener every morning  -give dulcolax and mineral oil enema

## 2022-08-08 NOTE — PROGRESS NOTE ADULT - PROBLEM SELECTOR PLAN 12
- from home with services  - pending final dose of IV meropenem abx on 8/8/22  - TOV started 8/8/22  - Plan DISPO planning for AM

## 2022-08-08 NOTE — PROGRESS NOTE ADULT - PROBLEM SELECTOR PLAN 9
-CT abd/pelvis showed cholelithiasis with markedly distended gallbladder  -RUQ US showed Gallstones in the gallbladder without evidence of thickened gallbladder wall, pericholecystic fluid or ultrasonic Ho's sign.  -no urgent intervention needed this time.   - can follow up outpatient upon d/c
-CT abd/pelvis showed cholelithiasis with markedly distended gallbladder  -RUQ US showed Gallstones in the gallbladder without evidence of thickened gallbladder wall, pericholecystic fluid or ultrasonic Ho's sign.  -no urgent intervention needed this time.   - can follow up outpatient upon d/c
-CT abd/pelvis resulted Rectal fecal impaction with associated uncomplicated stercoral proctitis.  Cholelithiasis with markedly distended gallbladder. Correlate with right upper quadrant ultrasound.  Nonobstructive left nephrolithiasis.  -RUQ US : Gallstones in the gallbladder without evidence of thickened gallbladder wall, pericholecystic fluid or ultrasonic Ho's sign.  -no urgent intervention needed this time.   -outpatient follow up when stable.
-CT abd/pelvis showed cholelithiasis with markedly distended gallbladder  -RUQ US showed Gallstones in the gallbladder without evidence of thickened gallbladder wall, pericholecystic fluid or ultrasonic Ho's sign.  -no urgent intervention needed this time.   - can follow up outpatient upon d/c
-CT abd/pelvis resulted Rectal fecal impaction with associated uncomplicated stercoral proctitis.  Cholelithiasis with markedly distended gallbladder. Correlate with right upper quadrant ultrasound.  Nonobstructive left nephrolithiasis.  -RUQ US : Gallstones in the gallbladder without evidence of thickened gallbladder wall, pericholecystic fluid or ultrasonic Ho's sign.  -no urgent intervention needed this time.   -outpatient follow up when stable.
-CT abd/pelvis resulted Rectal fecal impaction with associated uncomplicated stercoral proctitis.  Cholelithiasis with markedly distended gallbladder. Correlate with right upper quadrant ultrasound.  Nonobstructive left nephrolithiasis.  -RUQ US : Gallstones in the gallbladder without evidence of thickened gallbladder wall, pericholecystic fluid or ultrasonic Ho's sign.  -no urgent intervention needed this time.   -outpatient follow up when stable.

## 2022-08-08 NOTE — PROGRESS NOTE ADULT - PROBLEM SELECTOR PLAN 11
DVT ppx : on eliquis  GI ppx :PPI
DVT ppx : on eliquis 5mg BID  GI ppx :PPI

## 2022-08-08 NOTE — PROGRESS NOTE ADULT - PROBLEM SELECTOR PLAN 1
- blood cultures from 07/28 and 7/26 +( ESBL, Ecoli)  - blood cx 8/2 No growth  - Echo shows no vegetation  - ABD US: Gallstones in the gallbladder without evidence of thickened gallbladder   wall, pericholecystic fluid or ultrasonic Ho's sign.   -Renal US- rt kidney no hydronephrosis, stone or mass  - Ertapenem for 7 days from negative blood culture, 8/2-8/8.   - ID Dr. Wayne following

## 2022-08-08 NOTE — PROGRESS NOTE ADULT - ASSESSMENT
76 year old female from home with PMH of HTN, A fib on eliquis and atenolol, CVA 20 y ago with residual right sided weakness, bed bound, presents with altered mental status, associated with fever and concerns for dysphagia. found Febrile T max 102.5, , /78,  Lactate 3.7, UA +Ve, and white count of 14, CT abdomen pelvis showed distended gallbladder with stones, stool impaction and stercoral colitis, non obstructing left nephrolithiasis, RUQ w/o cholecystitis. Admitted to medicine for metabolic encephalopathy, 2/2 sepsis in the setting of ESBL, Ecoli Bacteremia. ID consulted Dr. Wayne. Started on Meropenem, now changed to Ertapenem per ID Dr. Khan, repeat blood cultures from 07/28 were NGTD. Hospital course c/b repeat blood cultures from 07/28 growing gram negative rods in anaerobic bottle on day 5. Echo without vegetations. Blood cultures 8/2 no growth to date, Ertapenem changed to meropenem 2/2 availability per pharmacy, to continue for 7 days from negative bld cx, until 8/8. DISPO planning for AM.

## 2022-08-08 NOTE — PROGRESS NOTE ADULT - PROBLEM SELECTOR PLAN 3
--resolved---  - 2/2 to sepsis in the setting of ESBL E coli bacteremia, UTI.   hx dementia AOx 1 at baseline.    - CTH negative for acute changes  -SLP reccs: puree diet with mild thick per reccs. family refuses, on soft diet now.   -c/w abx per ID- Currently on Ertapenem until 8/8  -ID Dr. Wayne following

## 2022-08-08 NOTE — PROGRESS NOTE ADULT - SUBJECTIVE AND OBJECTIVE BOX
Patient is a 76y old  Female who presents with a chief complaint of Altered mental status (07 Aug 2022 17:48)      INTERVAL HPI/OVERNIGHT EVENTS: no overnight events    I&O's Summary    07 Aug 2022 07:01  -  08 Aug 2022 07:00  --------------------------------------------------------  IN: 0 mL / OUT: 1800 mL / NET: -1800 mL      Vital Signs Last 24 Hrs  T(C): 36.7 (08 Aug 2022 04:50), Max: 36.7 (08 Aug 2022 04:50)  T(F): 98.1 (08 Aug 2022 04:50), Max: 98.1 (08 Aug 2022 04:50)  HR: 104 (08 Aug 2022 04:50) (64 - 104)  BP: 131/75 (08 Aug 2022 04:50) (100/64 - 131/75)  BP(mean): --  RR: 19 (08 Aug 2022 04:50) (17 - 19)  SpO2: 95% (08 Aug 2022 04:50) (95% - 97%)    Parameters below as of 08 Aug 2022 04:50  Patient On (Oxygen Delivery Method): room air      PAST MEDICAL & SURGICAL HISTORY:  HTN (hypertension)      CVA (cerebrovascular accident)      Atrial fibrillation      Carotid stenosis      No significant past surgical history          SOCIAL HISTORY  Alcohol:  Tobacco:  Illicit substance use:      FAMILY HISTORY:      LABS:                        12.0   12.26 )-----------( 284      ( 07 Aug 2022 06:55 )             37.6     08-08    142  |  106  |  21<H>  ----------------------------<  104<H>  3.9   |  31  |  0.73    Ca    9.0      08 Aug 2022 06:25      CAPILLARY BLOOD GLUCOSE      MEDICATIONS  (STANDING):  apixaban 5 milliGRAM(s) Oral every 12 hours  ascorbic acid 500 milliGRAM(s) Oral daily  aspirin  chewable 81 milliGRAM(s) Oral daily  atorvastatin 40 milliGRAM(s) Oral at bedtime  bisacodyl 5 milliGRAM(s) Oral at bedtime  chlorhexidine 2% Cloths 1 Application(s) Topical daily  levETIRAcetam 750 milliGRAM(s) Oral two times a day  meropenem  IVPB 1000 milliGRAM(s) IV Intermittent every 8 hours  metoprolol tartrate 25 milliGRAM(s) Oral every 12 hours  multivitamin 1 Tablet(s) Oral daily  nystatin Powder 1 Application(s) Topical three times a day  polyethylene glycol 3350 17 Gram(s) Oral two times a day  senna 2 Tablet(s) Oral at bedtime  tamsulosin 0.4 milliGRAM(s) Oral at bedtime  zinc sulfate 220 milliGRAM(s) Oral daily    MEDICATIONS  (PRN):  acetaminophen     Tablet .. 650 milliGRAM(s) Oral every 6 hours PRN Temp greater or equal to 38C (100.4F)  melatonin 3 milliGRAM(s) Oral at bedtime PRN Insomnia      REVIEW OF SYSTEMS: limited   EYES: No eye pain  ENMT: No sinus or throat pain  NECK: No pain or stiffness  RESPIRATORY: No cough, No shortness of breath  CARDIOVASCULAR: No chest pain  GASTROINTESTINAL: No abdominal pain.   GENITOURINARY: No dysuria  NEUROLOGICAL: No headaches  MUSCULOSKELETAL: rt hand pain      RADIOLOGY & ADDITIONAL TESTS:< from: Xray Chest 1 View-PORTABLE IMMEDIATE (07.26.22 @ 07:51) >    ACC: [id number] EXAM:  XR CHEST PORTABLE IMMED 1V                          PROCEDURE DATE:  07/26/2022          INTERPRETATION:  INDICATION: Sepsis    PRIORS: 5/4/2021    VIEWS: Portable AP radiography of the chest performed.    FINDINGS: Heart size appears within normal limits. No hilar or superior   mediastinal abnormalities are identified. There is no evidence for focal   infiltrate, lobar consolidation or pulmonary edema. Linear scarlike   opacity right midlung field. No pleural effusion or pneumothorax is   demonstrated. No mediastinal shift is noted. Degenerative changes of the   bilateral shoulder regions.    IMPRESSION: No evidence for focal infiltrate or lobar consolidation.    --- End of Report ---            TRAE ULLOA MD; Attending Radiologist  This document has been electronically signed. Jul 26 2022 10:07AM    < end of copied text >  < from: CT Head No Cont (07.26.22 @ 09:57) >  ACC: [id number] EXAM:  CT BRAIN                          PROCEDURE DATE:  07/26/2022          INTERPRETATION:  CLINICAL STATEMENT: Pain.    TECHNIQUE: CT of the head was performed without IV contrast.  RAPID   artificial intelligence was utilized forthe preliminary evaluation of   intracranial hemorrhage.    COMPARISON: CT head 5/6/2021    FINDINGS:  There is moderate diffuse parenchymal volume loss asymmetric to the left.   There are areas of low attenuation in the periventricular white matter   likely related to moderate chronic microvascular ischemic changes.    Chronic lacunar infarcts bilateral thalami, basal ganglia. Left wallerian   degeneration noted.    There is no acute intracranial hemorrhage, parenchymal mass, mass effect   or midline shift. There is no acute territorial infarct. There is no   hydrocephalus. Partial empty sella    The cranium is intact. The visualized paranasal sinuses are well-aerated.    IMPRESSION:  No acute intracranial hemorrhage or acute territorial infarct.  If   symptoms persist, follow-up MRI exam recommended.    --- End of Report ---            CARLOS EDUARDO GARCIA MD; Attending Radiologist  This document has been electronically signed. Jul 26 2022 10:27AM    < end of copied text >  < from: CT Abdomen and Pelvis No Cont (07.26.22 @ 09:57) >    ACC: [id number] EXAM:  CT ABDOMEN AND PELVIS                          PROCEDURE DATE:  07/26/2022          INTERPRETATION:  CLINICAL INFORMATION: Vomiting, fever    COMPARISON: None.    CONTRAST/COMPLICATIONS:  IV Contrast: NONE  Oral Contrast: NONE  Complications: None reported at time of study completion    PROCEDURE:  CT of the Abdomen and Pelvis was performed.  Sagittal and coronal reformats were performed.    FINDINGS:  LOWER CHEST: Cardiomegaly with cardiac vascular calcification..    LIVER: Within normal limits.  BILE DUCTS: Normal caliber.  GALLBLADDER: Cholelithiasis with markedly distended gallbladder.   Correlate with right upper quadrant ultrasound..  SPLEEN: Within normal limits.  PANCREAS: Within normal limits.  ADRENALS: Subcentimeter indeterminate left adrenal nodule statistically   most likely a cortical adenoma. Nonspecific thickening right adrenal.  KIDNEYS/URETERS: Markedly atrophic right kidney. Cortical thinning of the   left kidney. Bilateral renal cysts. Nonobstructive left renal calculus   measuring 8 mm. No hydronephrosis or obstructive urolithiasis.    BLADDER: Within normal limits.  REPRODUCTIVE ORGANS: Unremarkable    BOWEL: No bowel obstruction. Rectal fecal impaction with perirectal   stranding consistent with a stercoral proctitis.. Appendix no appendicitis  PERITONEUM: No ascites.  VESSELS: Nonaneurysmal. IVC filter in situ.  RETROPERITONEUM/LYMPH NODES: No lymphadenopathy.  ABDOMINAL WALL: Within normal limits.  BONES: ORIF left hip. Degenerative changes.    IMPRESSION:  Rectal fecal impaction with associated uncomplicated stercoral proctitis.  Cholelithiasis with markedly distended gallbladder. Correlate with right   upper quadrant ultrasound.  Nonobstructive left nephrolithiasis.        --- End of Report ---              < end of copied text >  < from: US Abdomen Upper Quadrant Right (07.26.22 @ 12:24) >    ACC: [id number] EXAM:  US ABDOMEN RT UPR QUADRANT                          PROCEDURE DATE:  07/26/2022          INTERPRETATION:  CLINICAL INFORMATION: Fever and vomiting. Distended   gallbladder with cholelithiasis on CT    COMPARISON: No prior abdominal ultrasound is available for comparison.   Reference is made with a previous abdominal CT of the same day.    TECHNIQUE: Sonography of the right upper quadrant.    FINDINGS:  Liver: Within normal limits.  Bile ducts: Normal caliber. Common bile duct measures 5 mm in caliber   which is within normal limits..  Gallbladder: Gallstones in the gallbladder without evidence of thickened   gallbladder wall, pericholecystic fluid or ultrasonic Ho's sign.  Pancreas: Visualized portions are within normal limits.  Right kidney: 9.6 cm. No hydronephrosis. Apparent cortical thinning of   the right kidney.  Ascites: None.  IVC: Visualized portions are within normal limits.    IMPRESSION:  Gallstones in the gallbladder without evidence of thickened gallbladder   wall, pericholecystic fluid or ultrasonic Ho's sign.    --- End of Report ---            SOTERO AGUIAR MD; Attending Radiologist  This document has been electronically signed. Jul 26 2022 12:37PM    < end of copied text >  < from: US Renal (08.05.22 @ 12:14) >    ACC: [id number] EXAM:  US KIDNEY(S)                          PROCEDURE DATE:  08/05/2022          INTERPRETATION:  CLINICAL INFORMATION: Bacteremia.  Urinary tract   infection.    COMPARISON: Right upper quadrant ultrasound from 07/26/2022.    TECHNIQUE: Sonography of the kidneys and bladder.    FINDINGS:  Right kidney: 8.4 cm. No renal mass, hydronephrosis or calculi.    Left kidney: Not visualized.    Urinary bladder: Markedly distended bladder with volume of 1196 cc    IMPRESSION:  1.  The patient's left kidney cannot be visualized.  2.  No renal mass, hydronephrosis or calculus is visualized in the right   kidney  3.  Markedly distended bladder.  The patient was unable to urinate.  A   Ly catheter may be placed as clinically warranted.        --- End of Report ---    < end of copied text >      Imaging Personally Reviewed:  [x ] YES  [ ] NO    Consultant(s) Notes Reviewed:  [x ] YES  [ ] NO    PHYSICAL EXAM:  GENERAL: NAD  HEAD:  Atraumatic, Normocephalic  EYES: conjunctiva and sclera clear  ENMT: Moist mucous membranes  NECK: Supple, No JVD  NERVOUS SYSTEM:  Alert & Oriented X1-2  CHEST/LUNG: CTA bilaterally; No rales, rhonchi, wheezing, or rubs  HEART: Regular rate and rhythm  ABDOMEN: Soft, Nontender, Nondistended; Bowel sounds present  EXTREMITIES:  2+ Peripheral Pulses, No clubbing, cyanosis  SKIN: + lower abdominal red rash    Care Collaborated Discussed with Consultants/Other Providers [x ] YES  [ ] NO

## 2022-08-08 NOTE — PROGRESS NOTE ADULT - PROBLEM SELECTOR PROBLEM 2
Sepsis due to urinary tract infection

## 2022-08-08 NOTE — PROGRESS NOTE ADULT - PROBLEM SELECTOR PLAN 2
leukocytosis, fever, hypotensive on admission,  -source E coli UTI, ESBL & E. Coli bacteremia  -s/p IV bolus  - TOV 8/8/22  - f/u PVR  - rest of plan as above  --resolved----

## 2022-08-08 NOTE — PROGRESS NOTE ADULT - PROBLEM SELECTOR PROBLEM 1
Metabolic encephalopathy
Metabolic encephalopathy
Bacteremia due to Escherichia coli
Metabolic encephalopathy
Bacteremia due to Escherichia coli
Metabolic encephalopathy
Metabolic encephalopathy

## 2022-08-08 NOTE — PROGRESS NOTE ADULT - PROBLEM SELECTOR PLAN 5
- controlled on home; Atenolol   - held atenolol 2/2 sepsis  - on Metoprolol with parameters  - monitor BP/HR

## 2022-08-08 NOTE — PROGRESS NOTE ADULT - PROBLEM SELECTOR PLAN 8
- controlled on home eliqiuis  - comes in with sepsis HR 120s, EKG Afib with RVR in ED  - c/w Eliquis, c/w Metoprolol for rate control  - monitor HR

## 2022-08-09 ENCOUNTER — TRANSCRIPTION ENCOUNTER (OUTPATIENT)
Age: 77
End: 2022-08-09

## 2022-08-09 VITALS
DIASTOLIC BLOOD PRESSURE: 67 MMHG | HEART RATE: 102 BPM | TEMPERATURE: 98 F | RESPIRATION RATE: 18 BRPM | OXYGEN SATURATION: 95 % | SYSTOLIC BLOOD PRESSURE: 132 MMHG

## 2022-08-09 PROCEDURE — 99285 EMERGENCY DEPT VISIT HI MDM: CPT

## 2022-08-09 PROCEDURE — 83735 ASSAY OF MAGNESIUM: CPT

## 2022-08-09 PROCEDURE — 93306 TTE W/DOPPLER COMPLETE: CPT

## 2022-08-09 PROCEDURE — 84100 ASSAY OF PHOSPHORUS: CPT

## 2022-08-09 PROCEDURE — 87186 SC STD MICRODIL/AGAR DIL: CPT

## 2022-08-09 PROCEDURE — 92610 EVALUATE SWALLOWING FUNCTION: CPT

## 2022-08-09 PROCEDURE — 74018 RADEX ABDOMEN 1 VIEW: CPT

## 2022-08-09 PROCEDURE — 80048 BASIC METABOLIC PNL TOTAL CA: CPT

## 2022-08-09 PROCEDURE — 87635 SARS-COV-2 COVID-19 AMP PRB: CPT

## 2022-08-09 PROCEDURE — 96366 THER/PROPH/DIAG IV INF ADDON: CPT

## 2022-08-09 PROCEDURE — 71045 X-RAY EXAM CHEST 1 VIEW: CPT

## 2022-08-09 PROCEDURE — 36415 COLL VENOUS BLD VENIPUNCTURE: CPT

## 2022-08-09 PROCEDURE — 85610 PROTHROMBIN TIME: CPT

## 2022-08-09 PROCEDURE — 81001 URINALYSIS AUTO W/SCOPE: CPT

## 2022-08-09 PROCEDURE — 87077 CULTURE AEROBIC IDENTIFY: CPT

## 2022-08-09 PROCEDURE — 96365 THER/PROPH/DIAG IV INF INIT: CPT

## 2022-08-09 PROCEDURE — 76775 US EXAM ABDO BACK WALL LIM: CPT

## 2022-08-09 PROCEDURE — 99239 HOSP IP/OBS DSCHRG MGMT >30: CPT

## 2022-08-09 PROCEDURE — 87040 BLOOD CULTURE FOR BACTERIA: CPT

## 2022-08-09 PROCEDURE — 93005 ELECTROCARDIOGRAM TRACING: CPT

## 2022-08-09 PROCEDURE — 87086 URINE CULTURE/COLONY COUNT: CPT

## 2022-08-09 PROCEDURE — 86140 C-REACTIVE PROTEIN: CPT

## 2022-08-09 PROCEDURE — 74176 CT ABD & PELVIS W/O CONTRAST: CPT | Mod: MA

## 2022-08-09 PROCEDURE — 70450 CT HEAD/BRAIN W/O DYE: CPT | Mod: MA

## 2022-08-09 PROCEDURE — 80053 COMPREHEN METABOLIC PANEL: CPT

## 2022-08-09 PROCEDURE — 83605 ASSAY OF LACTIC ACID: CPT

## 2022-08-09 PROCEDURE — 76705 ECHO EXAM OF ABDOMEN: CPT

## 2022-08-09 PROCEDURE — 85027 COMPLETE CBC AUTOMATED: CPT

## 2022-08-09 PROCEDURE — 87150 DNA/RNA AMPLIFIED PROBE: CPT

## 2022-08-09 PROCEDURE — 87640 STAPH A DNA AMP PROBE: CPT

## 2022-08-09 PROCEDURE — 85730 THROMBOPLASTIN TIME PARTIAL: CPT

## 2022-08-09 PROCEDURE — 87641 MR-STAPH DNA AMP PROBE: CPT

## 2022-08-09 PROCEDURE — 0225U NFCT DS DNA&RNA 21 SARSCOV2: CPT

## 2022-08-09 PROCEDURE — 85025 COMPLETE CBC W/AUTO DIFF WBC: CPT

## 2022-08-09 RX ORDER — ZINC SULFATE TAB 220 MG (50 MG ZINC EQUIVALENT) 220 (50 ZN) MG
1 TAB ORAL
Qty: 30 | Refills: 0
Start: 2022-08-09 | End: 2022-09-07

## 2022-08-09 RX ORDER — SENNA PLUS 8.6 MG/1
2 TABLET ORAL
Qty: 60 | Refills: 0
Start: 2022-08-09 | End: 2022-09-07

## 2022-08-09 RX ORDER — ASCORBIC ACID 60 MG
1 TABLET,CHEWABLE ORAL
Qty: 30 | Refills: 0
Start: 2022-08-09 | End: 2022-09-07

## 2022-08-09 RX ORDER — ATORVASTATIN CALCIUM 80 MG/1
1 TABLET, FILM COATED ORAL
Qty: 30 | Refills: 0
Start: 2022-08-09 | End: 2022-09-07

## 2022-08-09 RX ORDER — ASPIRIN/CALCIUM CARB/MAGNESIUM 324 MG
1 TABLET ORAL
Qty: 30 | Refills: 0
Start: 2022-08-09 | End: 2022-09-07

## 2022-08-09 RX ORDER — TAMSULOSIN HYDROCHLORIDE 0.4 MG/1
1 CAPSULE ORAL
Qty: 30 | Refills: 0
Start: 2022-08-09 | End: 2022-09-07

## 2022-08-09 RX ORDER — NYSTATIN CREAM 100000 [USP'U]/G
1 CREAM TOPICAL
Qty: 2 | Refills: 0
Start: 2022-08-09 | End: 2022-09-07

## 2022-08-09 RX ORDER — POLYETHYLENE GLYCOL 3350 17 G/17G
17 POWDER, FOR SOLUTION ORAL
Qty: 238 | Refills: 0
Start: 2022-08-09 | End: 2022-08-15

## 2022-08-09 RX ORDER — ASPIRIN/CALCIUM CARB/MAGNESIUM 324 MG
0 TABLET ORAL
Qty: 0 | Refills: 0 | DISCHARGE

## 2022-08-09 RX ORDER — LEVETIRACETAM 250 MG/1
1 TABLET, FILM COATED ORAL
Qty: 60 | Refills: 0
Start: 2022-08-09 | End: 2022-09-07

## 2022-08-09 RX ADMIN — Medication 25 MILLIGRAM(S): at 05:26

## 2022-08-09 RX ADMIN — LEVETIRACETAM 750 MILLIGRAM(S): 250 TABLET, FILM COATED ORAL at 05:26

## 2022-08-09 RX ADMIN — POLYETHYLENE GLYCOL 3350 17 GRAM(S): 17 POWDER, FOR SOLUTION ORAL at 05:26

## 2022-08-09 RX ADMIN — APIXABAN 5 MILLIGRAM(S): 2.5 TABLET, FILM COATED ORAL at 05:26

## 2022-08-09 RX ADMIN — NYSTATIN CREAM 1 APPLICATION(S): 100000 CREAM TOPICAL at 05:25

## 2022-08-09 NOTE — DISCHARGE NOTE NURSING/CASE MANAGEMENT/SOCIAL WORK - NSDCPEFALRISK_GEN_ALL_CORE
For information on Fall & Injury Prevention, visit: [email or web address] OR  [email or web address] OR  https://www.cdc.gov/steadi/patient.html

## 2022-08-09 NOTE — DISCHARGE NOTE NURSING/CASE MANAGEMENT/SOCIAL WORK - PATIENT PORTAL LINK FT
You can access the FollowMyHealth Patient Portal offered by [facility] by registering at the following website: [email or web address]. By joining [email or web address] FollowMyHealth portal, you will also be able to view your health information using other applications (apps) compatible with our system.

## 2022-12-06 NOTE — ED ADULT NURSE NOTE - NS ED NURSE PRESS ULCER STAGE 12
unstageable Plt ct elevated and slowly uptrending since admission. Plt 1029 12/6    - Heme/onc consulted, f/u recs

## 2023-01-01 ENCOUNTER — INPATIENT (INPATIENT)
Facility: HOSPITAL | Age: 78
LOS: 0 days | DRG: 871 | End: 2023-10-24
Attending: INTERNAL MEDICINE | Admitting: INTERNAL MEDICINE
Payer: COMMERCIAL

## 2023-01-01 ENCOUNTER — INPATIENT (INPATIENT)
Facility: HOSPITAL | Age: 78
LOS: 9 days | Discharge: HOME | DRG: 871 | End: 2023-03-10
Attending: INTERNAL MEDICINE | Admitting: INTERNAL MEDICINE
Payer: COMMERCIAL

## 2023-01-01 ENCOUNTER — TRANSCRIPTION ENCOUNTER (OUTPATIENT)
Age: 78
End: 2023-01-01

## 2023-01-01 VITALS
HEART RATE: 122 BPM | OXYGEN SATURATION: 95 % | SYSTOLIC BLOOD PRESSURE: 146 MMHG | DIASTOLIC BLOOD PRESSURE: 69 MMHG | RESPIRATION RATE: 28 BRPM | TEMPERATURE: 104 F | WEIGHT: 198.42 LBS

## 2023-01-01 VITALS
TEMPERATURE: 98 F | DIASTOLIC BLOOD PRESSURE: 70 MMHG | HEART RATE: 87 BPM | RESPIRATION RATE: 19 BRPM | SYSTOLIC BLOOD PRESSURE: 108 MMHG | OXYGEN SATURATION: 92 %

## 2023-01-01 VITALS
HEART RATE: 117 BPM | DIASTOLIC BLOOD PRESSURE: 40 MMHG | SYSTOLIC BLOOD PRESSURE: 60 MMHG | WEIGHT: 125 LBS | RESPIRATION RATE: 18 BRPM

## 2023-01-01 VITALS — DIASTOLIC BLOOD PRESSURE: 67 MMHG | SYSTOLIC BLOOD PRESSURE: 103 MMHG | HEART RATE: 80 BPM

## 2023-01-01 DIAGNOSIS — E86.0 DEHYDRATION: ICD-10-CM

## 2023-01-01 DIAGNOSIS — R53.81 OTHER MALAISE: ICD-10-CM

## 2023-01-01 DIAGNOSIS — I48.91 UNSPECIFIED ATRIAL FIBRILLATION: ICD-10-CM

## 2023-01-01 DIAGNOSIS — Z29.9 ENCOUNTER FOR PROPHYLACTIC MEASURES, UNSPECIFIED: ICD-10-CM

## 2023-01-01 DIAGNOSIS — I63.9 CEREBRAL INFARCTION, UNSPECIFIED: ICD-10-CM

## 2023-01-01 DIAGNOSIS — A41.9 SEPSIS, UNSPECIFIED ORGANISM: ICD-10-CM

## 2023-01-01 DIAGNOSIS — F03.C0 UNSPECIFIED DEMENTIA, SEVERE, WITHOUT BEHAVIORAL DISTURBANCE, PSYCHOTIC DISTURBANCE, MOOD DISTURBANCE, AND ANXIETY: ICD-10-CM

## 2023-01-01 DIAGNOSIS — E43 UNSPECIFIED SEVERE PROTEIN-CALORIE MALNUTRITION: ICD-10-CM

## 2023-01-01 DIAGNOSIS — A41.89 OTHER SPECIFIED SEPSIS: ICD-10-CM

## 2023-01-01 DIAGNOSIS — R33.9 RETENTION OF URINE, UNSPECIFIED: ICD-10-CM

## 2023-01-01 DIAGNOSIS — E04.1 NONTOXIC SINGLE THYROID NODULE: ICD-10-CM

## 2023-01-01 DIAGNOSIS — R56.9 UNSPECIFIED CONVULSIONS: ICD-10-CM

## 2023-01-01 DIAGNOSIS — G93.40 ENCEPHALOPATHY, UNSPECIFIED: ICD-10-CM

## 2023-01-01 DIAGNOSIS — J96.01 ACUTE RESPIRATORY FAILURE WITH HYPOXIA: ICD-10-CM

## 2023-01-01 DIAGNOSIS — N17.9 ACUTE KIDNEY FAILURE, UNSPECIFIED: ICD-10-CM

## 2023-01-01 DIAGNOSIS — L30.9 DERMATITIS, UNSPECIFIED: ICD-10-CM

## 2023-01-01 DIAGNOSIS — E78.5 HYPERLIPIDEMIA, UNSPECIFIED: ICD-10-CM

## 2023-01-01 DIAGNOSIS — Z51.5 ENCOUNTER FOR PALLIATIVE CARE: ICD-10-CM

## 2023-01-01 LAB
-  AMIKACIN: SIGNIFICANT CHANGE UP
-  AMIKACIN: SIGNIFICANT CHANGE UP
-  AMOXICILLIN/CLAVULANIC ACID: SIGNIFICANT CHANGE UP
-  AMPICILLIN/SULBACTAM: SIGNIFICANT CHANGE UP
-  AMPICILLIN/SULBACTAM: SIGNIFICANT CHANGE UP
-  AMPICILLIN: SIGNIFICANT CHANGE UP
-  AMPICILLIN: SIGNIFICANT CHANGE UP
-  AZTREONAM: SIGNIFICANT CHANGE UP
-  AZTREONAM: SIGNIFICANT CHANGE UP
-  CEFAZOLIN: SIGNIFICANT CHANGE UP
-  CEFAZOLIN: SIGNIFICANT CHANGE UP
-  CEFEPIME: SIGNIFICANT CHANGE UP
-  CEFEPIME: SIGNIFICANT CHANGE UP
-  CEFTRIAXONE: SIGNIFICANT CHANGE UP
-  CEFTRIAXONE: SIGNIFICANT CHANGE UP
-  CEFUROXIME: SIGNIFICANT CHANGE UP
-  CIPROFLOXACIN: SIGNIFICANT CHANGE UP
-  CIPROFLOXACIN: SIGNIFICANT CHANGE UP
-  CTX-M RESISTANCE MARKER: SIGNIFICANT CHANGE UP
-  ERTAPENEM: SIGNIFICANT CHANGE UP
-  ERTAPENEM: SIGNIFICANT CHANGE UP
-  ESBL: SIGNIFICANT CHANGE UP
-  GENTAMICIN: SIGNIFICANT CHANGE UP
-  GENTAMICIN: SIGNIFICANT CHANGE UP
-  IMIPENEM: SIGNIFICANT CHANGE UP
-  IMIPENEM: SIGNIFICANT CHANGE UP
-  LEVOFLOXACIN: SIGNIFICANT CHANGE UP
-  LEVOFLOXACIN: SIGNIFICANT CHANGE UP
-  MEROPENEM: SIGNIFICANT CHANGE UP
-  MEROPENEM: SIGNIFICANT CHANGE UP
-  NITROFURANTOIN: SIGNIFICANT CHANGE UP
-  PIPERACILLIN/TAZOBACTAM: SIGNIFICANT CHANGE UP
-  PIPERACILLIN/TAZOBACTAM: SIGNIFICANT CHANGE UP
-  TOBRAMYCIN: SIGNIFICANT CHANGE UP
-  TOBRAMYCIN: SIGNIFICANT CHANGE UP
-  TRIMETHOPRIM/SULFAMETHOXAZOLE: SIGNIFICANT CHANGE UP
-  TRIMETHOPRIM/SULFAMETHOXAZOLE: SIGNIFICANT CHANGE UP
A1C WITH ESTIMATED AVERAGE GLUCOSE RESULT: 8 % — HIGH (ref 4–5.6)
ALBUMIN SERPL ELPH-MCNC: 1.3 G/DL — LOW (ref 3.5–5)
ALBUMIN SERPL ELPH-MCNC: 1.3 G/DL — LOW (ref 3.5–5)
ALBUMIN SERPL ELPH-MCNC: 1.9 G/DL — LOW (ref 3.5–5)
ALBUMIN SERPL ELPH-MCNC: 2 G/DL — LOW (ref 3.5–5)
ALBUMIN SERPL ELPH-MCNC: 2.1 G/DL — LOW (ref 3.5–5)
ALBUMIN SERPL ELPH-MCNC: 2.2 G/DL — LOW (ref 3.5–5)
ALBUMIN SERPL ELPH-MCNC: 2.4 G/DL — LOW (ref 3.5–5)
ALP SERPL-CCNC: 130 U/L — HIGH (ref 40–120)
ALP SERPL-CCNC: 130 U/L — HIGH (ref 40–120)
ALP SERPL-CCNC: 65 U/L — SIGNIFICANT CHANGE UP (ref 40–120)
ALP SERPL-CCNC: 71 U/L — SIGNIFICANT CHANGE UP (ref 40–120)
ALP SERPL-CCNC: 77 U/L — SIGNIFICANT CHANGE UP (ref 40–120)
ALP SERPL-CCNC: 78 U/L — SIGNIFICANT CHANGE UP (ref 40–120)
ALP SERPL-CCNC: 81 U/L — SIGNIFICANT CHANGE UP (ref 40–120)
ALP SERPL-CCNC: 92 U/L — SIGNIFICANT CHANGE UP (ref 40–120)
ALP SERPL-CCNC: 93 U/L — SIGNIFICANT CHANGE UP (ref 40–120)
ALT FLD-CCNC: 15 U/L DA — SIGNIFICANT CHANGE UP (ref 10–60)
ALT FLD-CCNC: 16 U/L DA — SIGNIFICANT CHANGE UP (ref 10–60)
ALT FLD-CCNC: 17 U/L DA — SIGNIFICANT CHANGE UP (ref 10–60)
ALT FLD-CCNC: 18 U/L DA — SIGNIFICANT CHANGE UP (ref 10–60)
ALT FLD-CCNC: 18 U/L DA — SIGNIFICANT CHANGE UP (ref 10–60)
ALT FLD-CCNC: 53 U/L DA — SIGNIFICANT CHANGE UP (ref 10–60)
ALT FLD-CCNC: 53 U/L DA — SIGNIFICANT CHANGE UP (ref 10–60)
ANION GAP SERPL CALC-SCNC: 1 MMOL/L — LOW (ref 5–17)
ANION GAP SERPL CALC-SCNC: 2 MMOL/L — LOW (ref 5–17)
ANION GAP SERPL CALC-SCNC: 3 MMOL/L — LOW (ref 5–17)
ANION GAP SERPL CALC-SCNC: 4 MMOL/L — LOW (ref 5–17)
ANION GAP SERPL CALC-SCNC: 4 MMOL/L — LOW (ref 5–17)
ANION GAP SERPL CALC-SCNC: 5 MMOL/L — SIGNIFICANT CHANGE UP (ref 5–17)
ANION GAP SERPL CALC-SCNC: 5 MMOL/L — SIGNIFICANT CHANGE UP (ref 5–17)
ANION GAP SERPL CALC-SCNC: 6 MMOL/L — SIGNIFICANT CHANGE UP (ref 5–17)
ANION GAP SERPL CALC-SCNC: 7 MMOL/L — SIGNIFICANT CHANGE UP (ref 5–17)
ANION GAP SERPL CALC-SCNC: 8 MMOL/L — SIGNIFICANT CHANGE UP (ref 5–17)
ANISOCYTOSIS BLD QL: SLIGHT — SIGNIFICANT CHANGE UP
APPEARANCE UR: ABNORMAL
APTT BLD: 103.7 SEC — HIGH (ref 27.5–35.5)
APTT BLD: 181.2 SEC — CRITICAL HIGH (ref 27.5–35.5)
APTT BLD: 22.7 SEC — LOW (ref 24.5–35.6)
APTT BLD: 22.7 SEC — LOW (ref 24.5–35.6)
APTT BLD: 32.6 SEC — SIGNIFICANT CHANGE UP (ref 27.5–35.5)
APTT BLD: 43.6 SEC — HIGH (ref 27.5–35.5)
AST SERPL-CCNC: 14 U/L — SIGNIFICANT CHANGE UP (ref 10–40)
AST SERPL-CCNC: 14 U/L — SIGNIFICANT CHANGE UP (ref 10–40)
AST SERPL-CCNC: 15 U/L — SIGNIFICANT CHANGE UP (ref 10–40)
AST SERPL-CCNC: 17 U/L — SIGNIFICANT CHANGE UP (ref 10–40)
AST SERPL-CCNC: 17 U/L — SIGNIFICANT CHANGE UP (ref 10–40)
AST SERPL-CCNC: 20 U/L — SIGNIFICANT CHANGE UP (ref 10–40)
AST SERPL-CCNC: 23 U/L — SIGNIFICANT CHANGE UP (ref 10–40)
AST SERPL-CCNC: 59 U/L — HIGH (ref 10–40)
AST SERPL-CCNC: 59 U/L — HIGH (ref 10–40)
BACTERIA # UR AUTO: ABNORMAL /HPF
BASE EXCESS BLDV CALC-SCNC: -4.7 MMOL/L — SIGNIFICANT CHANGE UP
BASOPHILS # BLD AUTO: 0 K/UL — SIGNIFICANT CHANGE UP (ref 0–0.2)
BASOPHILS # BLD AUTO: 0.03 K/UL — SIGNIFICANT CHANGE UP (ref 0–0.2)
BASOPHILS # BLD AUTO: 0.04 K/UL — SIGNIFICANT CHANGE UP (ref 0–0.2)
BASOPHILS # BLD AUTO: 0.04 K/UL — SIGNIFICANT CHANGE UP (ref 0–0.2)
BASOPHILS # BLD AUTO: 0.06 K/UL — SIGNIFICANT CHANGE UP (ref 0–0.2)
BASOPHILS # BLD AUTO: 0.07 K/UL — SIGNIFICANT CHANGE UP (ref 0–0.2)
BASOPHILS # BLD AUTO: 0.08 K/UL — SIGNIFICANT CHANGE UP (ref 0–0.2)
BASOPHILS # BLD AUTO: 0.08 K/UL — SIGNIFICANT CHANGE UP (ref 0–0.2)
BASOPHILS # BLD AUTO: 0.1 K/UL — SIGNIFICANT CHANGE UP (ref 0–0.2)
BASOPHILS NFR BLD AUTO: 0 % — SIGNIFICANT CHANGE UP (ref 0–2)
BASOPHILS NFR BLD AUTO: 0.2 % — SIGNIFICANT CHANGE UP (ref 0–2)
BASOPHILS NFR BLD AUTO: 0.2 % — SIGNIFICANT CHANGE UP (ref 0–2)
BASOPHILS NFR BLD AUTO: 0.3 % — SIGNIFICANT CHANGE UP (ref 0–2)
BASOPHILS NFR BLD AUTO: 0.4 % — SIGNIFICANT CHANGE UP (ref 0–2)
BASOPHILS NFR BLD AUTO: 0.5 % — SIGNIFICANT CHANGE UP (ref 0–2)
BASOPHILS NFR BLD AUTO: 0.5 % — SIGNIFICANT CHANGE UP (ref 0–2)
BILIRUB SERPL-MCNC: 0.3 MG/DL — SIGNIFICANT CHANGE UP (ref 0.2–1.2)
BILIRUB SERPL-MCNC: 0.4 MG/DL — SIGNIFICANT CHANGE UP (ref 0.2–1.2)
BILIRUB SERPL-MCNC: 0.5 MG/DL — SIGNIFICANT CHANGE UP (ref 0.2–1.2)
BILIRUB SERPL-MCNC: 0.6 MG/DL — SIGNIFICANT CHANGE UP (ref 0.2–1.2)
BILIRUB SERPL-MCNC: 0.9 MG/DL — SIGNIFICANT CHANGE UP (ref 0.2–1.2)
BILIRUB UR-MCNC: ABNORMAL
BILIRUB UR-MCNC: ABNORMAL
BILIRUB UR-MCNC: NEGATIVE — SIGNIFICANT CHANGE UP
BLD GP AB SCN SERPL QL: SIGNIFICANT CHANGE UP
BLOOD GAS COMMENTS, VENOUS: SIGNIFICANT CHANGE UP
BUN SERPL-MCNC: 109 MG/DL — HIGH (ref 7–18)
BUN SERPL-MCNC: 109 MG/DL — HIGH (ref 7–18)
BUN SERPL-MCNC: 23 MG/DL — HIGH (ref 7–18)
BUN SERPL-MCNC: 23 MG/DL — HIGH (ref 7–18)
BUN SERPL-MCNC: 24 MG/DL — HIGH (ref 7–18)
BUN SERPL-MCNC: 25 MG/DL — HIGH (ref 7–18)
BUN SERPL-MCNC: 26 MG/DL — HIGH (ref 7–18)
BUN SERPL-MCNC: 27 MG/DL — HIGH (ref 7–18)
BUN SERPL-MCNC: 27 MG/DL — HIGH (ref 7–18)
BUN SERPL-MCNC: 29 MG/DL — HIGH (ref 7–18)
BUN SERPL-MCNC: 30 MG/DL — HIGH (ref 7–18)
BUN SERPL-MCNC: 31 MG/DL — HIGH (ref 7–18)
BURR CELLS BLD QL SMEAR: PRESENT — SIGNIFICANT CHANGE UP
CA-I SERPL-SCNC: SIGNIFICANT CHANGE UP MMOL/L (ref 1.15–1.33)
CALCIUM SERPL-MCNC: 7.7 MG/DL — LOW (ref 8.4–10.5)
CALCIUM SERPL-MCNC: 7.7 MG/DL — LOW (ref 8.4–10.5)
CALCIUM SERPL-MCNC: 8 MG/DL — LOW (ref 8.4–10.5)
CALCIUM SERPL-MCNC: 8.3 MG/DL — LOW (ref 8.4–10.5)
CALCIUM SERPL-MCNC: 8.5 MG/DL — SIGNIFICANT CHANGE UP (ref 8.4–10.5)
CALCIUM SERPL-MCNC: 8.6 MG/DL — SIGNIFICANT CHANGE UP (ref 8.4–10.5)
CALCIUM SERPL-MCNC: 8.6 MG/DL — SIGNIFICANT CHANGE UP (ref 8.4–10.5)
CALCIUM SERPL-MCNC: 8.8 MG/DL — SIGNIFICANT CHANGE UP (ref 8.4–10.5)
CALCIUM SERPL-MCNC: 8.9 MG/DL — SIGNIFICANT CHANGE UP (ref 8.4–10.5)
CALCIUM SERPL-MCNC: 9 MG/DL — SIGNIFICANT CHANGE UP (ref 8.4–10.5)
CALCIUM SERPL-MCNC: 9.1 MG/DL — SIGNIFICANT CHANGE UP (ref 8.4–10.5)
CALCIUM SERPL-MCNC: 9.1 MG/DL — SIGNIFICANT CHANGE UP (ref 8.4–10.5)
CHLORIDE SERPL-SCNC: 105 MMOL/L — SIGNIFICANT CHANGE UP (ref 96–108)
CHLORIDE SERPL-SCNC: 108 MMOL/L — SIGNIFICANT CHANGE UP (ref 96–108)
CHLORIDE SERPL-SCNC: 109 MMOL/L — HIGH (ref 96–108)
CHLORIDE SERPL-SCNC: 109 MMOL/L — HIGH (ref 96–108)
CHLORIDE SERPL-SCNC: 110 MMOL/L — HIGH (ref 96–108)
CHLORIDE SERPL-SCNC: 110 MMOL/L — HIGH (ref 96–108)
CHLORIDE SERPL-SCNC: 112 MMOL/L — HIGH (ref 96–108)
CHLORIDE SERPL-SCNC: 112 MMOL/L — HIGH (ref 96–108)
CHLORIDE SERPL-SCNC: 113 MMOL/L — HIGH (ref 96–108)
CHLORIDE SERPL-SCNC: 128 MMOL/L — HIGH (ref 96–108)
CHLORIDE SERPL-SCNC: 128 MMOL/L — HIGH (ref 96–108)
CHLORIDE UR-SCNC: 72 MMOL/L — SIGNIFICANT CHANGE UP
CK SERPL-CCNC: 173 U/L — SIGNIFICANT CHANGE UP (ref 21–215)
CK SERPL-CCNC: 173 U/L — SIGNIFICANT CHANGE UP (ref 21–215)
CO2 SERPL-SCNC: 25 MMOL/L — SIGNIFICANT CHANGE UP (ref 22–31)
CO2 SERPL-SCNC: 25 MMOL/L — SIGNIFICANT CHANGE UP (ref 22–31)
CO2 SERPL-SCNC: 26 MMOL/L — SIGNIFICANT CHANGE UP (ref 22–31)
CO2 SERPL-SCNC: 27 MMOL/L — SIGNIFICANT CHANGE UP (ref 22–31)
CO2 SERPL-SCNC: 28 MMOL/L — SIGNIFICANT CHANGE UP (ref 22–31)
CO2 SERPL-SCNC: 29 MMOL/L — SIGNIFICANT CHANGE UP (ref 22–31)
CO2 SERPL-SCNC: 29 MMOL/L — SIGNIFICANT CHANGE UP (ref 22–31)
CO2 SERPL-SCNC: 30 MMOL/L — SIGNIFICANT CHANGE UP (ref 22–31)
CO2 SERPL-SCNC: 32 MMOL/L — HIGH (ref 22–31)
CO2 SERPL-SCNC: 32 MMOL/L — HIGH (ref 22–31)
COLOR SPEC: SIGNIFICANT CHANGE UP
COMMENT - URINE: SIGNIFICANT CHANGE UP
CREAT ?TM UR-MCNC: 65 MG/DL — SIGNIFICANT CHANGE UP
CREAT SERPL-MCNC: 0.92 MG/DL — SIGNIFICANT CHANGE UP (ref 0.5–1.3)
CREAT SERPL-MCNC: 0.95 MG/DL — SIGNIFICANT CHANGE UP (ref 0.5–1.3)
CREAT SERPL-MCNC: 0.98 MG/DL — SIGNIFICANT CHANGE UP (ref 0.5–1.3)
CREAT SERPL-MCNC: 1 MG/DL — SIGNIFICANT CHANGE UP (ref 0.5–1.3)
CREAT SERPL-MCNC: 1.02 MG/DL — SIGNIFICANT CHANGE UP (ref 0.5–1.3)
CREAT SERPL-MCNC: 1.07 MG/DL — SIGNIFICANT CHANGE UP (ref 0.5–1.3)
CREAT SERPL-MCNC: 1.11 MG/DL — SIGNIFICANT CHANGE UP (ref 0.5–1.3)
CREAT SERPL-MCNC: 1.12 MG/DL — SIGNIFICANT CHANGE UP (ref 0.5–1.3)
CREAT SERPL-MCNC: 1.17 MG/DL — SIGNIFICANT CHANGE UP (ref 0.5–1.3)
CREAT SERPL-MCNC: 1.22 MG/DL — SIGNIFICANT CHANGE UP (ref 0.5–1.3)
CREAT SERPL-MCNC: 3.4 MG/DL — HIGH (ref 0.5–1.3)
CREAT SERPL-MCNC: 3.4 MG/DL — HIGH (ref 0.5–1.3)
CRP SERPL-MCNC: 51 MG/L — HIGH
CULTURE RESULTS: SIGNIFICANT CHANGE UP
DIFF PNL FLD: ABNORMAL
DIGOXIN SERPL-MCNC: >5 NG/ML — CRITICAL HIGH (ref 0.8–2)
E COLI DNA BLD POS QL NAA+NON-PROBE: SIGNIFICANT CHANGE UP
EGFR: 14 ML/MIN/1.73M2 — LOW
EGFR: 14 ML/MIN/1.73M2 — LOW
EGFR: 46 ML/MIN/1.73M2 — LOW
EGFR: 49 ML/MIN/1.73M2 — LOW
EGFR: 51 ML/MIN/1.73M2 — LOW
EGFR: 52 ML/MIN/1.73M2 — LOW
EGFR: 54 ML/MIN/1.73M2 — LOW
EGFR: 57 ML/MIN/1.73M2 — LOW
EGFR: 59 ML/MIN/1.73M2 — LOW
EGFR: 60 ML/MIN/1.73M2 — SIGNIFICANT CHANGE UP
EGFR: 62 ML/MIN/1.73M2 — SIGNIFICANT CHANGE UP
EGFR: 65 ML/MIN/1.73M2 — SIGNIFICANT CHANGE UP
EOSINOPHIL # BLD AUTO: 0 K/UL — SIGNIFICANT CHANGE UP (ref 0–0.5)
EOSINOPHIL # BLD AUTO: 0.03 K/UL — SIGNIFICANT CHANGE UP (ref 0–0.5)
EOSINOPHIL # BLD AUTO: 0.04 K/UL — SIGNIFICANT CHANGE UP (ref 0–0.5)
EOSINOPHIL # BLD AUTO: 0.06 K/UL — SIGNIFICANT CHANGE UP (ref 0–0.5)
EOSINOPHIL # BLD AUTO: 0.06 K/UL — SIGNIFICANT CHANGE UP (ref 0–0.5)
EOSINOPHIL # BLD AUTO: 0.08 K/UL — SIGNIFICANT CHANGE UP (ref 0–0.5)
EOSINOPHIL # BLD AUTO: 0.18 K/UL — SIGNIFICANT CHANGE UP (ref 0–0.5)
EOSINOPHIL # BLD AUTO: 0.25 K/UL — SIGNIFICANT CHANGE UP (ref 0–0.5)
EOSINOPHIL # BLD AUTO: 0.4 K/UL — SIGNIFICANT CHANGE UP (ref 0–0.5)
EOSINOPHIL NFR BLD AUTO: 0 % — SIGNIFICANT CHANGE UP (ref 0–6)
EOSINOPHIL NFR BLD AUTO: 0.1 % — SIGNIFICANT CHANGE UP (ref 0–6)
EOSINOPHIL NFR BLD AUTO: 0.2 % — SIGNIFICANT CHANGE UP (ref 0–6)
EOSINOPHIL NFR BLD AUTO: 0.3 % — SIGNIFICANT CHANGE UP (ref 0–6)
EOSINOPHIL NFR BLD AUTO: 0.3 % — SIGNIFICANT CHANGE UP (ref 0–6)
EOSINOPHIL NFR BLD AUTO: 0.4 % — SIGNIFICANT CHANGE UP (ref 0–6)
EOSINOPHIL NFR BLD AUTO: 1.3 % — SIGNIFICANT CHANGE UP (ref 0–6)
EOSINOPHIL NFR BLD AUTO: 2.5 % — SIGNIFICANT CHANGE UP (ref 0–6)
EOSINOPHIL NFR BLD AUTO: 3.1 % — SIGNIFICANT CHANGE UP (ref 0–6)
EPI CELLS # UR: SIGNIFICANT CHANGE UP /HPF
ERYTHROCYTE [SEDIMENTATION RATE] IN BLOOD: 10 MM/HR — SIGNIFICANT CHANGE UP (ref 0–20)
ESTIMATED AVERAGE GLUCOSE: 183 MG/DL — HIGH (ref 68–114)
FLUAV AG NPH QL: SIGNIFICANT CHANGE UP
FLUBV AG NPH QL: SIGNIFICANT CHANGE UP
GAS PNL BLDV: 134 MMOL/L — LOW (ref 136–145)
GAS PNL BLDV: SIGNIFICANT CHANGE UP
GLUCOSE BLDC GLUCOMTR-MCNC: 119 MG/DL — HIGH (ref 70–99)
GLUCOSE BLDC GLUCOMTR-MCNC: 120 MG/DL — HIGH (ref 70–99)
GLUCOSE BLDC GLUCOMTR-MCNC: 129 MG/DL — HIGH (ref 70–99)
GLUCOSE BLDC GLUCOMTR-MCNC: 133 MG/DL — HIGH (ref 70–99)
GLUCOSE BLDC GLUCOMTR-MCNC: 134 MG/DL — HIGH (ref 70–99)
GLUCOSE BLDC GLUCOMTR-MCNC: 141 MG/DL — HIGH (ref 70–99)
GLUCOSE BLDC GLUCOMTR-MCNC: 143 MG/DL — HIGH (ref 70–99)
GLUCOSE BLDC GLUCOMTR-MCNC: 151 MG/DL — HIGH (ref 70–99)
GLUCOSE BLDC GLUCOMTR-MCNC: 154 MG/DL — HIGH (ref 70–99)
GLUCOSE BLDC GLUCOMTR-MCNC: 175 MG/DL — HIGH (ref 70–99)
GLUCOSE SERPL-MCNC: 137 MG/DL — HIGH (ref 70–99)
GLUCOSE SERPL-MCNC: 144 MG/DL — HIGH (ref 70–99)
GLUCOSE SERPL-MCNC: 150 MG/DL — HIGH (ref 70–99)
GLUCOSE SERPL-MCNC: 152 MG/DL — HIGH (ref 70–99)
GLUCOSE SERPL-MCNC: 162 MG/DL — HIGH (ref 70–99)
GLUCOSE SERPL-MCNC: 163 MG/DL — HIGH (ref 70–99)
GLUCOSE SERPL-MCNC: 175 MG/DL — HIGH (ref 70–99)
GLUCOSE SERPL-MCNC: 175 MG/DL — HIGH (ref 70–99)
GLUCOSE SERPL-MCNC: 176 MG/DL — HIGH (ref 70–99)
GLUCOSE SERPL-MCNC: 176 MG/DL — HIGH (ref 70–99)
GLUCOSE SERPL-MCNC: 188 MG/DL — HIGH (ref 70–99)
GLUCOSE SERPL-MCNC: 230 MG/DL — HIGH (ref 70–99)
GLUCOSE SERPL-MCNC: 359 MG/DL — HIGH (ref 70–99)
GLUCOSE SERPL-MCNC: 385 MG/DL — HIGH (ref 70–99)
GLUCOSE UR QL: NEGATIVE MG/DL — SIGNIFICANT CHANGE UP
GLUCOSE UR QL: NEGATIVE MG/DL — SIGNIFICANT CHANGE UP
GLUCOSE UR QL: NEGATIVE — SIGNIFICANT CHANGE UP
GRAM STN FLD: SIGNIFICANT CHANGE UP
HCO3 BLDV-SCNC: 26 MMOL/L — SIGNIFICANT CHANGE UP (ref 22–29)
HCT VFR BLD CALC: 36.6 % — SIGNIFICANT CHANGE UP (ref 34.5–45)
HCT VFR BLD CALC: 38.2 % — SIGNIFICANT CHANGE UP (ref 34.5–45)
HCT VFR BLD CALC: 38.3 % — SIGNIFICANT CHANGE UP (ref 34.5–45)
HCT VFR BLD CALC: 39.9 % — SIGNIFICANT CHANGE UP (ref 34.5–45)
HCT VFR BLD CALC: 40 % — SIGNIFICANT CHANGE UP (ref 34.5–45)
HCT VFR BLD CALC: 41.4 % — SIGNIFICANT CHANGE UP (ref 34.5–45)
HCT VFR BLD CALC: 41.4 % — SIGNIFICANT CHANGE UP (ref 34.5–45)
HCT VFR BLD CALC: 41.8 % — SIGNIFICANT CHANGE UP (ref 34.5–45)
HCT VFR BLD CALC: 43.5 % — SIGNIFICANT CHANGE UP (ref 34.5–45)
HCT VFR BLD CALC: 44 % — SIGNIFICANT CHANGE UP (ref 34.5–45)
HCT VFR BLD CALC: 45.3 % — HIGH (ref 34.5–45)
HCT VFR BLD CALC: 45.6 % — HIGH (ref 34.5–45)
HCT VFR BLD CALC: 46 % — HIGH (ref 34.5–45)
HCT VFR BLD CALC: 46.2 % — HIGH (ref 34.5–45)
HCT VFR BLD CALC: 50.2 % — HIGH (ref 34.5–45)
HGB BLD-MCNC: 11.1 G/DL — LOW (ref 11.5–15.5)
HGB BLD-MCNC: 11.5 G/DL — SIGNIFICANT CHANGE UP (ref 11.5–15.5)
HGB BLD-MCNC: 11.6 G/DL — SIGNIFICANT CHANGE UP (ref 11.5–15.5)
HGB BLD-MCNC: 11.6 G/DL — SIGNIFICANT CHANGE UP (ref 11.5–15.5)
HGB BLD-MCNC: 12.3 G/DL — SIGNIFICANT CHANGE UP (ref 11.5–15.5)
HGB BLD-MCNC: 12.4 G/DL — SIGNIFICANT CHANGE UP (ref 11.5–15.5)
HGB BLD-MCNC: 12.5 G/DL — SIGNIFICANT CHANGE UP (ref 11.5–15.5)
HGB BLD-MCNC: 12.5 G/DL — SIGNIFICANT CHANGE UP (ref 11.5–15.5)
HGB BLD-MCNC: 13.2 G/DL — SIGNIFICANT CHANGE UP (ref 11.5–15.5)
HGB BLD-MCNC: 13.3 G/DL — SIGNIFICANT CHANGE UP (ref 11.5–15.5)
HGB BLD-MCNC: 13.3 G/DL — SIGNIFICANT CHANGE UP (ref 11.5–15.5)
HGB BLD-MCNC: 13.5 G/DL — SIGNIFICANT CHANGE UP (ref 11.5–15.5)
HGB BLD-MCNC: 13.6 G/DL — SIGNIFICANT CHANGE UP (ref 11.5–15.5)
HGB BLD-MCNC: 13.6 G/DL — SIGNIFICANT CHANGE UP (ref 11.5–15.5)
HGB BLD-MCNC: 15 G/DL — SIGNIFICANT CHANGE UP (ref 11.5–15.5)
HIV 1 & 2 AB SERPL IA.RAPID: SIGNIFICANT CHANGE UP
HOROWITZ INDEX BLDV+IHG-RTO: 21 — SIGNIFICANT CHANGE UP
IMM GRANULOCYTES NFR BLD AUTO: 0.4 % — SIGNIFICANT CHANGE UP (ref 0–0.9)
IMM GRANULOCYTES NFR BLD AUTO: 0.4 % — SIGNIFICANT CHANGE UP (ref 0–0.9)
IMM GRANULOCYTES NFR BLD AUTO: 0.5 % — SIGNIFICANT CHANGE UP (ref 0–0.9)
IMM GRANULOCYTES NFR BLD AUTO: 0.6 % — SIGNIFICANT CHANGE UP (ref 0–0.9)
IMM GRANULOCYTES NFR BLD AUTO: 0.8 % — SIGNIFICANT CHANGE UP (ref 0–0.9)
IMM GRANULOCYTES NFR BLD AUTO: 0.8 % — SIGNIFICANT CHANGE UP (ref 0–0.9)
IMM GRANULOCYTES NFR BLD AUTO: 1.1 % — HIGH (ref 0–0.9)
IMM GRANULOCYTES NFR BLD AUTO: 2.4 % — HIGH (ref 0–0.9)
INR BLD: 1.53 RATIO — HIGH (ref 0.85–1.18)
INR BLD: 1.53 RATIO — HIGH (ref 0.85–1.18)
INR BLD: 1.99 RATIO — HIGH (ref 0.88–1.16)
INR BLD: 2.06 RATIO — HIGH (ref 0.88–1.16)
KETONES UR-MCNC: ABNORMAL MG/DL
KETONES UR-MCNC: ABNORMAL MG/DL
KETONES UR-MCNC: NEGATIVE — SIGNIFICANT CHANGE UP
LACTATE BLDV-MCNC: 1.9 MMOL/L — SIGNIFICANT CHANGE UP (ref 0.5–2)
LACTATE SERPL-SCNC: 1.8 MMOL/L — SIGNIFICANT CHANGE UP (ref 0.7–2)
LACTATE SERPL-SCNC: 2.7 MMOL/L — HIGH (ref 0.7–2)
LACTATE SERPL-SCNC: 3 MMOL/L — HIGH (ref 0.7–2)
LACTATE SERPL-SCNC: 4 MMOL/L — CRITICAL HIGH (ref 0.7–2)
LACTATE SERPL-SCNC: 4 MMOL/L — CRITICAL HIGH (ref 0.7–2)
LACTATE SERPL-SCNC: 4.4 MMOL/L — CRITICAL HIGH (ref 0.7–2)
LACTATE SERPL-SCNC: 4.4 MMOL/L — CRITICAL HIGH (ref 0.7–2)
LEGIONELLA AG UR QL: NEGATIVE — SIGNIFICANT CHANGE UP
LEUKOCYTE ESTERASE UR-ACNC: ABNORMAL
LIDOCAIN IGE QN: 15 U/L — SIGNIFICANT CHANGE UP (ref 13–75)
LIDOCAIN IGE QN: 15 U/L — SIGNIFICANT CHANGE UP (ref 13–75)
LYMPHOCYTES # BLD AUTO: 0.4 K/UL — LOW (ref 1–3.3)
LYMPHOCYTES # BLD AUTO: 0.53 K/UL — LOW (ref 1–3.3)
LYMPHOCYTES # BLD AUTO: 1 % — LOW (ref 13–44)
LYMPHOCYTES # BLD AUTO: 1.09 K/UL — SIGNIFICANT CHANGE UP (ref 1–3.3)
LYMPHOCYTES # BLD AUTO: 1.16 K/UL — SIGNIFICANT CHANGE UP (ref 1–3.3)
LYMPHOCYTES # BLD AUTO: 1.26 K/UL — SIGNIFICANT CHANGE UP (ref 1–3.3)
LYMPHOCYTES # BLD AUTO: 1.37 K/UL — SIGNIFICANT CHANGE UP (ref 1–3.3)
LYMPHOCYTES # BLD AUTO: 1.87 K/UL — SIGNIFICANT CHANGE UP (ref 1–3.3)
LYMPHOCYTES # BLD AUTO: 13.6 % — SIGNIFICANT CHANGE UP (ref 13–44)
LYMPHOCYTES # BLD AUTO: 14.7 % — SIGNIFICANT CHANGE UP (ref 13–44)
LYMPHOCYTES # BLD AUTO: 21.7 % — SIGNIFICANT CHANGE UP (ref 13–44)
LYMPHOCYTES # BLD AUTO: 21.7 % — SIGNIFICANT CHANGE UP (ref 13–44)
LYMPHOCYTES # BLD AUTO: 3 % — LOW (ref 13–44)
LYMPHOCYTES # BLD AUTO: 3.9 % — LOW (ref 13–44)
LYMPHOCYTES # BLD AUTO: 3.95 K/UL — HIGH (ref 1–3.3)
LYMPHOCYTES # BLD AUTO: 3.95 K/UL — HIGH (ref 1–3.3)
LYMPHOCYTES # BLD AUTO: 5.8 % — LOW (ref 13–44)
LYMPHOCYTES # BLD AUTO: 8.2 % — LOW (ref 13–44)
M PNEUMO DNA SPEC QL NAA+PROBE: NEGATIVE — SIGNIFICANT CHANGE UP
MAGNESIUM SERPL-MCNC: 1.9 MG/DL — SIGNIFICANT CHANGE UP (ref 1.6–2.6)
MAGNESIUM SERPL-MCNC: 2 MG/DL — SIGNIFICANT CHANGE UP (ref 1.6–2.6)
MAGNESIUM SERPL-MCNC: 2.1 MG/DL — SIGNIFICANT CHANGE UP (ref 1.6–2.6)
MAGNESIUM SERPL-MCNC: 2.7 MG/DL — HIGH (ref 1.6–2.6)
MAGNESIUM SERPL-MCNC: 2.7 MG/DL — HIGH (ref 1.6–2.6)
MANUAL SMEAR VERIFICATION: SIGNIFICANT CHANGE UP
MCHC RBC-ENTMCNC: 29.1 GM/DL — LOW (ref 32–36)
MCHC RBC-ENTMCNC: 29.1 GM/DL — LOW (ref 32–36)
MCHC RBC-ENTMCNC: 29.4 GM/DL — LOW (ref 32–36)
MCHC RBC-ENTMCNC: 29.4 PG — SIGNIFICANT CHANGE UP (ref 27–34)
MCHC RBC-ENTMCNC: 29.4 PG — SIGNIFICANT CHANGE UP (ref 27–34)
MCHC RBC-ENTMCNC: 29.6 GM/DL — LOW (ref 32–36)
MCHC RBC-ENTMCNC: 29.6 GM/DL — LOW (ref 32–36)
MCHC RBC-ENTMCNC: 29.6 PG — SIGNIFICANT CHANGE UP (ref 27–34)
MCHC RBC-ENTMCNC: 29.6 PG — SIGNIFICANT CHANGE UP (ref 27–34)
MCHC RBC-ENTMCNC: 29.7 GM/DL — LOW (ref 32–36)
MCHC RBC-ENTMCNC: 29.7 PG — SIGNIFICANT CHANGE UP (ref 27–34)
MCHC RBC-ENTMCNC: 29.9 GM/DL — LOW (ref 32–36)
MCHC RBC-ENTMCNC: 29.9 PG — SIGNIFICANT CHANGE UP (ref 27–34)
MCHC RBC-ENTMCNC: 30 PG — SIGNIFICANT CHANGE UP (ref 27–34)
MCHC RBC-ENTMCNC: 30 PG — SIGNIFICANT CHANGE UP (ref 27–34)
MCHC RBC-ENTMCNC: 30.1 GM/DL — LOW (ref 32–36)
MCHC RBC-ENTMCNC: 30.1 PG — SIGNIFICANT CHANGE UP (ref 27–34)
MCHC RBC-ENTMCNC: 30.2 GM/DL — LOW (ref 32–36)
MCHC RBC-ENTMCNC: 30.2 PG — SIGNIFICANT CHANGE UP (ref 27–34)
MCHC RBC-ENTMCNC: 30.2 PG — SIGNIFICANT CHANGE UP (ref 27–34)
MCHC RBC-ENTMCNC: 30.3 GM/DL — LOW (ref 32–36)
MCHC RBC-ENTMCNC: 30.3 GM/DL — LOW (ref 32–36)
MCHC RBC-ENTMCNC: 30.3 PG — SIGNIFICANT CHANGE UP (ref 27–34)
MCHC RBC-ENTMCNC: 30.5 PG — SIGNIFICANT CHANGE UP (ref 27–34)
MCHC RBC-ENTMCNC: 30.5 PG — SIGNIFICANT CHANGE UP (ref 27–34)
MCHC RBC-ENTMCNC: 30.6 GM/DL — LOW (ref 32–36)
MCHC RBC-ENTMCNC: 30.7 PG — SIGNIFICANT CHANGE UP (ref 27–34)
MCHC RBC-ENTMCNC: 30.8 GM/DL — LOW (ref 32–36)
MCV RBC AUTO: 100.5 FL — HIGH (ref 80–100)
MCV RBC AUTO: 101.2 FL — HIGH (ref 80–100)
MCV RBC AUTO: 101.2 FL — HIGH (ref 80–100)
MCV RBC AUTO: 101.6 FL — HIGH (ref 80–100)
MCV RBC AUTO: 101.8 FL — HIGH (ref 80–100)
MCV RBC AUTO: 101.9 FL — HIGH (ref 80–100)
MCV RBC AUTO: 102 FL — HIGH (ref 80–100)
MCV RBC AUTO: 102.7 FL — HIGH (ref 80–100)
MCV RBC AUTO: 103.2 FL — HIGH (ref 80–100)
MCV RBC AUTO: 96.9 FL — SIGNIFICANT CHANGE UP (ref 80–100)
MCV RBC AUTO: 97.4 FL — SIGNIFICANT CHANGE UP (ref 80–100)
MCV RBC AUTO: 97.4 FL — SIGNIFICANT CHANGE UP (ref 80–100)
MCV RBC AUTO: 98.5 FL — SIGNIFICANT CHANGE UP (ref 80–100)
MCV RBC AUTO: 98.7 FL — SIGNIFICANT CHANGE UP (ref 80–100)
MCV RBC AUTO: 99.1 FL — SIGNIFICANT CHANGE UP (ref 80–100)
METHOD TYPE: SIGNIFICANT CHANGE UP
MONOCYTES # BLD AUTO: 0.61 K/UL — SIGNIFICANT CHANGE UP (ref 0–0.9)
MONOCYTES # BLD AUTO: 0.83 K/UL — SIGNIFICANT CHANGE UP (ref 0–0.9)
MONOCYTES # BLD AUTO: 0.83 K/UL — SIGNIFICANT CHANGE UP (ref 0–0.9)
MONOCYTES # BLD AUTO: 1.02 K/UL — HIGH (ref 0–0.9)
MONOCYTES # BLD AUTO: 1.21 K/UL — HIGH (ref 0–0.9)
MONOCYTES # BLD AUTO: 1.37 K/UL — HIGH (ref 0–0.9)
MONOCYTES # BLD AUTO: 1.75 K/UL — HIGH (ref 0–0.9)
MONOCYTES # BLD AUTO: 1.76 K/UL — HIGH (ref 0–0.9)
MONOCYTES # BLD AUTO: 2.78 K/UL — HIGH (ref 0–0.9)
MONOCYTES NFR BLD AUTO: 10.1 % — SIGNIFICANT CHANGE UP (ref 2–14)
MONOCYTES NFR BLD AUTO: 3.4 % — SIGNIFICANT CHANGE UP (ref 2–14)
MONOCYTES NFR BLD AUTO: 4.6 % — SIGNIFICANT CHANGE UP (ref 2–14)
MONOCYTES NFR BLD AUTO: 4.6 % — SIGNIFICANT CHANGE UP (ref 2–14)
MONOCYTES NFR BLD AUTO: 6.2 % — SIGNIFICANT CHANGE UP (ref 2–14)
MONOCYTES NFR BLD AUTO: 7 % — SIGNIFICANT CHANGE UP (ref 2–14)
MONOCYTES NFR BLD AUTO: 8 % — SIGNIFICANT CHANGE UP (ref 2–14)
MONOCYTES NFR BLD AUTO: 9.5 % — SIGNIFICANT CHANGE UP (ref 2–14)
MONOCYTES NFR BLD AUTO: 9.7 % — SIGNIFICANT CHANGE UP (ref 2–14)
MRSA PCR RESULT.: SIGNIFICANT CHANGE UP
NEUTROPHILS # BLD AUTO: 11.3 K/UL — HIGH (ref 1.8–7.4)
NEUTROPHILS # BLD AUTO: 13.19 K/UL — HIGH (ref 1.8–7.4)
NEUTROPHILS # BLD AUTO: 13.19 K/UL — HIGH (ref 1.8–7.4)
NEUTROPHILS # BLD AUTO: 16.28 K/UL — HIGH (ref 1.8–7.4)
NEUTROPHILS # BLD AUTO: 18.58 K/UL — HIGH (ref 1.8–7.4)
NEUTROPHILS # BLD AUTO: 24.62 K/UL — HIGH (ref 1.8–7.4)
NEUTROPHILS # BLD AUTO: 36.51 K/UL — HIGH (ref 1.8–7.4)
NEUTROPHILS # BLD AUTO: 7.4 K/UL — SIGNIFICANT CHANGE UP (ref 1.8–7.4)
NEUTROPHILS # BLD AUTO: 9.12 K/UL — HIGH (ref 1.8–7.4)
NEUTROPHILS NFR BLD AUTO: 71.7 % — SIGNIFICANT CHANGE UP (ref 43–77)
NEUTROPHILS NFR BLD AUTO: 72.4 % — SIGNIFICANT CHANGE UP (ref 43–77)
NEUTROPHILS NFR BLD AUTO: 72.4 % — SIGNIFICANT CHANGE UP (ref 43–77)
NEUTROPHILS NFR BLD AUTO: 73.1 % — SIGNIFICANT CHANGE UP (ref 43–77)
NEUTROPHILS NFR BLD AUTO: 80 % — HIGH (ref 43–77)
NEUTROPHILS NFR BLD AUTO: 84.8 % — HIGH (ref 43–77)
NEUTROPHILS NFR BLD AUTO: 86 % — HIGH (ref 43–77)
NEUTROPHILS NFR BLD AUTO: 87 % — HIGH (ref 43–77)
NEUTROPHILS NFR BLD AUTO: 91.8 % — HIGH (ref 43–77)
NEUTS BAND # BLD: 6 % — SIGNIFICANT CHANGE UP (ref 0–8)
NITRITE UR-MCNC: NEGATIVE — SIGNIFICANT CHANGE UP
NITRITE UR-MCNC: NEGATIVE — SIGNIFICANT CHANGE UP
NITRITE UR-MCNC: POSITIVE
NRBC # BLD: 0 /100 WBCS — SIGNIFICANT CHANGE UP (ref 0–0)
NRBC # BLD: 0 /100 — SIGNIFICANT CHANGE UP (ref 0–0)
ORGANISM # SPEC MICROSCOPIC CNT: SIGNIFICANT CHANGE UP
OSMOLALITY UR: 397 MOS/KG — SIGNIFICANT CHANGE UP (ref 50–1200)
PCO2 BLDV: 79 MMHG — HIGH (ref 39–42)
PH BLDV: 7.13 — LOW (ref 7.32–7.43)
PH UR: 5 — SIGNIFICANT CHANGE UP (ref 5–8)
PH UR: 5.5 — SIGNIFICANT CHANGE UP (ref 5–8)
PH UR: 5.5 — SIGNIFICANT CHANGE UP (ref 5–8)
PHOSPHATE SERPL-MCNC: 2 MG/DL — LOW (ref 2.5–4.5)
PHOSPHATE SERPL-MCNC: 2 MG/DL — LOW (ref 2.5–4.5)
PHOSPHATE SERPL-MCNC: 2.4 MG/DL — LOW (ref 2.5–4.5)
PHOSPHATE SERPL-MCNC: 2.4 MG/DL — LOW (ref 2.5–4.5)
PHOSPHATE SERPL-MCNC: 2.8 MG/DL — SIGNIFICANT CHANGE UP (ref 2.5–4.5)
PHOSPHATE SERPL-MCNC: 3 MG/DL — SIGNIFICANT CHANGE UP (ref 2.5–4.5)
PHOSPHATE SERPL-MCNC: 3.1 MG/DL — SIGNIFICANT CHANGE UP (ref 2.5–4.5)
PHOSPHATE SERPL-MCNC: 3.1 MG/DL — SIGNIFICANT CHANGE UP (ref 2.5–4.5)
PHOSPHATE SERPL-MCNC: 3.2 MG/DL — SIGNIFICANT CHANGE UP (ref 2.5–4.5)
PHOSPHATE SERPL-MCNC: 3.3 MG/DL — SIGNIFICANT CHANGE UP (ref 2.5–4.5)
PHOSPHATE SERPL-MCNC: 4.6 MG/DL — HIGH (ref 2.5–4.5)
PLAT MORPH BLD: NORMAL — SIGNIFICANT CHANGE UP
PLATELET # BLD AUTO: 134 K/UL — LOW (ref 150–400)
PLATELET # BLD AUTO: 138 K/UL — LOW (ref 150–400)
PLATELET # BLD AUTO: 155 K/UL — SIGNIFICANT CHANGE UP (ref 150–400)
PLATELET # BLD AUTO: 157 K/UL — SIGNIFICANT CHANGE UP (ref 150–400)
PLATELET # BLD AUTO: 160 K/UL — SIGNIFICANT CHANGE UP (ref 150–400)
PLATELET # BLD AUTO: 169 K/UL — SIGNIFICANT CHANGE UP (ref 150–400)
PLATELET # BLD AUTO: 183 K/UL — SIGNIFICANT CHANGE UP (ref 150–400)
PLATELET # BLD AUTO: 185 K/UL — SIGNIFICANT CHANGE UP (ref 150–400)
PLATELET # BLD AUTO: 187 K/UL — SIGNIFICANT CHANGE UP (ref 150–400)
PLATELET # BLD AUTO: 209 K/UL — SIGNIFICANT CHANGE UP (ref 150–400)
PLATELET # BLD AUTO: 211 K/UL — SIGNIFICANT CHANGE UP (ref 150–400)
PLATELET # BLD AUTO: 211 K/UL — SIGNIFICANT CHANGE UP (ref 150–400)
PLATELET # BLD AUTO: 214 K/UL — SIGNIFICANT CHANGE UP (ref 150–400)
PLATELET # BLD AUTO: 81 K/UL — LOW (ref 150–400)
PLATELET # BLD AUTO: 81 K/UL — LOW (ref 150–400)
PLATELET COUNT - ESTIMATE: NORMAL — SIGNIFICANT CHANGE UP
PO2 BLDV: 56 MMHG — SIGNIFICANT CHANGE UP
POIKILOCYTOSIS BLD QL AUTO: SLIGHT — SIGNIFICANT CHANGE UP
POLYCHROMASIA BLD QL SMEAR: SLIGHT — SIGNIFICANT CHANGE UP
POTASSIUM BLDV-SCNC: 4.1 MMOL/L — SIGNIFICANT CHANGE UP (ref 3.5–5.1)
POTASSIUM SERPL-MCNC: 3.6 MMOL/L — SIGNIFICANT CHANGE UP (ref 3.5–5.3)
POTASSIUM SERPL-MCNC: 3.8 MMOL/L — SIGNIFICANT CHANGE UP (ref 3.5–5.3)
POTASSIUM SERPL-MCNC: 3.9 MMOL/L — SIGNIFICANT CHANGE UP (ref 3.5–5.3)
POTASSIUM SERPL-MCNC: 3.9 MMOL/L — SIGNIFICANT CHANGE UP (ref 3.5–5.3)
POTASSIUM SERPL-MCNC: 4.3 MMOL/L — SIGNIFICANT CHANGE UP (ref 3.5–5.3)
POTASSIUM SERPL-MCNC: 4.4 MMOL/L — SIGNIFICANT CHANGE UP (ref 3.5–5.3)
POTASSIUM SERPL-MCNC: 4.4 MMOL/L — SIGNIFICANT CHANGE UP (ref 3.5–5.3)
POTASSIUM SERPL-MCNC: 4.5 MMOL/L — SIGNIFICANT CHANGE UP (ref 3.5–5.3)
POTASSIUM SERPL-MCNC: 4.5 MMOL/L — SIGNIFICANT CHANGE UP (ref 3.5–5.3)
POTASSIUM SERPL-MCNC: 4.6 MMOL/L — SIGNIFICANT CHANGE UP (ref 3.5–5.3)
POTASSIUM SERPL-MCNC: 4.8 MMOL/L — SIGNIFICANT CHANGE UP (ref 3.5–5.3)
POTASSIUM SERPL-MCNC: 5 MMOL/L — SIGNIFICANT CHANGE UP (ref 3.5–5.3)
POTASSIUM SERPL-SCNC: 3.6 MMOL/L — SIGNIFICANT CHANGE UP (ref 3.5–5.3)
POTASSIUM SERPL-SCNC: 3.8 MMOL/L — SIGNIFICANT CHANGE UP (ref 3.5–5.3)
POTASSIUM SERPL-SCNC: 3.9 MMOL/L — SIGNIFICANT CHANGE UP (ref 3.5–5.3)
POTASSIUM SERPL-SCNC: 3.9 MMOL/L — SIGNIFICANT CHANGE UP (ref 3.5–5.3)
POTASSIUM SERPL-SCNC: 4.3 MMOL/L — SIGNIFICANT CHANGE UP (ref 3.5–5.3)
POTASSIUM SERPL-SCNC: 4.4 MMOL/L — SIGNIFICANT CHANGE UP (ref 3.5–5.3)
POTASSIUM SERPL-SCNC: 4.4 MMOL/L — SIGNIFICANT CHANGE UP (ref 3.5–5.3)
POTASSIUM SERPL-SCNC: 4.5 MMOL/L — SIGNIFICANT CHANGE UP (ref 3.5–5.3)
POTASSIUM SERPL-SCNC: 4.5 MMOL/L — SIGNIFICANT CHANGE UP (ref 3.5–5.3)
POTASSIUM SERPL-SCNC: 4.6 MMOL/L — SIGNIFICANT CHANGE UP (ref 3.5–5.3)
POTASSIUM SERPL-SCNC: 4.8 MMOL/L — SIGNIFICANT CHANGE UP (ref 3.5–5.3)
POTASSIUM SERPL-SCNC: 5 MMOL/L — SIGNIFICANT CHANGE UP (ref 3.5–5.3)
POTASSIUM UR-SCNC: 42 MMOL/L — SIGNIFICANT CHANGE UP
PROCALCITONIN SERPL-MCNC: 29.2 NG/ML — HIGH (ref 0.02–0.1)
PROT SERPL-MCNC: 5 G/DL — LOW (ref 6–8.3)
PROT SERPL-MCNC: 5.4 G/DL — LOW (ref 6–8.3)
PROT SERPL-MCNC: 5.4 G/DL — LOW (ref 6–8.3)
PROT SERPL-MCNC: 5.6 G/DL — LOW (ref 6–8.3)
PROT SERPL-MCNC: 5.6 G/DL — LOW (ref 6–8.3)
PROT SERPL-MCNC: 5.9 G/DL — LOW (ref 6–8.3)
PROT SERPL-MCNC: 6 G/DL — SIGNIFICANT CHANGE UP (ref 6–8.3)
PROT SERPL-MCNC: 6.1 G/DL — SIGNIFICANT CHANGE UP (ref 6–8.3)
PROT SERPL-MCNC: 6.5 G/DL — SIGNIFICANT CHANGE UP (ref 6–8.3)
PROT UR-MCNC: 100
PROT UR-MCNC: 100 MG/DL
PROT UR-MCNC: 100 MG/DL
PROTHROM AB SERPL-ACNC: 17.2 SEC — HIGH (ref 9.5–13)
PROTHROM AB SERPL-ACNC: 17.2 SEC — HIGH (ref 9.5–13)
PROTHROM AB SERPL-ACNC: 23.8 SEC — HIGH (ref 10.5–13.4)
PROTHROM AB SERPL-ACNC: 24.7 SEC — HIGH (ref 10.5–13.4)
RAPID RVP RESULT: SIGNIFICANT CHANGE UP
RAPID RVP RESULT: SIGNIFICANT CHANGE UP
RBC # BLD: 3.64 M/UL — LOW (ref 3.8–5.2)
RBC # BLD: 3.75 M/UL — LOW (ref 3.8–5.2)
RBC # BLD: 3.88 M/UL — SIGNIFICANT CHANGE UP (ref 3.8–5.2)
RBC # BLD: 3.91 M/UL — SIGNIFICANT CHANGE UP (ref 3.8–5.2)
RBC # BLD: 4.06 M/UL — SIGNIFICANT CHANGE UP (ref 3.8–5.2)
RBC # BLD: 4.13 M/UL — SIGNIFICANT CHANGE UP (ref 3.8–5.2)
RBC # BLD: 4.25 M/UL — SIGNIFICANT CHANGE UP (ref 3.8–5.2)
RBC # BLD: 4.25 M/UL — SIGNIFICANT CHANGE UP (ref 3.8–5.2)
RBC # BLD: 4.42 M/UL — SIGNIFICANT CHANGE UP (ref 3.8–5.2)
RBC # BLD: 4.44 M/UL — SIGNIFICANT CHANGE UP (ref 3.8–5.2)
RBC # BLD: 4.46 M/UL — SIGNIFICANT CHANGE UP (ref 3.8–5.2)
RBC # BLD: 4.49 M/UL — SIGNIFICANT CHANGE UP (ref 3.8–5.2)
RBC # BLD: 4.5 M/UL — SIGNIFICANT CHANGE UP (ref 3.8–5.2)
RBC # BLD: 4.52 M/UL — SIGNIFICANT CHANGE UP (ref 3.8–5.2)
RBC # BLD: 4.96 M/UL — SIGNIFICANT CHANGE UP (ref 3.8–5.2)
RBC # FLD: 13.7 % — SIGNIFICANT CHANGE UP (ref 10.3–14.5)
RBC # FLD: 13.8 % — SIGNIFICANT CHANGE UP (ref 10.3–14.5)
RBC # FLD: 13.9 % — SIGNIFICANT CHANGE UP (ref 10.3–14.5)
RBC # FLD: 14 % — SIGNIFICANT CHANGE UP (ref 10.3–14.5)
RBC # FLD: 14.1 % — SIGNIFICANT CHANGE UP (ref 10.3–14.5)
RBC # FLD: 14.1 % — SIGNIFICANT CHANGE UP (ref 10.3–14.5)
RBC # FLD: 14.2 % — SIGNIFICANT CHANGE UP (ref 10.3–14.5)
RBC # FLD: 14.3 % — SIGNIFICANT CHANGE UP (ref 10.3–14.5)
RBC # FLD: 14.3 % — SIGNIFICANT CHANGE UP (ref 10.3–14.5)
RBC # FLD: 14.4 % — SIGNIFICANT CHANGE UP (ref 10.3–14.5)
RBC # FLD: 14.5 % — SIGNIFICANT CHANGE UP (ref 10.3–14.5)
RBC # FLD: 20.4 % — HIGH (ref 10.3–14.5)
RBC # FLD: 20.4 % — HIGH (ref 10.3–14.5)
RBC BLD AUTO: ABNORMAL
RBC CASTS # UR COMP ASSIST: ABNORMAL /HPF (ref 0–2)
S AUREUS DNA NOSE QL NAA+PROBE: DETECTED
S PNEUM AG UR QL: NEGATIVE — SIGNIFICANT CHANGE UP
SAO2 % BLDV: 79.8 % — SIGNIFICANT CHANGE UP
SARS-COV-2 RNA SPEC QL NAA+PROBE: SIGNIFICANT CHANGE UP
SODIUM SERPL-SCNC: 139 MMOL/L — SIGNIFICANT CHANGE UP (ref 135–145)
SODIUM SERPL-SCNC: 141 MMOL/L — SIGNIFICANT CHANGE UP (ref 135–145)
SODIUM SERPL-SCNC: 143 MMOL/L — SIGNIFICANT CHANGE UP (ref 135–145)
SODIUM SERPL-SCNC: 144 MMOL/L — SIGNIFICANT CHANGE UP (ref 135–145)
SODIUM SERPL-SCNC: 144 MMOL/L — SIGNIFICANT CHANGE UP (ref 135–145)
SODIUM SERPL-SCNC: 145 MMOL/L — SIGNIFICANT CHANGE UP (ref 135–145)
SODIUM SERPL-SCNC: 145 MMOL/L — SIGNIFICANT CHANGE UP (ref 135–145)
SODIUM SERPL-SCNC: 146 MMOL/L — HIGH (ref 135–145)
SODIUM SERPL-SCNC: 161 MMOL/L — CRITICAL HIGH (ref 135–145)
SODIUM SERPL-SCNC: 161 MMOL/L — CRITICAL HIGH (ref 135–145)
SODIUM UR-SCNC: 50 MMOL/L — SIGNIFICANT CHANGE UP
SP GR SPEC: 1.01 — SIGNIFICANT CHANGE UP (ref 1.01–1.02)
SP GR SPEC: 1.02 — SIGNIFICANT CHANGE UP (ref 1–1.03)
SP GR SPEC: 1.02 — SIGNIFICANT CHANGE UP (ref 1–1.03)
SPECIMEN SOURCE: SIGNIFICANT CHANGE UP
TROPONIN I, HIGH SENSITIVITY RESULT: 39.9 NG/L — SIGNIFICANT CHANGE UP
TROPONIN I, HIGH SENSITIVITY RESULT: 39.9 NG/L — SIGNIFICANT CHANGE UP
TSH SERPL-MCNC: 0.67 UU/ML — SIGNIFICANT CHANGE UP (ref 0.34–4.82)
URATE UR-MCNC: 29.6 MG/DL — SIGNIFICANT CHANGE UP
UROBILINOGEN FLD QL: 0.2 MG/DL — SIGNIFICANT CHANGE UP (ref 0.2–1)
UROBILINOGEN FLD QL: 0.2 MG/DL — SIGNIFICANT CHANGE UP (ref 0.2–1)
UROBILINOGEN FLD QL: NEGATIVE — SIGNIFICANT CHANGE UP
UUN UR-MCNC: 395 MG/DL — SIGNIFICANT CHANGE UP
WBC # BLD: 10.11 K/UL — SIGNIFICANT CHANGE UP (ref 3.8–10.5)
WBC # BLD: 11.42 K/UL — HIGH (ref 3.8–10.5)
WBC # BLD: 12.73 K/UL — HIGH (ref 3.8–10.5)
WBC # BLD: 12.79 K/UL — HIGH (ref 3.8–10.5)
WBC # BLD: 12.96 K/UL — HIGH (ref 3.8–10.5)
WBC # BLD: 14.13 K/UL — HIGH (ref 3.8–10.5)
WBC # BLD: 17.73 K/UL — HIGH (ref 3.8–10.5)
WBC # BLD: 18.22 K/UL — HIGH (ref 3.8–10.5)
WBC # BLD: 18.22 K/UL — HIGH (ref 3.8–10.5)
WBC # BLD: 21.89 K/UL — HIGH (ref 3.8–10.5)
WBC # BLD: 28.27 K/UL — HIGH (ref 3.8–10.5)
WBC # BLD: 29.33 K/UL — HIGH (ref 3.8–10.5)
WBC # BLD: 39.69 K/UL — HIGH (ref 3.8–10.5)
WBC # BLD: 9.33 K/UL — SIGNIFICANT CHANGE UP (ref 3.8–10.5)
WBC # BLD: 9.73 K/UL — SIGNIFICANT CHANGE UP (ref 3.8–10.5)
WBC # FLD AUTO: 10.11 K/UL — SIGNIFICANT CHANGE UP (ref 3.8–10.5)
WBC # FLD AUTO: 11.42 K/UL — HIGH (ref 3.8–10.5)
WBC # FLD AUTO: 12.73 K/UL — HIGH (ref 3.8–10.5)
WBC # FLD AUTO: 12.79 K/UL — HIGH (ref 3.8–10.5)
WBC # FLD AUTO: 12.96 K/UL — HIGH (ref 3.8–10.5)
WBC # FLD AUTO: 14.13 K/UL — HIGH (ref 3.8–10.5)
WBC # FLD AUTO: 17.73 K/UL — HIGH (ref 3.8–10.5)
WBC # FLD AUTO: 18.22 K/UL — HIGH (ref 3.8–10.5)
WBC # FLD AUTO: 18.22 K/UL — HIGH (ref 3.8–10.5)
WBC # FLD AUTO: 21.89 K/UL — HIGH (ref 3.8–10.5)
WBC # FLD AUTO: 28.27 K/UL — HIGH (ref 3.8–10.5)
WBC # FLD AUTO: 29.33 K/UL — HIGH (ref 3.8–10.5)
WBC # FLD AUTO: 39.69 K/UL — HIGH (ref 3.8–10.5)
WBC # FLD AUTO: 9.33 K/UL — SIGNIFICANT CHANGE UP (ref 3.8–10.5)
WBC # FLD AUTO: 9.73 K/UL — SIGNIFICANT CHANGE UP (ref 3.8–10.5)
WBC UR QL: >50 /HPF (ref 0–5)

## 2023-01-01 PROCEDURE — 84132 ASSAY OF SERUM POTASSIUM: CPT

## 2023-01-01 PROCEDURE — 80162 ASSAY OF DIGOXIN TOTAL: CPT

## 2023-01-01 PROCEDURE — 93005 ELECTROCARDIOGRAM TRACING: CPT

## 2023-01-01 PROCEDURE — 85027 COMPLETE CBC AUTOMATED: CPT

## 2023-01-01 PROCEDURE — 71045 X-RAY EXAM CHEST 1 VIEW: CPT | Mod: 26

## 2023-01-01 PROCEDURE — 84145 PROCALCITONIN (PCT): CPT

## 2023-01-01 PROCEDURE — 87150 DNA/RNA AMPLIFIED PROBE: CPT

## 2023-01-01 PROCEDURE — 82436 ASSAY OF URINE CHLORIDE: CPT

## 2023-01-01 PROCEDURE — 99291 CRITICAL CARE FIRST HOUR: CPT | Mod: 25

## 2023-01-01 PROCEDURE — 83605 ASSAY OF LACTIC ACID: CPT

## 2023-01-01 PROCEDURE — 82550 ASSAY OF CK (CPK): CPT

## 2023-01-01 PROCEDURE — 80053 COMPREHEN METABOLIC PANEL: CPT

## 2023-01-01 PROCEDURE — 87899 AGENT NOS ASSAY W/OPTIC: CPT

## 2023-01-01 PROCEDURE — 0225U NFCT DS DNA&RNA 21 SARSCOV2: CPT

## 2023-01-01 PROCEDURE — 87040 BLOOD CULTURE FOR BACTERIA: CPT

## 2023-01-01 PROCEDURE — 82570 ASSAY OF URINE CREATININE: CPT

## 2023-01-01 PROCEDURE — 96365 THER/PROPH/DIAG IV INF INIT: CPT

## 2023-01-01 PROCEDURE — 85025 COMPLETE CBC W/AUTO DIFF WBC: CPT

## 2023-01-01 PROCEDURE — 99291 CRITICAL CARE FIRST HOUR: CPT

## 2023-01-01 PROCEDURE — 36415 COLL VENOUS BLD VENIPUNCTURE: CPT

## 2023-01-01 PROCEDURE — 87086 URINE CULTURE/COLONY COUNT: CPT

## 2023-01-01 PROCEDURE — 84484 ASSAY OF TROPONIN QUANT: CPT

## 2023-01-01 PROCEDURE — 87186 SC STD MICRODIL/AGAR DIL: CPT

## 2023-01-01 PROCEDURE — 86900 BLOOD TYPING SEROLOGIC ABO: CPT

## 2023-01-01 PROCEDURE — 84295 ASSAY OF SERUM SODIUM: CPT

## 2023-01-01 PROCEDURE — 84443 ASSAY THYROID STIM HORMONE: CPT

## 2023-01-01 PROCEDURE — 82962 GLUCOSE BLOOD TEST: CPT

## 2023-01-01 PROCEDURE — 71045 X-RAY EXAM CHEST 1 VIEW: CPT

## 2023-01-01 PROCEDURE — 96368 THER/DIAG CONCURRENT INF: CPT

## 2023-01-01 PROCEDURE — 83036 HEMOGLOBIN GLYCOSYLATED A1C: CPT

## 2023-01-01 PROCEDURE — 87641 MR-STAPH DNA AMP PROBE: CPT

## 2023-01-01 PROCEDURE — 86901 BLOOD TYPING SEROLOGIC RH(D): CPT

## 2023-01-01 PROCEDURE — 70450 CT HEAD/BRAIN W/O DYE: CPT | Mod: 26

## 2023-01-01 PROCEDURE — 87637 SARSCOV2&INF A&B&RSV AMP PRB: CPT

## 2023-01-01 PROCEDURE — 84560 ASSAY OF URINE/URIC ACID: CPT

## 2023-01-01 PROCEDURE — 92526 ORAL FUNCTION THERAPY: CPT

## 2023-01-01 PROCEDURE — 85652 RBC SED RATE AUTOMATED: CPT

## 2023-01-01 PROCEDURE — 82330 ASSAY OF CALCIUM: CPT

## 2023-01-01 PROCEDURE — 83735 ASSAY OF MAGNESIUM: CPT

## 2023-01-01 PROCEDURE — 70450 CT HEAD/BRAIN W/O DYE: CPT | Mod: ME

## 2023-01-01 PROCEDURE — 82803 BLOOD GASES ANY COMBINATION: CPT

## 2023-01-01 PROCEDURE — 87640 STAPH A DNA AMP PROBE: CPT

## 2023-01-01 PROCEDURE — G1004: CPT

## 2023-01-01 PROCEDURE — 84133 ASSAY OF URINE POTASSIUM: CPT

## 2023-01-01 PROCEDURE — 85610 PROTHROMBIN TIME: CPT

## 2023-01-01 PROCEDURE — 87449 NOS EACH ORGANISM AG IA: CPT

## 2023-01-01 PROCEDURE — 81001 URINALYSIS AUTO W/SCOPE: CPT

## 2023-01-01 PROCEDURE — 84300 ASSAY OF URINE SODIUM: CPT

## 2023-01-01 PROCEDURE — 96366 THER/PROPH/DIAG IV INF ADDON: CPT

## 2023-01-01 PROCEDURE — 87581 M.PNEUMON DNA AMP PROBE: CPT

## 2023-01-01 PROCEDURE — 99285 EMERGENCY DEPT VISIT HI MDM: CPT

## 2023-01-01 PROCEDURE — 86850 RBC ANTIBODY SCREEN: CPT

## 2023-01-01 PROCEDURE — 85730 THROMBOPLASTIN TIME PARTIAL: CPT

## 2023-01-01 PROCEDURE — 71250 CT THORAX DX C-: CPT | Mod: MG

## 2023-01-01 PROCEDURE — 96375 TX/PRO/DX INJ NEW DRUG ADDON: CPT

## 2023-01-01 PROCEDURE — 86703 HIV-1/HIV-2 1 RESULT ANTBDY: CPT

## 2023-01-01 PROCEDURE — 84540 ASSAY OF URINE/UREA-N: CPT

## 2023-01-01 PROCEDURE — 93306 TTE W/DOPPLER COMPLETE: CPT

## 2023-01-01 PROCEDURE — 36556 INSERT NON-TUNNEL CV CATH: CPT

## 2023-01-01 PROCEDURE — 86140 C-REACTIVE PROTEIN: CPT

## 2023-01-01 PROCEDURE — 83690 ASSAY OF LIPASE: CPT

## 2023-01-01 PROCEDURE — 84100 ASSAY OF PHOSPHORUS: CPT

## 2023-01-01 PROCEDURE — 99497 ADVNCD CARE PLAN 30 MIN: CPT | Mod: 25

## 2023-01-01 PROCEDURE — 92610 EVALUATE SWALLOWING FUNCTION: CPT

## 2023-01-01 PROCEDURE — 87077 CULTURE AEROBIC IDENTIFY: CPT

## 2023-01-01 PROCEDURE — 80177 DRUG SCRN QUAN LEVETIRACETAM: CPT

## 2023-01-01 PROCEDURE — 99223 1ST HOSP IP/OBS HIGH 75: CPT | Mod: 25

## 2023-01-01 PROCEDURE — 80048 BASIC METABOLIC PNL TOTAL CA: CPT

## 2023-01-01 PROCEDURE — 83935 ASSAY OF URINE OSMOLALITY: CPT

## 2023-01-01 PROCEDURE — 71250 CT THORAX DX C-: CPT | Mod: 26

## 2023-01-01 RX ORDER — ATORVASTATIN CALCIUM 80 MG/1
1 TABLET, FILM COATED ORAL
Qty: 0 | Refills: 0 | DISCHARGE
Start: 2023-01-01

## 2023-01-01 RX ORDER — AZITHROMYCIN 500 MG/1
500 TABLET, FILM COATED ORAL ONCE
Refills: 0 | Status: COMPLETED | OUTPATIENT
Start: 2023-01-01 | End: 2023-01-01

## 2023-01-01 RX ORDER — INSULIN LISPRO 100/ML
VIAL (ML) SUBCUTANEOUS EVERY 6 HOURS
Refills: 0 | Status: DISCONTINUED | OUTPATIENT
Start: 2023-01-01 | End: 2023-01-01

## 2023-01-01 RX ORDER — ACETAMINOPHEN 500 MG
1000 TABLET ORAL ONCE
Refills: 0 | Status: COMPLETED | OUTPATIENT
Start: 2023-01-01 | End: 2023-01-01

## 2023-01-01 RX ORDER — SODIUM CHLORIDE 9 MG/ML
1000 INJECTION, SOLUTION INTRAVENOUS
Refills: 0 | Status: DISCONTINUED | OUTPATIENT
Start: 2023-01-01 | End: 2023-01-01

## 2023-01-01 RX ORDER — DIGOXIN 250 MCG
500 TABLET ORAL ONCE
Refills: 0 | Status: COMPLETED | OUTPATIENT
Start: 2023-01-01 | End: 2023-01-01

## 2023-01-01 RX ORDER — CEFTRIAXONE 500 MG/1
2000 INJECTION, POWDER, FOR SOLUTION INTRAMUSCULAR; INTRAVENOUS ONCE
Refills: 0 | Status: COMPLETED | OUTPATIENT
Start: 2023-01-01 | End: 2023-01-01

## 2023-01-01 RX ORDER — INFLUENZA VIRUS VACCINE 15; 15; 15; 15 UG/.5ML; UG/.5ML; UG/.5ML; UG/.5ML
0.7 SUSPENSION INTRAMUSCULAR ONCE
Refills: 0 | Status: COMPLETED | OUTPATIENT
Start: 2023-01-01 | End: 2023-01-01

## 2023-01-01 RX ORDER — ENOXAPARIN SODIUM 100 MG/ML
90 INJECTION SUBCUTANEOUS EVERY 12 HOURS
Refills: 0 | Status: DISCONTINUED | OUTPATIENT
Start: 2023-01-01 | End: 2023-01-01

## 2023-01-01 RX ORDER — SODIUM CHLORIDE 9 MG/ML
10 INJECTION INTRAMUSCULAR; INTRAVENOUS; SUBCUTANEOUS
Refills: 0 | Status: DISCONTINUED | OUTPATIENT
Start: 2023-01-01 | End: 2023-01-01

## 2023-01-01 RX ORDER — MUPIROCIN 20 MG/G
1 OINTMENT TOPICAL
Refills: 0 | Status: DISCONTINUED | OUTPATIENT
Start: 2023-01-01 | End: 2023-01-01

## 2023-01-01 RX ORDER — CEFEPIME 1 G/1
2000 INJECTION, POWDER, FOR SOLUTION INTRAMUSCULAR; INTRAVENOUS ONCE
Refills: 0 | Status: DISCONTINUED | OUTPATIENT
Start: 2023-01-01 | End: 2023-01-01

## 2023-01-01 RX ORDER — SODIUM CHLORIDE 9 MG/ML
1000 INJECTION INTRAMUSCULAR; INTRAVENOUS; SUBCUTANEOUS ONCE
Refills: 0 | Status: DISCONTINUED | OUTPATIENT
Start: 2023-01-01 | End: 2023-01-01

## 2023-01-01 RX ORDER — ASPIRIN/CALCIUM CARB/MAGNESIUM 324 MG
81 TABLET ORAL DAILY
Refills: 0 | Status: DISCONTINUED | OUTPATIENT
Start: 2023-01-01 | End: 2023-01-01

## 2023-01-01 RX ORDER — NYSTATIN CREAM 100000 [USP'U]/G
1 CREAM TOPICAL
Refills: 0 | Status: DISCONTINUED | OUTPATIENT
Start: 2023-01-01 | End: 2023-01-01

## 2023-01-01 RX ORDER — ERTAPENEM SODIUM 1 G/1
1000 INJECTION, POWDER, LYOPHILIZED, FOR SOLUTION INTRAMUSCULAR; INTRAVENOUS
Qty: 7 | Refills: 0
Start: 2023-01-01 | End: 2023-01-01

## 2023-01-01 RX ORDER — LEVETIRACETAM 250 MG/1
750 TABLET, FILM COATED ORAL EVERY 12 HOURS
Refills: 0 | Status: DISCONTINUED | OUTPATIENT
Start: 2023-01-01 | End: 2023-01-01

## 2023-01-01 RX ORDER — ASCORBIC ACID 60 MG
1 TABLET,CHEWABLE ORAL
Qty: 0 | Refills: 0 | DISCHARGE
Start: 2023-01-01

## 2023-01-01 RX ORDER — PANTOPRAZOLE SODIUM 20 MG/1
40 TABLET, DELAYED RELEASE ORAL
Refills: 0 | Status: DISCONTINUED | OUTPATIENT
Start: 2023-01-01 | End: 2023-01-01

## 2023-01-01 RX ORDER — POTASSIUM PHOSPHATE, MONOBASIC POTASSIUM PHOSPHATE, DIBASIC 236; 224 MG/ML; MG/ML
15 INJECTION, SOLUTION INTRAVENOUS ONCE
Refills: 0 | Status: COMPLETED | OUTPATIENT
Start: 2023-01-01 | End: 2023-01-01

## 2023-01-01 RX ORDER — LANOLIN ALCOHOL/MO/W.PET/CERES
5 CREAM (GRAM) TOPICAL AT BEDTIME
Refills: 0 | Status: DISCONTINUED | OUTPATIENT
Start: 2023-01-01 | End: 2023-01-01

## 2023-01-01 RX ORDER — NOREPINEPHRINE BITARTRATE/D5W 8 MG/250ML
0.05 PLASTIC BAG, INJECTION (ML) INTRAVENOUS
Qty: 8 | Refills: 0 | Status: DISCONTINUED | OUTPATIENT
Start: 2023-01-01 | End: 2023-01-01

## 2023-01-01 RX ORDER — MEROPENEM 1 G/30ML
1000 INJECTION INTRAVENOUS ONCE
Refills: 0 | Status: COMPLETED | OUTPATIENT
Start: 2023-01-01 | End: 2023-01-01

## 2023-01-01 RX ORDER — ATENOLOL 25 MG/1
0 TABLET ORAL
Qty: 0 | Refills: 0 | DISCHARGE

## 2023-01-01 RX ORDER — SODIUM CHLORIDE 9 MG/ML
2800 INJECTION INTRAMUSCULAR; INTRAVENOUS; SUBCUTANEOUS ONCE
Refills: 0 | Status: COMPLETED | OUTPATIENT
Start: 2023-01-01 | End: 2023-01-01

## 2023-01-01 RX ORDER — TAMSULOSIN HYDROCHLORIDE 0.4 MG/1
1 CAPSULE ORAL
Qty: 0 | Refills: 0 | DISCHARGE

## 2023-01-01 RX ORDER — HEPARIN SODIUM 5000 [USP'U]/ML
INJECTION INTRAVENOUS; SUBCUTANEOUS
Qty: 25000 | Refills: 0 | Status: DISCONTINUED | OUTPATIENT
Start: 2023-01-01 | End: 2023-01-01

## 2023-01-01 RX ORDER — SODIUM CHLORIDE 9 MG/ML
1000 INJECTION, SOLUTION INTRAVENOUS ONCE
Refills: 0 | Status: COMPLETED | OUTPATIENT
Start: 2023-01-01 | End: 2023-01-01

## 2023-01-01 RX ORDER — AZITHROMYCIN 500 MG/1
500 TABLET, FILM COATED ORAL EVERY 24 HOURS
Refills: 0 | Status: DISCONTINUED | OUTPATIENT
Start: 2023-01-01 | End: 2023-01-01

## 2023-01-01 RX ORDER — ATORVASTATIN CALCIUM 80 MG/1
40 TABLET, FILM COATED ORAL AT BEDTIME
Refills: 0 | Status: DISCONTINUED | OUTPATIENT
Start: 2023-01-01 | End: 2023-01-01

## 2023-01-01 RX ORDER — ASPIRIN/CALCIUM CARB/MAGNESIUM 324 MG
1 TABLET ORAL
Qty: 0 | Refills: 0 | DISCHARGE
Start: 2023-01-01

## 2023-01-01 RX ORDER — PANTOPRAZOLE SODIUM 20 MG/1
40 TABLET, DELAYED RELEASE ORAL DAILY
Refills: 0 | Status: DISCONTINUED | OUTPATIENT
Start: 2023-01-01 | End: 2023-01-01

## 2023-01-01 RX ORDER — ATENOLOL 25 MG/1
100 TABLET ORAL ONCE
Refills: 0 | Status: COMPLETED | OUTPATIENT
Start: 2023-01-01 | End: 2023-01-01

## 2023-01-01 RX ORDER — MEROPENEM 1 G/30ML
1000 INJECTION INTRAVENOUS EVERY 8 HOURS
Refills: 0 | Status: DISCONTINUED | OUTPATIENT
Start: 2023-01-01 | End: 2023-01-01

## 2023-01-01 RX ORDER — ISOPROPYL ALCOHOL, BENZOCAINE .7; .06 ML/ML; ML/ML
2 SWAB TOPICAL
Qty: 14 | Refills: 0
Start: 2023-01-01 | End: 2023-01-01

## 2023-01-01 RX ORDER — SODIUM CHLORIDE 9 MG/ML
15 INJECTION INTRAMUSCULAR; INTRAVENOUS; SUBCUTANEOUS
Qty: 210 | Refills: 0
Start: 2023-01-01 | End: 2023-01-01

## 2023-01-01 RX ORDER — METOPROLOL TARTRATE 50 MG
50 TABLET ORAL
Refills: 0 | Status: DISCONTINUED | OUTPATIENT
Start: 2023-01-01 | End: 2023-01-01

## 2023-01-01 RX ORDER — DIGOXIN 250 MCG
250 TABLET ORAL EVERY 6 HOURS
Refills: 0 | Status: COMPLETED | OUTPATIENT
Start: 2023-01-01 | End: 2023-01-01

## 2023-01-01 RX ORDER — SODIUM CHLORIDE 9 MG/ML
500 INJECTION INTRAMUSCULAR; INTRAVENOUS; SUBCUTANEOUS ONCE
Refills: 0 | Status: COMPLETED | OUTPATIENT
Start: 2023-01-01 | End: 2023-01-01

## 2023-01-01 RX ORDER — SODIUM CHLORIDE 9 MG/ML
500 INJECTION, SOLUTION INTRAVENOUS ONCE
Refills: 0 | Status: COMPLETED | OUTPATIENT
Start: 2023-01-01 | End: 2023-01-01

## 2023-01-01 RX ORDER — METOPROLOL TARTRATE 50 MG
1 TABLET ORAL
Qty: 0 | Refills: 0 | DISCHARGE
Start: 2023-01-01

## 2023-01-01 RX ORDER — VANCOMYCIN HCL 1 G
1000 VIAL (EA) INTRAVENOUS ONCE
Refills: 0 | Status: DISCONTINUED | OUTPATIENT
Start: 2023-01-01 | End: 2023-01-01

## 2023-01-01 RX ORDER — ERTAPENEM SODIUM 1 G/1
1000 INJECTION, POWDER, LYOPHILIZED, FOR SOLUTION INTRAMUSCULAR; INTRAVENOUS EVERY 24 HOURS
Refills: 0 | Status: DISCONTINUED | OUTPATIENT
Start: 2023-01-01 | End: 2023-01-01

## 2023-01-01 RX ORDER — HEPARIN SODIUM 5000 [USP'U]/ML
5000 INJECTION INTRAVENOUS; SUBCUTANEOUS EVERY 8 HOURS
Refills: 0 | Status: DISCONTINUED | OUTPATIENT
Start: 2023-01-01 | End: 2023-01-01

## 2023-01-01 RX ORDER — SODIUM CHLORIDE 9 MG/ML
2000 INJECTION INTRAMUSCULAR; INTRAVENOUS; SUBCUTANEOUS ONCE
Refills: 0 | Status: DISCONTINUED | OUTPATIENT
Start: 2023-01-01 | End: 2023-01-01

## 2023-01-01 RX ORDER — MEROPENEM 1 G/30ML
INJECTION INTRAVENOUS
Refills: 0 | Status: DISCONTINUED | OUTPATIENT
Start: 2023-01-01 | End: 2023-01-01

## 2023-01-01 RX ORDER — VANCOMYCIN HCL 1 G
1000 VIAL (EA) INTRAVENOUS ONCE
Refills: 0 | Status: COMPLETED | OUTPATIENT
Start: 2023-01-01 | End: 2023-01-01

## 2023-01-01 RX ORDER — SODIUM CHLORIDE 9 MG/ML
500 INJECTION, SOLUTION INTRAVENOUS
Refills: 0 | Status: DISCONTINUED | OUTPATIENT
Start: 2023-01-01 | End: 2023-01-01

## 2023-01-01 RX ORDER — CHLORHEXIDINE GLUCONATE 213 G/1000ML
1 SOLUTION TOPICAL
Refills: 0 | Status: DISCONTINUED | OUTPATIENT
Start: 2023-01-01 | End: 2023-01-01

## 2023-01-01 RX ORDER — METOPROLOL TARTRATE 50 MG
100 TABLET ORAL
Refills: 0 | Status: DISCONTINUED | OUTPATIENT
Start: 2023-01-01 | End: 2023-01-01

## 2023-01-01 RX ORDER — METOPROLOL TARTRATE 50 MG
1 TABLET ORAL
Qty: 60 | Refills: 0
Start: 2023-01-01 | End: 2023-01-01

## 2023-01-01 RX ORDER — POTASSIUM PHOSPHATE, MONOBASIC POTASSIUM PHOSPHATE, DIBASIC 236; 224 MG/ML; MG/ML
30 INJECTION, SOLUTION INTRAVENOUS ONCE
Refills: 0 | Status: COMPLETED | OUTPATIENT
Start: 2023-01-01 | End: 2023-01-01

## 2023-01-01 RX ORDER — APIXABAN 2.5 MG/1
1 TABLET, FILM COATED ORAL
Qty: 60 | Refills: 0
Start: 2023-01-01 | End: 2023-01-01

## 2023-01-01 RX ORDER — LEVETIRACETAM 250 MG/1
1 TABLET, FILM COATED ORAL
Qty: 0 | Refills: 0 | DISCHARGE
Start: 2023-01-01

## 2023-01-01 RX ORDER — LEVETIRACETAM 250 MG/1
750 TABLET, FILM COATED ORAL
Refills: 0 | Status: DISCONTINUED | OUTPATIENT
Start: 2023-01-01 | End: 2023-01-01

## 2023-01-01 RX ORDER — HEPARIN SODIUM 5000 [USP'U]/ML
3500 INJECTION INTRAVENOUS; SUBCUTANEOUS EVERY 6 HOURS
Refills: 0 | Status: DISCONTINUED | OUTPATIENT
Start: 2023-01-01 | End: 2023-01-01

## 2023-01-01 RX ORDER — METOPROLOL TARTRATE 50 MG
2.5 TABLET ORAL ONCE
Refills: 0 | Status: COMPLETED | OUTPATIENT
Start: 2023-01-01 | End: 2023-01-01

## 2023-01-01 RX ORDER — FUROSEMIDE 40 MG
20 TABLET ORAL ONCE
Refills: 0 | Status: COMPLETED | OUTPATIENT
Start: 2023-01-01 | End: 2023-01-01

## 2023-01-01 RX ORDER — INSULIN LISPRO 100/ML
VIAL (ML) SUBCUTANEOUS
Refills: 0 | Status: DISCONTINUED | OUTPATIENT
Start: 2023-01-01 | End: 2023-01-01

## 2023-01-01 RX ORDER — DIGOXIN 250 MCG
1 TABLET ORAL
Qty: 15 | Refills: 0
Start: 2023-01-01 | End: 2023-01-01

## 2023-01-01 RX ORDER — TAMSULOSIN HYDROCHLORIDE 0.4 MG/1
2 CAPSULE ORAL
Qty: 0 | Refills: 0 | DISCHARGE
Start: 2023-01-01

## 2023-01-01 RX ORDER — HEPARIN SODIUM 5000 [USP'U]/ML
7500 INJECTION INTRAVENOUS; SUBCUTANEOUS EVERY 6 HOURS
Refills: 0 | Status: DISCONTINUED | OUTPATIENT
Start: 2023-01-01 | End: 2023-01-01

## 2023-01-01 RX ORDER — ACETAMINOPHEN 500 MG
650 TABLET ORAL EVERY 6 HOURS
Refills: 0 | Status: DISCONTINUED | OUTPATIENT
Start: 2023-01-01 | End: 2023-01-01

## 2023-01-01 RX ORDER — TAMSULOSIN HYDROCHLORIDE 0.4 MG/1
0.8 CAPSULE ORAL AT BEDTIME
Refills: 0 | Status: DISCONTINUED | OUTPATIENT
Start: 2023-01-01 | End: 2023-01-01

## 2023-01-01 RX ORDER — METOPROLOL TARTRATE 50 MG
5 TABLET ORAL ONCE
Refills: 0 | Status: COMPLETED | OUTPATIENT
Start: 2023-01-01 | End: 2023-01-01

## 2023-01-01 RX ORDER — APIXABAN 2.5 MG/1
5 TABLET, FILM COATED ORAL EVERY 12 HOURS
Refills: 0 | Status: DISCONTINUED | OUTPATIENT
Start: 2023-01-01 | End: 2023-01-01

## 2023-01-01 RX ORDER — ATENOLOL 25 MG/1
100 TABLET ORAL DAILY
Refills: 0 | Status: DISCONTINUED | OUTPATIENT
Start: 2023-01-01 | End: 2023-01-01

## 2023-01-01 RX ORDER — INSULIN LISPRO 100/ML
VIAL (ML) SUBCUTANEOUS AT BEDTIME
Refills: 0 | Status: DISCONTINUED | OUTPATIENT
Start: 2023-01-01 | End: 2023-01-01

## 2023-01-01 RX ADMIN — ATORVASTATIN CALCIUM 40 MILLIGRAM(S): 80 TABLET, FILM COATED ORAL at 21:02

## 2023-01-01 RX ADMIN — LEVETIRACETAM 750 MILLIGRAM(S): 250 TABLET, FILM COATED ORAL at 19:36

## 2023-01-01 RX ADMIN — Medication 2.5 MILLIGRAM(S): at 20:12

## 2023-01-01 RX ADMIN — NYSTATIN CREAM 1 APPLICATION(S): 100000 CREAM TOPICAL at 17:53

## 2023-01-01 RX ADMIN — APIXABAN 5 MILLIGRAM(S): 2.5 TABLET, FILM COATED ORAL at 17:08

## 2023-01-01 RX ADMIN — ERTAPENEM SODIUM 120 MILLIGRAM(S): 1 INJECTION, POWDER, LYOPHILIZED, FOR SOLUTION INTRAMUSCULAR; INTRAVENOUS at 14:09

## 2023-01-01 RX ADMIN — Medication 81 MILLIGRAM(S): at 16:13

## 2023-01-01 RX ADMIN — APIXABAN 5 MILLIGRAM(S): 2.5 TABLET, FILM COATED ORAL at 05:20

## 2023-01-01 RX ADMIN — Medication 2: at 13:36

## 2023-01-01 RX ADMIN — NYSTATIN CREAM 1 APPLICATION(S): 100000 CREAM TOPICAL at 05:12

## 2023-01-01 RX ADMIN — LEVETIRACETAM 750 MILLIGRAM(S): 250 TABLET, FILM COATED ORAL at 05:32

## 2023-01-01 RX ADMIN — Medication 1 TABLET(S): at 18:53

## 2023-01-01 RX ADMIN — Medication 650 MILLIGRAM(S): at 00:40

## 2023-01-01 RX ADMIN — LEVETIRACETAM 750 MILLIGRAM(S): 250 TABLET, FILM COATED ORAL at 05:11

## 2023-01-01 RX ADMIN — LEVETIRACETAM 750 MILLIGRAM(S): 250 TABLET, FILM COATED ORAL at 18:53

## 2023-01-01 RX ADMIN — LEVETIRACETAM 400 MILLIGRAM(S): 250 TABLET, FILM COATED ORAL at 18:48

## 2023-01-01 RX ADMIN — Medication 81 MILLIGRAM(S): at 12:14

## 2023-01-01 RX ADMIN — Medication 5 MILLIGRAM(S): at 21:33

## 2023-01-01 RX ADMIN — CEFTRIAXONE 2000 MILLIGRAM(S): 500 INJECTION, POWDER, FOR SOLUTION INTRAMUSCULAR; INTRAVENOUS at 21:47

## 2023-01-01 RX ADMIN — ATORVASTATIN CALCIUM 40 MILLIGRAM(S): 80 TABLET, FILM COATED ORAL at 21:15

## 2023-01-01 RX ADMIN — Medication 50 MILLIGRAM(S): at 17:15

## 2023-01-01 RX ADMIN — POTASSIUM PHOSPHATE, MONOBASIC POTASSIUM PHOSPHATE, DIBASIC 62.5 MILLIMOLE(S): 236; 224 INJECTION, SOLUTION INTRAVENOUS at 09:30

## 2023-01-01 RX ADMIN — Medication 400 MILLIGRAM(S): at 19:11

## 2023-01-01 RX ADMIN — LEVETIRACETAM 400 MILLIGRAM(S): 250 TABLET, FILM COATED ORAL at 06:58

## 2023-01-01 RX ADMIN — Medication 650 MILLIGRAM(S): at 05:58

## 2023-01-01 RX ADMIN — Medication 10 MILLIGRAM(S): at 18:53

## 2023-01-01 RX ADMIN — Medication 650 MILLIGRAM(S): at 19:23

## 2023-01-01 RX ADMIN — LEVETIRACETAM 750 MILLIGRAM(S): 250 TABLET, FILM COATED ORAL at 05:58

## 2023-01-01 RX ADMIN — LEVETIRACETAM 750 MILLIGRAM(S): 250 TABLET, FILM COATED ORAL at 05:27

## 2023-01-01 RX ADMIN — Medication 81 MILLIGRAM(S): at 11:52

## 2023-01-01 RX ADMIN — MEROPENEM 100 MILLIGRAM(S): 1 INJECTION INTRAVENOUS at 00:32

## 2023-01-01 RX ADMIN — MUPIROCIN 1 APPLICATION(S): 20 OINTMENT TOPICAL at 17:35

## 2023-01-01 RX ADMIN — Medication 8.44 MICROGRAM(S)/KG/MIN: at 04:33

## 2023-01-01 RX ADMIN — LEVETIRACETAM 750 MILLIGRAM(S): 250 TABLET, FILM COATED ORAL at 17:35

## 2023-01-01 RX ADMIN — CHLORHEXIDINE GLUCONATE 1 APPLICATION(S): 213 SOLUTION TOPICAL at 06:11

## 2023-01-01 RX ADMIN — APIXABAN 5 MILLIGRAM(S): 2.5 TABLET, FILM COATED ORAL at 17:34

## 2023-01-01 RX ADMIN — NYSTATIN CREAM 1 APPLICATION(S): 100000 CREAM TOPICAL at 18:24

## 2023-01-01 RX ADMIN — Medication 5 MILLIGRAM(S): at 21:23

## 2023-01-01 RX ADMIN — MUPIROCIN 1 APPLICATION(S): 20 OINTMENT TOPICAL at 05:59

## 2023-01-01 RX ADMIN — MUPIROCIN 1 APPLICATION(S): 20 OINTMENT TOPICAL at 05:47

## 2023-01-01 RX ADMIN — MUPIROCIN 1 APPLICATION(S): 20 OINTMENT TOPICAL at 17:09

## 2023-01-01 RX ADMIN — Medication 100 MILLIGRAM(S): at 05:32

## 2023-01-01 RX ADMIN — Medication 81 MILLIGRAM(S): at 11:22

## 2023-01-01 RX ADMIN — Medication 8.44 MICROGRAM(S)/KG/MIN: at 13:27

## 2023-01-01 RX ADMIN — LEVETIRACETAM 750 MILLIGRAM(S): 250 TABLET, FILM COATED ORAL at 17:09

## 2023-01-01 RX ADMIN — PANTOPRAZOLE SODIUM 40 MILLIGRAM(S): 20 TABLET, DELAYED RELEASE ORAL at 12:05

## 2023-01-01 RX ADMIN — PANTOPRAZOLE SODIUM 40 MILLIGRAM(S): 20 TABLET, DELAYED RELEASE ORAL at 06:00

## 2023-01-01 RX ADMIN — CHLORHEXIDINE GLUCONATE 1 APPLICATION(S): 213 SOLUTION TOPICAL at 05:13

## 2023-01-01 RX ADMIN — Medication 1000 MILLIGRAM(S): at 00:41

## 2023-01-01 RX ADMIN — Medication 10 MILLIGRAM(S): at 16:30

## 2023-01-01 RX ADMIN — Medication 10 MILLIGRAM(S): at 13:28

## 2023-01-01 RX ADMIN — Medication 400 MILLIGRAM(S): at 00:11

## 2023-01-01 RX ADMIN — Medication 650 MILLIGRAM(S): at 07:29

## 2023-01-01 RX ADMIN — NYSTATIN CREAM 1 APPLICATION(S): 100000 CREAM TOPICAL at 05:45

## 2023-01-01 RX ADMIN — ATORVASTATIN CALCIUM 40 MILLIGRAM(S): 80 TABLET, FILM COATED ORAL at 22:11

## 2023-01-01 RX ADMIN — Medication 1 TABLET(S): at 11:38

## 2023-01-01 RX ADMIN — SODIUM CHLORIDE 2800 MILLILITER(S): 9 INJECTION INTRAMUSCULAR; INTRAVENOUS; SUBCUTANEOUS at 21:47

## 2023-01-01 RX ADMIN — Medication 8.44 MICROGRAM(S)/KG/MIN: at 16:18

## 2023-01-01 RX ADMIN — Medication 2: at 01:06

## 2023-01-01 RX ADMIN — APIXABAN 5 MILLIGRAM(S): 2.5 TABLET, FILM COATED ORAL at 17:52

## 2023-01-01 RX ADMIN — AZITHROMYCIN 255 MILLIGRAM(S): 500 TABLET, FILM COATED ORAL at 00:37

## 2023-01-01 RX ADMIN — MUPIROCIN 1 APPLICATION(S): 20 OINTMENT TOPICAL at 18:24

## 2023-01-01 RX ADMIN — Medication 650 MILLIGRAM(S): at 18:53

## 2023-01-01 RX ADMIN — Medication 650 MILLIGRAM(S): at 22:06

## 2023-01-01 RX ADMIN — ERTAPENEM SODIUM 120 MILLIGRAM(S): 1 INJECTION, POWDER, LYOPHILIZED, FOR SOLUTION INTRAMUSCULAR; INTRAVENOUS at 13:01

## 2023-01-01 RX ADMIN — Medication 50 MILLIGRAM(S): at 05:57

## 2023-01-01 RX ADMIN — Medication 1000 MILLIGRAM(S): at 21:03

## 2023-01-01 RX ADMIN — Medication 250 MICROGRAM(S): at 22:06

## 2023-01-01 RX ADMIN — PANTOPRAZOLE SODIUM 40 MILLIGRAM(S): 20 TABLET, DELAYED RELEASE ORAL at 05:46

## 2023-01-01 RX ADMIN — MEROPENEM 100 MILLIGRAM(S): 1 INJECTION INTRAVENOUS at 06:11

## 2023-01-01 RX ADMIN — NYSTATIN CREAM 1 APPLICATION(S): 100000 CREAM TOPICAL at 17:34

## 2023-01-01 RX ADMIN — APIXABAN 5 MILLIGRAM(S): 2.5 TABLET, FILM COATED ORAL at 05:32

## 2023-01-01 RX ADMIN — NYSTATIN CREAM 1 APPLICATION(S): 100000 CREAM TOPICAL at 06:37

## 2023-01-01 RX ADMIN — LEVETIRACETAM 750 MILLIGRAM(S): 250 TABLET, FILM COATED ORAL at 17:14

## 2023-01-01 RX ADMIN — Medication 2: at 18:35

## 2023-01-01 RX ADMIN — Medication 5 MILLIGRAM(S): at 21:37

## 2023-01-01 RX ADMIN — MUPIROCIN 1 APPLICATION(S): 20 OINTMENT TOPICAL at 05:03

## 2023-01-01 RX ADMIN — NYSTATIN CREAM 1 APPLICATION(S): 100000 CREAM TOPICAL at 05:19

## 2023-01-01 RX ADMIN — Medication 5 MILLIGRAM(S): at 12:23

## 2023-01-01 RX ADMIN — APIXABAN 5 MILLIGRAM(S): 2.5 TABLET, FILM COATED ORAL at 17:35

## 2023-01-01 RX ADMIN — MEROPENEM 100 MILLIGRAM(S): 1 INJECTION INTRAVENOUS at 06:56

## 2023-01-01 RX ADMIN — LEVETIRACETAM 750 MILLIGRAM(S): 250 TABLET, FILM COATED ORAL at 05:20

## 2023-01-01 RX ADMIN — Medication 5 MILLIGRAM(S): at 21:02

## 2023-01-01 RX ADMIN — ERTAPENEM SODIUM 120 MILLIGRAM(S): 1 INJECTION, POWDER, LYOPHILIZED, FOR SOLUTION INTRAMUSCULAR; INTRAVENOUS at 16:16

## 2023-01-01 RX ADMIN — LEVETIRACETAM 400 MILLIGRAM(S): 250 TABLET, FILM COATED ORAL at 06:12

## 2023-01-01 RX ADMIN — MUPIROCIN 1 APPLICATION(S): 20 OINTMENT TOPICAL at 05:19

## 2023-01-01 RX ADMIN — Medication 50 MILLIGRAM(S): at 18:53

## 2023-01-01 RX ADMIN — SODIUM CHLORIDE 500 MILLILITER(S): 9 INJECTION INTRAMUSCULAR; INTRAVENOUS; SUBCUTANEOUS at 00:40

## 2023-01-01 RX ADMIN — Medication 81 MILLIGRAM(S): at 13:28

## 2023-01-01 RX ADMIN — Medication 1000 MILLIGRAM(S): at 08:27

## 2023-01-01 RX ADMIN — HEPARIN SODIUM 1700 UNIT(S)/HR: 5000 INJECTION INTRAVENOUS; SUBCUTANEOUS at 13:16

## 2023-01-01 RX ADMIN — Medication 8.44 MICROGRAM(S)/KG/MIN: at 05:27

## 2023-01-01 RX ADMIN — APIXABAN 5 MILLIGRAM(S): 2.5 TABLET, FILM COATED ORAL at 17:53

## 2023-01-01 RX ADMIN — MUPIROCIN 1 APPLICATION(S): 20 OINTMENT TOPICAL at 18:52

## 2023-01-01 RX ADMIN — NYSTATIN CREAM 1 APPLICATION(S): 100000 CREAM TOPICAL at 18:52

## 2023-01-01 RX ADMIN — NYSTATIN CREAM 1 APPLICATION(S): 100000 CREAM TOPICAL at 05:03

## 2023-01-01 RX ADMIN — Medication 81 MILLIGRAM(S): at 12:31

## 2023-01-01 RX ADMIN — PANTOPRAZOLE SODIUM 40 MILLIGRAM(S): 20 TABLET, DELAYED RELEASE ORAL at 13:27

## 2023-01-01 RX ADMIN — CHLORHEXIDINE GLUCONATE 1 APPLICATION(S): 213 SOLUTION TOPICAL at 05:34

## 2023-01-01 RX ADMIN — CEFTRIAXONE 100 MILLIGRAM(S): 500 INJECTION, POWDER, FOR SOLUTION INTRAMUSCULAR; INTRAVENOUS at 21:27

## 2023-01-01 RX ADMIN — NYSTATIN CREAM 1 APPLICATION(S): 100000 CREAM TOPICAL at 17:15

## 2023-01-01 RX ADMIN — Medication 2: at 11:47

## 2023-01-01 RX ADMIN — Medication 250 MILLIGRAM(S): at 21:42

## 2023-01-01 RX ADMIN — Medication 5 MILLIGRAM(S): at 21:15

## 2023-01-01 RX ADMIN — PANTOPRAZOLE SODIUM 40 MILLIGRAM(S): 20 TABLET, DELAYED RELEASE ORAL at 05:31

## 2023-01-01 RX ADMIN — MUPIROCIN 1 APPLICATION(S): 20 OINTMENT TOPICAL at 19:10

## 2023-01-01 RX ADMIN — PANTOPRAZOLE SODIUM 40 MILLIGRAM(S): 20 TABLET, DELAYED RELEASE ORAL at 05:32

## 2023-01-01 RX ADMIN — Medication 8.44 MICROGRAM(S)/KG/MIN: at 20:30

## 2023-01-01 RX ADMIN — ERTAPENEM SODIUM 120 MILLIGRAM(S): 1 INJECTION, POWDER, LYOPHILIZED, FOR SOLUTION INTRAMUSCULAR; INTRAVENOUS at 13:27

## 2023-01-01 RX ADMIN — Medication 5 MILLIGRAM(S): at 22:11

## 2023-01-01 RX ADMIN — CHLORHEXIDINE GLUCONATE 1 APPLICATION(S): 213 SOLUTION TOPICAL at 05:46

## 2023-01-01 RX ADMIN — LEVETIRACETAM 750 MILLIGRAM(S): 250 TABLET, FILM COATED ORAL at 17:34

## 2023-01-01 RX ADMIN — Medication 1 TABLET(S): at 11:22

## 2023-01-01 RX ADMIN — Medication 8.44 MICROGRAM(S)/KG/MIN: at 20:06

## 2023-01-01 RX ADMIN — MUPIROCIN 1 APPLICATION(S): 20 OINTMENT TOPICAL at 17:15

## 2023-01-01 RX ADMIN — Medication 2.5 MILLIGRAM(S): at 21:24

## 2023-01-01 RX ADMIN — MUPIROCIN 1 APPLICATION(S): 20 OINTMENT TOPICAL at 17:54

## 2023-01-01 RX ADMIN — Medication 650 MILLIGRAM(S): at 22:36

## 2023-01-01 RX ADMIN — Medication 50 MILLIGRAM(S): at 05:11

## 2023-01-01 RX ADMIN — CHLORHEXIDINE GLUCONATE 1 APPLICATION(S): 213 SOLUTION TOPICAL at 05:58

## 2023-01-01 RX ADMIN — CHLORHEXIDINE GLUCONATE 1 APPLICATION(S): 213 SOLUTION TOPICAL at 06:57

## 2023-01-01 RX ADMIN — Medication 10 MILLIGRAM(S): at 11:48

## 2023-01-01 RX ADMIN — Medication 650 MILLIGRAM(S): at 23:44

## 2023-01-01 RX ADMIN — MEROPENEM 100 MILLIGRAM(S): 1 INJECTION INTRAVENOUS at 13:27

## 2023-01-01 RX ADMIN — Medication 81 MILLIGRAM(S): at 11:38

## 2023-01-01 RX ADMIN — Medication 50 MILLIGRAM(S): at 17:34

## 2023-01-01 RX ADMIN — SODIUM CHLORIDE 500 MILLILITER(S): 9 INJECTION, SOLUTION INTRAVENOUS at 01:30

## 2023-01-01 RX ADMIN — CHLORHEXIDINE GLUCONATE 1 APPLICATION(S): 213 SOLUTION TOPICAL at 04:12

## 2023-01-01 RX ADMIN — Medication 100 MILLIGRAM(S): at 19:35

## 2023-01-01 RX ADMIN — ATORVASTATIN CALCIUM 40 MILLIGRAM(S): 80 TABLET, FILM COATED ORAL at 21:24

## 2023-01-01 RX ADMIN — Medication 2.5 MILLIGRAM(S): at 22:11

## 2023-01-01 RX ADMIN — APIXABAN 5 MILLIGRAM(S): 2.5 TABLET, FILM COATED ORAL at 05:58

## 2023-01-01 RX ADMIN — Medication 5 MILLIGRAM(S): at 22:08

## 2023-01-01 RX ADMIN — Medication 400 MILLIGRAM(S): at 06:57

## 2023-01-01 RX ADMIN — LEVETIRACETAM 750 MILLIGRAM(S): 250 TABLET, FILM COATED ORAL at 17:50

## 2023-01-01 RX ADMIN — ERTAPENEM SODIUM 120 MILLIGRAM(S): 1 INJECTION, POWDER, LYOPHILIZED, FOR SOLUTION INTRAMUSCULAR; INTRAVENOUS at 12:40

## 2023-01-01 RX ADMIN — TAMSULOSIN HYDROCHLORIDE 0.8 MILLIGRAM(S): 0.4 CAPSULE ORAL at 21:31

## 2023-01-01 RX ADMIN — LEVETIRACETAM 750 MILLIGRAM(S): 250 TABLET, FILM COATED ORAL at 05:46

## 2023-01-01 RX ADMIN — APIXABAN 5 MILLIGRAM(S): 2.5 TABLET, FILM COATED ORAL at 17:14

## 2023-01-01 RX ADMIN — Medication 1 TABLET(S): at 16:13

## 2023-01-01 RX ADMIN — ENOXAPARIN SODIUM 90 MILLIGRAM(S): 100 INJECTION SUBCUTANEOUS at 06:13

## 2023-01-01 RX ADMIN — ERTAPENEM SODIUM 120 MILLIGRAM(S): 1 INJECTION, POWDER, LYOPHILIZED, FOR SOLUTION INTRAMUSCULAR; INTRAVENOUS at 12:10

## 2023-01-01 RX ADMIN — SODIUM CHLORIDE 100 MILLILITER(S): 9 INJECTION, SOLUTION INTRAVENOUS at 16:45

## 2023-01-01 RX ADMIN — Medication 1: at 08:37

## 2023-01-01 RX ADMIN — APIXABAN 5 MILLIGRAM(S): 2.5 TABLET, FILM COATED ORAL at 05:45

## 2023-01-01 RX ADMIN — Medication 1 TABLET(S): at 12:15

## 2023-01-01 RX ADMIN — Medication 50 MILLIGRAM(S): at 05:45

## 2023-01-01 RX ADMIN — Medication 1 TABLET(S): at 12:31

## 2023-01-01 RX ADMIN — PANTOPRAZOLE SODIUM 40 MILLIGRAM(S): 20 TABLET, DELAYED RELEASE ORAL at 05:06

## 2023-01-01 RX ADMIN — TAMSULOSIN HYDROCHLORIDE 0.8 MILLIGRAM(S): 0.4 CAPSULE ORAL at 21:01

## 2023-01-01 RX ADMIN — Medication 1: at 17:14

## 2023-01-01 RX ADMIN — Medication 10 MILLIGRAM(S): at 11:53

## 2023-01-01 RX ADMIN — ERTAPENEM SODIUM 120 MILLIGRAM(S): 1 INJECTION, POWDER, LYOPHILIZED, FOR SOLUTION INTRAMUSCULAR; INTRAVENOUS at 12:31

## 2023-01-01 RX ADMIN — ATORVASTATIN CALCIUM 40 MILLIGRAM(S): 80 TABLET, FILM COATED ORAL at 21:34

## 2023-01-01 RX ADMIN — NYSTATIN CREAM 1 APPLICATION(S): 100000 CREAM TOPICAL at 05:33

## 2023-01-01 RX ADMIN — ERTAPENEM SODIUM 120 MILLIGRAM(S): 1 INJECTION, POWDER, LYOPHILIZED, FOR SOLUTION INTRAMUSCULAR; INTRAVENOUS at 12:24

## 2023-01-01 RX ADMIN — CHLORHEXIDINE GLUCONATE 1 APPLICATION(S): 213 SOLUTION TOPICAL at 05:05

## 2023-01-01 RX ADMIN — PANTOPRAZOLE SODIUM 40 MILLIGRAM(S): 20 TABLET, DELAYED RELEASE ORAL at 05:10

## 2023-01-01 RX ADMIN — AZITHROMYCIN 255 MILLIGRAM(S): 500 TABLET, FILM COATED ORAL at 20:06

## 2023-01-01 RX ADMIN — MUPIROCIN 1 APPLICATION(S): 20 OINTMENT TOPICAL at 05:12

## 2023-01-01 RX ADMIN — Medication 1000 MILLIGRAM(S): at 19:15

## 2023-01-01 RX ADMIN — SODIUM CHLORIDE 1000 MILLILITER(S): 9 INJECTION, SOLUTION INTRAVENOUS at 06:13

## 2023-01-01 RX ADMIN — Medication 1 TABLET(S): at 13:31

## 2023-01-01 RX ADMIN — MUPIROCIN 1 APPLICATION(S): 20 OINTMENT TOPICAL at 05:33

## 2023-01-01 RX ADMIN — NYSTATIN CREAM 1 APPLICATION(S): 100000 CREAM TOPICAL at 19:10

## 2023-01-01 RX ADMIN — TAMSULOSIN HYDROCHLORIDE 0.8 MILLIGRAM(S): 0.4 CAPSULE ORAL at 22:06

## 2023-01-01 RX ADMIN — Medication 1: at 12:32

## 2023-01-01 RX ADMIN — Medication 1 TABLET(S): at 11:53

## 2023-01-01 RX ADMIN — Medication 250 MICROGRAM(S): at 05:33

## 2023-01-01 RX ADMIN — NYSTATIN CREAM 1 APPLICATION(S): 100000 CREAM TOPICAL at 05:47

## 2023-01-01 RX ADMIN — Medication 81 MILLIGRAM(S): at 12:40

## 2023-01-01 RX ADMIN — POTASSIUM PHOSPHATE, MONOBASIC POTASSIUM PHOSPHATE, DIBASIC 83.33 MILLIMOLE(S): 236; 224 INJECTION, SOLUTION INTRAVENOUS at 06:31

## 2023-01-01 RX ADMIN — LEVETIRACETAM 750 MILLIGRAM(S): 250 TABLET, FILM COATED ORAL at 05:04

## 2023-01-01 RX ADMIN — SODIUM CHLORIDE 2800 MILLILITER(S): 9 INJECTION INTRAMUSCULAR; INTRAVENOUS; SUBCUTANEOUS at 19:04

## 2023-01-01 RX ADMIN — NYSTATIN CREAM 1 APPLICATION(S): 100000 CREAM TOPICAL at 17:09

## 2023-01-01 RX ADMIN — CHLORHEXIDINE GLUCONATE 1 APPLICATION(S): 213 SOLUTION TOPICAL at 05:26

## 2023-01-01 RX ADMIN — MEROPENEM 100 MILLIGRAM(S): 1 INJECTION INTRAVENOUS at 21:29

## 2023-01-01 RX ADMIN — AZITHROMYCIN 255 MILLIGRAM(S): 500 TABLET, FILM COATED ORAL at 01:11

## 2023-01-01 RX ADMIN — APIXABAN 5 MILLIGRAM(S): 2.5 TABLET, FILM COATED ORAL at 05:27

## 2023-01-01 RX ADMIN — SODIUM CHLORIDE 1000 MILLILITER(S): 9 INJECTION, SOLUTION INTRAVENOUS at 00:34

## 2023-01-01 RX ADMIN — HEPARIN SODIUM 0 UNIT(S)/HR: 5000 INJECTION INTRAVENOUS; SUBCUTANEOUS at 21:25

## 2023-01-01 RX ADMIN — Medication 500 MICROGRAM(S): at 16:42

## 2023-01-01 RX ADMIN — LEVETIRACETAM 400 MILLIGRAM(S): 250 TABLET, FILM COATED ORAL at 00:34

## 2023-01-01 RX ADMIN — Medication 50 MILLIGRAM(S): at 17:35

## 2023-01-01 RX ADMIN — Medication 20 MILLIGRAM(S): at 17:53

## 2023-01-01 RX ADMIN — Medication 2: at 12:06

## 2023-01-01 RX ADMIN — ATORVASTATIN CALCIUM 40 MILLIGRAM(S): 80 TABLET, FILM COATED ORAL at 22:08

## 2023-01-01 RX ADMIN — HEPARIN SODIUM 1400 UNIT(S)/HR: 5000 INJECTION INTRAVENOUS; SUBCUTANEOUS at 23:21

## 2023-01-01 RX ADMIN — LEVETIRACETAM 750 MILLIGRAM(S): 250 TABLET, FILM COATED ORAL at 17:53

## 2023-01-01 RX ADMIN — Medication 10 MILLIGRAM(S): at 12:15

## 2023-01-01 RX ADMIN — PANTOPRAZOLE SODIUM 40 MILLIGRAM(S): 20 TABLET, DELAYED RELEASE ORAL at 06:02

## 2023-01-01 RX ADMIN — POTASSIUM PHOSPHATE, MONOBASIC POTASSIUM PHOSPHATE, DIBASIC 62.5 MILLIMOLE(S): 236; 224 INJECTION, SOLUTION INTRAVENOUS at 05:26

## 2023-01-01 RX ADMIN — APIXABAN 5 MILLIGRAM(S): 2.5 TABLET, FILM COATED ORAL at 19:10

## 2023-01-01 RX ADMIN — APIXABAN 5 MILLIGRAM(S): 2.5 TABLET, FILM COATED ORAL at 18:53

## 2023-01-01 RX ADMIN — APIXABAN 5 MILLIGRAM(S): 2.5 TABLET, FILM COATED ORAL at 05:04

## 2023-01-01 RX ADMIN — PANTOPRAZOLE SODIUM 40 MILLIGRAM(S): 20 TABLET, DELAYED RELEASE ORAL at 16:13

## 2023-01-01 RX ADMIN — Medication 2: at 06:56

## 2023-01-01 RX ADMIN — APIXABAN 5 MILLIGRAM(S): 2.5 TABLET, FILM COATED ORAL at 05:11

## 2023-01-01 RX ADMIN — ATORVASTATIN CALCIUM 40 MILLIGRAM(S): 80 TABLET, FILM COATED ORAL at 21:33

## 2023-01-01 RX ADMIN — ATENOLOL 100 MILLIGRAM(S): 25 TABLET ORAL at 19:10

## 2023-02-13 NOTE — PROGRESS NOTE ADULT - ASSESSMENT
Ambulance Patient is a 76 year old female from home with PMH of HTN, A fib on eliquis and atenolol, CVA 20 y ago with residual right sided weakness and is now non verbal and bed bound, b/L ICA stenosis, Recurrent UTI up to 5x a year, who comes in due to 24h hx of altered mental status from baseline, associated with fever and concerns for dysphagia, found Febrile T max 102.5, , /78,  Lactate 3.7, UA +Ve, and white count of 14, CT abdomen pelvis showed distended gallbladder with stones, stool impaction and stercoral colitis, non obstructing left nephrolithiasis, RUQ w/o cholecystitis. Admitted to medicine for metabolic encephalopathy, 2/2 sepsis in the setting of ESBL Ecoli Bacteremia & UTI. ID consulted. Started on Meropenem, now changed to Ertapenem per ID Dr. Khan, repeat blood cultures from 07/28 were NGTD. Hospital course c/b repeat blood cultures from 07/28 growing gram negative rods in anaerobic bottle on day 5. Echo without vegetations, US Abd and repeat blood cultures pending. Patient can remain on Ertapenem per ID.    EMS

## 2023-02-28 NOTE — H&P ADULT - ASSESSMENT
DX:  - Septic shock 2/2 UTI and aspiration pneumonia   - AHRF 2/2 Pneumonia   - Acute encephalopathy   - DEJA  - H/o CVA  - Hypertension  - Atrial fibrillation   - HLD    =================== Neuro============================  Alert and oriented x 1-2 at baseline   Pain management with Tylenol    #Acute encephalopathy  - Patient admitted for acute encephalopathy   - Metabolic vs Infectious vs Toxic    - F/u CT Head   - NPO   - Fall precautions/Aspiration precautions/Seizure precautions  - Will start Empiric antibiotics Meropenem   - F/u Bcx, Ucx, ESR, CRP, MRSA PCR, sputum cx, legionella, mycoplasma, streptococcus       #H/o CVA  - Right sided paralysis   - On asa and Eliquis     ================= Cardiovascular==========================  #Hypertension  - Patient has history of Hypertension on atenolol at home   - Hold in the setting of septic shock    #Atrial fibrillation   - Patient on Eliquis and atenolol  - C/w Eliquis, hold BB while on pressors     ================- Pulm=================================  #AHRF 2/2 Pneumonia   - Patient with hypoxia requiring o2 supplementation   - On exam, bilateral wheezing rhonchi  - CXR showed RLL pneumonia   - S/p Vancomycin, Rocephin and Zithromax   - Will start broad spectrum with Meropenem  - NPO for now, FS q6 hrs    ==================ID===================================  #Septic shock 2/2 UTI and aspiration pneumonia  - Plan as above      #AHRF 2/2 Pneumonia  - Plan as above      ================= Nephro================================  - On admission, patient with Cr 1.1, baseline 0.6  - DEJA most likely pre renal in the setting of acute infection, poor oral intake  - F/u urinalysis, urine lytes (urine sodium, urine creatinine, urine urea), urine protein/urine creatinine ratio   - Monitor I/O's daily  - Avoid nephrotoxins, NSAIDS, ACEI and ARBS  - S/p 3L IVF in ED  - Monitor BMP daily    =================GI====================================  NPO, FS q6 hrs     ================ Heme==================================  No acute issues     =================Endocrine===============================  No acute issues     ================= Skin/Catheters============================  Peripheral IV lines   Ly catheter   Patient/Family consented for A line and CVC     =================Prophylaxis =============================  DVT prophylaxis with Heparin SQ   GI prophylaxis with PPI daily    ==================GOC==================================  DNR DNI   Disposition ICU     DX:  - Septic shock 2/2 UTI and aspiration pneumonia   - AHRF 2/2 Pneumonia   - Acute encephalopathy   - DEJA  - H/o CVA  - Atrial fibrillation   - HLD    =================== Neuro============================  Alert and oriented x 1-2 at baseline   Pain management with Tylenol    #Acute encephalopathy  - Patient admitted for acute encephalopathy   - Metabolic vs Infectious vs Toxic    - F/u CT Head   - NPO   - Fall precautions/Aspiration precautions/Seizure precautions  - Will start Empiric antibiotics Meropenem   - F/u Bcx, Ucx, ESR, CRP, MRSA PCR, sputum cx, legionella, mycoplasma, streptococcus       #H/o CVA  - Right sided paralysis   - On asa and Eliquis     ================= Cardiovascular==========================  #Atrial fibrillation   - Patient on Eliquis and atenolol  - C/w Eliquis, hold BB while on pressors     ================- Pulm=================================  #AHRF 2/2 Pneumonia   - Patient with hypoxia requiring o2 supplementation   - On exam, bilateral wheezing rhonchi  - CXR showed RLL pneumonia   - S/p Vancomycin, Rocephin and Zithromax   - Will start broad spectrum with Meropenem  - NPO for now, FS q6 hrs    ==================ID===================================  #Septic shock 2/2 UTI and aspiration pneumonia  - Plan as above      #AHRF 2/2 Pneumonia  - Plan as above      ================= Nephro================================  - On admission, patient with Cr 1.1, baseline 0.6  - DEJA most likely pre renal in the setting of acute infection, poor oral intake  - F/u urinalysis, urine lytes (urine sodium, urine creatinine, urine urea), urine protein/urine creatinine ratio   - Monitor I/O's daily  - Avoid nephrotoxins, NSAIDS, ACEI and ARBS  - S/p 3L IVF in ED  - Monitor BMP daily    =================GI====================================  NPO, FS q6 hrs     ================ Heme==================================  No acute issues     =================Endocrine===============================  No acute issues     ================= Skin/Catheters============================  Peripheral IV lines   Ly catheter   Patient/Family consented for A line and CVC     =================Prophylaxis =============================  DVT prophylaxis with Heparin SQ   GI prophylaxis with PPI daily    ==================GOC==================================  DNR DNI   Disposition ICU   77-year-old female, from home, lives with son, has HHA, bedbound, AAO x 1 at baseline, almost non verbal, answers yes or no and moans, has past medical history of CVA, with right sided residual paralysis, hyperlipidemia, atrial fibrillation on Eliquis and atenolol is admitted to ICU for septic shock     DX:  - Septic shock 2/2 UTI and aspiration pneumonia   - AHRF 2/2 Pneumonia   - Acute encephalopathy   - DEJA  - H/o CVA  - Atrial fibrillation   - HLD    =================== Neuro============================  Alert and oriented x 1-2 at baseline   Pain management with Tylenol    #Acute encephalopathy  - Patient admitted for acute encephalopathy   - Metabolic vs Infectious vs Toxic    - F/u CT Head   - NPO   - Fall precautions/Aspiration precautions/Seizure precautions  - Will start Empiric antibiotics Meropenem   - F/u Bcx, Ucx, ESR, CRP, MRSA PCR, sputum cx, legionella, mycoplasma, streptococcus       #H/o CVA  - Right sided paralysis   - On asa and Eliquis     ================= Cardiovascular==========================  #Atrial fibrillation   - Patient on Eliquis and atenolol  - C/w Eliquis, hold BB while on pressors     ================- Pulm=================================  #AHRF 2/2 Pneumonia   - Patient with hypoxia requiring o2 supplementation   - On exam, bilateral wheezing rhonchi  - CXR showed RLL pneumonia   - S/p Vancomycin, Rocephin and Zithromax   - Will start broad spectrum with Meropenem  - NPO for now, FS q6 hrs    ==================ID===================================  #Septic shock 2/2 UTI and aspiration pneumonia  - Plan as above      #AHRF 2/2 Pneumonia  - Plan as above      ================= Nephro================================  - On admission, patient with Cr 1.1, baseline 0.6  - DEJA most likely pre renal in the setting of acute infection, poor oral intake  - F/u urinalysis, urine lytes (urine sodium, urine creatinine, urine urea), urine protein/urine creatinine ratio   - Monitor I/O's daily  - Avoid nephrotoxins, NSAIDS, ACEI and ARBS  - S/p 3L IVF in ED  - Monitor BMP daily    =================GI====================================  NPO, FS q6 hrs     ================ Heme==================================  No acute issues     =================Endocrine===============================  No acute issues     ================= Skin/Catheters============================  Peripheral IV lines   Ly catheter   Patient/Family consented for A line and CVC     =================Prophylaxis =============================  DVT prophylaxis with Heparin SQ   GI prophylaxis with PPI daily    ==================GOC==================================  DNR DNI   Disposition ICU   77-year-old female, from home, lives with son, has HHA, bedbound, AAO x 1 at baseline, almost non verbal, answers yes or no and moans, has past medical history of CVA, with right sided residual paralysis, hyperlipidemia, atrial fibrillation on Eliquis and atenolol, hypotension at baseline is admitted to ICU for septic shock     DX:  - Septic shock 2/2 UTI and aspiration pneumonia   - AHRF 2/2 Pneumonia   - Acute encephalopathy   - DEJA  - H/o CVA  - Atrial fibrillation   - HLD    =================== Neuro============================  Alert and oriented x 1-2 at baseline   Pain management with Tylenol    #Acute encephalopathy  - Patient admitted for acute encephalopathy   - Metabolic vs Infectious vs Toxic    - F/u CT Head   - NPO   - Fall precautions/Aspiration precautions/Seizure precautions  - Will start Empiric antibiotics Meropenem   - F/u Bcx, Ucx, ESR, CRP, MRSA PCR, sputum cx, legionella, mycoplasma, streptococcus       #H/o CVA  - Right sided paralysis   - On asa and Eliquis     ================= Cardiovascular==========================  #Atrial fibrillation   - Patient on Eliquis and atenolol  - C/w Eliquis, hold BB while on pressors     ================- Pulm=================================  #AHRF 2/2 Pneumonia   - Patient with hypoxia requiring o2 supplementation   - On exam, bilateral wheezing rhonchi  - CXR showed RLL pneumonia   - S/p Vancomycin, Rocephin and Zithromax   - Will start broad spectrum with Meropenem  - NPO for now, FS q6 hrs    ==================ID===================================  #Septic shock 2/2 UTI and aspiration pneumonia  - Plan as above      #AHRF 2/2 Pneumonia  - Plan as above      ================= Nephro================================  - On admission, patient with Cr 1.1, baseline 0.6  - DEJA most likely pre renal in the setting of acute infection, poor oral intake  - F/u urinalysis, urine lytes (urine sodium, urine creatinine, urine urea), urine protein/urine creatinine ratio   - Monitor I/O's daily  - Avoid nephrotoxins, NSAIDS, ACEI and ARBS  - S/p 3L IVF in ED  - Monitor BMP daily    =================GI====================================  NPO, FS q6 hrs     ================ Heme==================================  No acute issues     =================Endocrine===============================  No acute issues     ================= Skin/Catheters============================  Peripheral IV lines   Ly catheter   Patient/Family consented for A line and CVC     =================Prophylaxis =============================  DVT prophylaxis with Heparin SQ   GI prophylaxis with PPI daily    ==================GOC==================================  DNR DNI   Disposition ICU   77-year-old female, from home, lives with son, has HHA, bedbound, AAO x 1 at baseline, almost non verbal, answers yes or no and moans, has past medical history of CVA, with right sided residual paralysis, hyperlipidemia, atrial fibrillation on Eliquis and atenolol, hypotension at baseline is admitted to ICU for septic shock     DX:  - Septic shock 2/2 UTI and aspiration pneumonia   - AHRF 2/2 Pneumonia   - Acute encephalopathy   - DEJA  - H/o CVA  - Atrial fibrillation   - HLD    =================== Neuro============================  Alert and oriented x 1-2 at baseline   Pain management with Tylenol    #Acute encephalopathy  - Patient admitted for acute encephalopathy   - Metabolic and Infectious   - F/u CT Head and CT Chest   - NPO   - Fall precautions/Aspiration precautions/Seizure precautions  - Will start Empiric antibiotics Meropenem and Azithromycin   - F/u Bcx, Ucx, ESR, CRP, MRSA PCR, sputum cx, legionella, mycoplasma, streptococcus   - ID consult in the am     #H/o CVA  - Right sided paralysis   - On asa and Eliquis     ================= Cardiovascular==========================  #Atrial fibrillation   - Patient on Eliquis and atenolol  - C/w Eliquis, hold BB while on pressors     ================- Pulm=================================  #AHRF 2/2 Pneumonia   - Patient with hypoxia requiring o2 supplementation   - On exam, bilateral wheezing rhonchi  - CXR showed RLL pneumonia   - S/p Vancomycin, Rocephin and Zithromax   - Will start Empiric antibiotics Meropenem and Azithromycin   - NPO for now, FS q6 hrs  - ID consult in the am     ==================ID===================================  #Septic shock 2/2 UTI and aspiration pneumonia  - Plan as above      #AHRF 2/2 Pneumonia  - Plan as above      ================= Nephro================================  - On admission, patient with Cr 1.1, baseline 0.6  - DEJA most likely pre renal in the setting of acute infection, poor oral intake  - F/u urinalysis, urine lytes (urine sodium, urine creatinine, urine urea), urine protein/urine creatinine ratio   - Monitor I/O's daily  - Avoid nephrotoxins, NSAIDS, ACEI and ARBS  - S/p 3L IVF in ED  - Monitor BMP daily    =================GI====================================  NPO, FS q6 hrs     ================ Heme==================================  No acute issues     =================Endocrine===============================  No acute issues     ================= Skin/Catheters============================  Peripheral IV lines   Ly catheter   Patient/Family consented for A line and CVC     =================Prophylaxis =============================  DVT prophylaxis with Heparin SQ   GI prophylaxis with PPI daily    ==================GOC==================================  DNR DNI   Disposition ICU   77-year-old female, from home, lives with son, has HHA, bedbound, AAO x 1 at baseline, almost non verbal, answers yes or no and moans, has past medical history of CVA, with right sided residual paralysis, seizures on Keppra, hyperlipidemia, atrial fibrillation on Eliquis and atenolol, hypotension at baseline is admitted to ICU for septic shock     DX:  - Septic shock 2/2 UTI and aspiration pneumonia   - AHRF 2/2 Pneumonia   - Acute encephalopathy   - DEJA  - H/o CVA  - H/o seizures   - Atrial fibrillation   - HLD    =================== Neuro============================  Alert and oriented x 1-2 at baseline   Pain management with Tylenol    #Acute encephalopathy  - Patient admitted for acute encephalopathy   - Metabolic and Infectious   - F/u CT Head and CT Chest   - NPO   - Fall precautions/Aspiration precautions/Seizure precautions  - Will start Empiric antibiotics Meropenem and Azithromycin   - F/u Bcx, Ucx, lactate, ESR, CRP, MRSA PCR, sputum cx, legionella, mycoplasma, streptococcus   - ID consult in the am    #H/o CVA  - Right sided paralysis   - On asa and Eliquis     #H/o seizures  - Patient on Keppra at home  - C/w Keppra IV while NPO    ================= Cardiovascular==========================  #Septic shock 2/2 UTI and aspiration pneumonia  - On admission, patient with tachycardia, hypotension, leucocytosis, elevated lactate, UA + and RLL pneumonia   - In ED s/p 3L IVF , Vancomycin, Rocephin and Zithromax x1   - Started on Meropenem and Zithromax, pressor support and Tylenol for fever  - F/u Ucx, Bcx, lactate, ESR, CRP, MRSA PCR, sputum cx, streptococcus, mycoplasma, legionella CT chest   - Monitor for any signs of hemodynamic instability      #Atrial fibrillation   - Patient on Eliquis and atenolol  - Will use Lovenox for AC while NPO, hold BB while on pressors   - Can resume meds as feasible     ================- Pulm=================================  #AHRF 2/2 Pneumonia   - Patient with hypoxia requiring o2 supplementation   - On exam, bilateral wheezing rhonchi  - CXR showed RLL pneumonia   - S/p Vancomycin, Rocephin and Zithromax   - Will start Empiric antibiotics Meropenem and Azithromycin   - NPO for now, FS q6 hrs  - ID consult in the am     ==================ID===================================  #Septic shock 2/2 UTI and aspiration pneumonia  - Plan as above      #AHRF 2/2 Pneumonia  - Plan as above      ================= Nephro================================  - On admission, patient with Cr 1.1, baseline 0.6  - DEJA most likely pre renal in the setting of acute infection, poor oral intake  - F/u urinalysis, urine lytes (urine sodium, urine creatinine, urine urea), urine protein/urine creatinine ratio   - Monitor I/O's daily  - Avoid nephrotoxins, NSAIDS, ACEI and ARBS  - S/p 3L IVF in ED  - Monitor BMP daily    =================GI====================================  NPO, FS q6 hrs     ================ Heme==================================  No acute issues     =================Endocrine===============================  No acute issues     ================= Skin/Catheters============================  Peripheral IV lines   Ly catheter   Patient/Family consented for A line and CVC     =================Prophylaxis =============================  Full AC with Lovenox while NPO   GI prophylaxis with PPI daily    ==================GOC==================================  DNR DNI   Disposition ICU   77-year-old female, from home, lives with son, has HHA, bedbound, AAO x 1 at baseline, non verbal, answers yes or no and moans, has past medical history of CVA, with right sided residual paralysis, seizures on Keppra, hyperlipidemia, atrial fibrillation on Eliquis and atenolol, hypotension, bilateral ICA stenosis and recurrent UTI's at baseline is admitted to ICU for septic shock     DX:  - Septic shock 2/2 UTI and aspiration pneumonia   - AHRF 2/2 Pneumonia   - Acute encephalopathy   - DEJA  - H/o CVA  - H/o seizures   - Atrial fibrillation   - HLD    =================== Neuro============================  Alert and oriented x 1-2 at baseline   Pain management with Tylenol    #Acute encephalopathy  - Patient admitted for acute encephalopathy   - Metabolic and Infectious   - F/u CT Head and CT Chest   - NPO   - Fall precautions/Aspiration precautions/Seizure precautions  - Will start Empiric antibiotics Meropenem and Azithromycin   - F/u Bcx, Ucx, lactate, ESR, CRP, MRSA PCR, sputum cx, legionella, mycoplasma, streptococcus   - ID consult in the am    #H/o CVA  - Right sided paralysis   - On asa and Eliquis     #H/o seizures  - Patient on Keppra at home  - C/w Keppra IV while NPO    ================= Cardiovascular==========================  #Septic shock 2/2 UTI and aspiration pneumonia  - On admission, patient with tachycardia, hypotension, leucocytosis, elevated lactate, UA + and RLL pneumonia   - In ED s/p 3L IVF , Vancomycin, Rocephin and Zithromax x1   - Started on Meropenem and Zithromax, pressor support and Tylenol for fever  - F/u Ucx, Bcx, lactate, ESR, CRP, MRSA PCR, sputum cx, streptococcus, mycoplasma, legionella CT chest   - Monitor for any signs of hemodynamic instability      #Atrial fibrillation   - Patient on Eliquis and atenolol  - Will use Lovenox for AC while NPO, hold BB while on pressors   - Can resume meds as feasible     ================- Pulm=================================  #AHRF 2/2 Pneumonia   - Patient with hypoxia requiring o2 supplementation   - On exam, bilateral wheezing rhonchi  - CXR showed RLL pneumonia   - S/p Vancomycin, Rocephin and Zithromax   - Will start Empiric antibiotics Meropenem and Azithromycin   - NPO for now, FS q6 hrs  - ID consult in the am     ==================ID===================================  #Septic shock 2/2 UTI and aspiration pneumonia  - Plan as above      #AHRF 2/2 Pneumonia  - Plan as above      ================= Nephro================================  - On admission, patient with Cr 1.1, baseline 0.6  - DEJA most likely pre renal in the setting of acute infection, poor oral intake  - F/u urinalysis, urine lytes (urine sodium, urine creatinine, urine urea), urine protein/urine creatinine ratio   - Monitor I/O's daily  - Avoid nephrotoxins, NSAIDS, ACEI and ARBS  - S/p 3L IVF in ED  - Monitor BMP daily    =================GI====================================  NPO, FS q6 hrs     ================ Heme==================================  No acute issues     =================Endocrine===============================  No acute issues     ================= Skin/Catheters============================  Peripheral IV lines   Ly catheter   Patient/Family consented for A line and CVC     =================Prophylaxis =============================  Full AC with Lovenox while NPO   GI prophylaxis with PPI daily    ==================GOC==================================  DNR DNI   Disposition ICU

## 2023-02-28 NOTE — H&P ADULT - HISTORY OF PRESENT ILLNESS
77-year-old female, from home, lives with son, has HHA, bedbound, AAO x 1 at baseline, almost non verbal, answers yes or no and moans, has past medical history of CVA, with right sided residual paralysis, hyperlipidemia, atrial fibrillation on Eliquis and atenolol was BIBEMS as patient was hypoglycemic to 60’s. As per son, patient is always moaning so it’s hard to tell when she is in pain or something is wrong with her. Son noticed she has poor appetite and is always coughing while eating. As per chart review, patient with multiple hospitalizations for ESBL E Coli UTI and aspiration pneumonia.  77-year-old female, from home, lives with son, has HHA, bedbound, AAO x 1 at baseline, almost non verbal, answers yes or no and moans, has past medical history of CVA, with right sided residual paralysis, hyperlipidemia, atrial fibrillation on Eliquis and atenolol, hypotension at baseline (SBP , DBP 60-70) was BIBEMS as patient was hypoglycemic to 60’s. As per son, patient is always moaning so it’s hard to tell when she is in pain or something is wrong with her. Son noticed she has poor appetite and is always coughing while eating. As per chart review, patient with multiple hospitalizations for ESBL E Coli UTI and aspiration pneumonia.  77-year-old female, from home, lives with son, has HHA, bedbound, AAO x 1 at baseline, almost non verbal, answers yes or no and moans, has past medical history of CVA, with right sided residual paralysis, seizures on Keppra, hyperlipidemia, atrial fibrillation on Eliquis and atenolol, hypotension at baseline (SBP , DBP 60-70) was BIBEMS as patient was hypoglycemic to 60’s. As per son, patient is always moaning so it’s hard to tell when she is in pain or something is wrong with her. Son noticed she has poor appetite and is always coughing while eating. As per chart review, patient with multiple hospitalizations for ESBL E Coli UTI and aspiration pneumonia.  77-year-old female, from home, lives with son, has HHA, bedbound, AAO x 1 at baseline, non verbal, answers yes or no and moans, has past medical history of CVA, with right sided residual paralysis, seizures on Keppra, hyperlipidemia, atrial fibrillation on Eliquis and atenolol, hypotension at baseline (SBP , DBP 60-70), bilateral ICA stenosis and recurrent UTI's was BIBEMS as patient was hypoglycemic to 60’s. As per son, patient is always moaning so it’s hard to tell when she is in pain or something is wrong with her. Son noticed she has poor appetite and is always coughing while eating. As per chart review, patient with multiple hospitalizations for ESBL E Coli UTI and aspiration pneumonia.

## 2023-02-28 NOTE — ED PROVIDER NOTE - PROGRESS NOTE DETAILS
Pt hypotensive s/p fluids, norepi infusion started. WBC 17.73, Lactate 3.0, UA nitrite positive, CXR concerning for PNA. Empiric antibiotics started. ICU to consult. Pt hypotensive s/p fluids, norepi infusion started. WBC 17.73, Lactate 3.0, UA nitrite positive, CXR concerning for PNA. Abx stared. ICU to consult.

## 2023-02-28 NOTE — ED PROVIDER NOTE - OBJECTIVE STATEMENT
78 y/o F PMH HTN, A fib on eliquis and atenolol, CVA 20 y ago with residual right sided weakness and is now non verbal and bed bound, b/L ICA stenosis, Recurrent UTI up to 5x a year, presenting to ED for AMS x 2 days. Son at bedside states she has been moaning over the past week, not eating over the past 2 days. EMS notes hypoxia to 60s on room air, tachy to 170s.  No trauma or falls.

## 2023-02-28 NOTE — H&P ADULT - CONVERSATION DETAILS
I spoke with son who was identified as the health care proxy and we had an extensive conversation about  patient's care and current condition.   During this discussion we reviewed the current diagnosis, treatment plan, and likely prognosis. An explanation of advanced directives and MOLST was provided. After this conversation decision was made by son to change status to DNR/Allow Natural Death. MOLST form completed, signed, and placed in chart.     Above was reviewed with MICU attending physician Dr. Skaggs.

## 2023-02-28 NOTE — ED PROVIDER NOTE - ATTENDING CONTRIBUTION TO CARE
seen with student, notification septic alerted, requiring pressors     The patient's condition is critical. Management options were put in place to ensure further deterioration does not occur. In addition to the usual care provided, I have spent additional time with this patient through but not limited to the following: additional documentation  reviewing test results,  discussing with consultants,  discussing with patient / patient's family prognosis and course of care,  reassessment of patient's status and response to interventions.

## 2023-02-28 NOTE — H&P ADULT - NSICDXPASTMEDICALHX_GEN_ALL_CORE_FT
PAST MEDICAL HISTORY:  Atrial fibrillation     Carotid stenosis     CVA (cerebrovascular accident)     HTN (hypertension)      PAST MEDICAL HISTORY:  Atrial fibrillation     Carotid stenosis     CVA (cerebrovascular accident)      PAST MEDICAL HISTORY:  Atrial fibrillation     Carotid stenosis     CVA (cerebrovascular accident)     Hyperlipidemia     Seizures

## 2023-02-28 NOTE — H&P ADULT - ATTENDING COMMENTS
UTI, Pneumonia, and septic shock    plan:  ICU  Sandra, z-max, and vanco given in ED  NPO  Will need a swallow eval  wean pressorsIVF  Continue Home medications

## 2023-02-28 NOTE — H&P ADULT - NSHPPHYSICALEXAM_GEN_ALL_CORE
ICU Vital Signs Last 24 Hrs  T(C): 39.9 (28 Feb 2023 18:18), Max: 39.9 (28 Feb 2023 18:18)  T(F): 103.8 (28 Feb 2023 18:18), Max: 103.8 (28 Feb 2023 18:18)  HR: 90 (28 Feb 2023 20:55) (90 - 122)  BP: 76/48 (28 Feb 2023 20:55) (73/52 - 146/69)  RR: 36 (28 Feb 2023 20:55) (18 - 45)  SpO2: 93% (28 Feb 2023 20:55) (93% - 98%)    O2 Parameters below as of 28 Feb 2023 20:55  Patient On (Oxygen Delivery Method): nasal cannula  O2 Flow (L/min): 6    GENERAL: NAD, overweight   HEAD:  Atraumatic, Normocephalic  EYES:  conjunctiva and sclera clear  NECK: Supple, No JVD, Normal thyroid  CHEST/LUNG: Clear to auscultation. Clear to percussion bilaterally; No rales, rhonchi, wheezing, or rubs  HEART: Regular rate and rhythm; No murmurs, rubs, or gallops  ABDOMEN: Soft, Nontender, Nondistended; Bowel sounds present, no pain or masses on palpation   NERVOUS SYSTEM:  Alert & Oriented X3  EXTREMITIES:  2+ Peripheral Pulses, No clubbing, cyanosis, or edema  : voiding well   SKIN: warm, dry ICU Vital Signs Last 24 Hrs  T(C): 39.9 (28 Feb 2023 18:18), Max: 39.9 (28 Feb 2023 18:18)  T(F): 103.8 (28 Feb 2023 18:18), Max: 103.8 (28 Feb 2023 18:18)  HR: 90 (28 Feb 2023 20:55) (90 - 122)  BP: 76/48 (28 Feb 2023 20:55) (73/52 - 146/69)  RR: 36 (28 Feb 2023 20:55) (18 - 45)  SpO2: 93% (28 Feb 2023 20:55) (93% - 98%)    O2 Parameters below as of 28 Feb 2023 20:55  Patient On (Oxygen Delivery Method): nasal cannula  O2 Flow (L/min): 6    GENERAL: NAD, overweight, deconditioned, sat well on 6 L NC, pale, poor hygiene, temporal wasting   HEAD:  Atraumatic, Normocephalic  EYES:  conjunctiva and sclera clear and pale   NECK: Supple, No JVD, Normal thyroid  CHEST/LUNG: Clear to auscultation. Clear to percussion bilaterally; No rales, rhonchi, wheezing, or rubs  HEART: Regular rate and rhythm; No murmurs, rubs, or gallops  ABDOMEN: Soft, Nontender, Nondistended; Bowel sounds present, no pain or masses on palpation   NERVOUS SYSTEM:  drowsy, non verbal, contracted, right sided paralysis   EXTREMITIES:  2+ Peripheral Pulses, No clubbing, cyanosis, or edema, long, thick, yellow toe nails   : Ly placed in ED   SKIN: warm, dry

## 2023-02-28 NOTE — ED ADULT NURSE NOTE - OBJECTIVE STATEMENT
pt is here for altered mental status.  As per family member, altered mental status since yesterday, gotten worse sine afternoon, non verbal status, DTI to sacrum area, right side weakness d/t  h/x stroke, rash to lower abdominal area, skin break down to neck, inner elbow,

## 2023-02-28 NOTE — ED PROVIDER NOTE - SEPSIS CONTRAINDICATION FLUID ADMINISTRATION
no PMH.   swelling to right ankle since last night - no known injury/trauma.    no fever.   no deformity, unable to bear weight.   IUTD. Not applicable

## 2023-02-28 NOTE — PATIENT PROFILE ADULT - FALL HARM RISK - HARM RISK INTERVENTIONS
Assistance with ambulation/Assistance OOB with selected safe patient handling equipment/Communicate Risk of Fall with Harm to all staff/Discuss with provider need for PT consult/Monitor gait and stability/Reinforce activity limits and safety measures with patient and family/Tailored Fall Risk Interventions/Visual Cue: Yellow wristband and red socks/Bed in lowest position, wheels locked, appropriate side rails in place/Call bell, personal items and telephone in reach/Instruct patient to call for assistance before getting out of bed or chair/Non-slip footwear when patient is out of bed/[place] to call system/Physically safe environment - no spills, clutter or unnecessary equipment/Purposeful Proactive Rounding/Room/bathroom lighting operational, light cord in reach

## 2023-02-28 NOTE — ED PROVIDER NOTE - CLINICAL SUMMARY MEDICAL DECISION MAKING FREE TEXT BOX
78 y/o F PMH HTN, A fib on eliquis and atenolol, CVA 20 y ago with residual right sided weakness and is now non verbal and bed bound, b/L ICA stenosis, Recurrent UTI up to 5x a year, presenting to ED for AMS x 2 days. Son at bedside states she has been moaning over the past week, not eating over the past 2 days. EMS notes hypoxia to 60s on room air, tachy to 170s.  No trauma or falls. PE notable for tachycardia, tachypnea, diminished breath sounds bilaterally, concerning for PNA, UTI, less likely PE with abnormal lung sounds, will obtain sepsis labs, cxr, plan for admission 76 y/o F PMH HTN, A fib on eliquis and atenolol, CVA 20 y ago with residual right sided weakness and is now non verbal and bed bound, b/L ICA stenosis, Recurrent UTI up to 5x a year, presenting to ED for AMS x 2 days. Son at bedside states she has been moaning over the past week, not eating over the past 2 days. EMS notes hypoxia to 60s on room air, tachy to 170s.  No trauma or falls. PE notable for tachycardia, tachypnea, diminished breath sounds bilaterally, concerning for PNA, CHF exacerbation, UTI, less likely PE with abnormal lung sounds, will obtain sepsis labs, cxr, plan for admission 78 y/o F PMH HTN, A fib on eliquis and atenolol, CVA 20 y ago with residual right sided weakness and is now non verbal and bed bound, b/L ICA stenosis, Recurrent UTI up to 5x a year, presenting to ED for AMS x 2 days. Son at bedside states she has been moaning over the past week, not eating over the past 2 days. EMS notes hypoxia to 60s on room air, tachy to 170s. No trauma or falls. PE notable for tachycardia, tachypnea, diminished breath sounds bilaterally, febrile, concerning for PNA, UTI, less likely PE with abnormal lung sounds, will obtain sepsis labs, cxr, plan for admission

## 2023-02-28 NOTE — ED ADULT NURSE NOTE - ED STAT RN HANDOFF DETAILS
Received patient from HERVE Patel. Pt is non-verbal at baseline, pt received on cardiac monitor, pt sleeping, symmetrical rise and fall of chest noted. Pt b/p during endorsement in the low 80s, N/S infusing at this time. Pt 98% on 6L/nc. Son at bedside. As per RN MD Sesar Patel notified regarding pt VS. Pt to be reassessed after completion of fluids. Pt received with ferrari draining via gravity.

## 2023-02-28 NOTE — ED PROVIDER NOTE - PHYSICAL EXAMINATION
Gen: Chronically ill appearing female, minimally responsive to painful stimuli diaphoretic  Head: NCAT  Lung: RIGHT LUNG DIMINISHED WITH CRACKLES, +LEFT LUNG DIMINISHED, +TACHYPNEA   CV: irregular rhythm, tachycardic, no murmurs  Abd: +distended, soft, nontender, no guarding, no CVA tenderness  MSK: no visible deformities  Neuro: rt sided hemiplegia,   Skin:  no rash

## 2023-03-01 NOTE — PROCEDURE NOTE - NSPOSTPRCRAD_GEN_A_CORE
central line located in the/central line located in the superior vena cava/depth of insertion/line adjusted to depth of insertion/post-procedure radiography performed

## 2023-03-01 NOTE — ADVANCED PRACTICE NURSE CONSULT - RECOMMEDATIONS
-Clean all wounds with normal saline and apply skin prep to the surrounding skin  -Apply Silver Calcium Alginate (Aquacel Ag) and cover with a Foam dressing Q 72hrs PRN  -Elevate/float the patients heels using heel protectors and reposition the patient Q 2hrs using wedges or pillows

## 2023-03-01 NOTE — PROGRESS NOTE ADULT - ASSESSMENT
77-year-old female, from home, lives with son, has HHA, bedbound, AAO x 1 at baseline, non verbal, answers yes or no and moans, has past medical history of CVA, with right sided residual paralysis, seizures on Keppra, hyperlipidemia, atrial fibrillation on Eliquis and atenolol, hypotension, bilateral ICA stenosis and recurrent UTI's at baseline is admitted to ICU for septic shock     DX:  - Septic shock 2/2 UTI and aspiration pneumonia   - AHRF 2/2 Pneumonia   - Acute encephalopathy   - DEJA  - H/o CVA  - H/o seizures   - Atrial fibrillation   - HLD    =================== Neuro============================  Alert and oriented x 1-2 at baseline   Pain management with Tylenol    #Acute encephalopathy  - Patient admitted for acute encephalopathy   - Metabolic and Infectious   - F/u CT Head and CT Chest   - NPO   - Fall precautions/Aspiration precautions/Seizure precautions  - Will start Empiric antibiotics Meropenem and Azithromycin   - F/u Bcx, Ucx, lactate, ESR, CRP, MRSA PCR, sputum cx, legionella, mycoplasma, streptococcus   - ID consult in the am    #H/o CVA  - Right sided paralysis   - On asa and Eliquis     #H/o seizures  - Patient on Keppra at home  - C/w Keppra IV while NPO    ================= Cardiovascular==========================  #Septic shock 2/2 UTI and aspiration pneumonia  - On admission, patient with tachycardia, hypotension, leucocytosis, elevated lactate, UA + and RLL pneumonia   - In ED s/p 3L IVF , Vancomycin, Rocephin and Zithromax x1   - Started on Meropenem and Zithromax, pressor support and Tylenol for fever  - F/u Ucx, Bcx, lactate, ESR, CRP, MRSA PCR, sputum cx, streptococcus, mycoplasma, legionella CT chest   - Monitor for any signs of hemodynamic instability      #Atrial fibrillation   - Patient on Eliquis and atenolol  - Will use Lovenox for AC while NPO, hold BB while on pressors   - Can resume meds as feasible     ================- Pulm=================================  #AHRF 2/2 Pneumonia   - Patient with hypoxia requiring o2 supplementation   - On exam, bilateral wheezing rhonchi  - CXR showed RLL pneumonia   - S/p Vancomycin, Rocephin and Zithromax   - Will start Empiric antibiotics Meropenem and Azithromycin   - NPO for now, FS q6 hrs  - ID consult in the am     ==================ID===================================  #Septic shock 2/2 UTI and aspiration pneumonia  - Plan as above      #AHRF 2/2 Pneumonia  - Plan as above      ================= Nephro================================  - On admission, patient with Cr 1.1, baseline 0.6  - DEJA most likely pre renal in the setting of acute infection, poor oral intake  - F/u urinalysis, urine lytes (urine sodium, urine creatinine, urine urea), urine protein/urine creatinine ratio   - Monitor I/O's daily  - Avoid nephrotoxins, NSAIDS, ACEI and ARBS  - S/p 3L IVF in ED  - Monitor BMP daily    =================GI====================================  NPO, FS q6 hrs     ================ Heme==================================  No acute issues     =================Endocrine===============================  No acute issues     ================= Skin/Catheters============================  Peripheral IV lines   Ly catheter   Patient/Family consented for A line and CVC     =================Prophylaxis =============================  Full AC with Lovenox while NPO   GI prophylaxis with PPI daily    ==================GOC==================================  DNR DNI   Disposition ICU   77-year-old female, from home, lives with son, has HHA, bedbound, AAO x 1 at baseline, non verbal, answers yes or no and moans, has past medical history of CVA, with right sided residual paralysis, seizures on Keppra, hyperlipidemia, atrial fibrillation on Eliquis and atenolol, hypotension, bilateral ICA stenosis and recurrent UTI's at baseline is admitted to ICU for septic shock     DX:  - Septic shock 2/2 UTI and aspiration pneumonia   - AHRF 2/2 Pneumonia   - Acute encephalopathy   - DEJA  - H/o CVA  - H/o seizures   - Atrial fibrillation   - HLD    =================== Neuro============================  Alert and oriented x 1 at baseline   Pain management with Tylenol    #Acute encephalopathy  - Patient admitted for acute encephalopathy   - Metabolic and Infectious   - CT Head is neg for hemorrhage or mass  - CT Chest Moderate right and small left pleural effusion, consolidative opacities, most pronounced in both lower lobes,   - NPO   - Fall precautions/Aspiration precautions/Seizure precautions  - c/w Empiric antibiotics Meropenem and Azithromycin   - F/u Bcx, Ucx, lactate, ESR, CRP, MRSA PCR, sputum cx, legionella, mycoplasma, streptococcus   - ID consulted Dr. ruff    #H/o CVA  - Right sided paralysis   - hold asa and Eliquis due to NPO  - c/w home meds once pt tolerates diet    #H/o seizures  - Patient on Keppra at home  - C/w Keppra IV while NPO    ================= Cardiovascular==========================  #Septic shock 2/2 UTI and aspiration pneumonia  - On admission, patient with tachycardia, hypotension, leucocytosis, elevated lactate, UA + and RLL pneumonia   - In ED s/p 3L IVF , Vancomycin, Rocephin and Zithromax x1   - Started on Meropenem and Zithromax, pressor support and Tylenol for fever  - F/u Ucx, Bcx, lactate, ESR, CRP, MRSA PCR, sputum cx, streptococcus, mycoplasma, legionella  - Monitor for any signs of hemodynamic instability      #Atrial fibrillation   - Patient on Eliquis and atenolol  - Will use Lovenox for AC while NPO, hold BB while on pressors   - Can resume meds as feasible     ================- Pulm=================================  #AHRF 2/2 Pneumonia   - Patient with hypoxia requiring o2 supplementation   - On exam, bilateral wheezing rhonchi  - CXR showed RLL pneumonia   - S/p Vancomycin, Rocephin and Zithromax   - c/w Empiric antibiotics Meropenem and Azithromycin   - NPO for now, FS q6 hrs  - ID consulted Dr. Ruff    # Pleural effusion  -CT chest shows Moderate right and small left pleural effusion  - plan for tentative thoracentesis on 3/2  - DC heparin in AM  - switch lovenox to heparin    ==================ID===================================  #Septic shock 2/2 UTI and aspiration pneumonia  - Plan as above      #AHRF 2/2 Pneumonia  - Plan as above      ================= Nephro================================  - On admission, patient with Cr 1.1, baseline 0.6  - DEJA most likely pre renal in the setting of acute infection, poor oral intake  - F/u urinalysis, urine lytes (urine sodium, urine creatinine, urine urea), urine protein/urine creatinine ratio   - Monitor I/O's daily  - Avoid nephrotoxins, NSAIDS, ACEI and ARBS  - S/p 3L IVF in ED  - Monitor BMP daily  - Cr 1.17 3/1    =================GI====================================  NPO, FS q6 hrs     ================ Heme==================================  No acute issues     =================Endocrine===============================  #Thyroid nodule  - CT shows thyroid lobe nodules   - nonemergent ultrasound correlation o/p   - f/u TSH      ================= Skin/Catheters============================  Peripheral IV lines   Ly catheter   RIJ 2/28    =================Prophylaxis =============================  Full AC with heparin  while NPO for tentative thoracentesis AM 3/2  GI prophylaxis with PPI daily    ==================GOC==================================  DNR DNI   Disposition ICU

## 2023-03-01 NOTE — CHART NOTE - NSCHARTNOTEFT_GEN_A_CORE
Cardiac:    PSL:  LV contractility is grossly normal, irregular contraction rate  LVOT and LA are dilated  RVOT also slightly dilated  trivial pericardial effusion    PSS:  ventricular size ration was unremarkable  Papillary muscles and mitral valve appear grossly unremarkable  The CVC was "Power flushed" and the RV can be seen receiving saline    Apical: limited study     Subxiphoid:  cardiomegaly, large LA  Liver appears to be unremarkable  Hepatic vein into IVC  IVC: 1.96cm    -LR bolus      Pt has a suspicion for PNA on CT scan no lung POCUS was done  Receiving Abx

## 2023-03-01 NOTE — CONSULT NOTE ADULT - SUBJECTIVE AND OBJECTIVE BOX
HPI:  77-year-old female, from home, lives with son, has HHA, bedbound, AAO x 1 at baseline, non verbal, answers yes or no and moans, has past medical history of CVA, with right sided residual paralysis, seizures on Keppra, hyperlipidemia, atrial fibrillation on Eliquis and atenolol, hypotension at baseline (SBP , DBP 60-70), bilateral ICA stenosis and recurrent UTI's was BIBEMS as patient was hypoglycemic to 60’s. As per son, patient is always moaning so it’s hard to tell when she is in pain or something is wrong with her. Son noticed she has poor appetite and is always coughing while eating. As per chart review, patient with multiple hospitalizations for ESBL E Coli UTI and aspiration pneumonia.  (2023 20:55)      PAST MEDICAL & SURGICAL HISTORY:  CVA (cerebrovascular accident)      Atrial fibrillation      Carotid stenosis      Seizures      Hyperlipidemia      No significant past surgical history          penicillin (Unknown)      Meds:  azithromycin  IVPB 500 milliGRAM(s) IV Intermittent every 24 hours  chlorhexidine 2% Cloths 1 Application(s) Topical <User Schedule>  heparin   Injectable 7500 Unit(s) IV Push every 6 hours PRN  heparin   Injectable 3500 Unit(s) IV Push every 6 hours PRN  heparin  Infusion.  Unit(s)/Hr IV Continuous <Continuous>  influenza  Vaccine (HIGH DOSE) 0.7 milliLiter(s) IntraMuscular once  insulin lispro (ADMELOG) corrective regimen sliding scale   SubCutaneous every 6 hours  levETIRAcetam  IVPB 750 milliGRAM(s) IV Intermittent every 12 hours  meropenem  IVPB      meropenem  IVPB 1000 milliGRAM(s) IV Intermittent every 8 hours  norepinephrine Infusion 0.05 MICROgram(s)/kG/Min IV Continuous <Continuous>  pantoprazole  Injectable 40 milliGRAM(s) IV Push daily  sodium chloride 0.9% lock flush 10 milliLiter(s) IV Push every 1 hour PRN      SOCIAL HISTORY:  Smoker:  YES / NO        PACK YEARS:                         WHEN QUIT?  ETOH use:  YES / NO               FREQUENCY / QUANTITY:  Ilicit Drug use:  YES / NO  Occupation:  Assisted device use (Cane / Walker):  Live with:    FAMILY HISTORY:      VITALS:  Vital Signs Last 24 Hrs  T(C): 36.4 (01 Mar 2023 16:03), Max: 39.9 (2023 18:18)  T(F): 97.5 (01 Mar 2023 16:03), Max: 103.8 (2023 18:18)  HR: 99 (01 Mar 2023 11:15) (78 - 122)  BP: 120/98 (01 Mar 2023 11:00) (67/43 - 146/69)  BP(mean): 104 (01 Mar 2023 11:00) (40 - 106)  RR: 24 (01 Mar 2023 11:15) (9 - 45)  SpO2: 89% (01 Mar 2023 11:15) (82% - 100%)    Parameters below as of 2023 22:45  Patient On (Oxygen Delivery Method): nasal cannula  O2 Flow (L/min): 6      LABS/DIAGNOSTIC TESTS:                          13.6   39.69 )-----------( 211      ( 01 Mar 2023 04:18 )             46.2     WBC Count: 39.69 K/uL ( @ 04:18)  WBC Count: 28.27 K/uL ( @ 21:30)  WBC Count: 17.73 K/uL ( @ 18:50)      03-    141  |  109<H>  |  24<H>  ----------------------------<  359<H>  4.3   |  26  |  1.17    Ca    8.0<L>      01 Mar 2023 04:18  Phos  4.6     03-  Mg     1.9     03-    TPro  6.1  /  Alb  2.2<L>  /  TBili  0.5  /  DBili  x   /  AST  17  /  ALT  18  /  AlkPhos  81  03-01      Urinalysis Basic - ( 2023 18:50 )    Color: Pale Yellow / Appearance: very cloudy / S.015 / pH: x  Gluc: x / Ketone: Negative  / Bili: Negative / Urobili: Negative   Blood: x / Protein: 100 / Nitrite: Positive   Leuk Esterase: Moderate / RBC: 2-5 /HPF / WBC >50 /HPF   Sq Epi: x / Non Sq Epi: Few /HPF / Bacteria: Many /HPF        LIVER FUNCTIONS - ( 01 Mar 2023 04:18 )  Alb: 2.2 g/dL / Pro: 6.1 g/dL / ALK PHOS: 81 U/L / ALT: 18 U/L DA / AST: 17 U/L / GGT: x             PT/INR - ( 2023 21:30 )   PT: 24.7 sec;   INR: 2.06 ratio         PTT - ( 2023 21:30 )  PTT:32.6 sec    LACTATE:Lactate, Blood: 1.8 mmol/L ( @ 04:18)  Lactate, Blood: 2.7 mmol/L ( @ 21:30)  Lactate, Blood: 3.0 mmol/L ( @ 18:50)      ABG -     CULTURES:       RADIOLOGY:< from: Xray Chest 1 View-PORTABLE IMMEDIATE (Xray Chest 1 View-PORTABLE IMMEDIATE .) (23 @ 00:18) >    ACC: [id number] EXAM:  XR CHEST PORTABLE IMMED 1V   ORDERED BY: JOLIE CISNEROS     ACC: [id number] EXAM:  XR CHEST PORTABLE URGENT 1V   ORDERED BY: MIGNON GOMEZ     PROCEDURE DATE:  2023          INTERPRETATION:  INDICATION: Change in mental status. Sepsis    COMPARISON: 2022    Portable chest 2023 at 6:59 PM    FINDINGS:  Heart/Vascular: The heart size, mediastinum, hilum and aorta cannot be   adequately evaluated on this projection. Atherosclerotic change.  Pulmonary: Midline trachea. There is a moderate layering right effusion,   fluid tracks into the horizontal fissure. Patchy underlying airspace   infiltrates. There is a small left pleural effusion with retrocardiac   atelectasis and/or infiltrate.  Bones: There is no fracture.  Lines and catheter: None    Portable chest 2023 at 11:59 PM    FINDINGS: Tip of right IJ catheter is in the distal SVC. No pneumothorax.   No other change in the cardiopulmonary findings.    Impression:    There is a moderate layering right effusion, fluid tracks into the   horizontal fissure. Patchy underlying airspace infiltrates.  There is a small left pleural effusion with retrocardiac atelectasis   and/or infiltrate.    Tip of right IJ catheter is in the distal SVC. No pneumothorax.    --- End of Report ---             MARIE NARVAEZ DO; Attending Radiologist  This document has been electronically signed. Mar  1 2023  9:47AM    < end of copied text >  -------------------------------------------------------------------------------------------------------------------------------------------------------------------------------------------------------------------------------------------------  ACC: [id number] EXAM:  CT CHEST   ORDERED BY: JOLIE CISNEROS     PROCEDURE DATE:  2023          INTERPRETATION:  CLINICAL INFORMATION: Sepsis.    COMPARISON: CT abdomen pelvis 2022.    PROCEDURE:  CT of the Chest was performed without intravenous contrast.  Sagittal and coronal reformats were performed.  Postprocessed MIP reformatted images were created and reviewed.    FINDINGS: Absence of intravenous contrast limits evaluation of   vasculature and solid organs.    CHEST:    LUNGS,PLEURA AND LARGE AIRWAYS: Patent central airways. There is   moderate right and small left pleural effusion extending to the apices   with adjacent patchy somewhat consolidative opacities, most pronounced in   both lower lobes. This may represent atelectasis or developing pneumonia.   Aspiration precaution should be followed. Attenuated right lower lobe   bronchus containing debris or secretions.  VESSELS: Atherosclerotic changes.  HEART: Cardiomegaly. No pericardial effusion. Coronary artery   calcifications.  MEDIASTINUM AND EUN: No lymphadenopathy.  CHEST WALL AND LOWER NECK: Incompletely characterized thyroid lobe   nodules for which nonemergent ultrasound correlation is advised.  VISUALIZED UPPER ABDOMEN: Cholelithiasis. Atrophied rightkidney.   Bilateral renal cysts as well as too small to characterize hypodensities.   Nonobstructive left renal calculi. Bilateral adrenal nodular thickening.   Small ascites. Partially imaged infrarenal IVC filter.  BONES: Osteopenia and multilevel degenerative changes of the spine.    IMPRESSION:    Moderate right and small left pleural effusion extending to the apices   with adjacent patchy somewhat consolidative opacities, most pronounced in   both lower lobes. This may represent atelectasis or developing pneumonia.   Aspiration precaution should be followed.    Incompletely characterized thyroid lobe nodules for which nonemergent   ultrasound correlation is advised.    Additional findings as mentioned above.      --- End of Report ---            YOLY QUACH MD; Attending Radiologist  This document has been electronically signed. 2023 10:52PM    < end of copied text >        ROS  [  ] UNABLE TO ELICIT               HPI:  77-year-old female, from home, lives with son, has HHA, bedbound, AAO x 1 at baseline, non verbal, answers yes or no and moans, has past medical history of CVA, with right sided residual paralysis, seizures on Keppra, hyperlipidemia, atrial fibrillation on Eliquis and atenolol, hypotension at baseline (SBP , DBP 60-70), bilateral ICA stenosis and recurrent UTI's was BIBEMS as patient was hypoglycemic to 60’s. As per son, patient is always moaning so it’s hard to tell when she is in pain or something is wrong with her. Son noticed she has poor appetite and is always coughing while eating. As per chart review, patient with multiple hospitalizations for ESBL E Coli UTI and aspiration pneumonia.  (2023 20:55)        History as above, asked to see this patient who has been admitted to the ICU and is from home and admitted with coughing especially after eating at home as per her son.        PAST MEDICAL & SURGICAL HISTORY:  CVA (cerebrovascular accident)      Atrial fibrillation      Carotid stenosis      Seizures      Hyperlipidemia      No significant past surgical history          penicillin (Unknown)      Meds:  azithromycin  IVPB 500 milliGRAM(s) IV Intermittent every 24 hours  chlorhexidine 2% Cloths 1 Application(s) Topical <User Schedule>  heparin   Injectable 7500 Unit(s) IV Push every 6 hours PRN  heparin   Injectable 3500 Unit(s) IV Push every 6 hours PRN  heparin  Infusion.  Unit(s)/Hr IV Continuous <Continuous>  influenza  Vaccine (HIGH DOSE) 0.7 milliLiter(s) IntraMuscular once  insulin lispro (ADMELOG) corrective regimen sliding scale   SubCutaneous every 6 hours  levETIRAcetam  IVPB 750 milliGRAM(s) IV Intermittent every 12 hours  meropenem  IVPB      meropenem  IVPB 1000 milliGRAM(s) IV Intermittent every 8 hours  norepinephrine Infusion 0.05 MICROgram(s)/kG/Min IV Continuous <Continuous>  pantoprazole  Injectable 40 milliGRAM(s) IV Push daily  sodium chloride 0.9% lock flush 10 milliLiter(s) IV Push every 1 hour PRN      SOCIAL HISTORY:  Smoker:  YES / NO        PACK YEARS:                         WHEN QUIT?  ETOH use:  YES / NO               FREQUENCY / QUANTITY:  Ilicit Drug use:  YES / NO  Occupation:  Assisted device use (Cane / Walker):  Live with:    FAMILY HISTORY:      VITALS:  Vital Signs Last 24 Hrs  T(C): 36.4 (01 Mar 2023 16:03), Max: 39.9 (2023 18:18)  T(F): 97.5 (01 Mar 2023 16:03), Max: 103.8 (2023 18:18)  HR: 99 (01 Mar 2023 11:15) (78 - 122)  BP: 120/98 (01 Mar 2023 11:00) (67/43 - 146/69)  BP(mean): 104 (01 Mar 2023 11:00) (40 - 106)  RR: 24 (01 Mar 2023 11:15) (9 - 45)  SpO2: 89% (01 Mar 2023 11:15) (82% - 100%)    Parameters below as of 2023 22:45  Patient On (Oxygen Delivery Method): nasal cannula  O2 Flow (L/min): 6      LABS/DIAGNOSTIC TESTS:                          13.6   39.69 )-----------( 211      ( 01 Mar 2023 04:18 )             46.2     WBC Count: 39.69 K/uL ( @ 04:18)  WBC Count: 28.27 K/uL ( @ 21:30)  WBC Count: 17.73 K/uL ( @ 18:50)          141  |  109<H>  |  24<H>  ----------------------------<  359<H>  4.3   |  26  |  1.17    Ca    8.0<L>      01 Mar 2023 04:18  Phos  4.6     03-  Mg     1.9     -    TPro  6.1  /  Alb  2.2<L>  /  TBili  0.5  /  DBili  x   /  AST  17  /  ALT  18  /  AlkPhos  81  03-      Urinalysis Basic - ( 2023 18:50 )    Color: Pale Yellow / Appearance: very cloudy / S.015 / pH: x  Gluc: x / Ketone: Negative  / Bili: Negative / Urobili: Negative   Blood: x / Protein: 100 / Nitrite: Positive   Leuk Esterase: Moderate / RBC: 2-5 /HPF / WBC >50 /HPF   Sq Epi: x / Non Sq Epi: Few /HPF / Bacteria: Many /HPF        LIVER FUNCTIONS - ( 01 Mar 2023 04:18 )  Alb: 2.2 g/dL / Pro: 6.1 g/dL / ALK PHOS: 81 U/L / ALT: 18 U/L DA / AST: 17 U/L / GGT: x             PT/INR - ( 2023 21:30 )   PT: 24.7 sec;   INR: 2.06 ratio         PTT - ( 2023 21:30 )  PTT:32.6 sec    LACTATE:Lactate, Blood: 1.8 mmol/L ( @ 04:18)  Lactate, Blood: 2.7 mmol/L ( @ 21:30)  Lactate, Blood: 3.0 mmol/L ( @ 18:50)      ABG -     CULTURES:       RADIOLOGY:< from: Xray Chest 1 View-PORTABLE IMMEDIATE (Xray Chest 1 View-PORTABLE IMMEDIATE .) (23 @ 00:18) >    ACC: [id number] EXAM:  XR CHEST PORTABLE IMMED 1V   ORDERED BY: JOLIE CISNEROS     ACC: [id number] EXAM:  XR CHEST PORTABLE URGENT 1V   ORDERED BY: MIGNON GOMEZ     PROCEDURE DATE:  2023          INTERPRETATION:  INDICATION: Change in mental status. Sepsis    COMPARISON: 2022    Portable chest 2023 at 6:59 PM    FINDINGS:  Heart/Vascular: The heart size, mediastinum, hilum and aorta cannot be   adequately evaluated on this projection. Atherosclerotic change.  Pulmonary: Midline trachea. There is a moderate layering right effusion,   fluid tracks into the horizontal fissure. Patchy underlying airspace   infiltrates. There is a small left pleural effusion with retrocardiac   atelectasis and/or infiltrate.  Bones: There is no fracture.  Lines and catheter: None    Portable chest 2023 at 11:59 PM    FINDINGS: Tip of right IJ catheter is in the distal SVC. No pneumothorax.   No other change in the cardiopulmonary findings.    Impression:    There is a moderate layering right effusion, fluid tracks into the   horizontal fissure. Patchy underlying airspace infiltrates.  There is a small left pleural effusion with retrocardiac atelectasis   and/or infiltrate.    Tip of right IJ catheter is in the distal SVC. No pneumothorax.    --- End of Report ---             MARIE NARVAEZ DO; Attending Radiologist  This document has been electronically signed. Mar  1 2023  9:47AM    < end of copied text >  -------------------------------------------------------------------------------------------------------------------------------------------------------------------------------------------------------------------------------------------------  ACC: [id number] EXAM:  CT CHEST   ORDERED BY: JOLIE CISNEROS     PROCEDURE DATE:  2023          INTERPRETATION:  CLINICAL INFORMATION: Sepsis.    COMPARISON: CT abdomen pelvis 2022.    PROCEDURE:  CT of the Chest was performed without intravenous contrast.  Sagittal and coronal reformats were performed.  Postprocessed MIP reformatted images were created and reviewed.    FINDINGS: Absence of intravenous contrast limits evaluation of   vasculature and solid organs.    CHEST:    LUNGS,PLEURA AND LARGE AIRWAYS: Patent central airways. There is   moderate right and small left pleural effusion extending to the apices   with adjacent patchy somewhat consolidative opacities, most pronounced in   both lower lobes. This may represent atelectasis or developing pneumonia.   Aspiration precaution should be followed. Attenuated right lower lobe   bronchus containing debris or secretions.  VESSELS: Atherosclerotic changes.  HEART: Cardiomegaly. No pericardial effusion. Coronary artery   calcifications.  MEDIASTINUM AND EUN: No lymphadenopathy.  CHEST WALL AND LOWER NECK: Incompletely characterized thyroid lobe   nodules for which nonemergent ultrasound correlation is advised.  VISUALIZED UPPER ABDOMEN: Cholelithiasis. Atrophied rightkidney.   Bilateral renal cysts as well as too small to characterize hypodensities.   Nonobstructive left renal calculi. Bilateral adrenal nodular thickening.   Small ascites. Partially imaged infrarenal IVC filter.  BONES: Osteopenia and multilevel degenerative changes of the spine.    IMPRESSION:    Moderate right and small left pleural effusion extending to the apices   with adjacent patchy somewhat consolidative opacities, most pronounced in   both lower lobes. This may represent atelectasis or developing pneumonia.   Aspiration precaution should be followed.    Incompletely characterized thyroid lobe nodules for which nonemergent   ultrasound correlation is advised.    Additional findings as mentioned above.      --- End of Report ---            YOLY QUACH MD; Attending Radiologist  This document has been electronically signed. 2023 10:52PM    < end of copied text >        ROS  [  ] UNABLE TO ELICIT               HPI:  77-year-old female, from home, lives with son, has HHA, bedbound, AAO x 1 at baseline, non verbal, answers yes or no and moans, has past medical history of CVA, with right sided residual paralysis, seizures on Keppra, hyperlipidemia, atrial fibrillation on Eliquis and atenolol, hypotension at baseline (SBP , DBP 60-70), bilateral ICA stenosis and recurrent UTI's was BIBEMS as patient was hypoglycemic to 60’s. As per son, patient is always moaning so it’s hard to tell when she is in pain or something is wrong with her. Son noticed she has poor appetite and is always coughing while eating. As per chart review, patient with multiple hospitalizations for ESBL E Coli UTI and aspiration pneumonia.  (2023 20:55)        History as above, asked to see this patient who has been admitted to the ICU and is from home and admitted with coughing especially after eating at home as per her son. She was found to have hypotension along with high fevers to 103.8, an extremely high WBC count and Bilat patchy infiltrates and a UTI ( she has a H/O ESBL infection in the past). She is satish pressor in the ICU but is very lethargic and though she responds to tactile stimuli she is unable to answer any questions at all.         PAST MEDICAL & SURGICAL HISTORY:  CVA (cerebrovascular accident)      Atrial fibrillation      Carotid stenosis      Seizures      Hyperlipidemia      No significant past surgical history          penicillin (Unknown)      Meds:  azithromycin  IVPB 500 milliGRAM(s) IV Intermittent every 24 hours  chlorhexidine 2% Cloths 1 Application(s) Topical <User Schedule>  heparin   Injectable 7500 Unit(s) IV Push every 6 hours PRN  heparin   Injectable 3500 Unit(s) IV Push every 6 hours PRN  heparin  Infusion.  Unit(s)/Hr IV Continuous <Continuous>  influenza  Vaccine (HIGH DOSE) 0.7 milliLiter(s) IntraMuscular once  insulin lispro (ADMELOG) corrective regimen sliding scale   SubCutaneous every 6 hours  levETIRAcetam  IVPB 750 milliGRAM(s) IV Intermittent every 12 hours  meropenem  IVPB      meropenem  IVPB 1000 milliGRAM(s) IV Intermittent every 8 hours  norepinephrine Infusion 0.05 MICROgram(s)/kG/Min IV Continuous <Continuous>  pantoprazole  Injectable 40 milliGRAM(s) IV Push daily  sodium chloride 0.9% lock flush 10 milliLiter(s) IV Push every 1 hour PRN      SOCIAL HISTORY: unknown      FAMILY HISTORY: unknown      VITALS:  Vital Signs Last 24 Hrs  T(C): 36.4 (01 Mar 2023 16:03), Max: 39.9 (2023 18:18)  T(F): 97.5 (01 Mar 2023 16:03), Max: 103.8 (2023 18:18)  HR: 99 (01 Mar 2023 11:15) (78 - 122)  BP: 120/98 (01 Mar 2023 11:00) (67/43 - 146/69)  BP(mean): 104 (01 Mar 2023 11:00) (40 - 106)  RR: 24 (01 Mar 2023 11:15) (9 - 45)  SpO2: 89% (01 Mar 2023 11:15) (82% - 100%)    Parameters below as of 2023 22:45  Patient On (Oxygen Delivery Method): nasal cannula  O2 Flow (L/min): 6      LABS/DIAGNOSTIC TESTS:                          13.6   39.69 )-----------( 211      ( 01 Mar 2023 04:18 )             46.2     WBC Count: 39.69 K/uL ( @ 04:18)  WBC Count: 28.27 K/uL ( @ 21:30)  WBC Count: 17.73 K/uL ( @ 18:50)      03    141  |  109<H>  |  24<H>  ----------------------------<  359<H>  4.3   |  26  |  1.17    Ca    8.0<L>      01 Mar 2023 04:18  Phos  4.6     03-  Mg     1.9     -    TPro  6.1  /  Alb  2.2<L>  /  TBili  0.5  /  DBili  x   /  AST  17  /  ALT  18  /  AlkPhos  81  03-      Urinalysis Basic - ( 2023 18:50 )    Color: Pale Yellow / Appearance: very cloudy / S.015 / pH: x  Gluc: x / Ketone: Negative  / Bili: Negative / Urobili: Negative   Blood: x / Protein: 100 / Nitrite: Positive   Leuk Esterase: Moderate / RBC: 2-5 /HPF / WBC >50 /HPF   Sq Epi: x / Non Sq Epi: Few /HPF / Bacteria: Many /HPF        LIVER FUNCTIONS - ( 01 Mar 2023 04:18 )  Alb: 2.2 g/dL / Pro: 6.1 g/dL / ALK PHOS: 81 U/L / ALT: 18 U/L DA / AST: 17 U/L / GGT: x             PT/INR - ( 2023 21:30 )   PT: 24.7 sec;   INR: 2.06 ratio         PTT - ( 2023 21:30 )  PTT:32.6 sec    LACTATE:Lactate, Blood: 1.8 mmol/L ( @ 04:18)  Lactate, Blood: 2.7 mmol/L ( @ 21:30)  Lactate, Blood: 3.0 mmol/L ( @ 18:50)      ABG -     CULTURES:       RADIOLOGY:< from: Xray Chest 1 View-PORTABLE IMMEDIATE (Xray Chest 1 View-PORTABLE IMMEDIATE .) (23 @ 00:18) >    ACC: [id number] EXAM:  XR CHEST PORTABLE IMMED 1V   ORDERED BY: JOLIE CISNEROS     ACC: [id number] EXAM:  XR CHEST PORTABLE URGENT 1V   ORDERED BY: MIGNON GOMEZ     PROCEDURE DATE:  2023          INTERPRETATION:  INDICATION: Change in mental status. Sepsis    COMPARISON: 2022    Portable chest 2023 at 6:59 PM    FINDINGS:  Heart/Vascular: The heart size, mediastinum, hilum and aorta cannot be   adequately evaluated on this projection. Atherosclerotic change.  Pulmonary: Midline trachea. There is a moderate layering right effusion,   fluid tracks into the horizontal fissure. Patchy underlying airspace   infiltrates. There is a small left pleural effusion with retrocardiac   atelectasis and/or infiltrate.  Bones: There is no fracture.  Lines and catheter: None    Portable chest 2023 at 11:59 PM    FINDINGS: Tip of right IJ catheter is in the distal SVC. No pneumothorax.   No other change in the cardiopulmonary findings.    Impression:    There is a moderate layering right effusion, fluid tracks into the   horizontal fissure. Patchy underlying airspace infiltrates.  There is a small left pleural effusion with retrocardiac atelectasis   and/or infiltrate.    Tip of right IJ catheter is in the distal SVC. No pneumothorax.    --- End of Report ---             MARIE NARVAEZ DO; Attending Radiologist  This document has been electronically signed. Mar  1 2023  9:47AM    < end of copied text >  -------------------------------------------------------------------------------------------------------------------------------------------------------------------------------------------------------------------------------------------------  ACC: [id number] EXAM:  CT CHEST   ORDERED BY: JOLIE CISNEROS     PROCEDURE DATE:  2023          INTERPRETATION:  CLINICAL INFORMATION: Sepsis.    COMPARISON: CT abdomen pelvis 2022.    PROCEDURE:  CT of the Chest was performed without intravenous contrast.  Sagittal and coronal reformats were performed.  Postprocessed MIP reformatted images were created and reviewed.    FINDINGS: Absence of intravenous contrast limits evaluation of   vasculature and solid organs.    CHEST:    LUNGS,PLEURA AND LARGE AIRWAYS: Patent central airways. There is   moderate right and small left pleural effusion extending to the apices   with adjacent patchy somewhat consolidative opacities, most pronounced in   both lower lobes. This may represent atelectasis or developing pneumonia.   Aspiration precaution should be followed. Attenuated right lower lobe   bronchus containing debris or secretions.  VESSELS: Atherosclerotic changes.  HEART: Cardiomegaly. No pericardial effusion. Coronary artery   calcifications.  MEDIASTINUM AND EUN: No lymphadenopathy.  CHEST WALL AND LOWER NECK: Incompletely characterized thyroid lobe   nodules for which nonemergent ultrasound correlation is advised.  VISUALIZED UPPER ABDOMEN: Cholelithiasis. Atrophied rightkidney.   Bilateral renal cysts as well as too small to characterize hypodensities.   Nonobstructive left renal calculi. Bilateral adrenal nodular thickening.   Small ascites. Partially imaged infrarenal IVC filter.  BONES: Osteopenia and multilevel degenerative changes of the spine.    IMPRESSION:    Moderate right and small left pleural effusion extending to the apices   with adjacent patchy somewhat consolidative opacities, most pronounced in   both lower lobes. This may represent atelectasis or developing pneumonia.   Aspiration precaution should be followed.    Incompletely characterized thyroid lobe nodules for which nonemergent   ultrasound correlation is advised.    Additional findings as mentioned above.      --- End of Report ---            YOLY QUACH MD; Attending Radiologist  This document has been electronically signed. 2023 10:52PM    < end of copied text >        ROS  [ x ] UNABLE TO ELICIT

## 2023-03-01 NOTE — ADVANCED PRACTICE NURSE CONSULT - ASSESSMENT
This is a 77yr old female patient admitted for Sepsis, presenting with the following:  -There is a healed wound to the L. Heel  -There is a Stage 1 Pressure Injury to the Bilateral Heels, as evident by non-blanchable erythema  -There is a DTI to the Coccyx with epidermal separation, pink tissue, drainage, and periwound maceration and redness

## 2023-03-01 NOTE — PROGRESS NOTE ADULT - SUBJECTIVE AND OBJECTIVE BOX
INTERVAL HPI/OVERNIGHT EVENTS:     PRESSORS: [ ] YES [ ] NO  WHICH:    ANTIBIOTICS:                  DATE STARTED:  ANTIBIOTICS:                  DATE STARTED:  ANTIBIOTICS:                  DATE STARTED:    Antimicrobial:  azithromycin  IVPB 500 milliGRAM(s) IV Intermittent every 24 hours  meropenem  IVPB      meropenem  IVPB 1000 milliGRAM(s) IV Intermittent every 8 hours    Cardiovascular:  norepinephrine Infusion 0.05 MICROgram(s)/kG/Min IV Continuous <Continuous>    Pulmonary:    Hematalogic:  enoxaparin Injectable 90 milliGRAM(s) SubCutaneous every 12 hours    Other:  chlorhexidine 2% Cloths 1 Application(s) Topical <User Schedule>  influenza  Vaccine (HIGH DOSE) 0.7 milliLiter(s) IntraMuscular once  levETIRAcetam  IVPB 750 milliGRAM(s) IV Intermittent every 12 hours  pantoprazole  Injectable 40 milliGRAM(s) IV Push daily  sodium chloride 0.9% lock flush 10 milliLiter(s) IV Push every 1 hour PRN      Drug Dosing Weight  Height (cm): 172.7 (2023 22:45)  Weight (kg): 91.7 (2023 22:45)  BMI (kg/m2): 30.7 (2023 22:45)  BSA (m2): 2.05 (2023 22:45)    CENTRAL LINE: [ ] YES [ ] NO  LOCATION:   DATE INSERTED:  REMOVE: [ ] YES [ ] NO  EXPLAIN:    CHACON: [ ] YES [ ] NO    DATE INSERTED:  REMOVE:  [ ] YES [ ] NO  EXPLAIN:    A-LINE:  [ ] YES [ ] NO  LOCATION:   DATE INSERTED:  REMOVE:  [ ] YES [ ] NO  EXPLAIN:    PMH/Social Hx/Fam Hx -reviewed admission note, no change since admission  PAST MEDICAL & SURGICAL HISTORY:  CVA (cerebrovascular accident)      Atrial fibrillation      Carotid stenosis      Seizures      Hyperlipidemia      No significant past surgical history        Heart faliure: acute [ ] chronic [ ] acute or chronic [ ] diastolic [ ] systolic [ ] combied systolic and diastolic[ ]  DEJA: ATN[ ] renal medullary necrosis [ ] CKD I [ ]CKDII [ ]CKD III [ ]CKD IV [ ]CKD V [ ]Other pathological lesions [ ]  Abdominal Nutrition Status: malnutrition [ ] cachexia [ ] morbid obesity/BMI=40 [ ] Supplement ordered [___________]     T(C): 36.5 (23 @ 04:00), Max: 39.9 (23 @ 18:18)  HR: 101 (23 @ 07:00)  BP: 131/74 (23 @ 07:00)  BP(mean): 88 (23 @ 07:00)  ABP: --  ABP(mean): --  RR: 26 (23 @ 07:00)  SpO2: 97% (23 @ 07:00)  Wt(kg): --           @ 07:01  -   07:00  --------------------------------------------------------  IN: 2695.7 mL / OUT: 160 mL / NET: 2535.7 mL            PHYSICAL EXAM:    GENERAL: [ ]NAD, [ ]well-groomed, [ ]well-developed  HEAD:  [ ]Atraumatic, [ ]Normocephalic  EYES: [ ]EOMI, [ ]PERRLA, [ ]conjunctiva and sclera clear  ENMT: [ ]No tonsillar erythema, exudates, or enlargement; [ ]Moist mucous membranes, [ ]Good dentition, [ ]No lesions  NECK: [ ]Supple, normal appearance, [ ]No JVD; [ ]Normal thyroid; [ ]Trachea midline  NERVOUS SYSTEM:  [ ]Alert & Oriented X3, [ ]Good concentration; [ ]Motor Strength 5/5 B/L upper and lower extremities; [ ]DTRs 2+ intact and symmetric  CHEST/LUNG: [ ]No chest deformity; [ ]Normal percussion bilaterally; [ ]No rales, rhonchi, wheezing; [ ]Crackles at bases  HEART: [ ]Regular rate and rhythm; [ ]No murmurs, rubs, or gallops  ABDOMEN: [ ]Soft, Nontender, Nondistended; [ ]Bowel sounds present  EXTREMITIES:  [ ]2+ Peripheral Pulses, [ ]No clubbing, cyanosis, or edema [ ]Bilat lower extremity edema  LYMPH: [ ]No lymphadenopathy noted  SKIN: [ ]No rashes or lesions; [ ]Good capillary refill      LABS:  CBC Full  -  ( 01 Mar 2023 04:18 )  WBC Count : 39.69 K/uL  RBC Count : 4.50 M/uL  Hemoglobin : 13.6 g/dL  Hematocrit : 46.2 %  Platelet Count - Automated : 211 K/uL  Mean Cell Volume : 102.7 fl  Mean Cell Hemoglobin : 30.2 pg  Mean Cell Hemoglobin Concentration : 29.4 gm/dL  Auto Neutrophil # : 36.51 K/uL  Auto Lymphocyte # : 0.40 K/uL  Auto Monocyte # : 2.78 K/uL  Auto Eosinophil # : 0.00 K/uL  Auto Basophil # : 0.00 K/uL  Auto Neutrophil % : 86.0 %  Auto Lymphocyte % : 1.0 %  Auto Monocyte % : 7.0 %  Auto Eosinophil % : 0.0 %  Auto Basophil % : 0.0 %    03-    141  |  109<H>  |  24<H>  ----------------------------<  359<H>  4.3   |  26  |  1.17    Ca    8.0<L>      01 Mar 2023 04:18  Phos  4.6     03-  Mg     1.9     -    TPro  6.1  /  Alb  2.2<L>  /  TBili  0.5  /  DBili  x   /  AST  17  /  ALT  18  /  AlkPhos  81  03-01    PT/INR - ( 2023 21:30 )   PT: 24.7 sec;   INR: 2.06 ratio         PTT - ( 2023 21:30 )  PTT:32.6 sec  Urinalysis Basic - ( 2023 18:50 )    Color: Pale Yellow / Appearance: very cloudy / S.015 / pH: x  Gluc: x / Ketone: Negative  / Bili: Negative / Urobili: Negative   Blood: x / Protein: 100 / Nitrite: Positive   Leuk Esterase: Moderate / RBC: 2-5 /HPF / WBC >50 /HPF   Sq Epi: x / Non Sq Epi: Few /HPF / Bacteria: Many /HPF          RADIOLOGY & ADDITIONAL STUDIES REVIEWED:      [ ]GOALS OF CARE DISCUSSION WITH PATIENT/FAMILY/PROXY:    CRITICAL CARE TIME SPENT: 35 minutes INTERVAL HPI/OVERNIGHT EVENTS:   no acute overnight events, pt. seen and examined at bedside, pt is AOx1 at baseline. Unable to obtain ROS due to pt nonverbal and mental status.    PRESSORS: [ x] YES [ ] NO  WHICH: Norepinephrine     ANTIBIOTICS: azithromycin  IVPB 500 milliGRAM(s) IV Intermittent every 24 hours. DATE STARTED: 3/1  ANTIBIOTICS: meropenem  IVPB 1000 milliGRAM(s) IV Intermittent every 8 hours  DATE STARTED: 3/1  ANTIBIOTICS:                  DATE STARTED:    Antimicrobial:  azithromycin  IVPB 500 milliGRAM(s) IV Intermittent every 24 hours  meropenem  IVPB      meropenem  IVPB 1000 milliGRAM(s) IV Intermittent every 8 hours    Cardiovascular:  norepinephrine Infusion 0.05 MICROgram(s)/kG/Min IV Continuous <Continuous>    Pulmonary:    Hematalogic:  enoxaparin Injectable 90 milliGRAM(s) SubCutaneous every 12 hours    Other:  chlorhexidine 2% Cloths 1 Application(s) Topical <User Schedule>  influenza  Vaccine (HIGH DOSE) 0.7 milliLiter(s) IntraMuscular once  levETIRAcetam  IVPB 750 milliGRAM(s) IV Intermittent every 12 hours  pantoprazole  Injectable 40 milliGRAM(s) IV Push daily  sodium chloride 0.9% lock flush 10 milliLiter(s) IV Push every 1 hour PRN      Drug Dosing Weight  Height (cm): 172.7 (2023 22:45)  Weight (kg): 91.7 (2023 22:45)  BMI (kg/m2): 30.7 (2023 22:45)  BSA (m2): 2.05 (2023 22:45)    CENTRAL LINE: [x ] YES [ ] NO  LOCATION:   DATE INSERTED: 3/1  REMOVE: [ ] YES [ ] NO  EXPLAIN:    CHACON: [x ] YES [ ] NO    DATE INSERTED: 3/1  REMOVE:  [ ] YES [ ] NO  EXPLAIN:    A-LINE:  [ ] YES [ ] NO  LOCATION:   DATE INSERTED:  REMOVE:  [ ] YES [ ] NO  EXPLAIN:    PMH/Social Hx/Fam Hx -reviewed admission note, no change since admission  PAST MEDICAL & SURGICAL HISTORY:  CVA (cerebrovascular accident)      Atrial fibrillation      Carotid stenosis      Seizures      Hyperlipidemia      No significant past surgical history        Heart faliure: acute [ ] chronic [ ] acute or chronic [ ] diastolic [ ] systolic [ ] combied systolic and diastolic[ ]  DEJA: ATN[ ] renal medullary necrosis [ ] CKD I [ ]CKDII [ ]CKD III [ ]CKD IV [ ]CKD V [ ]Other pathological lesions [ ]  Abdominal Nutrition Status: malnutrition [ ] cachexia [ ] morbid obesity/BMI=40 [ ] Supplement ordered [___________]     T(C): 36.5 (23 @ 04:00), Max: 39.9 (23 @ 18:18)  HR: 101 (23 @ 07:00)  BP: 131/74 (23 @ 07:00)  BP(mean): 88 (23 @ 07:00)  ABP: --  ABP(mean): --  RR: 26 (23 @ 07:00)  SpO2: 97% (23 @ 07:00)  Wt(kg): --           @ 07:01  -   @ 07:00  --------------------------------------------------------  IN: 2695.7 mL / OUT: 160 mL / NET: 2535.7 mL            PHYSICAL EXAM:  GENERAL: NAD, overweight, deconditioned, sat well on RA, pale, poor hygiene, temporal wasting   HEAD:  Atraumatic, Normocephalic  EYES:  conjunctiva and sclera clear and pale   NECK: Supple, No JVD, Normal thyroid  CHEST/LUNG: diminished lung sounds bilaterally; not following commands to take deep breathes; No rales, rhonchi, wheezing, or rubs  HEART: Regular rate and rhythm; No murmurs, rubs, or gallops  ABDOMEN: Soft, Nontender, Nondistended; Bowel sounds present, no pain or masses on palpation, umbilical hernia present, abd rash with erythema   NERVOUS SYSTEM:  drowsy, non verbal, contracted, right sided paralysis, moaning sounds at baseline  EXTREMITIES:  2+ Peripheral Pulses, No clubbing, cyanosis, or edema, long, thick, yellow toe nails   : Chacon placed in ED   SKIN: warm, dry        LABS:  CBC Full  -  ( 01 Mar 2023 04:18 )  WBC Count : 39.69 K/uL  RBC Count : 4.50 M/uL  Hemoglobin : 13.6 g/dL  Hematocrit : 46.2 %  Platelet Count - Automated : 211 K/uL  Mean Cell Volume : 102.7 fl  Mean Cell Hemoglobin : 30.2 pg  Mean Cell Hemoglobin Concentration : 29.4 gm/dL  Auto Neutrophil # : 36.51 K/uL  Auto Lymphocyte # : 0.40 K/uL  Auto Monocyte # : 2.78 K/uL  Auto Eosinophil # : 0.00 K/uL  Auto Basophil # : 0.00 K/uL  Auto Neutrophil % : 86.0 %  Auto Lymphocyte % : 1.0 %  Auto Monocyte % : 7.0 %  Auto Eosinophil % : 0.0 %  Auto Basophil % : 0.0 %    03-01    141  |  109<H>  |  24<H>  ----------------------------<  359<H>  4.3   |  26  |  1.17    Ca    8.0<L>      01 Mar 2023 04:18  Phos  4.6     03-01  Mg     1.9     03-    TPro  6.1  /  Alb  2.2<L>  /  TBili  0.5  /  DBili  x   /  AST  17  /  ALT  18  /  AlkPhos  81  03-01    PT/INR - ( 2023 21:30 )   PT: 24.7 sec;   INR: 2.06 ratio         PTT - ( 2023 21:30 )  PTT:32.6 sec  Urinalysis Basic - ( 2023 18:50 )    Color: Pale Yellow / Appearance: very cloudy / S.015 / pH: x  Gluc: x / Ketone: Negative  / Bili: Negative / Urobili: Negative   Blood: x / Protein: 100 / Nitrite: Positive   Leuk Esterase: Moderate / RBC: 2-5 /HPF / WBC >50 /HPF   Sq Epi: x / Non Sq Epi: Few /HPF / Bacteria: Many /HPF          RADIOLOGY & ADDITIONAL STUDIES REVIEWED:      [ ]GOALS OF CARE DISCUSSION WITH PATIENT/FAMILY/PROXY:    CRITICAL CARE TIME SPENT: 35 minutes

## 2023-03-02 NOTE — DIETITIAN INITIAL EVALUATION ADULT - NSICDXPASTMEDICALHX_GEN_ALL_CORE_FT
PAST MEDICAL HISTORY:  Atrial fibrillation     Carotid stenosis     CVA (cerebrovascular accident)     Hyperlipidemia     Seizures

## 2023-03-02 NOTE — PROGRESS NOTE ADULT - SUBJECTIVE AND OBJECTIVE BOX
JAMAAL-see note below   Spoke to patient's daughter Leeanne. Daughter would not like to treat at this time, will call later this week and let us know how Gloria is doing.    ICU VISIT  77y Female    Meds:  ertapenem  IVPB 1000 milliGRAM(s) IV Intermittent every 24 hours    Allergies    penicillin (Unknown)    Intolerances        VITALS:  Vital Signs Last 24 Hrs  T(C): 36.7 (02 Mar 2023 11:30), Max: 37 (02 Mar 2023 05:00)  T(F): 98.1 (02 Mar 2023 11:30), Max: 98.6 (02 Mar 2023 05:00)  HR: 99 (02 Mar 2023 16:00) (91 - 129)  BP: 107/62 (02 Mar 2023 16:00) (74/54 - 150/80)  BP(mean): 72 (02 Mar 2023 16:00) (59 - 114)  RR: 24 (02 Mar 2023 16:00) (0 - 30)  SpO2: 97% (02 Mar 2023 16:00) (60% - 100%)    Parameters below as of 02 Mar 2023 12:00  Patient On (Oxygen Delivery Method): nasal cannula    O2 Concentration (%): 2    LABS/DIAGNOSTIC TESTS:                          13.2   21.89 )-----------( 160      ( 02 Mar 2023 04:02 )             45.3         03-02    141  |  110<H>  |  29<H>  ----------------------------<  188<H>  3.9   |  26  |  1.22    Ca    8.6      02 Mar 2023 04:02  Phos  3.3     03-02  Mg     1.9     03-02    TPro  6.0  /  Alb  2.1<L>  /  TBili  0.4  /  DBili  x   /  AST  17  /  ALT  17  /  AlkPhos  77  03-02      LIVER FUNCTIONS - ( 02 Mar 2023 04:02 )  Alb: 2.1 g/dL / Pro: 6.0 g/dL / ALK PHOS: 77 U/L / ALT: 17 U/L DA / AST: 17 U/L / GGT: x             CULTURES: Clean Catch Clean Catch (Midstream)  02-28 @ 18:50   >100,000 CFU/ml Escherichia coli  <10,000 CFU/ml Normal Urogenital nakul present  --  --      .Blood Blood-Peripheral  02-28 @ 18:40   No growth to date.  --  Blood Culture PCR            RADIOLOGY:      ROS:  [  ] UNABLE TO ELICIT ICU VISIT  77y Female who remains in the ICU , she is responsive today but not talking to me though her son who is at the bedside states that she occasionally says a word. She is still on 1 pressor. She has no fevers and her WBC count is decreasing now. She is Bacteremic with a ESBL E. Coli and so her antibiotics were changed to Invanz 1 gm iv q24hrs.    Meds:  ertapenem  IVPB 1000 milliGRAM(s) IV Intermittent every 24 hours    Allergies    penicillin (Unknown)    Intolerances        VITALS:  Vital Signs Last 24 Hrs  T(C): 36.7 (02 Mar 2023 11:30), Max: 37 (02 Mar 2023 05:00)  T(F): 98.1 (02 Mar 2023 11:30), Max: 98.6 (02 Mar 2023 05:00)  HR: 99 (02 Mar 2023 16:00) (91 - 129)  BP: 107/62 (02 Mar 2023 16:00) (74/54 - 150/80)  BP(mean): 72 (02 Mar 2023 16:00) (59 - 114)  RR: 24 (02 Mar 2023 16:00) (0 - 30)  SpO2: 97% (02 Mar 2023 16:00) (60% - 100%)    Parameters below as of 02 Mar 2023 12:00  Patient On (Oxygen Delivery Method): nasal cannula    O2 Concentration (%): 2    LABS/DIAGNOSTIC TESTS:                          13.2   21.89 )-----------( 160      ( 02 Mar 2023 04:02 )             45.3         03-02    141  |  110<H>  |  29<H>  ----------------------------<  188<H>  3.9   |  26  |  1.22    Ca    8.6      02 Mar 2023 04:02  Phos  3.3     03-02  Mg     1.9     03-02    TPro  6.0  /  Alb  2.1<L>  /  TBili  0.4  /  DBili  x   /  AST  17  /  ALT  17  /  AlkPhos  77  03-02      LIVER FUNCTIONS - ( 02 Mar 2023 04:02 )  Alb: 2.1 g/dL / Pro: 6.0 g/dL / ALK PHOS: 77 U/L / ALT: 17 U/L DA / AST: 17 U/L / GGT: x             CULTURES: Clean Catch Clean Catch (Midstream)  02-28 @ 18:50   >100,000 CFU/ml Escherichia coli  <10,000 CFU/ml Normal Urogenital nakul present  --  --      .Blood Blood-Peripheral  02-28 @ 18:40   No growth to date.  --  Blood Culture PCR            RADIOLOGY:      ROS:  [ x ] UNABLE TO ELICIT

## 2023-03-02 NOTE — DIETITIAN INITIAL EVALUATION ADULT - PERTINENT MEDS FT
MEDICATIONS  (STANDING):  apixaban 5 milliGRAM(s) Oral every 12 hours  aspirin  chewable 81 milliGRAM(s) Oral daily  atorvastatin 40 milliGRAM(s) Oral at bedtime  chlorhexidine 2% Cloths 1 Application(s) Topical <User Schedule>  ertapenem  IVPB 1000 milliGRAM(s) IV Intermittent every 24 hours  influenza  Vaccine (HIGH DOSE) 0.7 milliLiter(s) IntraMuscular once  levETIRAcetam 750 milliGRAM(s) Oral two times a day  melatonin 5 milliGRAM(s) Oral at bedtime  multivitamin 1 Tablet(s) Oral daily  norepinephrine Infusion 0.05 MICROgram(s)/kG/Min (8.44 mL/Hr) IV Continuous <Continuous>  pantoprazole    Tablet 40 milliGRAM(s) Oral before breakfast    MEDICATIONS  (PRN):  sodium chloride 0.9% lock flush 10 milliLiter(s) IV Push every 1 hour PRN Pre/post blood products, medications, blood draw, and to maintain line patency

## 2023-03-02 NOTE — PROGRESS NOTE ADULT - ASSESSMENT
77-year-old female, from home, lives with son, has HHA, bedbound, AAO x 1 at baseline, non verbal, answers yes or no and moans, has past medical history of CVA, with right sided residual paralysis, seizures on Keppra, hyperlipidemia, atrial fibrillation on Eliquis and atenolol, hypotension, bilateral ICA stenosis and recurrent UTI's at baseline is admitted to ICU for septic shock     DX:  - Septic shock 2/2 UTI and aspiration pneumonia   - AHRF 2/2 Pneumonia   - Acute encephalopathy   - DEJA  - H/o CVA  - H/o seizures   - Atrial fibrillation   - HLD    =================== Neuro============================  Alert and oriented x 1 at baseline   Pain management with Tylenol    #Acute encephalopathy  - Patient admitted for acute encephalopathy   - Metabolic and Infectious   - CT Head is neg for hemorrhage or mass  - CT Chest Moderate right and small left pleural effusion, consolidative opacities, most pronounced in both lower lobes,   -  Pureed Diet  - Fall precautions/Aspiration precautions/Seizure precautions  - c/w  antibiotics ertapenem   - Bcx showed ESBL Ecoli, Ucx Ecoli,   - ESR, CRP neg   - legionella, streptococcus, HIV Neg  - Lactate 1.8  - f/u repeat BCx, MRSA PCR, sputum cx, mycoplasma,   - ID consulted Dr. ruff    #H/o CVA  - Right sided paralysis   - On asa and Eliquis at home  - c/w home meds     #H/o seizures  - Patient on Keppra at home  - C/w home medications    ================= Cardiovascular==========================  #Septic shock 2/2 UTI and aspiration pneumonia  - On admission, patient with tachycardia, hypotension, leucocytosis, elevated lactate, UA + and RLL pneumonia   - In ED s/p 3L IVF , Vancomycin, Rocephin and Zithromax x1   - Started on Meropenem and Zithromax, pressor support and Tylenol for fever  - Monitor for any signs of hemodynamic instability  - c/w pressor levo 0.15 today      #Atrial fibrillation   - Patient on Eliquis and atenolol  - hold BB while on pressors   - c/w Eliquis     ================- Pulm=================================  #AHRF 2/2 Pneumonia   - Patient with hypoxia requiring o2 supplementation   - On exam, bilateral wheezing rhonchi  - CXR showed RLL pneumonia   - S/p Vancomycin, Rocephin and Zithromax   - c/w Ertapenem  - BCx grows ESBL Ecoli  - f/u repeat BCx  - ID consulted Dr. Ruff    # Pleural effusion  -CT chest shows Moderate right and small left pleural effusion  - Pt in no respiratory distress  - Pt is high risk of procedure due to agitation and not following commands  - will hold of thoracentesis for now  - plan was discussed with family     ==================ID===================================  #Septic shock 2/2 UTI and aspiration pneumonia  - Plan as above      #AHRF 2/2 Pneumonia  - Plan as above      ================= Nephro================================  - On admission, patient with Cr 1.1, baseline 0.6  - DEJA most likely pre renal in the setting of acute infection, poor oral intake  - Monitor I/O's daily  - Avoid nephrotoxins, NSAIDS, ACEI and ARBS  - S/p 3L IVF in ED  - Monitor BMP daily  - Cr 1.22 3/2    =================GI====================================  Pureed diet  ================ Heme==================================  No acute issues     =================Endocrine===============================  #Thyroid nodule  - CT shows thyroid lobe nodules   - nonemergent ultrasound correlation o/p   - TSH is normal      ================= Skin/Catheters============================  Peripheral IV lines   Ly catheter   RIJ 2/28    =================Prophylaxis =============================  Eliquis   GI prophylaxis with PPI daily    ==================GOC==================================  DNR DNI   Disposition ICU

## 2023-03-02 NOTE — DIETITIAN INITIAL EVALUATION ADULT - ORAL INTAKE PTA/DIET HISTORY
son cuts up food into small pieces (has 4 teeth in mouth), regular liquids ( a lot of orange juice).Pt eats 2 meals/ day (breakfast and dinner so gives extra food at breakfast), pt does not like lunch. Pt took Ensure at some time PTA (previous admission, but stopped liking it/ stopped taking it). Son reports pt will not eat puree food (so food texture was upgraded previous admission: soft/ bite size)

## 2023-03-02 NOTE — DIETITIAN INITIAL EVALUATION ADULT - OTHER INFO
Pt seen in AM and PM, asleep. Son present at PM and provides all info, reports pt cannot provide verbal info. Pt lives with son. He reports he or aide will be at hospital to feed pt. (see info above). He reports pt feeds herself at home, meals can take 2 hours. Previous admission seen by RD (ERIBERTO Joe) weight then 195# (some gain). Pt noted w/ DTI coccyx. Discussed w/ PGY1, SLP ordered. Son wants also provide foods and liquids pt likes and wants ("QOL")

## 2023-03-02 NOTE — PROGRESS NOTE ADULT - SUBJECTIVE AND OBJECTIVE BOX
INTERVAL HPI/OVERNIGHT EVENTS:   Overnight, pt was sleepless but was not agitated. Heparin was held for tentative thoracentesis today. pt. seen and examined at bedside, offers no complaints. pt is AOx1 at baseline. Unable to obtain ROS due to pt nonverbal and mental status.    PRESSORS: [x ] YES [ ] NO  WHICH: Norepinephrine 0.15    ANTIBIOTICS: ertapenem  IVPB 1000 milliGRAM(s) IV Intermittent every 24 hours DATE STARTED: 3/2    Antimicrobial:  ertapenem  IVPB 1000 milliGRAM(s) IV Intermittent every 24 hours    Cardiovascular:  norepinephrine Infusion 0.05 MICROgram(s)/kG/Min IV Continuous <Continuous>    Pulmonary:    Hematalogic:    Other:  chlorhexidine 2% Cloths 1 Application(s) Topical <User Schedule>  influenza  Vaccine (HIGH DOSE) 0.7 milliLiter(s) IntraMuscular once  insulin lispro (ADMELOG) corrective regimen sliding scale   SubCutaneous every 6 hours  levETIRAcetam  IVPB 750 milliGRAM(s) IV Intermittent every 12 hours  melatonin 5 milliGRAM(s) Oral at bedtime  pantoprazole  Injectable 40 milliGRAM(s) IV Push daily  sodium chloride 0.9% lock flush 10 milliLiter(s) IV Push every 1 hour PRN      Drug Dosing Weight  Height (cm): 172.7 (2023 22:45)  Weight (kg): 91.7 (2023 22:45)  BMI (kg/m2): 30.7 (2023 22:45)  BSA (m2): 2.05 (2023 22:45)    CENTRAL LINE: [x ] YES [ ] NO  LOCATION: [facility]   DATE INSERTED:   REMOVE: [ ] YES [ ] NO  EXPLAIN:    CHACON: [x ] YES [ ] NO    DATE INSERTED:   REMOVE:  [ ] YES [ ] NO  EXPLAIN:    A-LINE:  [ ] YES [ ] NO  LOCATION:   DATE INSERTED:  REMOVE:  [ ] YES [ ] NO  EXPLAIN:    PMH/Social Hx/Fam Hx -reviewed admission note, no change since admission  PAST MEDICAL & SURGICAL HISTORY:  CVA (cerebrovascular accident)      Atrial fibrillation      Carotid stenosis      Seizures      Hyperlipidemia      No significant past surgical history        Heart faliure: acute [ ] chronic [ ] acute or chronic [ ] diastolic [ ] systolic [ ] combied systolic and diastolic[ ]  DEJA: ATN[ ] renal medullary necrosis [ ] CKD I [ ]CKDII [ ]CKD III [ ]CKD IV [ ]CKD V [ ]Other pathological lesions [ ]  Abdominal Nutrition Status: malnutrition [ ] cachexia [ ] morbid obesity/BMI=40 [ ] Supplement ordered [___________]     T(C): 36.7 (23 @ 11:30), Max: 37 (23 @ 05:00)  HR: 127 (23 @ 13:45)  BP: 124/78 (23 @ 13:45)  BP(mean): 89 (23 @ 13:45)  ABP: --  ABP(mean): --  RR: 24 (23 @ 13:45)  SpO2: 91% (23 @ 13:45)  Wt(kg): --           @ 07:01  -   @ 07:00  --------------------------------------------------------  IN: 1739.7 mL / OUT: 650 mL / NET: 1089.7 mL            PHYSICAL EXAM:    GENERAL: NAD, overweight, deconditioned, sat well on RA, pale, poor hygiene, temporal wasting   HEAD:  Atraumatic, Normocephalic  EYES:  conjunctiva and sclera clear and pale   NECK: Supple, No JVD, Normal thyroid  CHEST/LUNG: diminished lung sounds bilaterally; not following commands to take deep breathes; No rales, rhonchi, wheezing, or rubs  HEART: Regular rate and rhythm; No murmurs, rubs, or gallops  ABDOMEN: Soft, Nontender, Nondistended; Bowel sounds present, no pain or masses on palpation, umbilical hernia present, abd rash with erythema   NERVOUS SYSTEM:  drowsy, non verbal, contracted, right sided paralysis, moaning sounds at baseline  EXTREMITIES:  2+ Peripheral Pulses, No clubbing, cyanosis, or edema, long, thick, yellow toe nails   : Chacon placed in ED   SKIN: warm, dry    LABS:  CBC Full  -  ( 02 Mar 2023 04:02 )  WBC Count : 21.89 K/uL  RBC Count : 4.46 M/uL  Hemoglobin : 13.2 g/dL  Hematocrit : 45.3 %  Platelet Count - Automated : 160 K/uL  Mean Cell Volume : 101.6 fl  Mean Cell Hemoglobin : 29.6 pg  Mean Cell Hemoglobin Concentration : 29.1 gm/dL  Auto Neutrophil # : 18.58 K/uL  Auto Lymphocyte # : 1.26 K/uL  Auto Monocyte # : 1.76 K/uL  Auto Eosinophil # : 0.08 K/uL  Auto Basophil # : 0.08 K/uL  Auto Neutrophil % : 84.8 %  Auto Lymphocyte % : 5.8 %  Auto Monocyte % : 8.0 %  Auto Eosinophil % : 0.4 %  Auto Basophil % : 0.4 %    03-02    141  |  110<H>  |  29<H>  ----------------------------<  188<H>  3.9   |  26  |  1.22    Ca    8.6      02 Mar 2023 04:02  Phos  3.3     03-02  Mg     1.9     03-02    TPro  6.0  /  Alb  2.1<L>  /  TBili  0.4  /  DBili  x   /  AST  17  /  ALT  17  /  AlkPhos  77  03-02    PT/INR - ( 2023 21:30 )   PT: 24.7 sec;   INR: 2.06 ratio         PTT - ( 02 Mar 2023 05:50 )  PTT:103.7 sec  Urinalysis Basic - ( 2023 18:50 )    Color: Pale Yellow / Appearance: very cloudy / S.015 / pH: x  Gluc: x / Ketone: Negative  / Bili: Negative / Urobili: Negative   Blood: x / Protein: 100 / Nitrite: Positive   Leuk Esterase: Moderate / RBC: 2-5 /HPF / WBC >50 /HPF   Sq Epi: x / Non Sq Epi: Few /HPF / Bacteria: Many /HPF      Culture Results:   >100,000 CFU/ml Escherichia coli  <10,000 CFU/ml Normal Urogenital naklu present ( @ 18:50)  Culture Results:   No growth to date. ( @ 18:40)  Culture Results:   Growth in aerobic bottle: Escherichia coli  ***Blood Panel PCR results on this specimen are available  approximately 3 hours after the Gram stain result.***  Gram stain, PCR, and/or culture results may not always  correspond due to difference in methodologies.  ************************************************************  This PCR assay was performed by multiplex PCR. This  Assay tests for 66 bacterial and resistance gene targets.  Please refer to the [facility] test directory  at [email or web address] for details. ( @ 18:40)      RADIOLOGY & ADDITIONAL STUDIES REVIEWED:      [ ]GOALS OF CARE DISCUSSION WITH PATIENT/FAMILY/PROXY:    CRITICAL CARE TIME SPENT: 35 minutes

## 2023-03-02 NOTE — PROGRESS NOTE ADULT - ASSESSMENT
Septic shock  Bacteremia - with ESBL E. Coli.  UTI  Bilat Pneumonia  Fevers - improved  Leukocytosis - decreasing      Plan - Antibiotics switched to Invanz 1 gm iv q24hrs  repeat blood cultures  Time spent - 32 mins

## 2023-03-02 NOTE — DIETITIAN INITIAL EVALUATION ADULT - NSFNSPHYEXAMSKINFT_GEN_A_CORE
Pressure Injury 1: coccyx, Suspected deep tissue injury  Pressure Injury 2: Bilateral:,heel, Stage I  Pressure Injury 3: none, none  Pressure Injury 4: none, none  Pressure Injury 5: none, none  Pressure Injury 6: none, none  Pressure Injury 7: none, none  Pressure Injury 8: none, none  Pressure Injury 9: none, none  Pressure Injury 10: none, none  Pressure Injury 11: none, none, Pressure Injury 1: coccyx, Suspected deep tissue injury  Pressure Injury 2: Bilateral:,heel, Stage I  Pressure Injury 3: none, none  Pressure Injury 4: none, none  Pressure Injury 5: none, none  Pressure Injury 6: none, none  Pressure Injury 7: none, none  Pressure Injury 8: none, none  Pressure Injury 9: none, none  Pressure Injury 10: none, none  Pressure Injury 11: none, none

## 2023-03-02 NOTE — DIETITIAN INITIAL EVALUATION ADULT - ADD RECOMMEND
SLP ordered (consider diet upgrade to soft/ bite size/ least restrictive therapeutically re: QOL). MD to monitor. RD available.

## 2023-03-03 NOTE — PROGRESS NOTE ADULT - ASSESSMENT
Septic shock  Bacteremia - with ESBL E. Coli.  UTI  Bilat Pneumonia  Fevers - improved  Leukocytosis - decreasing      Plan - Continue Invanz 1 gm iv q24hrs  repeat blood cultures sent yesterday, awaiting results   Time spent - 34 mins

## 2023-03-03 NOTE — SWALLOW BEDSIDE ASSESSMENT ADULT - SLP PERTINENT HISTORY OF CURRENT PROBLEM
77-year-old female, from home, lives with son, has HHA, bedbound, AAO x 1 at baseline, non verbal, answers yes or no and moans, has past medical history of CVA, with right sided residual paralysis, seizures on Keppra, hyperlipidemia, atrial fibrillation on Eliquis and atenolol, hypotension, bilateral ICA stenosis and recurrent UTI's at baseline is admitted to ICU for septic shock

## 2023-03-03 NOTE — SWALLOW BEDSIDE ASSESSMENT ADULT - SWALLOW EVAL: FUNCTIONAL LEVEL AT TIME OF EVAL
Nonverbal, bedbound. Gestures, nods & facial expressions to communicate. Dependent for feeding at this exam.

## 2023-03-03 NOTE — PROGRESS NOTE ADULT - ASSESSMENT
77-year-old female, from home, lives with son, has HHA, bedbound, AAO x 1 at baseline, non verbal, answers yes or no and moans, has past medical history of CVA, with right sided residual paralysis, seizures on Keppra, hyperlipidemia, atrial fibrillation on Eliquis and atenolol, hypotension, bilateral ICA stenosis and recurrent UTI's at baseline is admitted to ICU for septic shock     DX:  - Septic shock 2/2 UTI and aspiration pneumonia   - AHRF 2/2 Pneumonia   - Acute encephalopathy   - DEJA  - H/o CVA  - H/o seizures   - Atrial fibrillation   - HLD    =================== Neuro============================  Alert and oriented x 1 at baseline   Pain management with Tylenol    #Acute encephalopathy  - Patient admitted for acute encephalopathy   - Metabolic and Infectious   - CT Head is neg for hemorrhage or mass  - CT Chest Moderate right and small left pleural effusion, consolidative opacities, most pronounced in both lower lobes,   -  Pureed Diet  - Fall precautions/Aspiration precautions/Seizure precautions  - c/w  antibiotics ertapenem   - Bcx showed ESBL Ecoli, Ucx Ecoli,   - ESR, CRP neg   - legionella, streptococcus, HIV Neg  - Lactate 1.8  - f/u repeat BCx, MRSA PCR, sputum cx, mycoplasma,   - ID consulted Dr. ruff    #H/o CVA  - Right sided paralysis   - On asa and Eliquis at home  - c/w home meds     #H/o seizures  - Patient on Keppra at home  - C/w home medications    ================= Cardiovascular==========================  #Septic shock 2/2 UTI and aspiration pneumonia  - On admission, patient with tachycardia, hypotension, leucocytosis, elevated lactate, UA + and RLL pneumonia   - In ED s/p 3L IVF , Vancomycin, Rocephin and Zithromax x1   - Started on Meropenem and Zithromax, pressor support and Tylenol for fever  - Monitor for any signs of hemodynamic instability  - c/w pressor levo 0.15 today      #Atrial fibrillation   - Patient on Eliquis and atenolol  - hold BB while on pressors   - c/w Eliquis     ================- Pulm=================================  #AHRF 2/2 Pneumonia   - Patient with hypoxia requiring o2 supplementation   - On exam, bilateral wheezing rhonchi  - CXR showed RLL pneumonia   - S/p Vancomycin, Rocephin and Zithromax   - c/w Ertapenem  - BCx grows ESBL Ecoli  - f/u repeat BCx  - ID consulted Dr. Ruff    # Pleural effusion  -CT chest shows Moderate right and small left pleural effusion  - Pt in no respiratory distress  - Pt is high risk of procedure due to agitation and not following commands  - will hold of thoracentesis for now  - plan was discussed with family     ==================ID===================================  #Septic shock 2/2 UTI and aspiration pneumonia  - Plan as above      #AHRF 2/2 Pneumonia  - Plan as above      ================= Nephro================================  - On admission, patient with Cr 1.1, baseline 0.6  - DEJA most likely pre renal in the setting of acute infection, poor oral intake  - Monitor I/O's daily  - Avoid nephrotoxins, NSAIDS, ACEI and ARBS  - S/p 3L IVF in ED  - Monitor BMP daily  - Cr 1.22 3/2    =================GI====================================  Pureed diet  ================ Heme==================================  No acute issues     =================Endocrine===============================  #Thyroid nodule  - CT shows thyroid lobe nodules   - nonemergent ultrasound correlation o/p   - TSH is normal      ================= Skin/Catheters============================  Peripheral IV lines   Ly catheter   RIJ 2/28    =================Prophylaxis =============================  Eliquis   GI prophylaxis with PPI daily    ==================GOC==================================  DNR DNI   Disposition ICU   77-year-old female, from home, lives with son, has HHA, bedbound, AAO x 1 at baseline, non verbal, answers yes or no and moans, has past medical history of CVA, with right sided residual paralysis, seizures on Keppra, hyperlipidemia, atrial fibrillation on Eliquis and atenolol, hypotension, bilateral ICA stenosis and recurrent UTI's at baseline is admitted to ICU for septic shock     DX:  - Septic shock 2/2 UTI and aspiration pneumonia   - AHRF 2/2 Pneumonia   - Acute encephalopathy   - DEJA  - H/o CVA  - H/o seizures   - Atrial fibrillation   - HLD    =================== Neuro============================  Alert and oriented x 1 at baseline   Pain management with Tylenol    #Acute encephalopathy  - Patient admitted for acute encephalopathy   - Metabolic and Infectious   - CT Head is neg for hemorrhage or mass  - CT Chest Moderate right and small left pleural effusion, consolidative opacities, most pronounced in both lower lobes,   -  Pureed Diet  - Fall precautions/Aspiration precautions/Seizure precautions  - c/w  antibiotics ertapenem   - Bcx showed ESBL Ecoli, Ucx Ecoli,   - ESR, CRP neg   - legionella, streptococcus, HIV, MRSA  Neg  - Lactate 1.8  - f/u repeat BCx, mycoplasma  - ID consulted Dr. ruff    #H/o CVA  - Right sided paralysis   - On asa and Eliquis at home  - c/w home meds     #H/o seizures  - Patient on Keppra at home  - C/w home medications    ================= Cardiovascular==========================  #Septic shock 2/2 UTI and aspiration pneumonia  - On admission, patient with tachycardia, hypotension, leucocytosis, elevated lactate, UA + and RLL pneumonia   - In ED s/p 3L IVF , Vancomycin, Rocephin and Zithromax x1   - Started on Meropenem and Zithromax, pressor support and Tylenol for fever  - Monitor for any signs of hemodynamic instability  - d/c levo today        #Atrial fibrillation   - Patient on Eliquis and atenolol  - hold BB while on pressors   - c/w Eliquis, atenolol    ================- Pulm=================================  #AHRF 2/2 Pneumonia   - Patient with hypoxia requiring o2 supplementation   - On exam, bilateral wheezing rhonchi  - CXR showed RLL pneumonia   - S/p Vancomycin, Rocephin and Zithromax   - c/w Ertapenem  - BCx grows ESBL Ecoli  - f/u repeat BCx  - ID consulted Dr. Ruff    # Pleural effusion  -CT chest shows Moderate right and small left pleural effusion  - Pt in no respiratory distress  - Thoracentesis, risk outweigh benefit. will hold off thoracentesis   - plan was discussed with family     ==================ID===================================  #Septic shock 2/2 UTI and aspiration pneumonia  - Plan as above      #AHRF 2/2 Pneumonia  - Plan as above      ================= Nephro================================  - On admission, patient with Cr 1.1, baseline 0.6  - DEJA most likely pre renal in the setting of acute infection, poor oral intake  - Monitor I/O's daily  - Avoid nephrotoxins, NSAIDS, ACEI and ARBS  - S/p 3L IVF in ED  - Monitor BMP daily  - Cr 0.95 3/3    =================GI====================================  Pureed diet  ================ Heme==================================  No acute issues     =================Endocrine===============================  #Thyroid nodule  - CT shows thyroid lobe nodules   - nonemergent ultrasound correlation o/p   - TSH is normal      ================= Skin/Catheters============================  # Dermatitis of the skin  Pt has abdominal rash with erythematous, raised papules  likely fungal infection contact dermatitis  started Nystatin powder     Peripheral IV lines   Ly catheter   RIJ 2/28    =================Prophylaxis =============================  Eliquis   GI prophylaxis with PPI daily    ==================GOC==================================  DNR DNI   Disposition ICU

## 2023-03-03 NOTE — SWALLOW BEDSIDE ASSESSMENT ADULT - COMMENTS
Pt alert/awake; HOB elevated to 45°; body lists to the left; was being fed by HHA (Teri). Pt has difficulty being upright 90° & does not tolerate being repositioned. Oral residue cleared with liquid wash.

## 2023-03-03 NOTE — PROGRESS NOTE ADULT - SUBJECTIVE AND OBJECTIVE BOX
PGY-1 Progress Note discussed with attending    PAGER #: [[phone]] TILL 5:00 PM  PLEASE CONTACT ON CALL TEAM:  - On Call Team (Please refer to Marta) FROM 5:00 PM - 8:30PM  - Nightfloat Team FROM 8:30 -7:30 AM    CHIEF COMPLAINT & BRIEF HOSPITAL COURSE:    INTERVAL HPI/OVERNIGHT EVENTS:       REVIEW OF SYSTEMS:  CONSTITUTIONAL: No fever, weight loss, or fatigue  RESPIRATORY: No cough, wheezing, chills or hemoptysis; No shortness of breath  CARDIOVASCULAR: No chest pain, palpitations, dizziness, or leg swelling  GASTROINTESTINAL: No abdominal pain. No nausea, vomiting, or hematemesis; No diarrhea or constipation. No melena or hematochezia.  GENITOURINARY: No dysuria or hematuria, urinary frequency  NEUROLOGICAL: No headaches, memory loss, loss of strength, numbness, or tremors  SKIN: No itching, burning, rashes, or lesions     Vital Signs Last 24 Hrs  T(C): 36.4 (02 Mar 2023 20:47), Max: 36.7 (02 Mar 2023 11:30)  T(F): 97.6 (02 Mar 2023 20:47), Max: 98.1 (02 Mar 2023 11:30)  HR: 102 (03 Mar 2023 08:00) (89 - 129)  BP: 111/61 (03 Mar 2023 08:00) (80/48 - 151/98)  BP(mean): 74 (03 Mar 2023 08:00) (56 - 114)  RR: 12 (03 Mar 2023 08:00) (12 - 28)  SpO2: 95% (03 Mar 2023 08:00) (83% - 100%)    Parameters below as of 02 Mar 2023 12:00  Patient On (Oxygen Delivery Method): nasal cannula    O2 Concentration (%): 2    PHYSICAL EXAMINATION:  GENERAL: NAD, well built  HEAD:  Atraumatic, Normocephalic  EYES:  conjunctiva and sclera clear  NECK: Supple, No JVD, Normal thyroid  CHEST/LUNG: Clear to auscultation. Clear to percussion bilaterally; No rales, rhonchi, wheezing, or rubs  HEART: Regular rate and rhythm; No murmurs, rubs, or gallops  ABDOMEN: Soft, Nontender, Nondistended; Bowel sounds present  NERVOUS SYSTEM:  Alert & Oriented X3,    EXTREMITIES:  2+ Peripheral Pulses, No clubbing, cyanosis, or edema  SKIN: warm dry                          12.3   14.13 )-----------( 157      ( 03 Mar 2023 04:22 )             40.0     03-03    144  |  112<H>  |  30<H>  ----------------------------<  176<H>  3.6   |  27  |  0.95    Ca    8.5      03 Mar 2023 04:22  Phos  2.0     03-03  Mg     1.9     03-03    TPro  5.6<L>  /  Alb  2.0<L>  /  TBili  0.4  /  DBili  x   /  AST  14  /  ALT  17  /  AlkPhos  71  03-03    LIVER FUNCTIONS - ( 03 Mar 2023 04:22 )  Alb: 2.0 g/dL / Pro: 5.6 g/dL / ALK PHOS: 71 U/L / ALT: 17 U/L DA / AST: 14 U/L / GGT: x               PTT - ( 02 Mar 2023 05:50 )  PTT:103.7 sec    CAPILLARY BLOOD GLUCOSE      RADIOLOGY & ADDITIONAL TESTS:                   INTERVAL HPI/OVERNIGHT EVENTS:   no acute overnight events, pt. seen and examined at bedside, pt looks much better today. She is more responsive today and was able to tell me that "she's fine."    PRESSORS: [x ] YES [ ] NO  WHICH:    ANTIBIOTICS:                  DATE STARTED:  ANTIBIOTICS:                  DATE STARTED:  ANTIBIOTICS:                  DATE STARTED:    Antimicrobial:  ertapenem  IVPB 1000 milliGRAM(s) IV Intermittent every 24 hours    Cardiovascular:  atenolol  Tablet 100 milliGRAM(s) Oral daily  norepinephrine Infusion 0.05 MICROgram(s)/kG/Min IV Continuous <Continuous>    Pulmonary:    Hematalogic:  apixaban 5 milliGRAM(s) Oral every 12 hours  aspirin  chewable 81 milliGRAM(s) Oral daily    Other:  atorvastatin 40 milliGRAM(s) Oral at bedtime  chlorhexidine 2% Cloths 1 Application(s) Topical <User Schedule>  clotrimazole 1% Vaginal Cream 1 Applicatorful Vaginal at bedtime  influenza  Vaccine (HIGH DOSE) 0.7 milliLiter(s) IntraMuscular once  levETIRAcetam 750 milliGRAM(s) Oral two times a day  melatonin 5 milliGRAM(s) Oral at bedtime  multivitamin 1 Tablet(s) Oral daily  mupirocin 2% Ointment 1 Application(s) Topical two times a day  pantoprazole    Tablet 40 milliGRAM(s) Oral before breakfast  sodium chloride 0.9% lock flush 10 milliLiter(s) IV Push every 1 hour PRN      Drug Dosing Weight  Height (cm): 172.7 (02 Mar 2023 16:55)  Weight (kg): 91.7 (28 Feb 2023 22:45)  BMI (kg/m2): 30.7 (02 Mar 2023 16:55)  BSA (m2): 2.05 (02 Mar 2023 16:55)    CENTRAL LINE: [ ] YES [ ] NO  LOCATION:   DATE INSERTED:  REMOVE: [ ] YES [ ] NO  EXPLAIN:    CHACON: [ ] YES [ ] NO    DATE INSERTED:  REMOVE:  [ ] YES [ ] NO  EXPLAIN:    A-LINE:  [ ] YES [ ] NO  LOCATION:   DATE INSERTED:  REMOVE:  [ ] YES [ ] NO  EXPLAIN:    PMH/Social Hx/Fam Hx -reviewed admission note, no change since admission  PAST MEDICAL & SURGICAL HISTORY:  CVA (cerebrovascular accident)      Atrial fibrillation      Carotid stenosis      Seizures      Hyperlipidemia      No significant past surgical history        Heart faliure: acute [ ] chronic [ ] acute or chronic [ ] diastolic [ ] systolic [ ] combied systolic and diastolic[ ]  DEJA: ATN[ ] renal medullary necrosis [ ] CKD I [ ]CKDII [ ]CKD III [ ]CKD IV [ ]CKD V [ ]Other pathological lesions [ ]  Abdominal Nutrition Status: malnutrition [ ] cachexia [ ] morbid obesity/BMI=40 [ ] Supplement ordered [___________]     T(C): 36.4 (03-02-23 @ 20:47), Max: 36.4 (03-02-23 @ 20:47)  HR: 128 (03-03-23 @ 14:15)  BP: 101/51 (03-03-23 @ 14:15)  BP(mean): 63 (03-03-23 @ 14:15)  ABP: --  ABP(mean): --  RR: 25 (03-03-23 @ 14:15)  SpO2: 96% (03-03-23 @ 14:15)  Wt(kg): --          03-02 @ 07:01  -  03-03 @ 07:00  --------------------------------------------------------  IN: 1123.8 mL / OUT: 675 mL / NET: 448.8 mL            PHYSICAL EXAM:    GENERAL: [ ]NAD, [ ]well-groomed, [ ]well-developed  HEAD:  [ ]Atraumatic, [ ]Normocephalic  EYES: [ ]EOMI, [ ]PERRLA, [ ]conjunctiva and sclera clear  ENMT: [ ]No tonsillar erythema, exudates, or enlargement; [ ]Moist mucous membranes, [ ]Good dentition, [ ]No lesions  NECK: [ ]Supple, normal appearance, [ ]No JVD; [ ]Normal thyroid; [ ]Trachea midline  NERVOUS SYSTEM:  [ ]Alert & Oriented X3, [ ]Good concentration; [ ]Motor Strength 5/5 B/L upper and lower extremities; [ ]DTRs 2+ intact and symmetric  CHEST/LUNG: [ ]No chest deformity; [ ]Normal percussion bilaterally; [ ]No rales, rhonchi, wheezing; [ ]Crackles at bases  HEART: [ ]Regular rate and rhythm; [ ]No murmurs, rubs, or gallops  ABDOMEN: [ ]Soft, Nontender, Nondistended; [ ]Bowel sounds present  EXTREMITIES:  [ ]2+ Peripheral Pulses, [ ]No clubbing, cyanosis, or edema [ ]Bilat lower extremity edema  LYMPH: [ ]No lymphadenopathy noted  SKIN: [ ]No rashes or lesions; [ ]Good capillary refill      LABS:  CBC Full  -  ( 03 Mar 2023 04:22 )  WBC Count : 14.13 K/uL  RBC Count : 4.06 M/uL  Hemoglobin : 12.3 g/dL  Hematocrit : 40.0 %  Platelet Count - Automated : 157 K/uL  Mean Cell Volume : 98.5 fl  Mean Cell Hemoglobin : 30.3 pg  Mean Cell Hemoglobin Concentration : 30.8 gm/dL  Auto Neutrophil # : 11.30 K/uL  Auto Lymphocyte # : 1.16 K/uL  Auto Monocyte # : 1.37 K/uL  Auto Eosinophil # : 0.18 K/uL  Auto Basophil # : 0.06 K/uL  Auto Neutrophil % : 80.0 %  Auto Lymphocyte % : 8.2 %  Auto Monocyte % : 9.7 %  Auto Eosinophil % : 1.3 %  Auto Basophil % : 0.4 %    03-03    144  |  112<H>  |  30<H>  ----------------------------<  176<H>  3.6   |  27  |  0.95    Ca    8.5      03 Mar 2023 04:22  Phos  2.0     03-03  Mg     1.9     03-03    TPro  5.6<L>  /  Alb  2.0<L>  /  TBili  0.4  /  DBili  x   /  AST  14  /  ALT  17  /  AlkPhos  71  03-03    PTT - ( 02 Mar 2023 05:50 )  PTT:103.7 sec    Culture Results:   >100,000 CFU/ml Escherichia coli ESBL  <10,000 CFU/ml Normal Urogenital nakul present (02-28 @ 18:50)  Culture Results:   No growth to date. (02-28 @ 18:40)  Culture Results:   Growth in aerobic bottle: Escherichia coli ESBL  ***Blood Panel PCR results on this specimen are available  approximately 3 hours after the Gram stain result.***  Gram stain, PCR, and/or culture results may not always  correspond due to difference in methodologies.  ************************************************************  This PCR assay was performed by multiplex PCR. This  Assay tests for 66 bacterial and resistance gene targets.  Please refer to the [facility] test directory  at [email or web address] for details. (02-28 @ 18:40)      RADIOLOGY & ADDITIONAL STUDIES REVIEWED:      [ ]GOALS OF CARE DISCUSSION WITH PATIENT/FAMILY/PROXY:    CRITICAL CARE TIME SPENT: 35 minutes INTERVAL HPI/OVERNIGHT EVENTS:   no acute overnight events, pt. seen and examined at bedside, pt looks much better today. She is more responsive today and was able to tell me that "she's fine."    PRESSORS: [x ] YES [ ] NO  WHICH:    ANTIBIOTICS:  Ertapenem                DATE STARTED: 3/2  ANTIBIOTICS:                  DATE STARTED:  ANTIBIOTICS:                  DATE STARTED:    Antimicrobial:  ertapenem  IVPB 1000 milliGRAM(s) IV Intermittent every 24 hours    Cardiovascular:  atenolol  Tablet 100 milliGRAM(s) Oral daily  norepinephrine Infusion 0.05 MICROgram(s)/kG/Min IV Continuous <Continuous>    Pulmonary:    Hematalogic:  apixaban 5 milliGRAM(s) Oral every 12 hours  aspirin  chewable 81 milliGRAM(s) Oral daily    Other:  atorvastatin 40 milliGRAM(s) Oral at bedtime  chlorhexidine 2% Cloths 1 Application(s) Topical <User Schedule>  clotrimazole 1% Vaginal Cream 1 Applicatorful Vaginal at bedtime  influenza  Vaccine (HIGH DOSE) 0.7 milliLiter(s) IntraMuscular once  levETIRAcetam 750 milliGRAM(s) Oral two times a day  melatonin 5 milliGRAM(s) Oral at bedtime  multivitamin 1 Tablet(s) Oral daily  mupirocin 2% Ointment 1 Application(s) Topical two times a day  pantoprazole    Tablet 40 milliGRAM(s) Oral before breakfast  sodium chloride 0.9% lock flush 10 milliLiter(s) IV Push every 1 hour PRN      Drug Dosing Weight  Height (cm): 172.7 (02 Mar 2023 16:55)  Weight (kg): 91.7 (28 Feb 2023 22:45)  BMI (kg/m2): 30.7 (02 Mar 2023 16:55)  BSA (m2): 2.05 (02 Mar 2023 16:55)    CENTRAL LINE: [x ] YES [ ] NO  LOCATION: [facility]  DATE INSERTED: 2/28  REMOVE: [ ] YES [ ] NO  EXPLAIN:    CHACON: [x ] YES [ ] NO    DATE INSERTED: 2/28  REMOVE:  [ ] YES [ ] NO  EXPLAIN:    A-LINE:  [ ] YES [x ] NO  LOCATION:   DATE INSERTED:  REMOVE:  [ ] YES [ ] NO  EXPLAIN:    PMH/Social Hx/Fam Hx -reviewed admission note, no change since admission  PAST MEDICAL & SURGICAL HISTORY:  CVA (cerebrovascular accident)      Atrial fibrillation      Carotid stenosis      Seizures      Hyperlipidemia      No significant past surgical history        Heart faliure: acute [ ] chronic [ ] acute or chronic [ ] diastolic [ ] systolic [ ] combied systolic and diastolic[ ]  DEJA: ATN[ ] renal medullary necrosis [ ] CKD I [ ]CKDII [ ]CKD III [ ]CKD IV [ ]CKD V [ ]Other pathological lesions [ ]  Abdominal Nutrition Status: malnutrition [ ] cachexia [ ] morbid obesity/BMI=40 [ ] Supplement ordered [___________]     T(C): 36.4 (03-02-23 @ 20:47), Max: 36.4 (03-02-23 @ 20:47)  HR: 128 (03-03-23 @ 14:15)  BP: 101/51 (03-03-23 @ 14:15)  BP(mean): 63 (03-03-23 @ 14:15)  ABP: --  ABP(mean): --  RR: 25 (03-03-23 @ 14:15)  SpO2: 96% (03-03-23 @ 14:15)  Wt(kg): --          03-02 @ 07:01  -  03-03 @ 07:00  --------------------------------------------------------  IN: 1123.8 mL / OUT: 675 mL / NET: 448.8 mL            PHYSICAL EXAM:  GENERAL: NAD, overweight, deconditioned, sat well on RA, pale, poor hygiene, temporal wasting   HEAD:  Atraumatic, Normocephalic  EYES:  conjunctiva and sclera clear and pale   NECK: Supple, No JVD, Normal thyroid, RIJ CVC  CHEST/LUNG: diminished lung sounds bilaterally; not following commands to take deep breathes; No rales, rhonchi, wheezing, or rubs  HEART: Regular rate and rhythm; No murmurs, rubs, or gallops  ABDOMEN: Soft, Nontender, Nondistended; Bowel sounds present, no pain or masses on palpation, umbilical hernia present, abd rash with erythema   NERVOUS SYSTEM:  drowsy, non verbal, contracted, right sided paralysis, moaning sounds at baseline  EXTREMITIES:  2+ Peripheral Pulses, No clubbing, cyanosis, or edema, long, thick, yellow toe nails   : Chacon placed in ED   SKIN: warm, dry        LABS:  CBC Full  -  ( 03 Mar 2023 04:22 )  WBC Count : 14.13 K/uL  RBC Count : 4.06 M/uL  Hemoglobin : 12.3 g/dL  Hematocrit : 40.0 %  Platelet Count - Automated : 157 K/uL  Mean Cell Volume : 98.5 fl  Mean Cell Hemoglobin : 30.3 pg  Mean Cell Hemoglobin Concentration : 30.8 gm/dL  Auto Neutrophil # : 11.30 K/uL  Auto Lymphocyte # : 1.16 K/uL  Auto Monocyte # : 1.37 K/uL  Auto Eosinophil # : 0.18 K/uL  Auto Basophil # : 0.06 K/uL  Auto Neutrophil % : 80.0 %  Auto Lymphocyte % : 8.2 %  Auto Monocyte % : 9.7 %  Auto Eosinophil % : 1.3 %  Auto Basophil % : 0.4 %    03-03    144  |  112<H>  |  30<H>  ----------------------------<  176<H>  3.6   |  27  |  0.95    Ca    8.5      03 Mar 2023 04:22  Phos  2.0     03-03  Mg     1.9     03-03    TPro  5.6<L>  /  Alb  2.0<L>  /  TBili  0.4  /  DBili  x   /  AST  14  /  ALT  17  /  AlkPhos  71  03-03    PTT - ( 02 Mar 2023 05:50 )  PTT:103.7 sec    Culture Results:   >100,000 CFU/ml Escherichia coli ESBL  <10,000 CFU/ml Normal Urogenital nakul present (02-28 @ 18:50)  Culture Results:   No growth to date. (02-28 @ 18:40)  Culture Results:   Growth in aerobic bottle: Escherichia coli ESBL  ***Blood Panel PCR results on this specimen are available  approximately 3 hours after the Gram stain result.***  Gram stain, PCR, and/or culture results may not always  correspond due to difference in methodologies.  ************************************************************  This PCR assay was performed by multiplex PCR. This  Assay tests for 66 bacterial and resistance gene targets.  Please refer to the [facility] test directory  at [email or web address] for details. (02-28 @ 18:40)      RADIOLOGY & ADDITIONAL STUDIES REVIEWED:      [ ]GOALS OF CARE DISCUSSION WITH PATIENT/FAMILY/PROXY:    CRITICAL CARE TIME SPENT: 35 minutes INTERVAL HPI/OVERNIGHT EVENTS:   no acute overnight events, pt. seen and examined at bedside, pt looks much better today. She is more responsive today and was able to tell me that "she's fine."    PRESSORS: [ ] YES [ x] NO  WHICH:    ANTIBIOTICS:  Ertapenem                DATE STARTED: 3/2  ANTIBIOTICS:                  DATE STARTED:  ANTIBIOTICS:                  DATE STARTED:    Antimicrobial:  ertapenem  IVPB 1000 milliGRAM(s) IV Intermittent every 24 hours    Cardiovascular:  atenolol  Tablet 100 milliGRAM(s) Oral daily    Pulmonary:    Hematalogic:  apixaban 5 milliGRAM(s) Oral every 12 hours  aspirin  chewable 81 milliGRAM(s) Oral daily    Other:  atorvastatin 40 milliGRAM(s) Oral at bedtime  chlorhexidine 2% Cloths 1 Application(s) Topical <User Schedule>  clotrimazole 1% Vaginal Cream 1 Applicatorful Vaginal at bedtime  influenza  Vaccine (HIGH DOSE) 0.7 milliLiter(s) IntraMuscular once  levETIRAcetam 750 milliGRAM(s) Oral two times a day  melatonin 5 milliGRAM(s) Oral at bedtime  multivitamin 1 Tablet(s) Oral daily  mupirocin 2% Ointment 1 Application(s) Topical two times a day  pantoprazole    Tablet 40 milliGRAM(s) Oral before breakfast  sodium chloride 0.9% lock flush 10 milliLiter(s) IV Push every 1 hour PRN      Drug Dosing Weight  Height (cm): 172.7 (02 Mar 2023 16:55)  Weight (kg): 91.7 (28 Feb 2023 22:45)  BMI (kg/m2): 30.7 (02 Mar 2023 16:55)  BSA (m2): 2.05 (02 Mar 2023 16:55)    CENTRAL LINE: [x ] YES [ ] NO  LOCATION: [facility]  DATE INSERTED: 2/28  REMOVE: [ ] YES [ ] NO  EXPLAIN:    CHACON: [x ] YES [ ] NO    DATE INSERTED: 2/28  REMOVE:  [ ] YES [ ] NO  EXPLAIN:    A-LINE:  [ ] YES [x ] NO  LOCATION:   DATE INSERTED:  REMOVE:  [ ] YES [ ] NO  EXPLAIN:    PMH/Social Hx/Fam Hx -reviewed admission note, no change since admission  PAST MEDICAL & SURGICAL HISTORY:  CVA (cerebrovascular accident)      Atrial fibrillation      Carotid stenosis      Seizures      Hyperlipidemia      No significant past surgical history        Heart faliure: acute [ ] chronic [ ] acute or chronic [ ] diastolic [ ] systolic [ ] combied systolic and diastolic[ ]  DEJA: ATN[ ] renal medullary necrosis [ ] CKD I [ ]CKDII [ ]CKD III [ ]CKD IV [ ]CKD V [ ]Other pathological lesions [ ]  Abdominal Nutrition Status: malnutrition [ ] cachexia [ ] morbid obesity/BMI=40 [ ] Supplement ordered [___________]     T(C): 36.4 (03-02-23 @ 20:47), Max: 36.4 (03-02-23 @ 20:47)  HR: 128 (03-03-23 @ 14:15)  BP: 101/51 (03-03-23 @ 14:15)  BP(mean): 63 (03-03-23 @ 14:15)  ABP: --  ABP(mean): --  RR: 25 (03-03-23 @ 14:15)  SpO2: 96% (03-03-23 @ 14:15)  Wt(kg): --          03-02 @ 07:01  -  03-03 @ 07:00  --------------------------------------------------------  IN: 1123.8 mL / OUT: 675 mL / NET: 448.8 mL            PHYSICAL EXAM:  GENERAL: NAD, overweight, deconditioned, sat well on RA, pale, poor hygiene, temporal wasting   HEAD:  Atraumatic, Normocephalic  EYES:  conjunctiva and sclera clear and pale   NECK: Supple, No JVD, Normal thyroid, RIJ CVC  CHEST/LUNG: diminished lung sounds bilaterally; not following commands to take deep breathes; No rales, rhonchi, wheezing, or rubs  HEART: Regular rate and rhythm; No murmurs, rubs, or gallops  ABDOMEN: Soft, Nontender, Nondistended; Bowel sounds present, no pain or masses on palpation, umbilical hernia present, abd rash with erythema   NERVOUS SYSTEM:  drowsy, non verbal, contracted, right sided paralysis, moaning sounds at baseline  EXTREMITIES:  2+ Peripheral Pulses, No clubbing, cyanosis, or edema, long, thick, yellow toe nails   : Chacon placed in ED   SKIN: warm, dry        LABS:  CBC Full  -  ( 03 Mar 2023 04:22 )  WBC Count : 14.13 K/uL  RBC Count : 4.06 M/uL  Hemoglobin : 12.3 g/dL  Hematocrit : 40.0 %  Platelet Count - Automated : 157 K/uL  Mean Cell Volume : 98.5 fl  Mean Cell Hemoglobin : 30.3 pg  Mean Cell Hemoglobin Concentration : 30.8 gm/dL  Auto Neutrophil # : 11.30 K/uL  Auto Lymphocyte # : 1.16 K/uL  Auto Monocyte # : 1.37 K/uL  Auto Eosinophil # : 0.18 K/uL  Auto Basophil # : 0.06 K/uL  Auto Neutrophil % : 80.0 %  Auto Lymphocyte % : 8.2 %  Auto Monocyte % : 9.7 %  Auto Eosinophil % : 1.3 %  Auto Basophil % : 0.4 %    03-03    144  |  112<H>  |  30<H>  ----------------------------<  176<H>  3.6   |  27  |  0.95    Ca    8.5      03 Mar 2023 04:22  Phos  2.0     03-03  Mg     1.9     03-03    TPro  5.6<L>  /  Alb  2.0<L>  /  TBili  0.4  /  DBili  x   /  AST  14  /  ALT  17  /  AlkPhos  71  03-03    PTT - ( 02 Mar 2023 05:50 )  PTT:103.7 sec    Culture Results:   >100,000 CFU/ml Escherichia coli ESBL  <10,000 CFU/ml Normal Urogenital nakul present (02-28 @ 18:50)  Culture Results:   No growth to date. (02-28 @ 18:40)  Culture Results:   Growth in aerobic bottle: Escherichia coli ESBL  ***Blood Panel PCR results on this specimen are available  approximately 3 hours after the Gram stain result.***  Gram stain, PCR, and/or culture results may not always  correspond due to difference in methodologies.  ************************************************************  This PCR assay was performed by multiplex PCR. This  Assay tests for 66 bacterial and resistance gene targets.  Please refer to the [facility] test directory  at [email or web address] for details. (02-28 @ 18:40)      RADIOLOGY & ADDITIONAL STUDIES REVIEWED:      [ ]GOALS OF CARE DISCUSSION WITH PATIENT/FAMILY/PROXY:    CRITICAL CARE TIME SPENT: 35 minutes

## 2023-03-03 NOTE — SWALLOW BEDSIDE ASSESSMENT ADULT - ORAL PHASE
Decreased anterior-posterior movement of the bolus/Delayed oral transit time/Stasis in lateral sulci Decreased anterior-posterior movement of the bolus/Delayed oral transit time Within functional limits

## 2023-03-03 NOTE — SWALLOW BEDSIDE ASSESSMENT ADULT - ORAL PREPARATORY PHASE
impaired bolus formation/Reduced oral grading/Bolus falls into left lateral sulci weak lip seal/Bolus falls into left lateral sulci Increased mastication time/Reduced oral grading/Decreased mastication ability

## 2023-03-03 NOTE — PROGRESS NOTE ADULT - SUBJECTIVE AND OBJECTIVE BOX
ICU visit:  77y Female is under our care for    REVIEW OF SYSTEMS:  [  ] Not able to elicit      MEDS:  ertapenem  IVPB 1000 milliGRAM(s) IV Intermittent every 24 hours    ALLERGIES: Allergies    penicillin (Unknown)    Intolerances        VITALS:  ICU Vital Signs Last 24 Hrs  T(C): 36.4 (02 Mar 2023 20:47), Max: 36.4 (02 Mar 2023 20:47)  T(F): 97.6 (02 Mar 2023 20:47), Max: 97.6 (02 Mar 2023 20:47)  HR: 106 (03 Mar 2023 11:00) (89 - 127)  BP: 110/61 (03 Mar 2023 11:00) (80/48 - 151/98)  BP(mean): 74 (03 Mar 2023 11:00) (56 - 109)  ABP: --  ABP(mean): --  RR: 24 (03 Mar 2023 11:00) (12 - 29)  SpO2: 97% (03 Mar 2023 11:00) (83% - 100%)    O2 Parameters below as of 02 Mar 2023 12:00  Patient On (Oxygen Delivery Method): nasal cannula    O2 Concentration (%): 2        PHYSICAL EXAM:      LABS/DIAGNOSTIC TESTS:                        12.3   14.13 )-----------( 157      ( 03 Mar 2023 04:22 )             40.0     WBC Count: 14.13 K/uL (03-03 @ 04:22)  WBC Count: 21.89 K/uL (03-02 @ 04:02)  WBC Count: 29.33 K/uL (03-01 @ 19:23)  WBC Count: 39.69 K/uL (03-01 @ 04:18)  WBC Count: 28.27 K/uL (02-28 @ 21:30)    03-03    144  |  112<H>  |  30<H>  ----------------------------<  176<H>  3.6   |  27  |  0.95    Ca    8.5      03 Mar 2023 04:22  Phos  2.0     03-03  Mg     1.9     03-03    TPro  5.6<L>  /  Alb  2.0<L>  /  TBili  0.4  /  DBili  x   /  AST  14  /  ALT  17  /  AlkPhos  71  03-03          CULTURES:   Clean Catch Clean Catch (Midstream)  02-28 @ 18:50   >100,000 CFU/ml Escherichia coli  <10,000 CFU/ml Normal Urogenital nakul present  --  --      .Blood Blood-Peripheral  02-28 @ 18:40   No growth to date.  --  Blood Culture PCR  Escherichia coli ESBL        RADIOLOGY:  no new studies ICU visit:  77y Female is under our care for bilateral pneumonia, UTI and bacteremia with ESBL E coli.  Patient was seen in the ICU laying comfortably in bed with no acute distress on room air.  She is awake and alert however not responding to questions.  Patient remains afebrile and WBC count is downtrending.  Awaiting results of repeat blood cultures.    REVIEW OF SYSTEMS:  [ x ] Not able to elicit      MEDS:  ertapenem  IVPB 1000 milliGRAM(s) IV Intermittent every 24 hours    ALLERGIES: Allergies    penicillin (Unknown)    Intolerances        VITALS:  ICU Vital Signs Last 24 Hrs  T(C): 36.4 (02 Mar 2023 20:47), Max: 36.4 (02 Mar 2023 20:47)  T(F): 97.6 (02 Mar 2023 20:47), Max: 97.6 (02 Mar 2023 20:47)  HR: 106 (03 Mar 2023 11:00) (89 - 127)  BP: 110/61 (03 Mar 2023 11:00) (80/48 - 151/98)  BP(mean): 74 (03 Mar 2023 11:00) (56 - 109)  ABP: --  ABP(mean): --  RR: 24 (03 Mar 2023 11:00) (12 - 29)  SpO2: 97% (03 Mar 2023 11:00) (83% - 100%)    O2 Parameters below as of 02 Mar 2023 12:00  Patient On (Oxygen Delivery Method): nasal cannula    O2 Concentration (%): 2        PHYSICAL EXAM:  HEENT: n/a  Neck: supple, right IJ  Respiratory: mild bilateral rales  Cardiovascular: S1 S2 reg no murmurs  Gastrointestinal: +BS with soft, nondistended abdomen; nontender  : Ly catheter in place with cloudy urine  Extremities: no edema  Skin: no rashes  Ortho: n/a  Neuro: Awake and alert      LABS/DIAGNOSTIC TESTS:                        12.3   14.13 )-----------( 157      ( 03 Mar 2023 04:22 )             40.0     WBC Count: 14.13 K/uL (03-03 @ 04:22)  WBC Count: 21.89 K/uL (03-02 @ 04:02)  WBC Count: 29.33 K/uL (03-01 @ 19:23)  WBC Count: 39.69 K/uL (03-01 @ 04:18)  WBC Count: 28.27 K/uL (02-28 @ 21:30)    03-03    144  |  112<H>  |  30<H>  ----------------------------<  176<H>  3.6   |  27  |  0.95    Ca    8.5      03 Mar 2023 04:22  Phos  2.0     03-03  Mg     1.9     03-03    TPro  5.6<L>  /  Alb  2.0<L>  /  TBili  0.4  /  DBili  x   /  AST  14  /  ALT  17  /  AlkPhos  71  03-03          CULTURES:   Clean Catch Clean Catch (Midstream)  02-28 @ 18:50   >100,000 CFU/ml Escherichia coli  <10,000 CFU/ml Normal Urogenital nakul present  --  --      .Blood Blood-Peripheral  02-28 @ 18:40   No growth to date.  --  Blood Culture PCR  Escherichia coli ESBL        RADIOLOGY:  no new studies

## 2023-03-03 NOTE — SWALLOW BEDSIDE ASSESSMENT ADULT - SWALLOW EVAL: DIAGNOSIS
Pt p/w oral dysphagia c/b impaired bolus formation, prolonged mastication 2/2 edentition, & slow A-P transport; However, oropharyngeal swallow was intact and timely with no overt s/s of laryngeal penetration or aspiration at this exam.

## 2023-03-04 NOTE — PHARMACOTHERAPY INTERVENTION NOTE - COMMENTS
Biofire is positive for CTX-M Resistance Marker ESBL Escherichia coli, recommended changing from meropenem to ertapenem as pseudomonas coverage is not needed at this time and due to patient's renal function that may require renal dose adjustment to meropenem.
Modified penicillin allergy to state patient tolerated ertapenem during this admission.    Asif Rothman, PharmD  Clinical Pharmacy Specialist, Infectious Diseases  Tele-Antimicrobial Stewardship Program (Tele-ASP)  Tele-ASP Phone: [phone] 
Recommended discontinuation of azithromycin due to 3 days of administration for pneumonia, negative legionella, and Biofire positive for CTX-M Resistance Marker ESBL Escherichia coli.
Reminded prescriber to obtain ID approval for meropenem, per hospital policy.

## 2023-03-04 NOTE — PROGRESS NOTE ADULT - ASSESSMENT
77-year-old female, from home, lives with son, has HHA, bedbound, AAO x 1 at baseline, non verbal, answers yes or no and moans, has past medical history of CVA, with right sided residual paralysis, seizures on Keppra, hyperlipidemia, atrial fibrillation on Eliquis and atenolol, hypotension, bilateral ICA stenosis and recurrent UTI's at baseline is admitted to ICU for septic shock     DX:  - Septic shock 2/2 UTI and aspiration pneumonia   - AHRF 2/2 Pneumonia   - Acute encephalopathy   - DEJA  - H/o CVA  - H/o seizures   - Atrial fibrillation   - HLD    =================== Neuro============================  Alert and oriented x 1 at baseline   Pain management with Tylenol    #Acute encephalopathy  - Patient admitted for acute encephalopathy   - Metabolic and Infectious   - CT Head is neg for hemorrhage or mass  - CT Chest Moderate right and small left pleural effusion, consolidative opacities, most pronounced in both lower lobes,   -  Pureed Diet  - Fall precautions/Aspiration precautions/Seizure precautions  - c/w  antibiotics ertapenem   - Bcx showed ESBL Ecoli, Ucx Ecoli,   - ESR, CRP neg   - legionella, streptococcus, HIV, MRSA  Neg  - Lactate 1.8  - f/u repeat BCx, mycoplasma  - ID consulted Dr. ruff    #H/o CVA  - Right sided paralysis   - On asa and Eliquis at home  - c/w home meds     #H/o seizures  - Patient on Keppra at home  - C/w home medications    ================= Cardiovascular==========================  #Septic shock 2/2 UTI and aspiration pneumonia  - On admission, patient with tachycardia, hypotension, leucocytosis, elevated lactate, UA + and RLL pneumonia   - In ED s/p 3L IVF , Vancomycin, Rocephin and Zithromax x1   - Started on Meropenem and Zithromax, pressor support and Tylenol for fever  - Monitor for any signs of hemodynamic instability  - d/c levo today        #Atrial fibrillation   - Patient on Eliquis and atenolol  - hold BB while on pressors   - c/w Eliquis, atenolol    ================- Pulm=================================  #AHRF 2/2 Pneumonia   - Patient with hypoxia requiring o2 supplementation   - On exam, bilateral wheezing rhonchi  - CXR showed RLL pneumonia   - S/p Vancomycin, Rocephin and Zithromax   - c/w Ertapenem  - BCx grows ESBL Ecoli  - f/u repeat BCx  - ID consulted Dr. Ruff    # Pleural effusion  -CT chest shows Moderate right and small left pleural effusion  - Pt in no respiratory distress  - Thoracentesis, risk outweigh benefit. will hold off thoracentesis   - plan was discussed with family     ==================ID===================================  #Septic shock 2/2 UTI and aspiration pneumonia  - Plan as above      #AHRF 2/2 Pneumonia  - Plan as above      ================= Nephro================================  - On admission, patient with Cr 1.1, baseline 0.6  - DEJA most likely pre renal in the setting of acute infection, poor oral intake  - Monitor I/O's daily  - Avoid nephrotoxins, NSAIDS, ACEI and ARBS  - S/p 3L IVF in ED  - Monitor BMP daily  - Cr 0.95 3/3    =================GI====================================  Pureed diet  ================ Heme==================================  No acute issues     =================Endocrine===============================  #Thyroid nodule  - CT shows thyroid lobe nodules   - nonemergent ultrasound correlation o/p   - TSH is normal      ================= Skin/Catheters============================  # Dermatitis of the skin  Pt has abdominal rash with erythematous, raised papules  likely fungal infection contact dermatitis  started Nystatin powder     Peripheral IV lines   Ly catheter   RIJ 2/28    =================Prophylaxis =============================  Eliquis   GI prophylaxis with PPI daily    ==================GOC==================================  DNR DNI   Disposition ICU   77-year-old female, from home, lives with son, has HHA, bedbound, AAO x 1 at baseline, non verbal, answers yes or no and moans, has past medical history of CVA, with right sided residual paralysis, seizures on Keppra, hyperlipidemia, atrial fibrillation on Eliquis and atenolol, hypotension, bilateral ICA stenosis and recurrent UTI's at baseline is admitted to ICU for septic shock     DX:  - Septic shock 2/2 UTI and aspiration pneumonia (resolving)  - AHRF 2/2 Pneumonia (resolving)  - Acute encephalopathy (resolving)  - DEJA  - H/o CVA  - H/o seizures   - Atrial fibrillation   - HLD    =================== Neuro============================  Alert and oriented x 1 at baseline   Pain management with Tylenol    #Acute encephalopathy (resolved)  - Patient admitted for acute encephalopathy   - Metabolic and Infectious   - CT Head is neg for hemorrhage or mass  - CT Chest Moderate right and small left pleural effusion, consolidative opacities, most pronounced in both lower lobes,   -  Pureed Diet  - Fall precautions/Aspiration precautions/Seizure precautions  - c/w  antibiotics ertapenem   - Bcx showed ESBL Ecoli, Ucx Ecoli,   - ESR, CRP neg   - legionella, streptococcus, HIV, MRSA  Neg  - Lactate 1.8  - f/u repeat BCx, mycoplasma  - Patient back to [facility]  - ID consulted Dr. ruff    #H/o CVA  - Right sided paralysis   - On asa and Eliquis at home  - c/w home meds     #H/o seizures  - Patient on Keppra at home  - C/w home medications    ================= Cardiovascular==========================  #Septic shock 2/2 UTI and aspiration pneumonia  - On admission, patient with tachycardia, hypotension, leucocytosis, elevated lactate, UA + and RLL pneumonia   - In ED s/p 3L IVF , Vancomycin, Rocephin and Zithromax x1   - Started on Meropenem and Zithromax, pressor support and Tylenol for fever  - Norepinephrine discontinued      #Atrial fibrillation   - Continue eliquis  - Atenolol switch to metoprolol     ================- Pulm=================================  #AHRF 2/2 Pneumonia (resolved)   - Hypoxia resolved  - CXR showed RLL pneumonia   - S/p Vancomycin, Rocephin and Zithromax   - c/w Ertapenem  - BCx grows ESBL Ecoli  - Repeat BCx negative (3/2)  - ID consulted Dr. Ruff    # Pleural effusion  -CT chest shows Moderate right and small left pleural effusion  - Pt in no respiratory distress  - Thoracentesis, risk outweigh benefit. will hold off thoracentesis   - plan was discussed with family     ==================ID===================================  #Septic shock 2/2 UTI and aspiration pneumonia  - Plan as above      #AHRF 2/2 Pneumonia  - Plan as above      ================= Nephro================================  - On admission, patient with Cr 1.1, baseline 0.6  - DEJA most likely pre renal in the setting of acute infection, poor oral intake  - Monitor I/O's daily  - Avoid nephrotoxins, NSAIDS, ACEI and ARBS  - S/p 3L IVF in ED  - Monitor BMP daily    =================GI====================================  Pureed diet  ================ Heme==================================  No acute issues     =================Endocrine===============================  #Thyroid nodule  - CT shows thyroid lobe nodules   - nonemergent ultrasound correlation o/p   - TSH is normal      ================= Skin/Catheters============================  # Dermatitis of the skin  Pt has abdominal rash with erythematous, raised papules  likely fungal infection contact dermatitis  started Nystatin powder     Peripheral IV lines   Ly catheter   RIJ 2/28 (will DC today)    =================Prophylaxis =============================  Eliquis   GI prophylaxis with PPI daily    ==================GOC==================================  DNR DNI   Stable for downgrade to medicine   77-year-old female, from home, lives with son, has HHA, bedbound, AAO x 1 at baseline, non verbal, answers yes or no and moans, has past medical history of CVA, with right sided residual paralysis, seizures on Keppra, hyperlipidemia, atrial fibrillation on Eliquis and atenolol, hypotension, bilateral ICA stenosis and recurrent UTI's at baseline is admitted to ICU for septic shock     DX:  - Septic shock 2/2 UTI and aspiration pneumonia (resolving)  - AHRF 2/2 Pneumonia (resolving)  - Acute encephalopathy (resolving)  - DEJA  - H/o CVA  - H/o seizures   - Atrial fibrillation   - HLD    =================== Neuro============================  Alert and oriented x 1 at baseline   Pain management with Tylenol    #Acute encephalopathy (resolved)  - Patient admitted for acute encephalopathy   - Metabolic and Infectious   - CT Head is neg for hemorrhage or mass  - CT Chest Moderate right and small left pleural effusion, consolidative opacities, most pronounced in both lower lobes,   -  Pureed Diet  - Fall precautions/Aspiration precautions/Seizure precautions  - c/w  antibiotics ertapenem   - Bcx showed ESBL Ecoli, Ucx Ecoli,   - ESR, CRP neg   - legionella, streptococcus, HIV, MRSA  Neg  - Lactate 1.8  - f/u repeat BCx, mycoplasma  - Patient back to [facility]  - ID consulted Dr. ruff    #H/o CVA  - Right sided paralysis   - On asa and Eliquis at home  - c/w home meds     #H/o seizures  - Patient on Keppra at home  - C/w home medications    ================= Cardiovascular==========================  #Septic shock 2/2 UTI and aspiration pneumonia  - On admission, patient with tachycardia, hypotension, leucocytosis, elevated lactate, UA + and RLL pneumonia   - In ED s/p 3L IVF , Vancomycin, Rocephin and Zithromax x1   - Started on Meropenem and Zithromax, pressor support and Tylenol for fever  - Norepinephrine discontinued      #Atrial fibrillation   - Continue eliquis  - Atenolol switch to metoprolol   - Formal cardiology consult pending: Dr Cerna     ================- Pulm=================================  #AHRF 2/2 Pneumonia (resolved)   - Hypoxia resolved  - CXR showed RLL pneumonia   - S/p Vancomycin, Rocephin and Zithromax   - c/w Ertapenem  - BCx grows ESBL Ecoli  - Repeat BCx negative (3/2)  - ID consulted Dr. Ruff    # Pleural effusion  -CT chest shows Moderate right and small left pleural effusion  - Pt in no respiratory distress  - Thoracentesis, risk outweigh benefit. will hold off thoracentesis   - plan was discussed with family     ==================ID===================================  #Septic shock 2/2 UTI and aspiration pneumonia  - Plan as above      #AHRF 2/2 Pneumonia  - Plan as above      ================= Nephro================================  - On admission, patient with Cr 1.1, baseline 0.6  - DEJA most likely pre renal in the setting of acute infection, poor oral intake  - Monitor I/O's daily  - Avoid nephrotoxins, NSAIDS, ACEI and ARBS  - S/p 3L IVF in ED  - Monitor BMP daily    =================GI====================================  Pureed diet  ================ Heme==================================  No acute issues     =================Endocrine===============================  #Thyroid nodule  - CT shows thyroid lobe nodules   - nonemergent ultrasound correlation o/p   - TSH is normal      ================= Skin/Catheters============================  # Dermatitis of the skin  Pt has abdominal rash with erythematous, raised papules  likely fungal infection contact dermatitis  started Nystatin powder     Peripheral IV lines   Ly catheter   RIJ 2/28 (will DC today)    =================Prophylaxis =============================  Eliquis   GI prophylaxis with PPI daily    ==================GOC==================================  DNR DNI   Stable for downgrade to medicine

## 2023-03-04 NOTE — PROGRESS NOTE ADULT - SUBJECTIVE AND OBJECTIVE BOX
INTERVAL HPI/OVERNIGHT EVENTS:   BP labile and unreliable overnight  not tolerating A line  Boluses given  otherwise had isolated readings of normal blood pressure    PRESSORS: [ ] YES [ ] NO  WHICH:    ANTIBIOTICS:  Ertapenem                DATE STARTED: 3/2      Antimicrobial:  ertapenem  IVPB 1000 milliGRAM(s) IV Intermittent every 24 hours    Cardiovascular:  metoprolol tartrate 50 milliGRAM(s) Oral two times a day    Pulmonary:    Hematalogic:  apixaban 5 milliGRAM(s) Oral every 12 hours  aspirin  chewable 81 milliGRAM(s) Oral daily    Other:  acetaminophen   IVPB .. 1000 milliGRAM(s) IV Intermittent once  atorvastatin 40 milliGRAM(s) Oral at bedtime  chlorhexidine 2% Cloths 1 Application(s) Topical <User Schedule>  influenza  Vaccine (HIGH DOSE) 0.7 milliLiter(s) IntraMuscular once  levETIRAcetam 750 milliGRAM(s) Oral two times a day  melatonin 5 milliGRAM(s) Oral at bedtime  multivitamin 1 Tablet(s) Oral daily  mupirocin 2% Ointment 1 Application(s) Topical two times a day  nystatin Powder 1 Application(s) Topical two times a day  pantoprazole    Tablet 40 milliGRAM(s) Oral before breakfast  sodium chloride 0.9% lock flush 10 milliLiter(s) IV Push every 1 hour PRN      Drug Dosing Weight  Height (cm): 172.7 (02 Mar 2023 16:55)  Weight (kg): 91.7 (28 Feb 2023 22:45)  BMI (kg/m2): 30.7 (02 Mar 2023 16:55)  BSA (m2): 2.05 (02 Mar 2023 16:55)    CENTRAL LINE: [x ] YES [ ] NO  LOCATION: [facility]  DATE INSERTED: 2/28  REMOVE: [ ] YES [ ] NO  EXPLAIN:    CHACON: [x ] YES [ ] NO    DATE INSERTED: 2/28  REMOVE:  [ ] YES [ ] NO  EXPLAIN:    A-LINE:  [ ] YES [x ] NO  LOCATION:   DATE INSERTED:  REMOVE:  [ ] YES [ ] NO  EXPLAIN:    PMH/Social Hx/Fam Hx -reviewed admission note, no change since admission  PAST MEDICAL & SURGICAL HISTORY:  CVA (cerebrovascular accident)      Atrial fibrillation      Carotid stenosis      Seizures      Hyperlipidemia      No significant past surgical history        Heart faliure: acute [ ] chronic [ ] acute or chronic [ ] diastolic [ ] systolic [ ] combied systolic and diastolic[ ]  DEJA: ATN[ ] renal medullary necrosis [ ] CKD I [ ]CKDII [ ]CKD III [ ]CKD IV [ ]CKD V [ ]Other pathological lesions [ ]  Abdominal Nutrition Status: malnutrition [ ] cachexia [ ] morbid obesity/BMI=40 [ ] Supplement ordered [___________]     T(C): 36.5 (03-04-23 @ 04:30), Max: 36.9 (03-03-23 @ 19:20)  HR: 86 (03-04-23 @ 06:00)  BP: 101/55 (03-04-23 @ 06:00)  BP(mean): 66 (03-04-23 @ 06:00)  ABP: --  ABP(mean): --  RR: 27 (03-04-23 @ 06:00)  SpO2: 98% (03-04-23 @ 06:00)  Wt(kg): --          03-02 @ 07:01  -  03-03 @ 07:00  --------------------------------------------------------  IN: 1123.8 mL / OUT: 675 mL / NET: 448.8 mL          PHYSICAL EXAM:  GENERAL: NAD, overweight, deconditioned, sat well on RA, pale, poor hygiene, temporal wasting   HEAD:  Atraumatic, Normocephalic  EYES:  conjunctiva and sclera clear and pale   NECK: Supple, No JVD, Normal thyroid, RIJ CVC  CHEST/LUNG: diminished lung sounds bilaterally; not following commands to take deep breathes; No rales, rhonchi, wheezing, or rubs  HEART: Regular rate and rhythm; No murmurs, rubs, or gallops  ABDOMEN: Soft, Nontender, Nondistended; Bowel sounds present, no pain or masses on palpation, umbilical hernia present, abd rash with erythema   NERVOUS SYSTEM:  drowsy, non verbal, contracted, right sided paralysis, moaning sounds at baseline  EXTREMITIES:  2+ Peripheral Pulses, No clubbing, cyanosis, or edema, long, thick, yellow toe nails   : Chacon placed in ED   SKIN: warm, dry        LABS:  CBC Full  -  ( 04 Mar 2023 03:50 )  WBC Count : 10.11 K/uL  RBC Count : 3.64 M/uL  Hemoglobin : 11.1 g/dL  Hematocrit : 36.6 %  Platelet Count - Automated : 134 K/uL  Mean Cell Volume : 100.5 fl  Mean Cell Hemoglobin : 30.5 pg  Mean Cell Hemoglobin Concentration : 30.3 gm/dL  Auto Neutrophil # : 7.40 K/uL  Auto Lymphocyte # : 1.37 K/uL  Auto Monocyte # : 1.02 K/uL  Auto Eosinophil # : 0.25 K/uL  Auto Basophil # : 0.03 K/uL  Auto Neutrophil % : 73.1 %  Auto Lymphocyte % : 13.6 %  Auto Monocyte % : 10.1 %  Auto Eosinophil % : 2.5 %  Auto Basophil % : 0.3 %    03-04    143  |  113<H>  |  31<H>  ----------------------------<  162<H>  3.8   |  29  |  1.00    Ca    8.6      04 Mar 2023 03:50  Phos  2.0     03-04  Mg     1.9     03-04    TPro  5.0<L>  /  Alb  1.9<L>  /  TBili  0.3  /  DBili  x   /  AST  15  /  ALT  16  /  AlkPhos  65  03-04        Culture Results:   No growth to date. (03-02 @ 13:34)  Culture Results:   No growth to date. (03-02 @ 13:15)      RADIOLOGY & ADDITIONAL STUDIES REVIEWED:      [ ]GOALS OF CARE DISCUSSION WITH PATIENT/FAMILY/PROXY:    CRITICAL CARE TIME SPENT: 35 minutes INTERVAL HPI/OVERNIGHT EVENTS: BP labile via NIBP with fluid boluses and IVP metoprolol given.    PRESSORS: [ ] YES [X] NO  WHICH:    ANTIBIOTICS:  Ertapenem                DATE STARTED: 3/2      Antimicrobial:  ertapenem  IVPB 1000 milliGRAM(s) IV Intermittent every 24 hours    Cardiovascular:  metoprolol tartrate 50 milliGRAM(s) Oral two times a day    Pulmonary:    Hematalogic:  apixaban 5 milliGRAM(s) Oral every 12 hours  aspirin  chewable 81 milliGRAM(s) Oral daily    Other:  acetaminophen   IVPB .. 1000 milliGRAM(s) IV Intermittent once  atorvastatin 40 milliGRAM(s) Oral at bedtime  chlorhexidine 2% Cloths 1 Application(s) Topical <User Schedule>  influenza  Vaccine (HIGH DOSE) 0.7 milliLiter(s) IntraMuscular once  levETIRAcetam 750 milliGRAM(s) Oral two times a day  melatonin 5 milliGRAM(s) Oral at bedtime  multivitamin 1 Tablet(s) Oral daily  mupirocin 2% Ointment 1 Application(s) Topical two times a day  nystatin Powder 1 Application(s) Topical two times a day  pantoprazole    Tablet 40 milliGRAM(s) Oral before breakfast  sodium chloride 0.9% lock flush 10 milliLiter(s) IV Push every 1 hour PRN      Drug Dosing Weight  Height (cm): 172.7 (02 Mar 2023 16:55)  Weight (kg): 91.7 (28 Feb 2023 22:45)  BMI (kg/m2): 30.7 (02 Mar 2023 16:55)  BSA (m2): 2.05 (02 Mar 2023 16:55)    CENTRAL LINE: [x ] YES [ ] NO  LOCATION: [facility]  DATE INSERTED: 2/28  REMOVE: [X] YES [ ] NO  EXPLAIN: Will remove today    CHACON: [X] YES [ ] NO    DATE INSERTED: 2/28  REMOVE:  [ ] YES [ ] NO  EXPLAIN: Retention    A-LINE:  [ ] YES [x ] NO  LOCATION:   DATE INSERTED:  REMOVE:  [ ] YES [ ] NO  EXPLAIN:    PMH/Social Hx/Fam Hx -reviewed admission note, no change since admission  PAST MEDICAL & SURGICAL HISTORY:  CVA (cerebrovascular accident)      Atrial fibrillation      Carotid stenosis      Seizures      Hyperlipidemia      No significant past surgical history        Heart faliure: acute [ ] chronic [ ] acute or chronic [ ] diastolic [ ] systolic [ ] combied systolic and diastolic[ ]  DEJA: ATN[ ] renal medullary necrosis [ ] CKD I [ ]CKDII [ ]CKD III [ ]CKD IV [ ]CKD V [ ]Other pathological lesions [ ]  Abdominal Nutrition Status: malnutrition [ ] cachexia [ ] morbid obesity/BMI=40 [ ] Supplement ordered [___________]     T(C): 36.5 (03-04-23 @ 04:30), Max: 36.9 (03-03-23 @ 19:20)  HR: 86 (03-04-23 @ 06:00)  BP: 101/55 (03-04-23 @ 06:00)  BP(mean): 66 (03-04-23 @ 06:00)  ABP: --  ABP(mean): --  RR: 27 (03-04-23 @ 06:00)  SpO2: 98% (03-04-23 @ 06:00)  Wt(kg): --          03-02 @ 07:01  -  03-03 @ 07:00  --------------------------------------------------------  IN: 1123.8 mL / OUT: 675 mL / NET: 448.8 mL          PHYSICAL EXAM:  GENERAL: NAD, overweight, deconditioned, sat well on RA, pale, poor hygiene, temporal wasting   HEAD:  Atraumatic, Normocephalic  EYES:  conjunctiva and sclera clear and pale   NECK: Supple, No JVD, Normal thyroid, RIJ CVC  CHEST/LUNG: diminished lung sounds bilaterally; not following commands to take deep breathes; No rales, rhonchi, wheezing, or rubs  HEART: Regular rate and rhythm; No murmurs, rubs, or gallops  ABDOMEN: Soft, Nontender, Nondistended; Bowel sounds present, no pain or masses on palpation, umbilical hernia present, abd rash with erythema   NERVOUS SYSTEM:  drowsy, minimal speech, contracted, right sided paralysis, mild left facial droop, moaning sounds at baseline. Alert to place. Answers to name.  EXTREMITIES:  2+ Peripheral Pulses, No clubbing, cyanosis, or edema, long, thick, yellow toe nails   : Chacon, voiding well  SKIN: warm, dry        LABS:  CBC Full  -  ( 04 Mar 2023 03:50 )  WBC Count : 10.11 K/uL  RBC Count : 3.64 M/uL  Hemoglobin : 11.1 g/dL  Hematocrit : 36.6 %  Platelet Count - Automated : 134 K/uL  Mean Cell Volume : 100.5 fl  Mean Cell Hemoglobin : 30.5 pg  Mean Cell Hemoglobin Concentration : 30.3 gm/dL  Auto Neutrophil # : 7.40 K/uL  Auto Lymphocyte # : 1.37 K/uL  Auto Monocyte # : 1.02 K/uL  Auto Eosinophil # : 0.25 K/uL  Auto Basophil # : 0.03 K/uL  Auto Neutrophil % : 73.1 %  Auto Lymphocyte % : 13.6 %  Auto Monocyte % : 10.1 %  Auto Eosinophil % : 2.5 %  Auto Basophil % : 0.3 %    03-04    143  |  113<H>  |  31<H>  ----------------------------<  162<H>  3.8   |  29  |  1.00    Ca    8.6      04 Mar 2023 03:50  Phos  2.0     03-04  Mg     1.9     03-04    TPro  5.0<L>  /  Alb  1.9<L>  /  TBili  0.3  /  DBili  x   /  AST  15  /  ALT  16  /  AlkPhos  65  03-04        Culture Results:   No growth to date. (03-02 @ 13:34)  Culture Results:   No growth to date. (03-02 @ 13:15)      RADIOLOGY & ADDITIONAL STUDIES REVIEWED:      [ ]GOALS OF CARE DISCUSSION WITH PATIENT/FAMILY/PROXY:    CRITICAL CARE TIME SPENT: 35 minutes

## 2023-03-04 NOTE — CHART NOTE - NSCHARTNOTEFT_GEN_A_CORE
Patient seen and examined at bedside for removal of central line. Chart and labs reviewed prior to removal, no contraindication to procedure. Area cleaned with chlorhexidine swabs and suture removal kit used to remove sutures holding central line in place using. Patient then placed in reverse Trendelenburg position, asked to hold breath/hum, and then central line removed. Pressure applied for 5 minutes with gauze. No signs of active bleeding noted. No hematoma formation noted. Tegaderm and gauze/tape used to create pressure dressing to maintain pressure on central line site. Patient tolerated well without complications.   Discussed procedure with RN who will monitor and notify the provider for bleeding, hematoma formation, or other procedural complications.

## 2023-03-05 NOTE — CHART NOTE - NSCHARTNOTEFT_GEN_A_CORE
77-year-old female, from home, lives with son, has HHA, bedbound, AAO x 1 at baseline, non verbal, answers yes or no and moans, has past medical history of CVA, with right sided residual paralysis, seizures on Keppra, hyperlipidemia, atrial fibrillation on Eliquis and atenolol, hypotension, bilateral ICA stenosis and recurrent UTI's with ESBL Ecoli admitted to ICU for septic shock 2/2 asp pna and UTI with ESBL Ecoli requiring pressor. CT Head is neg for hemorrhage or mass. CT Chest with  Moderate right and small left pleural effusion, consolidative opacities in both lower lobes. Pt is treated with Ertapenem. ID Dr. Ruff consulted. CT also shows thyroid lobe nodules, TSH is normal. Recommending nonemergent ultrasound correlation o/p. Pt has dermatitis of the skin, treated with nystatin.       Patient is stable for downgrade to floor under care of  ------------- for further management , covering resident ---------- was informed. Family notified.      Things to follow up:    - f/u repeat BCx, mycoplasma  - TOV at 8 pm   - nonemergent ultrasound thyroid correlation o/p 77-year-old female, from home, lives with son, has HHA, bedbound, AAO x 1 at baseline, non verbal, answers yes or no and moans, has past medical history of CVA, with right sided residual paralysis, seizures on Keppra, hyperlipidemia, atrial fibrillation on Eliquis and atenolol, hypotension, bilateral ICA stenosis and recurrent UTI's with ESBL Ecoli admitted to ICU for septic shock 2/2 asp pna and UTI with ESBL Ecoli requiring pressor. CT Head is neg for hemorrhage or mass. CT Chest with  Moderate right and small left pleural effusion, consolidative opacities in both lower lobes. Pt is treated with Ertapenem. ID Dr. Ruff consulted. CT also shows thyroid lobe nodules, TSH is normal. Recommending nonemergent ultrasound correlation o/p. Pt has dermatitis of the skin, treated with nystatin.       Patient is stable for downgrade to floor under care of Dr. Benton for further management , covering resident Dr. Guerra was informed. Family notified.      Things to follow up:    - f/u repeat BCx, mycoplasma  - TOV at 8 pm   - nonemergent ultrasound thyroid correlation o/p

## 2023-03-05 NOTE — PROGRESS NOTE ADULT - ASSESSMENT
77-year-old female, from home, lives with son, has HHA, bedbound, AAO x 1 at baseline, non verbal, answers yes or no and moans, has past medical history of CVA, with right sided residual paralysis, seizures on Keppra, hyperlipidemia, atrial fibrillation on Eliquis and atenolol, hypotension, bilateral ICA stenosis and recurrent UTI's at baseline is admitted to ICU for septic shock     DX:  - Septic shock 2/2 UTI and aspiration pneumonia (resolving)  - AHRF 2/2 Pneumonia (resolving)  - Acute encephalopathy (resolving)  - DEJA  - H/o CVA  - H/o seizures   - Atrial fibrillation   - HLD    =================== Neuro============================  Alert and oriented x 1 at baseline   Pain management with Tylenol    #Acute encephalopathy (resolved)  - Patient admitted for acute encephalopathy   - Metabolic and Infectious   - CT Head is neg for hemorrhage or mass  - CT Chest Moderate right and small left pleural effusion, consolidative opacities, most pronounced in both lower lobes,   -  Pureed Diet  - Fall precautions/Aspiration precautions/Seizure precautions  - c/w  antibiotics ertapenem   - Bcx showed ESBL Ecoli, Ucx Ecoli,   - ESR, CRP neg   - legionella, streptococcus, HIV, MRSA  Neg  - Lactate 1.8  - f/u repeat BCx, mycoplasma  - Patient back to [facility]  - ID consulted Dr. ruff    #H/o CVA  - Right sided paralysis   - On asa and Eliquis at home  - c/w home meds     #H/o seizures  - Patient on Keppra at home  - C/w home medications    ================= Cardiovascular==========================  #Septic shock 2/2 UTI and aspiration pneumonia  - On admission, patient with tachycardia, hypotension, leucocytosis, elevated lactate, UA + and RLL pneumonia   - In ED s/p 3L IVF , Vancomycin, Rocephin and Zithromax x1   - Started on Meropenem and Zithromax, pressor support and Tylenol for fever  - Norepinephrine discontinued      #Atrial fibrillation   - Continue eliquis  - Atenolol switch to metoprolol   - Formal cardiology consult pending: Dr Cerna     ================- Pulm=================================  #AHRF 2/2 Pneumonia (resolved)   - Hypoxia resolved  - CXR showed RLL pneumonia   - S/p Vancomycin, Rocephin and Zithromax   - c/w Ertapenem  - BCx grows ESBL Ecoli  - Repeat BCx negative (3/2)  - ID consulted Dr. Ruff    # Pleural effusion  -CT chest shows Moderate right and small left pleural effusion  - Pt in no respiratory distress  - Thoracentesis, risk outweigh benefit. will hold off thoracentesis   - plan was discussed with family     ==================ID===================================  #Septic shock 2/2 UTI and aspiration pneumonia  - Plan as above      #AHRF 2/2 Pneumonia  - Plan as above      ================= Nephro================================  - On admission, patient with Cr 1.1, baseline 0.6  - DEJA most likely pre renal in the setting of acute infection, poor oral intake  - Monitor I/O's daily  - Avoid nephrotoxins, NSAIDS, ACEI and ARBS  - S/p 3L IVF in ED  - Monitor BMP daily    =================GI====================================  Pureed diet  ================ Heme==================================  No acute issues     =================Endocrine===============================  #Thyroid nodule  - CT shows thyroid lobe nodules   - nonemergent ultrasound correlation o/p   - TSH is normal      ================= Skin/Catheters============================  # Dermatitis of the skin  Pt has abdominal rash with erythematous, raised papules  likely fungal infection contact dermatitis  started Nystatin powder     Peripheral IV lines   Ly catheter   RIJ 2/28 (will DC today)    =================Prophylaxis =============================  Eliquis   GI prophylaxis with PPI daily    ==================GOC==================================  DNR DNI   Stable for downgrade to telemetry

## 2023-03-05 NOTE — CONSULT NOTE ADULT - SUBJECTIVE AND OBJECTIVE BOX
EP ATTENDING      HISTORY OF PRESENT ILLNESS: She is a 76 y/o female with persistent AF since at least April 2021. Her AF is managed with eliquis and atenolol. Her echocardiogram is unremarkable. She is now a/w sepsis likely secondary to aspiration pneumonia and recent gram negative bacteremia. In this setting EP is now called for rapid AF. She denies angina nor syncope, but can not participate in a full ROS.    PAST MEDICAL & SURGICAL HISTORY:  CVA (cerebrovascular accident)  persistent Atrial fibrillation  Carotid stenosis  Seizure disorder  Hyperlipidemia      No significant past surgical history    MEDICATIONS  (STANDING):  apixaban 5 milliGRAM(s) Oral every 12 hours  aspirin  chewable 81 milliGRAM(s) Oral daily  atorvastatin 40 milliGRAM(s) Oral at bedtime  bisacodyl Suppository 10 milliGRAM(s) Rectal daily  chlorhexidine 2% Cloths 1 Application(s) Topical <User Schedule>  ertapenem  IVPB 1000 milliGRAM(s) IV Intermittent every 24 hours  influenza  Vaccine (HIGH DOSE) 0.7 milliLiter(s) IntraMuscular once  insulin lispro (ADMELOG) corrective regimen sliding scale   SubCutaneous three times a day before meals  insulin lispro (ADMELOG) corrective regimen sliding scale   SubCutaneous at bedtime  levETIRAcetam 750 milliGRAM(s) Oral two times a day  melatonin 5 milliGRAM(s) Oral at bedtime  metoprolol tartrate 50 milliGRAM(s) Oral two times a day  multivitamin 1 Tablet(s) Oral daily  mupirocin 2% Ointment 1 Application(s) Topical two times a day  nystatin Powder 1 Application(s) Topical two times a day  pantoprazole    Tablet 40 milliGRAM(s) Oral before breakfast      Allergies: penicillin (Unknown)    FAMILY HISTORY:  Non-contributary for premature coronary disease or sudden cardiac death    SOCIAL HISTORY:    [x ] Non-smoker  [ ] Smoker  [ ] Alcohol      REVIEW OF SYSTEMS:  [ ]chest pain  [  x]shortness of breath  [  ]palpitations  [  ]syncope  [ ]near syncope [ ]upper extremity weakness   [ ] lower extremity weakness  [  ]diplopia  [  ]altered mental status   [  ]fevers  [ ]chills [ ]nausea  [ ]vomitting  [  ]dysphagia    [ ]abdominal pain  [ ]melena  [ ]BRBPR    [  ]epistaxis  [  ]rash    [ ]lower extremity edema        [x ] All others negative	  [ ] Unable to obtain    PHYSICAL EXAM:  T(C): 37 (03-05-23 @ 13:00), Max: 37 (03-05-23 @ 13:00)  HR: 75 (03-05-23 @ 13:00) (75 - 137)  BP: 158/96 (03-05-23 @ 13:00) (92/53 - 164/83)  RR: 20 (03-05-23 @ 13:00) (0 - 45)  SpO2: 100% (03-05-23 @ 13:00) (87% - 100%)  Wt(kg): --      HEENT:   Normal oral mucosa, PERRL, EOMI	  Lymphatic: No lymphadenopathy , no edema  Cardiovascular: IRR Normal S1 S2, No JVD, No murmurs , Peripheral pulses palpable 2+ bilaterally  Respiratory: Lungs clear to auscultation, normal effort 	  Gastrointestinal:  Soft, Non-tender, + BS	  Skin: No rashes, No ecchymoses, No cyanosis, warm to touch        TELEMETRY: 	    ECG:  	    Echo:  NST:  Cath:  	  	  LABS:	 	                          13.3   12.73 )-----------( 169      ( 05 Mar 2023 06:15 )             43.5     03-05    143  |  113<H>  |  26<H>  ----------------------------<  163<H>  4.8   |  26  |  0.92    Ca    9.1      05 Mar 2023 06:15  Phos  2.4     03-05  Mg     2.0     03-05    TPro  5.9<L>  /  Alb  2.2<L>  /  TBili  0.4  /  DBili  x   /  AST  14  /  ALT  18  /  AlkPhos  92  03-05    proBNP:   Lipid Profile:   HgA1c:   TSH:     A/P) She is a 76 y/o female with persistent AF since at least April 2021. Her AF is managed with eliquis and atenolol. Her echocardiogram is unremarkable. She is now a/w sepsis likely secondary to aspiration pneumonia and recent gram negative bacteremia. In this setting EP is now called for rapid AF. She denies angina nor syncope, but can not participate in a full ROS.    -continue eliquis for lifelong a/c  -continue aspirin and lipitor for carotid artery disease  -continue metoprolol 50 bid for rate control of AF  -considering AF is chronic may add digoxin 0.125mg daily  -poor candidate for a rhythm control strategy  -the long term management of her AF is rate control and anticoagulation      Glenda Arrieta M.D., [facility]  Cardiac Electrophysiology  [facility]  [address] [place] [address], [address] [place] [address]  [email or web address]    office [phone]  pager [phone]

## 2023-03-05 NOTE — PROGRESS NOTE ADULT - SUBJECTIVE AND OBJECTIVE BOX
INTERVAL HPI/OVERNIGHT EVENTS: pt was to be downgraded to medicine floor. However, had afib w/ RVR running into 130's. Pt received 5 mg of lopressor with improvement in HR.     PRESSORS: [ ] YES [x] NO  WHICH:    Antimicrobial:  ertapenem  IVPB 1000 milliGRAM(s) IV Intermittent every 24 hours    Cardiovascular:  metoprolol tartrate 50 milliGRAM(s) Oral two times a day    Pulmonary:    Hematalogic:  apixaban 5 milliGRAM(s) Oral every 12 hours  aspirin  chewable 81 milliGRAM(s) Oral daily    Other:  atorvastatin 40 milliGRAM(s) Oral at bedtime  bisacodyl Suppository 10 milliGRAM(s) Rectal daily  chlorhexidine 2% Cloths 1 Application(s) Topical <User Schedule>  influenza  Vaccine (HIGH DOSE) 0.7 milliLiter(s) IntraMuscular once  insulin lispro (ADMELOG) corrective regimen sliding scale   SubCutaneous three times a day before meals  insulin lispro (ADMELOG) corrective regimen sliding scale   SubCutaneous at bedtime  levETIRAcetam 750 milliGRAM(s) Oral two times a day  melatonin 5 milliGRAM(s) Oral at bedtime  multivitamin 1 Tablet(s) Oral daily  mupirocin 2% Ointment 1 Application(s) Topical two times a day  nystatin Powder 1 Application(s) Topical two times a day  pantoprazole    Tablet 40 milliGRAM(s) Oral before breakfast  sodium chloride 0.9% lock flush 10 milliLiter(s) IV Push every 1 hour PRN    apixaban 5 milliGRAM(s) Oral every 12 hours  aspirin  chewable 81 milliGRAM(s) Oral daily's  atorvastatin 40 milliGRAM(s) Oral at bedtime  bisacodyl Suppository 10 milliGRAM(s) Rectal daily  chlorhexidine 2% Cloths 1 Application(s) Topical <User Schedule>  ertapenem  IVPB 1000 milliGRAM(s) IV Intermittent every 24 hours  influenza  Vaccine (HIGH DOSE) 0.7 milliLiter(s) IntraMuscular once  insulin lispro (ADMELOG) corrective regimen sliding scale   SubCutaneous three times a day before meals  insulin lispro (ADMELOG) corrective regimen sliding scale   SubCutaneous at bedtime  levETIRAcetam 750 milliGRAM(s) Oral two times a day  melatonin 5 milliGRAM(s) Oral at bedtime  metoprolol tartrate 50 milliGRAM(s) Oral two times a day  multivitamin 1 Tablet(s) Oral daily  mupirocin 2% Ointment 1 Application(s) Topical two times a day  nystatin Powder 1 Application(s) Topical two times a day  pantoprazole    Tablet 40 milliGRAM(s) Oral before breakfast  sodium chloride 0.9% lock flush 10 milliLiter(s) IV Push every 1 hour PRN    Drug Dosing Weight  Height (cm): 172.7 (02 Mar 2023 16:55)  Weight (kg): 91.7 (28 Feb 2023 22:45)  BMI (kg/m2): 30.7 (02 Mar 2023 16:55)  BSA (m2): 2.05 (02 Mar 2023 16:55)    CENTRAL LINE: [x ] YES [ ] NO  LOCATION: [facility]  DATE INSERTED: 2/28  REMOVE: [X] YES [ ] NO  EXPLAIN: Will remove today    [place]: [X] YES [ ] NO    DATE INSERTED: 2/28  REMOVE:  [ ] YES [ ] NO  EXPLAIN: Retention    A-LINE:  [ ] YES [x ] NO  LOCATION:   DATE INSERTED:  REMOVE:  [ ] YES [ ] NO  EXPLAIN:    PMH -reviewed admission note, no change since admission  PAST MEDICAL & SURGICAL HISTORY:  CVA (cerebrovascular accident)      Atrial fibrillation      Carotid stenosis      Seizures      Hyperlipidemia      No significant past surgical history          ICU Vital Signs Last 24 Hrs  T(C): 36.3 (04 Mar 2023 20:51), Max: 36.7 (04 Mar 2023 17:25)  T(F): 97.3 (04 Mar 2023 20:51), Max: 98.1 (04 Mar 2023 17:25)  HR: 101 (05 Mar 2023 00:00) (83 - 137)  BP: 126/68 (05 Mar 2023 00:00) (77/51 - 164/83)  BP(mean): 78 (05 Mar 2023 00:00) (57 - 107)  ABP: --  ABP(mean): --  RR: 33 (05 Mar 2023 00:00) (21 - 45)  SpO2: 94% (05 Mar 2023 00:00) (91% - 100%)    O2 Parameters below as of 04 Mar 2023 08:00  Patient On (Oxygen Delivery Method): nasal cannula    O2 Concentration (%): 1              03-03 @ 07:01  -  03-04 @ 07:00  --------------------------------------------------------  IN: 1739.2 mL / OUT: 980 mL / NET: 759.2 mL          PHYSICAL EXAM:  GENERAL: NAD, overweight, deconditioned, sat well on RA, pale, poor hygiene, temporal wasting   HEAD:  Atraumatic, Normocephalic  EYES:  conjunctiva and sclera clear and pale   NECK: Supple, No JVD, Normal thyroid  CHEST/LUNG: diminished lung sounds bilaterally; not following commands to take deep breathes; No rales, rhonchi, wheezing, or rubs  HEART: Regular rate and rhythm; No murmurs, rubs, or gallops  ABDOMEN: Soft, Nontender, Nondistended; Bowel sounds present, no pain or masses on palpation, umbilical hernia present, abd rash with erythema   NERVOUS SYSTEM:  drowsy, minimal speech, contracted, right sided paralysis, mild left facial droop, moaning sounds at baseline. Alert to place. Answers to name.  EXTREMITIES:  2+ Peripheral Pulses, No clubbing, cyanosis, or edema, long, thick, yellow toe nails   : [place], voiding well  SKIN: warm, dry        LABS:  CBC Full  -  ( 04 Mar 2023 03:50 )  WBC Count : 10.11 K/uL  RBC Count : 3.64 M/uL  Hemoglobin : 11.1 g/dL  Hematocrit : 36.6 %  Platelet Count - Automated : 134 K/uL  Mean Cell Volume : 100.5 fl  Mean Cell Hemoglobin : 30.5 pg  Mean Cell Hemoglobin Concentration : 30.3 gm/dL  Auto Neutrophil # : 7.40 K/uL  Auto Lymphocyte # : 1.37 K/uL  Auto Monocyte # : 1.02 K/uL  Auto Eosinophil # : 0.25 K/uL  Auto Basophil # : 0.03 K/uL  Auto Neutrophil % : 73.1 %  Auto Lymphocyte % : 13.6 %  Auto Monocyte % : 10.1 %  Auto Eosinophil % : 2.5 %  Auto Basophil % : 0.3 %    03-04    143  |  113<H>  |  31<H>  ----------------------------<  162<H>  3.8   |  29  |  1.00    Ca    8.6      04 Mar 2023 03:50  Phos  2.0     03-04  Mg     1.9     03-04    TPro  5.0<L>  /  Alb  1.9<L>  /  TBili  0.3  /  DBili  x   /  AST  15  /  ALT  16  /  AlkPhos  65  03-04        Culture Results:   No growth to date. (03-02 @ 13:34)  Culture Results:   No growth to date. (03-02 @ 13:15)      RADIOLOGY & ADDITIONAL STUDIES REVIEWED:  ***    [ ]GOALS OF CARE DISCUSSION WITH PATIENT/FAMILY/PROXY:    CRITICAL CARE TIME SPENT: 35 minutes

## 2023-03-05 NOTE — CONSULT NOTE ADULT - SUBJECTIVE AND OBJECTIVE BOX
HISTORY OF PRESENT ILLNESS:77-year-old female, from home, lives with son, has HHA, bedbound, AAO x 1 at baseline, non verbal, answers yes or no and moans, has past medical history of CVA, with right sided residual paralysis, seizures on Keppra, hyperlipidemia, atrial fibrillation on Eliquis and atenolol, hypotension at baseline (SBP , DBP 60-70), bilateral ICA stenosis and recurrent UTI's was BIBEMS as patient was hypoglycemic to 60’s. As per son, patient is always moaning so it’s hard to tell when she is in pain or something is wrong with her. Son noticed she has poor appetite and is always coughing while eating. As per chart review, patient with multiple hospitalizations for ESBL E Coli UTI and aspiration pneumonia.     PAST MEDICAL & SURGICAL HISTORY:  CVA (cerebrovascular accident)      Atrial fibrillation      Carotid stenosis      Seizures      Hyperlipidemia      No significant past surgical history          [ ] Diabetes   [ ] Hypertension  [ ] Hyperlipidemia  [ ] CAD  [ ] PCI  [ ] CABG    PREVIOUS DIAGNOSTIC TESTING:    [ ] Echocardiogram: < from: Transthoracic Echocardiogram (08.02.22 @ 09:36) >  1. Mild posterior mitral annular calcification. Trace  mitral regurgitation.  2. Calcified trileaflet aortic valve with normal opening.  3. Aortic Root: 3.2 cm.    4. Mildly dilated left atrium.  LA volume index = 38 cc/m2.  5. Normal left ventricular internal dimensions and wall  thicknesses.  6. Endocardium not well visualized; grossly normal left  ventricular systolic function.  7. Unable to adequately assess diastolic function due to  technical aspects of this study.  8. Normal right atrium.  9. Normal right ventricular size and systolic function  (TAPSE  1.8cm).  10. Unable to estimate RVSP.  11. There is mild tricuspid regurgitation.  12. There is trace pulmonic regurgitation.  13. Normal pericardium with no pericardial effusion.  14.No vegetations seen on this study, consider EMIR for  further evaluation if clinically indicated.  ------------------------------------------------------------------------  Confirmed on  8/3/2022 - 07:27:11 by Mya Bonilla MD  ------------------------------------------------------------------------    < end of copied text >    [ ]  Catheterization:  [ ] Stress Test:  	    MEDICATIONS:  apixaban 5 milliGRAM(s) Oral every 12 hours  aspirin  chewable 81 milliGRAM(s) Oral daily  metoprolol tartrate 50 milliGRAM(s) Oral two times a day    ertapenem  IVPB 1000 milliGRAM(s) IV Intermittent every 24 hours      levETIRAcetam 750 milliGRAM(s) Oral two times a day  melatonin 5 milliGRAM(s) Oral at bedtime    bisacodyl Suppository 10 milliGRAM(s) Rectal daily  pantoprazole    Tablet 40 milliGRAM(s) Oral before breakfast    atorvastatin 40 milliGRAM(s) Oral at bedtime  insulin lispro (ADMELOG) corrective regimen sliding scale   SubCutaneous three times a day before meals  insulin lispro (ADMELOG) corrective regimen sliding scale   SubCutaneous at bedtime    chlorhexidine 2% Cloths 1 Application(s) Topical <User Schedule>  influenza  Vaccine (HIGH DOSE) 0.7 milliLiter(s) IntraMuscular once  multivitamin 1 Tablet(s) Oral daily  mupirocin 2% Ointment 1 Application(s) Topical two times a day  nystatin Powder 1 Application(s) Topical two times a day  potassium phosphate IVPB 15 milliMole(s) IV Intermittent once  sodium chloride 0.9% lock flush 10 milliLiter(s) IV Push every 1 hour PRN      Allergies    penicillin (Unknown)    Intolerances        FAMILY HISTORY:      SOCIAL HISTORY:    [ ] Non-smoker  [ ] Smoker  [ ] Alcohol      REVIEW OF SYSTEMS:  [ ]chest pain  [  ]shortness of breath  [  ]palpitations  [  ]syncope  [ ]near syncope [  ]diplopia  [  ]altered mental status   [  ]fevers  [ ]chills [ ]nausea  [ ]vomitting  [ ]abdominal pain  [ ]melena  [ ]BRBPR  [  ]epistaxis  [  ]rash  [  ]lower extremity edema      CONSTITUTIONAL: No fever, weight loss, or fatigue  EYES: No eye pain, visual disturbances, or discharge  ENMT:  No difficulty hearing, tinnitus, vertigo; No sinus or throat pain  NECK: No pain or stiffness  RESPIRATORY: No cough, wheezing, chills or hemoptysis; No Shortness of Breath  CARDIOVASCULAR: No chest pain, palpitations, passing out, dizziness, or leg swelling  GASTROINTESTINAL: No abdominal or epigastric pain. No nausea, vomiting, or hematemesis; No diarrhea or constipation. No melena or hematochezia.  GENITOURINARY: No dysuria, frequency, hematuria, or incontinence  NEUROLOGICAL: No headaches, memory loss, loss of strength, numbness, or tremors  SKIN: No itching, burning, rashes, or lesions   LYMPH Nodes: No enlarged glands  ENDOCRINE: No heat or cold intolerance; No hair loss  MUSCULOSKELETAL: No joint pain or swelling; No muscle, back, or extremity pain  PSYCHIATRIC: No depression, anxiety, mood swings, or difficulty sleeping  HEME/LYMPH: No easy bruising, or bleeding gums  ALLERY AND IMMUNOLOGIC: No hives or eczema	    [ ] All others negative	  [ ] Unable to obtain    PHYSICAL EXAM:  T(C): 35.9 (03-05-23 @ 07:00), Max: 36.7 (03-04-23 @ 17:25)  HR: 107 (03-05-23 @ 08:00) (90 - 137)  BP: 134/76 (03-05-23 @ 07:00) (92/53 - 164/83)  RR: 26 (03-05-23 @ 08:00) (0 - 45)  SpO2: 95% (03-05-23 @ 08:00) (88% - 95%)  Wt(kg): --  I&O's Summary    04 Mar 2023 07:01  -  05 Mar 2023 07:00  --------------------------------------------------------  IN: 340 mL / OUT: 855 mL / NET: -515 mL        Appearance: Normal	  HEENT:   Normal oral mucosa, PERRL, EOMI	  Lymphatic: No lymphadenopathy  Cardiovascular: Normal S1 S2, No JVD, No murmurs, No edema  Respiratory: Lungs clear to auscultation	  Psychiatry: A & O x 3, Mood & affect appropriate  Gastrointestinal:  Soft, Non-tender, + BS	  Skin: No rashes, No ecchymoses, No cyanosis	  Neurologic: Non-focal  Extremities: Normal range of motion, No clubbing, cyanosis or edema  Vascular: Peripheral pulses palpable 2+ bilaterally    TELEMETRY: 	Afib 100-130   ECG:  	Afib 110 , NAD, no acute ischemia   RADIOLOGY: < from: CT Chest No Cont (02.28.23 @ 22:25) >  IMPRESSION:    Moderate right and small left pleural effusion extending to the apices   with adjacent patchy somewhat consolidative opacities, most pronounced in   both lower lobes. This may represent atelectasis or developing pneumonia.   Aspiration precaution should be followed.    Incompletely characterized thyroid lobe nodules for which nonemergent   ultrasound correlation is advised.    Additional findings as mentioned above.      --- End of Report ---    < end of copied text >    OTHER: 	  	  LABS:	 	    CARDIAC MARKERS:                                  13.3   12.73 )-----------( 169      ( 05 Mar 2023 06:15 )             43.5     03-05    143  |  113<H>  |  26<H>  ----------------------------<  163<H>  4.8   |  26  |  0.92    Ca    9.1      05 Mar 2023 06:15  Phos  2.4     03-05  Mg     2.0     03-05    TPro  5.9<L>  /  Alb  2.2<L>  /  TBili  0.4  /  DBili  x   /  AST  14  /  ALT  18  /  AlkPhos  92  03-05    proBNP:   Lipid Profile:   HgA1c:   TSH:     ASSESSMENT/PLAN: 	77-year-old female, from home, lives with son, has HHA, bedbound, AAO x 1 at baseline, non verbal, answers yes or no and moans, has past medical history of CVA, with right sided residual paralysis, seizures on Keppra, hyperlipidemia, atrial fibrillation on Eliquis and atenolol, hypotension, bilateral ICA stenosis and recurrent UTI's at baseline. now with septic shock. Cardiology consulted for Afib with RVR.      Increase Hr related to ongoing infection   cont ABX, follow up on cultures   A/C with eliquis for Afib .   ASA, statin   cont BB, hr stable this am   Bp stable at present , no pressors at present   echo noted   D/W Dr Arrieta      HISTORY OF PRESENT ILLNESS:77-year-old female, from home, lives with son, has HHA, bedbound, AAO x 1 at baseline, non verbal, answers yes or no and moans, has past medical history of CVA, with right sided residual paralysis, seizures on Keppra, hyperlipidemia, atrial fibrillation on Eliquis and atenolol, hypotension at baseline (SBP , DBP 60-70), bilateral ICA stenosis and recurrent UTI's was BIBEMS as patient was hypoglycemic to 60’s. As per son, patient is always moaning so it’s hard to tell when she is in pain or something is wrong with her. Son noticed she has poor appetite and is always coughing while eating. As per chart review, patient with multiple hospitalizations for ESBL E Coli UTI and aspiration pneumonia.     PAST MEDICAL & SURGICAL HISTORY:  CVA (cerebrovascular accident)      Atrial fibrillation      Carotid stenosis      Seizures      Hyperlipidemia      No significant past surgical history          [ ] Diabetes   [ ] Hypertension  [ ] Hyperlipidemia  [ ] CAD  [ ] PCI  [ ] CABG    PREVIOUS DIAGNOSTIC TESTING:    [ ] Echocardiogram: < from: Transthoracic Echocardiogram (08.02.22 @ 09:36) >  1. Mild posterior mitral annular calcification. Trace  mitral regurgitation.  2. Calcified trileaflet aortic valve with normal opening.  3. Aortic Root: 3.2 cm.    4. Mildly dilated left atrium.  LA volume index = 38 cc/m2.  5. Normal left ventricular internal dimensions and wall  thicknesses.  6. Endocardium not well visualized; grossly normal left  ventricular systolic function.  7. Unable to adequately assess diastolic function due to  technical aspects of this study.  8. Normal right atrium.  9. Normal right ventricular size and systolic function  (TAPSE  1.8cm).  10. Unable to estimate RVSP.  11. There is mild tricuspid regurgitation.  12. There is trace pulmonic regurgitation.  13. Normal pericardium with no pericardial effusion.  14.No vegetations seen on this study, consider EMIR for  further evaluation if clinically indicated.  ------------------------------------------------------------------------  Confirmed on  8/3/2022 - 07:27:11 by Mya Bonilla MD  ------------------------------------------------------------------------    < end of copied text >    [ ]  Catheterization:  [ ] Stress Test:  	    MEDICATIONS:  apixaban 5 milliGRAM(s) Oral every 12 hours  aspirin  chewable 81 milliGRAM(s) Oral daily  metoprolol tartrate 50 milliGRAM(s) Oral two times a day    ertapenem  IVPB 1000 milliGRAM(s) IV Intermittent every 24 hours      levETIRAcetam 750 milliGRAM(s) Oral two times a day  melatonin 5 milliGRAM(s) Oral at bedtime    bisacodyl Suppository 10 milliGRAM(s) Rectal daily  pantoprazole    Tablet 40 milliGRAM(s) Oral before breakfast    atorvastatin 40 milliGRAM(s) Oral at bedtime  insulin lispro (ADMELOG) corrective regimen sliding scale   SubCutaneous three times a day before meals  insulin lispro (ADMELOG) corrective regimen sliding scale   SubCutaneous at bedtime    chlorhexidine 2% Cloths 1 Application(s) Topical <User Schedule>  influenza  Vaccine (HIGH DOSE) 0.7 milliLiter(s) IntraMuscular once  multivitamin 1 Tablet(s) Oral daily  mupirocin 2% Ointment 1 Application(s) Topical two times a day  nystatin Powder 1 Application(s) Topical two times a day  potassium phosphate IVPB 15 milliMole(s) IV Intermittent once  sodium chloride 0.9% lock flush 10 milliLiter(s) IV Push every 1 hour PRN      Allergies    penicillin (Unknown)    Intolerances        FAMILY HISTORY:      SOCIAL HISTORY:    [ ] Non-smoker  [ ] Smoker  [ ] Alcohol      REVIEW OF SYSTEMS:  [ ]chest pain  [  ]shortness of breath  [  ]palpitations  [  ]syncope  [ ]near syncope [  ]diplopia  [  ]altered mental status   [  ]fevers  [ ]chills [ ]nausea  [ ]vomitting  [ ]abdominal pain  [ ]melena  [ ]BRBPR  [  ]epistaxis  [  ]rash  [  ]lower extremity edema      CONSTITUTIONAL: No fever, weight loss, or fatigue  EYES: No eye pain, visual disturbances, or discharge  ENMT:  No difficulty hearing, tinnitus, vertigo; No sinus or throat pain  NECK: No pain or stiffness  RESPIRATORY: No cough, wheezing, chills or hemoptysis; No Shortness of Breath  CARDIOVASCULAR: No chest pain, palpitations, passing out, dizziness, or leg swelling  GASTROINTESTINAL: No abdominal or epigastric pain. No nausea, vomiting, or hematemesis; No diarrhea or constipation. No melena or hematochezia.  GENITOURINARY: No dysuria, frequency, hematuria, or incontinence  NEUROLOGICAL: No headaches, memory loss, loss of strength, numbness, or tremors  SKIN: No itching, burning, rashes, or lesions   LYMPH Nodes: No enlarged glands  ENDOCRINE: No heat or cold intolerance; No hair loss  MUSCULOSKELETAL: No joint pain or swelling; No muscle, back, or extremity pain  PSYCHIATRIC: No depression, anxiety, mood swings, or difficulty sleeping  HEME/LYMPH: No easy bruising, or bleeding gums  ALLERY AND IMMUNOLOGIC: No hives or eczema	    [ ] All others negative	  [ ] Unable to obtain    PHYSICAL EXAM:  T(C): 35.9 (03-05-23 @ 07:00), Max: 36.7 (03-04-23 @ 17:25)  HR: 107 (03-05-23 @ 08:00) (90 - 137)  BP: 134/76 (03-05-23 @ 07:00) (92/53 - 164/83)  RR: 26 (03-05-23 @ 08:00) (0 - 45)  SpO2: 95% (03-05-23 @ 08:00) (88% - 95%)  Wt(kg): --  I&O's Summary    04 Mar 2023 07:01  -  05 Mar 2023 07:00  --------------------------------------------------------  IN: 340 mL / OUT: 855 mL / NET: -515 mL        Appearance: Normal	  HEENT:   Normal oral mucosa, PERRL, EOMI	  Lymphatic: No lymphadenopathy  Cardiovascular: Normal S1 S2, No JVD, No murmurs, No edema  Respiratory: Lungs clear to auscultation	  Psychiatry: A & O x 3, Mood & affect appropriate  Gastrointestinal:  Soft, Non-tender, + BS	  Skin: No rashes, No ecchymoses, No cyanosis	  Neurologic: Non-focal  Extremities: Normal range of motion, No clubbing, cyanosis or edema  Vascular: Peripheral pulses palpable 2+ bilaterally    TELEMETRY: 	Afib 100-130   ECG:  	Afib 110 , NAD, no acute ischemia   RADIOLOGY: < from: CT Chest No Cont (02.28.23 @ 22:25) >  IMPRESSION:    Moderate right and small left pleural effusion extending to the apices   with adjacent patchy somewhat consolidative opacities, most pronounced in   both lower lobes. This may represent atelectasis or developing pneumonia.   Aspiration precaution should be followed.    Incompletely characterized thyroid lobe nodules for which nonemergent   ultrasound correlation is advised.    Additional findings as mentioned above.      --- End of Report ---    < end of copied text >    OTHER: 	  	  LABS:	 	    CARDIAC MARKERS:                                  13.3   12.73 )-----------( 169      ( 05 Mar 2023 06:15 )             43.5     03-05    143  |  113<H>  |  26<H>  ----------------------------<  163<H>  4.8   |  26  |  0.92    Ca    9.1      05 Mar 2023 06:15  Phos  2.4     03-05  Mg     2.0     03-05    TPro  5.9<L>  /  Alb  2.2<L>  /  TBili  0.4  /  DBili  x   /  AST  14  /  ALT  18  /  AlkPhos  92  03-05    proBNP:   Lipid Profile:   HgA1c:   TSH:     ASSESSMENT/PLAN: 	77-year-old female, from home, lives with son, has HHA, bedbound, AAO x 1 at baseline, non verbal, answers yes or no and moans, has past medical history of CVA, with right sided residual paralysis, seizures on Keppra, hyperlipidemia, atrial fibrillation on Eliquis and atenolol, hypotension, bilateral ICA stenosis and recurrent UTI's at baseline. now with septic shock. Cardiology consulted for Afib with RVR.      Increase Hr related to ongoing infection   cont ABX, follow up on cultures   A/C with eliquis for Afib .   ASA, statin   cont BB, hr stable this am   Bp stable at present , no pressors at present     D/W Dr Cerna

## 2023-03-05 NOTE — PROGRESS NOTE ADULT - ATTENDING COMMENTS
77-year-old female, from home, lives with son, has HHA, bedbound, AAO x 1 at baseline, non verbal, answers yes or no and moans, has past medical history of CVA, with right sided residual paralysis, seizures on Keppra, hyperlipidemia, atrial fibrillation on Eliquis and atenolol, hypotension, bilateral ICA stenosis and recurrent UTI's at baseline is admitted to ICU for septic shock     DX:    - Acute Hypoxic Resp Failure   - Asp PNA   - Septic shock 2/2 UTI and aspiration pneumonia   - Bacteremia : E. coli   - Acute encephalopathy  - Bacteremia : ESBL e.coli    - DEJA  - H/o CVA  - H/o seizures   - Atrial fibrillation   - HLD      Plan     - No sedation    - Continue O2 supp as needed to maintain Sat>90%  - Moderate right pleural effusion   - Risk of thoracentesis out weigh the benefit at this time . Pat is bacteremic   - Therapeutic anticoag  for afib   - Asp precaution   - Dysphagia diet   - Hemodynamic monitoring   - Off pressors   - Continue antibx   - IVF   - Monitor renal function   - DVt GI prophy  - DNR DNI .
77-year-old female, from home, lives with son, has HHA, bedbound, AAO x 1 at baseline, non verbal, answers yes or no and moans, has past medical history of CVA, with right sided residual paralysis, seizures on Keppra, hyperlipidemia, atrial fibrillation on Eliquis and atenolol, hypotension, bilateral ICA stenosis and recurrent UTI's at baseline is admitted to ICU for septic shock     DX:    - Acute Hypoxic Resp Failure   - Asp PNA   - Septic shock 2/2 UTI and aspiration pneumonia   - Bacteremia : E. coli   - Acute encephalopathy  - Bacteremia : ESBL e.coli    - DEJA  - H/o CVA  - H/o seizures   - Atrial fibrillation   - HLD      Plan     - No sedation   - Monitor mental status   - Continue O2 supp as needed to maintain Sat>90%  - Moderate right pleural effusion   - Risk of thoracentesis out weigh the benefit at this time . Pat is bacteremic   - Therapeutic anticoag    - Asp precaution   - Dysphagia diet   - Hemodynamic monitoring   - Taper off vasopressors while maintaining MAP>65  - Continue antibx   - IVF   - Monitor renal function   - DVt GI prophy  - DNR DNI .
77-year-old female, from home, lives with son, has HHA, bedbound, AAO x 1 at baseline, non verbal, answers yes or no and moans, has past medical history of CVA, with right sided residual paralysis, seizures on Keppra, hyperlipidemia, atrial fibrillation on Eliquis and atenolol, hypotension, bilateral ICA stenosis and recurrent UTI's at baseline is admitted to ICU for septic shock     DX:    - Acute Hypoxic Resp Failure   - Asp PNA   - Septic shock 2/2 UTI and aspiration pneumonia   - Acute encephalopathy  - Bacteremia : ESBL e.coli    - DEJA  - H/o CVA  - H/o seizures   - Atrial fibrillation   - HLD      Plan     - No sedation   - Monitor mental status   - Continue O2 supp as needed to maintain Sat>90%  - Moderate right pleural effusion   - Risk of thoracentesis out weigh the benefit at this time . Pat is bacteremic   - Resume therapeutic anticoag    - Asp precaution   - Dysphagia diet   - Hemodynamic monitoring   - Continue antibx   - IVF   - Monitor renal function   - DVt GI prophy  - DNR DNI .
77-year-old female, from home, lives with son, has HHA, bedbound, AAO x 1 at baseline, non verbal, answers yes or no and moans, has past medical history of CVA, with right sided residual paralysis, seizures on Keppra, hyperlipidemia, atrial fibrillation on Eliquis and atenolol, hypotension, bilateral ICA stenosis and recurrent UTI's at baseline is admitted to ICU for septic shock     DX:    - Acute Hypoxic Resp Failure   - Asp PNA   - Septic shock 2/2 UTI and aspiration pneumonia   - Acute encephalopathy   - DEJA  - H/o CVA  - H/o seizures   - Atrial fibrillation   - HLD      Plan     - No sedation   - Monitor mental status   - Continue O2 supp as needed to maintain Sat>90%  - Moderate right pleural effusion   - Possible thoracentesis   - Hold lovenox , start hep gtt   - Asp precaution   - NPO   - Hemodynamic monitoring   - Continue antibx   - F/U cultures   - IVF   - Monitor renal function   - DVt GI prophy  - DNR DNI

## 2023-03-05 NOTE — CHART NOTE - NSCHARTNOTEFT_GEN_A_CORE
Notified by RN that patient urinated however bladder scan showed 400cc urinary retention. Informed the RN to kindly straight cath and do TOV at 6 AM. If patient unable to urinated and still retaining will do the Ly catheter in AM.   Discussed with the senior resident Dr. Galindo. Notified by RN that patient urinated however bladder scan showed 400cc urinary retention. Informed the RN to kindly straight cath and do TOV at 6 AM. If patient unable to urinated and still retaining will do the Ferrari catheter in AM.   Discussed with the senior resident Dr. Galindo.    Addendum:   Notified by RN that patient is retaining, bladder scan showed 800 ml urine. RN stated that unable to insert ferrari catheter in multiple attempts. Notified the Urology through ext [phone]. PA requested to call back at 7:30 for morning team to attempt the ferrari catheter insertion. Primary team to follow up at 7:30 AM.

## 2023-03-06 NOTE — CONSULT NOTE ADULT - ASSESSMENT
.
Agree with above assessment and plan as outlined above.    - f/u echo  - Ep eval dr. madhavi Cerna MD, [facility]  BEEPER [phone]  
Septic shock  Bilat Pneumonia  Fevers  Leukocytosis - increasing      Plan - Cont Meropenem 1 gm iv q8hrs  Cont Azithromycin 500mgs iv q24hrs  await culture results.

## 2023-03-06 NOTE — CONSULT NOTE ADULT - SUBJECTIVE AND OBJECTIVE BOX
Time of visit:    CHIEF COMPLAINT: Patient is a 77y old  Female who presents with a chief complaint of SEPTIC SHOCK (06 Mar 2023 13:00)      HPI:  77-year-old female, from home, lives with son, has HHA, bedbound, AAO x 1 at baseline, non verbal, answers yes or no and moans, has past medical history of CVA, with right sided residual paralysis, seizures on Keppra, hyperlipidemia, atrial fibrillation on Eliquis and atenolol, hypotension at baseline (SBP , DBP 60-70), bilateral ICA stenosis and recurrent UTI's was BIBEMS as patient was hypoglycemic to 60’s. As per son, patient is always moaning so it’s hard to tell when she is in pain or something is wrong with her. Son noticed she has poor appetite and is always coughing while eating. As per chart review, patient with multiple hospitalizations for ESBL E Coli UTI and aspiration pneumonia.  (28 Feb 2023 20:55)   Patient seen and examined.     PAST MEDICAL & SURGICAL HISTORY:  CVA (cerebrovascular accident)      Atrial fibrillation      Carotid stenosis      Seizures      Hyperlipidemia      No significant past surgical history          Allergies    penicillin (Unknown)    Intolerances        MEDICATIONS  (STANDING):  apixaban 5 milliGRAM(s) Oral every 12 hours  aspirin  chewable 81 milliGRAM(s) Oral daily  atorvastatin 40 milliGRAM(s) Oral at bedtime  bisacodyl Suppository 10 milliGRAM(s) Rectal daily  chlorhexidine 2% Cloths 1 Application(s) Topical <User Schedule>  ertapenem  IVPB 1000 milliGRAM(s) IV Intermittent every 24 hours  influenza  Vaccine (HIGH DOSE) 0.7 milliLiter(s) IntraMuscular once  insulin lispro (ADMELOG) corrective regimen sliding scale   SubCutaneous at bedtime  insulin lispro (ADMELOG) corrective regimen sliding scale   SubCutaneous three times a day before meals  levETIRAcetam 750 milliGRAM(s) Oral two times a day  melatonin 5 milliGRAM(s) Oral at bedtime  metoprolol tartrate 50 milliGRAM(s) Oral two times a day  multivitamin 1 Tablet(s) Oral daily  mupirocin 2% Ointment 1 Application(s) Topical two times a day  nystatin Powder 1 Application(s) Topical two times a day  pantoprazole    Tablet 40 milliGRAM(s) Oral before breakfast      MEDICATIONS  (PRN):  sodium chloride 0.9% lock flush 10 milliLiter(s) IV Push every 1 hour PRN Pre/post blood products, medications, blood draw, and to maintain line patency   Medications up to date at time of exam.    Medications up to date at time of exam.    FAMILY HISTORY:      SOCIAL HISTORY  Smoking History: Non smoker  per son at bedside .  Living Condition: [   ] apartment, [   ] private house  Work History:   Travel History: denies recent travel  Illicit Substance Use: denies  Alcohol Use: denies    REVIEW OF SYSTEMS:    CONSTITUTIONAL:  No fevers, chills on exam.     HEENT:  No sore throat nor runny nose.    CARDIOVASCULAR:  No facial grimace of chest discomfort.     RESPIRATORY:  No cough, nor wheezing on exam .    GASTROINTESTINAL:  No facial grimace of abdominal pain .     GENITOURINARY: No hematuria.     NEUROLOGIC:  No seizures on exam.     PSYCHIATRIC:  No emotional distress on exam.     PHYSICAL EXAMINATION:    Vital Signs Last 24 Hrs  T(C): 36.7 (06 Mar 2023 11:15), Max: 36.9 (05 Mar 2023 16:20)  T(F): 98.1 (06 Mar 2023 11:15), Max: 98.4 (05 Mar 2023 16:20)  HR: 96 (06 Mar 2023 11:15) (70 - 110)  BP: 138/93 (06 Mar 2023 11:15) (126/78 - 158/97)  BP(mean): --  RR: 18 (06 Mar 2023 11:15) (17 - 19)  SpO2: 97% (06 Mar 2023 11:15) (97% - 100%)    Parameters below as of 06 Mar 2023 11:15  Patient On (Oxygen Delivery Method): nasal cannula  O2 Flow (L/min): 2     (if applicable)    General : Forgetful . Poor historian.  No acute distress .     HEENT: Head is normocephalic and atraumatic. No nasal tenderness .  Mucous membranes are moist.     NECK: Supple, no palpable adenopathy.    LUNGS: Fair air entrance . Non labored. No use of accessory muscle.     HEART: S1 S2 irregular rate and no click / rub.     ABDOMEN: Soft, nontender, and nondistended. Active bowel sounds.     ; No bladder distention and tenderness.     NEUROLOGIC: Forgetful , Non verbal. Has Right Hemiparesis.      SKIN: Warm and moist . Non diaphoretic.       LABS:                        13.3   12.96 )-----------( 183      ( 06 Mar 2023 10:36 )             44.0     03-06    144  |  113<H>  |  25<H>  ----------------------------<  152<H>  4.5   |  28  |  0.95    Ca    8.9      06 Mar 2023 10:36  Phos  3.1     03-06  Mg     2.0     03-06    TPro  5.9<L>  /  Alb  2.2<L>  /  TBili  0.4  /  DBili  x   /  AST  14  /  ALT  18  /  AlkPhos  92  03-05                        MICROBIOLOGY: (if applicable)    RADIOLOGY & ADDITIONAL STUDIES:  EKG:   CXR:  ECHO:    IMPRESSION: 77y Female PAST MEDICAL & SURGICAL HISTORY:  CVA (cerebrovascular accident)      Atrial fibrillation      Carotid stenosis      Seizures      Hyperlipidemia      No significant past surgical history    Impression: This is a 76 Y/O Female presented to ED with hypoglycemia FS in  60’s. As per son, patient is always moaning so it’s hard to tell when she is in pain or something is wrong with her. Son noticed she has poor appetite and is always coughing while eating. Had Hx of multiple hospitalizations for ESBL E Coli UTI and aspiration pneumonia.  Was admitted to ICU due to Acute hypoxic respiratory failure due to Septic shock secondary to  UTI, Aspiration Pneumonia , RLL Pneumonia. Now in Medicine Unit and ongoing Oxygen supplementation  due to Acute hypoxic respiratory failure . 02-28-23 Negative swab for Covid 19 , Legionella Urine. CT chest with moderate Right pleural effusion and small left pleural effusion with adjacent patchy consolidative opacities.      Suggestion:  O2 saturation 96% with O2 supplement. Continue Oxygen supplementation 2L NC.  Aspiration precautions with HOB elevation during meal times.   Oral hygiene care.   Per ID , on Ertapenem 1 Gm IVPB Daily for Bacteremia .   On Apixaban 5 mg Q 12 Hours.

## 2023-03-06 NOTE — PROGRESS NOTE ADULT - PROBLEM SELECTOR PLAN 2
- Hypoxia resolved  - CXR and CT showed RLL pneumonia and pleural effusion  - rest of management as above - Hypoxia resolving  - CXR and CT showed RLL pneumonia and pleural effusion  - Pulmonology Dr. Jimenez   - rest of management as above

## 2023-03-06 NOTE — PROGRESS NOTE ADULT - SUBJECTIVE AND OBJECTIVE BOX
EP ATTENDING      HISTORY OF PRESENT ILLNESS: She is a 76 y/o female with persistent AF since at least April 2021. Her AF is managed with eliquis and atenolol. Her echocardiogram is unremarkable. She is now a/w sepsis likely secondary to aspiration pneumonia and recent gram negative bacteremia. In this setting EP is now called for rapid AF. She denies angina nor syncope, but can not participate in a full ROS.  Date of service 3/6- resting in bed.  nonverbal. joined by family.  no overnight issues.    PAST MEDICAL & SURGICAL HISTORY:  CVA (cerebrovascular accident)  persistent Atrial fibrillation  Carotid stenosis  Seizure disorder  Hyperlipidemia      No significant past surgical history    apixaban 5 milliGRAM(s) Oral every 12 hours  aspirin  chewable 81 milliGRAM(s) Oral daily  atorvastatin 40 milliGRAM(s) Oral at bedtime  bisacodyl Suppository 10 milliGRAM(s) Rectal daily  chlorhexidine 2% Cloths 1 Application(s) Topical <User Schedule>  ertapenem  IVPB 1000 milliGRAM(s) IV Intermittent every 24 hours  influenza  Vaccine (HIGH DOSE) 0.7 milliLiter(s) IntraMuscular once  insulin lispro (ADMELOG) corrective regimen sliding scale   SubCutaneous at bedtime  insulin lispro (ADMELOG) corrective regimen sliding scale   SubCutaneous three times a day before meals  levETIRAcetam 750 milliGRAM(s) Oral two times a day  melatonin 5 milliGRAM(s) Oral at bedtime  metoprolol tartrate 50 milliGRAM(s) Oral two times a day  multivitamin 1 Tablet(s) Oral daily  mupirocin 2% Ointment 1 Application(s) Topical two times a day  nystatin Powder 1 Application(s) Topical two times a day  pantoprazole    Tablet 40 milliGRAM(s) Oral before breakfast  sodium chloride 0.9% lock flush 10 milliLiter(s) IV Push every 1 hour PRN                            13.3   12.96 )-----------( 183      ( 06 Mar 2023 10:36 )             44.0       03-06    144  |  113<H>  |  25<H>  ----------------------------<  152<H>  4.5   |  28  |  0.95    Ca    8.9      06 Mar 2023 10:36  Phos  3.1     03-06  Mg     2.0     03-06    TPro  5.9<L>  /  Alb  2.2<L>  /  TBili  0.4  /  DBili  x   /  AST  14  /  ALT  18  /  AlkPhos  92  03-05      T(C): 36.7 (03-06-23 @ 11:15), Max: 36.9 (03-05-23 @ 16:20)  HR: 96 (03-06-23 @ 11:15) (70 - 110)  BP: 138/93 (03-06-23 @ 11:15) (126/78 - 158/97)  RR: 18 (03-06-23 @ 11:15) (17 - 19)  SpO2: 97% (03-06-23 @ 11:15) (97% - 100%)  Wt(kg): --    I&O's Summary    05 Mar 2023 07:01  -  06 Mar 2023 07:00  --------------------------------------------------------  IN: 0 mL / OUT: 150 mL / NET: -150 mL    06 Mar 2023 07:01  -  06 Mar 2023 13:01  --------------------------------------------------------  IN: 0 mL / OUT: 200 mL / NET: -200 mL      Gen: elderly  woman in no acute distress, awake and makes eyecontact but nonverbal.  HEENT:   Normal oral mucosa, PERRL, EOMI	  Lymphatic: No lymphadenopathy , no edema  Cardiovascular: IRR Normal S1 S2, No JVD, No murmurs , Peripheral pulses palpable 2+ bilaterally  Respiratory: Lungs clear to auscultation, normal effort 	  Gastrointestinal:  Soft, Non-tender, + BS	  Skin: No rashes, No ecchymoses, No cyanosis, warm to touch        TELEMETRY: AFib, HR 110bpm.	    ECG:  	AFib  Echo:  < from: Transthoracic Echocardiogram (08.02.22 @ 09:36) >  DIMENSIONS:  Dimensions:     Normal Values:  LA:     4.3 cm    2.0 - 4.0 cm  Ao:     3.2 cm    2.0 - 3.8 cm  SEPTUM: 1.3 cm    0.6 - 1.2 cm  PWT:    1.3 cm    0.6 - 1.1 cm  LVIDd:  3.9 cm    3.0 - 5.6 cm  LVIDs:  2.6 cm    1.8 - 4.0 cm      Derived Variables:  LVMI: 86 g/m2  RWT: 0.66  Ejection Fraction Visual Estimate: 55 %    ------------------------------------------------------------------------  OBSERVATIONS:  Mitral Valve: Mild posterior mitral annular calcification.  Trace mitral regurgitation.  Aortic Root: Aortic Root: 3.2 cm.    Aortic Valve: Calcified trileaflet aortic valve with normal  opening.  Left Atrium: Mildly dilated left atrium.  LA volume index =  38 cc/m2.  Left Ventricle: Endocardium not well visualized; grossly  normal left ventricular systolic function. Normal left  ventricular internal dimensions and wall thicknesses.  Unable to adequately assess diastolic function due to  technical aspects of this study.  Right Heart: Normal right atrium. Normal right ventricular  size and systolic function (TAPSE  1.8cm). There is mild  tricuspid regurgitation. There is trace pulmonic  regurgitation.  Pericardium/PleuraNormal pericardium with no pericardial  effusion.  Hemodynamic: Unable to estimate RVSP.    < end of copied text >    A/P) She is a 76 y/o female with longstanding persistent AF since at least April 2021. Her AF is managed with eliquis and atenolol. Her echocardiogram is unremarkable. She is now a/w sepsis likely secondary to aspiration pneumonia and recent gram negative bacteremia. In this setting EP is now called for rapid AF. She denies angina nor syncope, but can not participate in a full ROS.    -continue eliquis for lifelong a/c  -continue aspirin and lipitor for carotid artery disease  -continue metoprolol 50 bid for rate control of AF  -considering AF is chronic may add digoxin 0.125mg daily  -poor candidate for a rhythm control strategy, due to her condition after prior stroke.  -the long term management of her AF is rate control and anticoagulation    Chris Adams M.D.  Cardiac Electrophysiology  [phone]

## 2023-03-06 NOTE — PROGRESS NOTE ADULT - ASSESSMENT
77-year-old female, from home, lives with son, has HHA, bedbound, AAO x 1 at baseline, non verbal, answers yes or no and moans, has past medical history of CVA, with right sided residual paralysis, seizures on Keppra, hyperlipidemia, atrial fibrillation on Eliquis and atenolol, hypotension, bilateral ICA stenosis and recurrent UTI's at baseline is admitted to ICU for septic shock     DX:  - Septic shock 2/2 UTI and aspiration pneumonia (resolving)  - AHRF 2/2 Pneumonia (resolving)  - Acute encephalopathy (resolving)  - DEJA  - H/o CVA  - H/o seizures   - Atrial fibrillation   - HLD    =================== Neuro============================  Alert and oriented x 1 at baseline   Pain management with Tylenol    #Acute encephalopathy (resolved)  - Patient admitted for acute encephalopathy   - Metabolic and Infectious   - CT Head is neg for hemorrhage or mass  - CT Chest Moderate right and small left pleural effusion, consolidative opacities, most pronounced in both lower lobes,   -  Pureed Diet  - Fall precautions/Aspiration precautions/Seizure precautions  - c/w  antibiotics ertapenem   - Bcx showed ESBL Ecoli, Ucx Ecoli,   - ESR, CRP neg   - legionella, streptococcus, HIV, MRSA  Neg  - Lactate 1.8  - f/u repeat BCx, mycoplasma  - Patient back to [facility]  - ID consulted Dr. ruff    #H/o CVA  - Right sided paralysis   - On asa and Eliquis at home  - c/w home meds     #H/o seizures  - Patient on Keppra at home  - C/w home medications    ================= Cardiovascular==========================  #Septic shock 2/2 UTI and aspiration pneumonia  - On admission, patient with tachycardia, hypotension, leucocytosis, elevated lactate, UA + and RLL pneumonia   - In ED s/p 3L IVF , Vancomycin, Rocephin and Zithromax x1   - Started on Meropenem and Zithromax, pressor support and Tylenol for fever  - Norepinephrine discontinued      #Atrial fibrillation   - Continue eliquis  - Atenolol switch to metoprolol   - Formal cardiology consult pending: Dr Cerna     ================- Pulm=================================  #AHRF 2/2 Pneumonia (resolved)   - Hypoxia resolved  - CXR showed RLL pneumonia   - S/p Vancomycin, Rocephin and Zithromax   - c/w Ertapenem  - BCx grows ESBL Ecoli  - Repeat BCx negative (3/2)  - ID consulted Dr. Ruff    # Pleural effusion  -CT chest shows Moderate right and small left pleural effusion  - Pt in no respiratory distress  - Thoracentesis, risk outweigh benefit. will hold off thoracentesis   - plan was discussed with family     ==================ID===================================  #Septic shock 2/2 UTI and aspiration pneumonia  - Plan as above      #AHRF 2/2 Pneumonia  - Plan as above      ================= Nephro================================  - On admission, patient with Cr 1.1, baseline 0.6  - DEJA most likely pre renal in the setting of acute infection, poor oral intake  - Monitor I/O's daily  - Avoid nephrotoxins, NSAIDS, ACEI and ARBS  - S/p 3L IVF in ED  - Monitor BMP daily    =================GI====================================  Pureed diet  ================ Heme==================================  No acute issues     =================Endocrine===============================  #Thyroid nodule  - CT shows thyroid lobe nodules   - nonemergent ultrasound correlation o/p   - TSH is normal      ================= Skin/Catheters============================  # Dermatitis of the skin  Pt has abdominal rash with erythematous, raised papules  likely fungal infection contact dermatitis  started Nystatin powder     Peripheral IV lines   Ly catheter   RIJ 2/28 (will DC today)    =================Prophylaxis =============================  Eliquis   GI prophylaxis with PPI daily    ==================GOC==================================  DNR DNI   Stable for downgrade to telemetry    77-year-old female, from home, lives with son, has HHA, bedbound, AAO x 1 at baseline, non verbal, answers yes or no and moans, has past medical history of CVA, with right sided residual paralysis, seizures on Keppra, hyperlipidemia, atrial fibrillation on Eliquis and atenolol, hypotension, bilateral ICA stenosis and recurrent UTI's at baseline is admitted to ICU for septic shock, stabilized and downgraded.

## 2023-03-06 NOTE — PROGRESS NOTE ADULT - SUBJECTIVE AND OBJECTIVE BOX
C A R D I O L O G Y  **********************************     DATE OF SERVICE: 03-06-23    Essentially non-verbal.  Unable to obtain review of symptoms.    	  MEDICATIONS:  MEDICATIONS  (STANDING):  apixaban 5 milliGRAM(s) Oral every 12 hours  aspirin  chewable 81 milliGRAM(s) Oral daily  atorvastatin 40 milliGRAM(s) Oral at bedtime  bisacodyl Suppository 10 milliGRAM(s) Rectal daily  chlorhexidine 2% Cloths 1 Application(s) Topical <User Schedule>  ertapenem  IVPB 1000 milliGRAM(s) IV Intermittent every 24 hours  influenza  Vaccine (HIGH DOSE) 0.7 milliLiter(s) IntraMuscular once  insulin lispro (ADMELOG) corrective regimen sliding scale   SubCutaneous at bedtime  insulin lispro (ADMELOG) corrective regimen sliding scale   SubCutaneous three times a day before meals  levETIRAcetam 750 milliGRAM(s) Oral two times a day  melatonin 5 milliGRAM(s) Oral at bedtime  metoprolol tartrate 50 milliGRAM(s) Oral two times a day  multivitamin 1 Tablet(s) Oral daily  mupirocin 2% Ointment 1 Application(s) Topical two times a day  nystatin Powder 1 Application(s) Topical two times a day  pantoprazole    Tablet 40 milliGRAM(s) Oral before breakfast      LABS:	 	    CARDIAC MARKERS:                            13.3   12.96 )-----------( 183      ( 06 Mar 2023 10:36 )             44.0     Hemoglobin: 13.3 g/dL (03-06 @ 10:36)  Hemoglobin: 13.3 g/dL (03-05 @ 06:15)  Hemoglobin: 11.1 g/dL (03-04 @ 03:50)  Hemoglobin: 12.3 g/dL (03-03 @ 04:22)  Hemoglobin: 13.2 g/dL (03-02 @ 04:02)      03-06    144  |  113<H>  |  25<H>  ----------------------------<  152<H>  4.5   |  28  |  0.95    Ca    8.9      06 Mar 2023 10:36  Phos  3.1     03-06  Mg     2.0     03-06    TPro  5.9<L>  /  Alb  2.2<L>  /  TBili  0.4  /  DBili  x   /  AST  14  /  ALT  18  /  AlkPhos  92  03-05    Creatinine Trend: 0.95<--, 0.92<--, 1.00<--, 0.95<--, 1.22<--, 1.17<--    PHYSICAL EXAM:  T(C): 36.7 (03-06-23 @ 11:15), Max: 37 (03-05-23 @ 13:00)  HR: 96 (03-06-23 @ 11:15) (70 - 110)  BP: 138/93 (03-06-23 @ 11:15) (126/78 - 158/97)  RR: 18 (03-06-23 @ 11:15) (17 - 29)  SpO2: 97% (03-06-23 @ 11:15) (87% - 100%)  Wt(kg): --  I&O's Summary    05 Mar 2023 07:01  -  06 Mar 2023 07:00  --------------------------------------------------------  IN: 0 mL / OUT: 150 mL / NET: -150 mL    06 Mar 2023 07:01  -  06 Mar 2023 11:31  --------------------------------------------------------  IN: 0 mL / OUT: 200 mL / NET: -200 mL      HEENT:  (-)icterus (-)pallor  CV: N S1 S2 1/6 PENNY (+)2 Pulses B/l  Resp:  Clear to ausculatation B/L, normal effort  GI: (+) BS Soft, NT, ND  Lymph:  (-)Edema, (-)obvious lymphadenopathy  Skin: Warm to touch, Normal turgor  Psych: unable to adequately assessmood and affect      TELEMETRY: 	  afib        ASSESSMENT/PLAN: 	77y  Female from home, lives with son, has HHA, bedbound, AAO x 1 at baseline, non verbal, answers yes or no and moans, has past medical history of afib on eliquis CVA,  with right sided residual paralysis, seizures on Keppra, hyperlipidemia, atrial fibrillation on Eliquis and atenolol, hypotension, bilateral ICA stenosis and recurrent UTI's at baseline. admitted  with septic shock. Cardiology consulted for Afib with RVR.       # afib  - HR appropriate for critical illness  - EP eval appreciated  - cont eliquis if no invasive procedures planned  - f/u echo    # Sepsis  - Abx per primary team    Baltazar Cerna MD, [facility]  BEEPER [phone]

## 2023-03-06 NOTE — PROGRESS NOTE ADULT - PROBLEM SELECTOR PLAN 7
- Continue eliquis  - Atenolol switch to metoprolol   - Cards Dr. Cerna - Continue eliquis  - Atenolol switch to metoprolol   - Cards Dr. Cerna, EP Dr. Adams  - rec rate control, cont metoprolol. Consider adding digoxin 0.125 qd if pt's HR uncontrolled

## 2023-03-06 NOTE — PROGRESS NOTE ADULT - PROBLEM SELECTOR PLAN 3
Pt failed TOV on 3/5-3/6. Ly replaced by urology.  Cont to monitor I/O  Reattempt TOV in a few days  Consider flomax

## 2023-03-06 NOTE — PROGRESS NOTE ADULT - PROBLEM SELECTOR PLAN 1
- On admission, patient with tachycardia, hypotension, leucocytosis, elevated lactate, UA + and RLL pneumonia   - In ED s/p 3L IVF , Vancomycin, Rocephin and Zithromax x1   - Admitted to ICU for septic shock likely 2/2 UTI and asp PNA requiring pressers  - Bcx pos for ESBL E. coli on admission, Ucx pos for E coli  - Started on Meropenem and Zithromax, pressor support and Tylenol for fever  ---> transitioned to ertapenem  - Repeat bcx negative (3/2)  - ID Dr. Ruff  - Pt weaned off levo, downgraded to floor  - 14 days of IV antibiotics from start of carbapenem, pt will need PICC or midline - On admission, patient with tachycardia, hypotension, leucocytosis, elevated lactate, UA + and RLL pneumonia   - In ED s/p 3L IVF , Vancomycin, Rocephin and Zithromax x1   - Admitted to ICU for septic shock likely 2/2 UTI and asp PNA requiring pressers  - Bcx pos for ESBL E. coli on admission, Ucx pos for E coli  - Started on Meropenem and Zithromax, pressor support and Tylenol for fever  ---> transitioned to ertapenem  - Repeat bcx negative (3/2)  - ID Dr. Ruff  - Pt weaned off levo, downgraded to floor  - 14 days of IV antibiotics 3/1 - 3/14, pt will need PICC or midline for d/c

## 2023-03-06 NOTE — PROGRESS NOTE ADULT - SUBJECTIVE AND OBJECTIVE BOX
PGY-1 Progress Note discussed with attending    PAGER #: [[phone]]  PLEASE CONTACT ON CALL TEAM:  - On Call Team (Please refer to Marta) FROM 5:00 PM - 8:30PM  - Nightfloat Team FROM 8:30 -7:30 AM    CHIEF COMPLAINT & BRIEF HOSPITAL COURSE:      INTERVAL HPI/OVERNIGHT EVENTS:   Downgraded to floor. Failed TOV, retaining >800cc urine on bladder scan. Overnight team unable to straight cath or place ferrari. Urology consulted this morning.    REVIEW OF SYSTEMS:  Unable to assess 2/2 mental status    MEDICATIONS  (STANDING):  apixaban 5 milliGRAM(s) Oral every 12 hours  aspirin  chewable 81 milliGRAM(s) Oral daily  atorvastatin 40 milliGRAM(s) Oral at bedtime  bisacodyl Suppository 10 milliGRAM(s) Rectal daily  chlorhexidine 2% Cloths 1 Application(s) Topical <User Schedule>  ertapenem  IVPB 1000 milliGRAM(s) IV Intermittent every 24 hours  influenza  Vaccine (HIGH DOSE) 0.7 milliLiter(s) IntraMuscular once  insulin lispro (ADMELOG) corrective regimen sliding scale   SubCutaneous at bedtime  insulin lispro (ADMELOG) corrective regimen sliding scale   SubCutaneous three times a day before meals  levETIRAcetam 750 milliGRAM(s) Oral two times a day  melatonin 5 milliGRAM(s) Oral at bedtime  metoprolol tartrate 50 milliGRAM(s) Oral two times a day  multivitamin 1 Tablet(s) Oral daily  mupirocin 2% Ointment 1 Application(s) Topical two times a day  nystatin Powder 1 Application(s) Topical two times a day  pantoprazole    Tablet 40 milliGRAM(s) Oral before breakfast    MEDICATIONS  (PRN):  sodium chloride 0.9% lock flush 10 milliLiter(s) IV Push every 1 hour PRN Pre/post blood products, medications, blood draw, and to maintain line patency      Vital Signs Last 24 Hrs  T(C): 36.3 (06 Mar 2023 07:14), Max: 37 (05 Mar 2023 13:00)  T(F): 97.3 (06 Mar 2023 07:14), Max: 98.6 (05 Mar 2023 13:00)  HR: 70 (06 Mar 2023 07:14) (70 - 122)  BP: 158/97 (06 Mar 2023 07:14) (126/78 - 158/97)  BP(mean): 86 (05 Mar 2023 11:00) (86 - 86)  RR: 17 (06 Mar 2023 07:14) (17 - 34)  SpO2: 98% (06 Mar 2023 07:14) (87% - 100%)    Parameters below as of 06 Mar 2023 07:14  Patient On (Oxygen Delivery Method): nasal cannula  O2 Flow (L/min): 2      PHYSICAL EXAMINATION:  GENERAL: Uncomfortable appearing, moaning, obese, elderly  HEAD:  Atraumatic, Normocephalic  EYES:  conjunctiva and sclera clear  NECK: Supple, No JVD  CHEST/LUNG: Clear to auscultation. No rales, rhonchi, wheezing, or rubs  HEART: Regular rate and rhythm  ABDOMEN: Soft, Nontender, Nondistended; Bowel sounds present, suprapubic fullness and tenderness  NERVOUS SYSTEM: No signs of facial droop. R hemiparesis  : suprapubic tenderness/fullness, no ferrari  EXTREMITIES:  2+ Peripheral Pulses, No clubbing, cyanosis, or edema  SKIN: warm dry                          13.3   12.73 )-----------( 169      ( 05 Mar 2023 06:15 )             43.5     03-05    143  |  113<H>  |  26<H>  ----------------------------<  163<H>  4.8   |  26  |  0.92    Ca    9.1      05 Mar 2023 06:15  Phos  2.4     03-05  Mg     2.0     03-05    TPro  5.9<L>  /  Alb  2.2<L>  /  TBili  0.4  /  DBili  x   /  AST  14  /  ALT  18  /  AlkPhos  92  03-05    LIVER FUNCTIONS - ( 05 Mar 2023 06:15 )  Alb: 2.2 g/dL / Pro: 5.9 g/dL / ALK PHOS: 92 U/L / ALT: 18 U/L DA / AST: 14 U/L / GGT: x                   I&O's Summary    05 Mar 2023 07:01  -  06 Mar 2023 07:00  --------------------------------------------------------  IN: 0 mL / OUT: 150 mL / NET: -150 mL            CAPILLARY BLOOD GLUCOSE      RADIOLOGY & ADDITIONAL TESTS:                   PGY-1 Progress Note discussed with attending    PAGER #: [[phone]]  PLEASE CONTACT ON CALL TEAM:  - On Call Team (Please refer to Marta) FROM 5:00 PM - 8:30PM  - Nightfloat Team FROM 8:30 -7:30 AM    CHIEF COMPLAINT & BRIEF HOSPITAL COURSE:      INTERVAL HPI/OVERNIGHT EVENTS:   This is a late entry. Pt was evaluated this a.m.   Downgraded to floor. Failed TOV, retaining >800cc urine on bladder scan. Overnight team unable to straight cath or place ferrari. Urology consulted this morning.    REVIEW OF SYSTEMS:  Unable to assess 2/2 mental status    MEDICATIONS  (STANDING):  apixaban 5 milliGRAM(s) Oral every 12 hours  aspirin  chewable 81 milliGRAM(s) Oral daily  atorvastatin 40 milliGRAM(s) Oral at bedtime  bisacodyl Suppository 10 milliGRAM(s) Rectal daily  chlorhexidine 2% Cloths 1 Application(s) Topical <User Schedule>  ertapenem  IVPB 1000 milliGRAM(s) IV Intermittent every 24 hours  influenza  Vaccine (HIGH DOSE) 0.7 milliLiter(s) IntraMuscular once  insulin lispro (ADMELOG) corrective regimen sliding scale   SubCutaneous at bedtime  insulin lispro (ADMELOG) corrective regimen sliding scale   SubCutaneous three times a day before meals  levETIRAcetam 750 milliGRAM(s) Oral two times a day  melatonin 5 milliGRAM(s) Oral at bedtime  metoprolol tartrate 50 milliGRAM(s) Oral two times a day  multivitamin 1 Tablet(s) Oral daily  mupirocin 2% Ointment 1 Application(s) Topical two times a day  nystatin Powder 1 Application(s) Topical two times a day  pantoprazole    Tablet 40 milliGRAM(s) Oral before breakfast    MEDICATIONS  (PRN):  sodium chloride 0.9% lock flush 10 milliLiter(s) IV Push every 1 hour PRN Pre/post blood products, medications, blood draw, and to maintain line patency      Vital Signs Last 24 Hrs  T(C): 36.3 (06 Mar 2023 07:14), Max: 37 (05 Mar 2023 13:00)  T(F): 97.3 (06 Mar 2023 07:14), Max: 98.6 (05 Mar 2023 13:00)  HR: 70 (06 Mar 2023 07:14) (70 - 122)  BP: 158/97 (06 Mar 2023 07:14) (126/78 - 158/97)  BP(mean): 86 (05 Mar 2023 11:00) (86 - 86)  RR: 17 (06 Mar 2023 07:14) (17 - 34)  SpO2: 98% (06 Mar 2023 07:14) (87% - 100%)    Parameters below as of 06 Mar 2023 07:14  Patient On (Oxygen Delivery Method): nasal cannula  O2 Flow (L/min): 2      PHYSICAL EXAMINATION:  GENERAL: Uncomfortable appearing, moaning, obese, elderly  HEAD:  Atraumatic, Normocephalic  EYES:  conjunctiva and sclera clear  NECK: Supple, No JVD  CHEST/LUNG: Clear to auscultation. No rales, rhonchi, wheezing, or rubs  HEART: Regular rate and rhythm  ABDOMEN: Soft, Nontender, Nondistended; Bowel sounds present, suprapubic fullness and tenderness  NERVOUS SYSTEM: No signs of facial droop. R hemiparesis  : suprapubic tenderness/fullness, no ferrari  EXTREMITIES:  2+ Peripheral Pulses, No clubbing, cyanosis, or edema  SKIN: warm dry                          13.3   12.73 )-----------( 169      ( 05 Mar 2023 06:15 )             43.5     03-05    143  |  113<H>  |  26<H>  ----------------------------<  163<H>  4.8   |  26  |  0.92    Ca    9.1      05 Mar 2023 06:15  Phos  2.4     03-05  Mg     2.0     03-05    TPro  5.9<L>  /  Alb  2.2<L>  /  TBili  0.4  /  DBili  x   /  AST  14  /  ALT  18  /  AlkPhos  92  03-05    LIVER FUNCTIONS - ( 05 Mar 2023 06:15 )  Alb: 2.2 g/dL / Pro: 5.9 g/dL / ALK PHOS: 92 U/L / ALT: 18 U/L DA / AST: 14 U/L / GGT: x                   I&O's Summary    05 Mar 2023 07:01  -  06 Mar 2023 07:00  --------------------------------------------------------  IN: 0 mL / OUT: 150 mL / NET: -150 mL            CAPILLARY BLOOD GLUCOSE      RADIOLOGY & ADDITIONAL TESTS:

## 2023-03-07 NOTE — PROGRESS NOTE ADULT - PROBLEM SELECTOR PLAN 7
- Continue eliquis  - Atenolol switch to metoprolol   - Cards Dr. Cerna, EP Dr. Adams  - rec rate control, cont metoprolol. Consider adding digoxin 0.125 qd if pt's HR uncontrolled

## 2023-03-07 NOTE — PROGRESS NOTE ADULT - SUBJECTIVE AND OBJECTIVE BOX
C A R D I O L O G Y  **********************************     DATE OF SERVICE: 03-07-23    Patient denies chest pain or shortness of breath.   Review of symptoms otherwise negative.    acetaminophen     Tablet .. 650 milliGRAM(s) Oral every 6 hours PRN  apixaban 5 milliGRAM(s) Oral every 12 hours  aspirin  chewable 81 milliGRAM(s) Oral daily  atorvastatin 40 milliGRAM(s) Oral at bedtime  bisacodyl Suppository 10 milliGRAM(s) Rectal daily  chlorhexidine 2% Cloths 1 Application(s) Topical <User Schedule>  ertapenem  IVPB 1000 milliGRAM(s) IV Intermittent every 24 hours  influenza  Vaccine (HIGH DOSE) 0.7 milliLiter(s) IntraMuscular once  levETIRAcetam 750 milliGRAM(s) Oral two times a day  melatonin 5 milliGRAM(s) Oral at bedtime  metoprolol tartrate 50 milliGRAM(s) Oral two times a day  multivitamin 1 Tablet(s) Oral daily  mupirocin 2% Ointment 1 Application(s) Topical two times a day  nystatin Powder 1 Application(s) Topical two times a day  pantoprazole    Tablet 40 milliGRAM(s) Oral before breakfast  sodium chloride 0.9% lock flush 10 milliLiter(s) IV Push every 1 hour PRN                            12.4   12.79 )-----------( 209      ( 07 Mar 2023 05:46 )             41.8       Hemoglobin: 12.4 g/dL (03-07 @ 05:46)  Hemoglobin: 13.3 g/dL (03-06 @ 10:36)  Hemoglobin: 13.3 g/dL (03-05 @ 06:15)  Hemoglobin: 11.1 g/dL (03-04 @ 03:50)  Hemoglobin: 12.3 g/dL (03-03 @ 04:22)      03-07    145  |  113<H>  |  27<H>  ----------------------------<  137<H>  4.8   |  29  |  0.95    Ca    9.1      07 Mar 2023 05:46  Phos  3.1     03-07  Mg     2.1     03-07      Creatinine Trend: 0.95<--, 0.95<--, 0.92<--, 1.00<--, 0.95<--, 1.22<--    COAGS:           T(C): 36.9 (03-07-23 @ 07:13), Max: 36.9 (03-06-23 @ 16:39)  HR: 95 (03-07-23 @ 07:13) (95 - 119)  BP: 120/74 (03-07-23 @ 07:13) (118/70 - 138/93)  RR: 19 (03-07-23 @ 07:13) (18 - 20)  SpO2: 99% (03-07-23 @ 07:13) (97% - 100%)  Wt(kg): --    I&O's Summary    06 Mar 2023 07:01  -  07 Mar 2023 07:00  --------------------------------------------------------  IN: 190 mL / OUT: 1100 mL / NET: -910 mL    07 Mar 2023 07:01  -  07 Mar 2023 10:10  --------------------------------------------------------  IN: 236 mL / OUT: 0 mL / NET: 236 mL          HEENT:  (-)icterus (-)pallor  CV: N S1 S2 1/6 PENNY (+)2 Pulses B/l  Resp:  Clear to ausculatation B/L, normal effort  GI: (+) BS Soft, NT, ND  Lymph:  (-)Edema, (-)obvious lymphadenopathy  Skin: Warm to touch, Normal turgor  Psych: unable to adequately assessmood and affect      TELEMETRY: 	  afib        ASSESSMENT/PLAN: 	77y  Female from home, lives with son, has HHA, bedbound, AAO x 1 at baseline, non verbal, answers yes or no and moans, has past medical history of afib on eliquis CVA,  with right sided residual paralysis, seizures on Keppra, hyperlipidemia, atrial fibrillation on Eliquis and atenolol, hypotension, bilateral ICA stenosis and recurrent UTI's at baseline. admitted  with septic shock. Cardiology consulted for Afib with RVR.       # afib  - HR appropriate   - EP eval appreciated  - cont eliquis if no invasive procedures planned  - f/u echo    # Sepsis  - Abx per primary team    Baltazar Cerna MD, [facility]  BEEPER [phone]

## 2023-03-07 NOTE — PROGRESS NOTE ADULT - ASSESSMENT
[facility]                                                                     MD Princess Velasco,                                               Epilepsy Consult #: [phone])                                               Office: [address] [place] [address], [address]                                                 Phone: [phone]; Fax: [phone]                            ==============================================    77-year-old female, from home, lives with son, has HHA, bedbound, AAO x 1 at baseline, non verbal, answers yes or no and moans, has past medical history of CVA, with right sided residual paralysis, seizures on Keppra, hyperlipidemia, atrial fibrillation on Eliquis and atenolol, hypotension, bilateral ICA stenosis and recurrent UTI's at baseline is admitted to ICU for septic shock, stabilized and downgraded.

## 2023-03-07 NOTE — DISCHARGE NOTE PROVIDER - HOSPITAL COURSE
77-year-old female, from home, lives with son, has HHA, bedbound, AAO x 1 at baseline, non verbal, answers yes or no and moans, has past medical history of CVA, with right sided residual paralysis, seizures on Keppra, hyperlipidemia, atrial fibrillation on Eliquis and atenolol, hypotension, bilateral ICA stenosis and recurrent UTI's with ESBL Ecoli admitted to ICU for septic shock 2/2 asp pna and UTI with ESBL Ecoli requiring pressor. CT Head is neg for hemorrhage or mass. CT Chest with  Moderate right and small left pleural effusion, consolidative opacities in both lower lobes. Pt is treated with Ertapenem. ID Dr. Ruff consulted. CT also shows thyroid lobe nodules, TSH is normal. Recommending nonemergent ultrasound correlation o/p. Pt has dermatitis of the skin, treated with nystatin.    Pt was downgraded to floor, failed TOV, ferrari placed, draining well. Pt will need 14 days of carbapenem abx, repeat bcx neg. Midline placed, pt stabilized for d/c with ferrari, to f/u o/p for thyroid u/s and urological management.    Given patient's improved clinical status and current hemodynamic stability, decision was made to discharge. Discussed with attending. Please refer to complete medical records for a full hospital course, as this is only a brief summary. 77-year-old female, from home, lives with son, has HHA, bedbound, AAO x 1 at baseline, non verbal, answers yes or no and moans, has past medical history of CVA, with right sided residual paralysis, seizures on Keppra, hyperlipidemia, atrial fibrillation on Eliquis and atenolol, hypotension, bilateral ICA stenosis and recurrent UTI's with ESBL Ecoli admitted to ICU for septic shock 2/2 asp pna and UTI with ESBL Ecoli requiring pressor. CT Head is neg for hemorrhage or mass. CT Chest with  Moderate right and small left pleural effusion, consolidative opacities in both lower lobes. Pt is treated with Ertapenem. ID Dr. Ruff consulted. CT also shows thyroid lobe nodules, TSH is normal. Recommending nonemergent ultrasound correlation o/p. Pt has dermatitis of the skin, treated with nystatin.    Pt was downgraded to floor, failed TOV, ferrari placed, draining well. Pt will need 14 days of carbapenem abx, repeat bcx neg. ______ placed, pt stabilized for d/c with ferrari, to f/u o/p for thyroid u/s and urological management.    Given patient's improved clinical status and current hemodynamic stability, decision was made to discharge. Discussed with attending. Please refer to complete medical records for a full hospital course, as this is only a brief summary. 77-year-old female, from home, lives with son, has HHA, bedbound, AAO x 1 at baseline, non verbal, answers yes or no and moans, has past medical history of CVA, with right sided residual paralysis, seizures on Keppra, hyperlipidemia, atrial fibrillation on Eliquis and atenolol, hypotension, bilateral ICA stenosis and recurrent UTI's with ESBL Ecoli admitted to ICU for septic shock 2/2 asp pna and UTI with ESBL Ecoli requiring pressor. CT Head is neg for hemorrhage or mass. CT Chest with  Moderate right and small left pleural effusion, consolidative opacities in both lower lobes. Pt is treated with Ertapenem. ID Dr. Ruff consulted. CT also shows thyroid lobe nodules, TSH is normal. Recommending nonemergent ultrasound correlation o/p. Pt has dermatitis of the skin, treated with nystatin.    Pt was downgraded to floor, failed TOV, ferrari placed, draining well. Pt will need 14 days of carbapenem abx, repeat bcx neg. Son states he is not able to do abx infusions, will need assistance. Ertapenem abx to be followed by PCP. Extended dwell placed placed, pt stabilized for d/c with ferrari, to f/u o/p for thyroid u/s and urological management.    Given patient's improved clinical status and current hemodynamic stability, decision was made to discharge. Discussed with attending. Please refer to complete medical records for a full hospital course, as this is only a brief summary. 77-year-old female, from home, lives with son, has HHA, bedbound, AAO x 1 at baseline, non verbal, answers yes or no and moans, has past medical history of CVA, with right sided residual paralysis, seizures on Keppra, hyperlipidemia, atrial fibrillation on Eliquis and atenolol, hypotension, bilateral ICA stenosis and recurrent UTI's with ESBL Ecoli admitted to ICU for septic shock 2/2 asp pna and UTI with ESBL Ecoli requiring pressor. CT Head is neg for hemorrhage or mass. CT Chest with  Moderate right and small left pleural effusion, consolidative opacities in both lower lobes. Pt is treated with Ertapenem. ID Dr. Ruff consulted. CT also shows thyroid lobe nodules, TSH is normal. Recommending nonemergent ultrasound correlation o/p. Pt has dermatitis of the skin, treated with nystatin.    Pt was downgraded to floor. Pt will need 14 days of carbapenem abx, repeat bcx neg. Son states he is not able to do abx infusions, will need assistance. Ertapenem abx to be followed by PCP. Extended dwell placed placed, to f/u o/p for thyroid u/s. Ly d/c'ed and pt passed TOV on 3/9.    Given patient's improved clinical status and current hemodynamic stability, decision was made to discharge. Discussed with attending. Please refer to complete medical records for a full hospital course, as this is only a brief summary. 77-year-old female, from home, lives with son, has HHA, bedbound, AAO x 1 at baseline, non verbal, answers yes or no and moans, has past medical history of CVA, with right sided residual paralysis, seizures on Keppra, hyperlipidemia, atrial fibrillation on Eliquis and atenolol, hypotension, bilateral ICA stenosis and recurrent UTI's with ESBL Ecoli admitted to ICU for septic shock 2/2 asp pna and UTI with ESBL Ecoli requiring pressor. CT Head is neg for hemorrhage or mass. CT Chest with  Moderate right and small left pleural effusion, consolidative opacities in both lower lobes. Pt is treated with Ertapenem. ID Dr. Ruff consulted. CT also shows thyroid lobe nodules, TSH is normal. Recommending nonemergent ultrasound correlation o/p. Pt has dermatitis of the skin, treated with nystatin.    Pt was downgraded to floor. Pt will need 14 days of carbapenem abx, repeat bcx neg. Son states he is not able to do abx infusions, will need assistance. Ertapenem abx to be followed by PCP. Extended dwell placed placed, to f/u o/p for thyroid u/s. Ly d/c'ed and pt passed TOV on 3/9. Pt noted to be in a fib, started on digoxin and metoprolol tartrate increased, however, pt warren to 30s with pauses. Pt's AHRF initially resolved and she was satting well on RA, however on 3/9 pt noted to be desaturating to 87% on RA, CXR was done, noted to be fluid overloaded again, likely 2/2 increased fluids given by visitors.      Given patient's improved clinical status and current hemodynamic stability, decision was made to discharge. Discussed with attending. Please refer to complete medical records for a full hospital course, as this is only a brief summary. 77-year-old female, from home, lives with son, has HHA, bedbound, AAO x 1 at baseline, non verbal, answers yes or no and moans, has past medical history of CVA, with right sided residual paralysis, seizures on Keppra, hyperlipidemia, atrial fibrillation on Eliquis and atenolol, hypotension, bilateral ICA stenosis and recurrent UTI's with ESBL Ecoli admitted to ICU for septic shock 2/2 asp pna and UTI with ESBL Ecoli requiring pressor. CT Head is neg for hemorrhage or mass. CT Chest with  Moderate right and small left pleural effusion, consolidative opacities in both lower lobes. Pt is treated with Ertapenem. ID Dr. Ruff consulted. CT also shows thyroid lobe nodules, TSH is normal. Recommending nonemergent ultrasound correlation o/p. Pt has dermatitis of the skin, treated with nystatin.    Pt was downgraded to floor. Pt will need 14 days of carbapenem abx, repeat bcx neg. Son states he is not able to do abx infusions, will need assistance. Ertapenem abx to be followed by PCP. Extended dwell placed placed, to f/u o/p for thyroid u/s. Ly d/c'ed and pt passed TOV on 3/9. Pt noted to be in a fib, started on digoxin and metoprolol tartrate increased, however, pt warren to 30s with pauses, discontinued digoxin, resumed lower dose metoprolol tartrate at 50 mg BID. Pt's AHRF initially resolved and she was satting well on RA, however on 3/9 pt noted to be desaturating to 87% on RA, CXR was done, noted to be fluid overloaded again, responded well to lasix.    Given patient's improved clinical status and current hemodynamic stability, decision was made to discharge. Discussed with attending. Please refer to complete medical records for a full hospital course, as this is only a brief summary. 77-year-old female, from home, lives with son, has HHA, bedbound, AAO x 1 at baseline, non verbal, answers yes or no and moans, has past medical history of CVA, with right sided residual paralysis, seizures on Keppra, hyperlipidemia, atrial fibrillation on Eliquis and atenolol, hypotension, bilateral ICA stenosis and recurrent UTI's with ESBL Ecoli admitted to ICU for septic shock 2/2 asp pna and UTI with ESBL Ecoli requiring pressor. CT Head is neg for hemorrhage or mass. CT Chest with  Moderate right and small left pleural effusion, consolidative opacities in both lower lobes. Pt is treated with Ertapenem. ID Dr. Ruff consulted. CT also shows thyroid lobe nodules, TSH is normal. Recommending nonemergent ultrasound correlation o/p. Pt has dermatitis of the skin, treated with nystatin.    Pt was downgraded to floor. Pt will need 14 days of carbapenem abx, repeat bcx neg. Ertapenem abx to be followed by PCP. Extended dwell placed placed, to f/u o/p for thyroid u/s. Ly d/c'ed and pt passed TOV on 3/9. Pt noted to be in a fib, started on digoxin and metoprolol tartrate increased, however, pt warren to 30s with pauses, discontinued digoxin, resumed lower dose metoprolol tartrate at 50 mg BID. Pt's AHRF initially resolved and she was satting well on RA, however on 3/9 pt noted to be desaturating to 87% on RA, CXR was done, noted to be fluid overloaded again, responded well to lasix. Pt to be dc on digoxin 0.125 mg every other day, and 25 mg metoprolol tartrate BID. Extended Dwell to be d/c after last day of Ertapenem infusion which is 3/14.    Given patient's improved clinical status and current hemodynamic stability, decision was made to discharge. Discussed with attending. Please refer to complete medical records for a full hospital course, as this is only a brief summary.

## 2023-03-07 NOTE — PROGRESS NOTE ADULT - ASSESSMENT
Bacteremia - with ESBL E. Coli.  UTI  Bilat Pneumonia - resolved  Fevers - resolved  Leukocytosis - mildly elevated only and lower than it was before.      Plan - Continue Invanz 1 gm iv q24hrs till 3/13/23.

## 2023-03-07 NOTE — DISCHARGE NOTE PROVIDER - NSDCMRMEDTOKEN_GEN_ALL_CORE_FT
ascorbic acid 500 mg oral tablet: 1 tab(s) orally once a day  aspirin 81 mg oral tablet: 1 tab(s) orally once a day   ATENOLOL 100MG TABLETS: TAKE 1 TABLET BY MOUTH EVERY DAY  atorvastatin 40 mg oral tablet: 1 tab(s) orally once a day (at bedtime)  Eliquis 5 mg oral tablet: 1 tab(s) orally 2 times a day   levETIRAcetam 750 mg oral tablet: 1 tab(s) orally 2 times a day  Multiple Vitamins oral tablet: 1 tab(s) orally once a day   apixaban 5 mg oral tablet: 1 tab(s) orally every 12 hours  ertapenem 1 g injection: 1000 milligram(s) injectable once a day until 3/14/23  Normal saline Flushes: 1 dose(s) injectable once a day    apixaban 5 mg oral tablet: 1 tab(s) orally every 12 hours  ertapenem 1 g injection: 1000 milligram(s) injectable once a day until 3/14/23  tamsulosin 0.4 mg oral capsule: 2 cap(s) orally once a day (at bedtime)   apixaban 5 mg oral tablet: 1 tab(s) orally every 12 hours  ertapenem 1 g injection: 1000 milligram(s) injectable once a day until 3/14/23  Metoprolol Tartrate 50 mg oral tablet: 1 tab(s) orally 2 times a day  tamsulosin 0.4 mg oral capsule: 2 cap(s) orally once a day (at bedtime)   apixaban 5 mg oral tablet: 1 tab(s) orally every 12 hours  aspirin 81 mg oral tablet, chewable: 1 tab(s) orally once a day  atorvastatin 40 mg oral tablet: 1 tab(s) orally once a day (at bedtime)  Digox 125 mcg (0.125 mg) oral tablet: 1 tab(s) orally every other day   ertapenem 1 g injection: 1000 milligram(s) injectable once a day until 3/14/23  levETIRAcetam 750 mg oral tablet: 1 tab(s) orally 2 times a day  Metoprolol Tartrate 25 mg oral tablet: 1 tab(s) orally 2 times a day  Multiple Vitamins oral tablet: 1 tab(s) orally once a day  tamsulosin 0.4 mg oral capsule: 2 cap(s) orally once a day (at bedtime)  Vitamin C 500 mg oral tablet: 1 tab(s) orally once a day

## 2023-03-07 NOTE — PROGRESS NOTE ADULT - PROBLEM SELECTOR PLAN 1
- On admission, patient with tachycardia, hypotension, leucocytosis, elevated lactate, UA + and RLL pneumonia   - In ED s/p 3L IVF , Vancomycin, Rocephin and Zithromax x1   - Admitted to ICU for septic shock likely 2/2 UTI and asp PNA requiring pressers  - Bcx pos for ESBL E. coli on admission, Ucx pos for E coli  - Started on Meropenem and Zithromax, pressor support and Tylenol for fever  ---> transitioned to ertapenem  - Repeat bcx negative (3/2)  - ID Dr. Ruff  - Pt weaned off levo, downgraded to floor  - 14 days of IV antibiotics 3/1 - 3/14, pt will need PICC or midline for d/c

## 2023-03-07 NOTE — PROGRESS NOTE ADULT - GASTROINTESTINAL
soft/nontender/nondistended/normal active bowel sounds/no guarding/no rigidity
soft/nontender/nondistended/normal active bowel sounds/no guarding/no rigidity

## 2023-03-07 NOTE — DISCHARGE NOTE PROVIDER - DETAILS OF MALNUTRITION DIAGNOSIS/DIAGNOSES
This patient has been assessed with a concern for Malnutrition and was treated during this hospitalization for the following Nutrition diagnosis/diagnoses:     -  03/09/2023: Moderate protein-calorie malnutrition

## 2023-03-07 NOTE — PROGRESS NOTE ADULT - SUBJECTIVE AND OBJECTIVE BOX
Time of Visit:  Patient seen and examined. pat is laying in bed , awake , confuse     MEDICATIONS  (STANDING):  apixaban 5 milliGRAM(s) Oral every 12 hours  aspirin  chewable 81 milliGRAM(s) Oral daily  atorvastatin 40 milliGRAM(s) Oral at bedtime  bisacodyl Suppository 10 milliGRAM(s) Rectal daily  chlorhexidine 2% Cloths 1 Application(s) Topical <User Schedule>  ertapenem  IVPB 1000 milliGRAM(s) IV Intermittent every 24 hours  influenza  Vaccine (HIGH DOSE) 0.7 milliLiter(s) IntraMuscular once  levETIRAcetam 750 milliGRAM(s) Oral two times a day  melatonin 5 milliGRAM(s) Oral at bedtime  metoprolol tartrate 50 milliGRAM(s) Oral two times a day  multivitamin 1 Tablet(s) Oral daily  mupirocin 2% Ointment 1 Application(s) Topical two times a day  nystatin Powder 1 Application(s) Topical two times a day  pantoprazole    Tablet 40 milliGRAM(s) Oral before breakfast  tamsulosin 0.8 milliGRAM(s) Oral at bedtime      MEDICATIONS  (PRN):  acetaminophen     Tablet .. 650 milliGRAM(s) Oral every 6 hours PRN Temp greater or equal to 38C (100.4F), Mild Pain (1 - 3), Moderate Pain (4 - 6)  sodium chloride 0.9% lock flush 10 milliLiter(s) IV Push every 1 hour PRN Pre/post blood products, medications, blood draw, and to maintain line patency       Medications up to date at time of exam.      PHYSICAL EXAMINATION:  Patient has no new complaints.  GENERAL: The patient is a well-developed, well-nourished, in no apparent distress.     Vital Signs Last 24 Hrs  T(C): 37 (07 Mar 2023 16:12), Max: 37 (07 Mar 2023 16:12)  T(F): 98.6 (07 Mar 2023 16:12), Max: 98.6 (07 Mar 2023 16:12)  HR: 124 (07 Mar 2023 16:12) (95 - 124)  BP: 118/88 (07 Mar 2023 16:12) (118/70 - 130/85)  BP(mean): --  RR: 19 (07 Mar 2023 16:12) (18 - 20)  SpO2: 94% (07 Mar 2023 16:12) (94% - 100%)    Parameters below as of 07 Mar 2023 16:12  Patient On (Oxygen Delivery Method): room air       (if applicable)    Chest Tube (if applicable)    HEENT: Head is normocephalic and atraumatic. Extraocular muscles are intact. Mucous membranes are moist.     NECK: Supple, no palpable adenopathy.    LUNGS: Clear to auscultation, no wheezing, rales, or rhonchi.    HEART: Regular rate and rhythm without murmur.    ABDOMEN: Soft, nontender, and nondistended.  No hepatosplenomegaly is noted.    EXTREMITIES: Without any cyanosis, clubbing, rash, lesions or edema.    NEUROLOGIC: Awake, Confuse     SKIN: Warm, dry, good turgor.      LABS:                        12.4   12.79 )-----------( 209      ( 07 Mar 2023 05:46 )             41.8     03-07    145  |  113<H>  |  27<H>  ----------------------------<  137<H>  4.8   |  29  |  0.95    Ca    9.1      07 Mar 2023 05:46  Phos  3.1     03-07  Mg     2.1     03-07                          MICROBIOLOGY: (if applicable)    RADIOLOGY & ADDITIONAL STUDIES:  EKG:   CXR:  ECHO:    IMPRESSION: 77y Female PAST MEDICAL & SURGICAL HISTORY:  CVA (cerebrovascular accident)      Atrial fibrillation      Carotid stenosis      Seizures      Hyperlipidemia      No significant past surgical history       p/w             IMP: This is a 77-year-old woman , from home, lives with son, has HHA, bedbound, AAO x 1 at baseline, non verbal, answers yes or no and moans, has past medical history of CVA, with right sided residual paralysis, seizures on Keppra, hyperlipidemia, atrial fibrillation on Eliquis and atenolol, hypotension, bilateral ICA stenosis and recurrent UTI's at baseline is admitted to ICU for septic shock and acute hypoxic resp failure      DX:    - Acute Hypoxic Resp Failure   - Asp PNA   - Septic shock 2/2 UTI and aspiration pneumonia   - Bacteremia : E. coli   - Acute encephalopathy  - Bacteremia : ESBL e.coli    - DEJA  - H/o CVA  - H/o seizures   - Atrial fibrillation   - HLD      Sugg;    - Continue O2 supp as needed to maintain sat >90%  - Duonebs   - Continue antibx as per ID   - Asp precaution   - DVT GI prophy

## 2023-03-07 NOTE — PROGRESS NOTE ADULT - SUBJECTIVE AND OBJECTIVE BOX
77y Female    Meds:  ertapenem  IVPB 1000 milliGRAM(s) IV Intermittent every 24 hours    Allergies    penicillin (Unknown)    Intolerances        VITALS:  Vital Signs Last 24 Hrs  T(C): 37 (07 Mar 2023 16:12), Max: 37 (07 Mar 2023 16:12)  T(F): 98.6 (07 Mar 2023 16:12), Max: 98.6 (07 Mar 2023 16:12)  HR: 124 (07 Mar 2023 16:12) (95 - 124)  BP: 118/88 (07 Mar 2023 16:12) (118/70 - 130/85)  BP(mean): --  RR: 19 (07 Mar 2023 16:12) (18 - 20)  SpO2: 94% (07 Mar 2023 16:12) (94% - 100%)    Parameters below as of 07 Mar 2023 16:12  Patient On (Oxygen Delivery Method): room air        LABS/DIAGNOSTIC TESTS:                          12.4   12.79 )-----------( 209      ( 07 Mar 2023 05:46 )             41.8         03-07    145  |  113<H>  |  27<H>  ----------------------------<  137<H>  4.8   |  29  |  0.95    Ca    9.1      07 Mar 2023 05:46  Phos  3.1     03-07  Mg     2.1     03-07            CULTURES: .Blood Blood-Peripheral  03-02 @ 13:34   No growth to date.  --  --      .Blood Blood-Peripheral  03-02 @ 13:15   No growth to date.  --  --      Clean Catch Clean Catch (Midstream)  02-28 @ 18:50   >100,000 CFU/ml Escherichia coli ESBL  <10,000 CFU/ml Normal Urogenital nakul present  --  Escherichia coli ESBL      .Blood Blood-Peripheral  02-28 @ 18:40   No Growth Final  --  Blood Culture PCR  Escherichia coli ESBL            RADIOLOGY:      ROS:  [  ] UNABLE TO ELICIT 77y Female who is totally confused but is awake and making some noises but unable to answer any questions. Her aide is at the bedside and states that she is trying to have a BM but has otherwise been comfortable. She has no fevers and her WBC count is only marginally elevated. She has to get antibiotics till 3/13/23.    Meds:  ertapenem  IVPB 1000 milliGRAM(s) IV Intermittent every 24 hours    Allergies    penicillin (Unknown)    Intolerances        VITALS:  Vital Signs Last 24 Hrs  T(C): 37 (07 Mar 2023 16:12), Max: 37 (07 Mar 2023 16:12)  T(F): 98.6 (07 Mar 2023 16:12), Max: 98.6 (07 Mar 2023 16:12)  HR: 124 (07 Mar 2023 16:12) (95 - 124)  BP: 118/88 (07 Mar 2023 16:12) (118/70 - 130/85)  BP(mean): --  RR: 19 (07 Mar 2023 16:12) (18 - 20)  SpO2: 94% (07 Mar 2023 16:12) (94% - 100%)    Parameters below as of 07 Mar 2023 16:12  Patient On (Oxygen Delivery Method): room air        LABS/DIAGNOSTIC TESTS:                          12.4   12.79 )-----------( 209      ( 07 Mar 2023 05:46 )             41.8         03-07    145  |  113<H>  |  27<H>  ----------------------------<  137<H>  4.8   |  29  |  0.95    Ca    9.1      07 Mar 2023 05:46  Phos  3.1     03-07  Mg     2.1     03-07            CULTURES: .Blood Blood-Peripheral  03-02 @ 13:34   No growth to date.  --  --      .Blood Blood-Peripheral  03-02 @ 13:15   No growth to date.  --  --      Clean Catch Clean Catch (Midstream)  02-28 @ 18:50   >100,000 CFU/ml Escherichia coli ESBL  <10,000 CFU/ml Normal Urogenital nakul present  --  Escherichia coli ESBL      .Blood Blood-Peripheral  02-28 @ 18:40   No Growth Final  --  Blood Culture PCR  Escherichia coli ESBL            RADIOLOGY:      ROS:  [ x ] UNABLE TO ELICIT

## 2023-03-07 NOTE — DISCHARGE NOTE PROVIDER - CARE PROVIDER_API CALL
PETRONA KIM  Internal Medicine  [address] [place] [address]  Phone: [phone]  Fax: [phone]  Follow Up Time:

## 2023-03-07 NOTE — PROGRESS NOTE ADULT - PROBLEM SELECTOR PLAN 3
Pt failed TOV on 3/5-3/6. Ly replaced by urology.  Cont to monitor I/O  Reattempt TOV in a few days  Consider flomax Pt failed TOV on 3/5-3/6. Ferrari replaced by urology.  Cont to monitor I/O  Reattempt TOV in a few days  Consider flomax  Can d/c w ferrari for o/p f/u

## 2023-03-07 NOTE — PROGRESS NOTE ADULT - PROBLEM SELECTOR PLAN 2
- Hypoxia resolving  - CXR and CT showed RLL pneumonia and pleural effusion  - Pulmonology Dr. Jimenez   - rest of management as above - Hypoxia resolving  - CXR and CT showed RLL pneumonia and pleural effusion  - Pulmonology Dr. Jimenez   - rest of management as above  - wean O2 as tolerated, currently on 2L satting 99%

## 2023-03-07 NOTE — PROGRESS NOTE ADULT - RESPIRATORY
no wheezes/no rhonchi/breath sounds equal/rales
clear to auscultation bilaterally/no wheezes/no rales/no rhonchi

## 2023-03-07 NOTE — PROGRESS NOTE ADULT - SUBJECTIVE AND OBJECTIVE BOX
PGY-1 Progress Note discussed with attending    PAGER #: [phone]]  PLEASE CONTACT ON CALL TEAM:  - On Call Team (Please refer to Marta) FROM 5:00 PM - 8:30PM  - Nightfloat Team FROM 8:30 -7:30 AM    CHIEF COMPLAINT & BRIEF HOSPITAL COURSE:      INTERVAL HPI/OVERNIGHT EVENTS:   No acute events overnight.     REVIEW OF SYSTEMS:  unable to acquire 2/2 mental status    MEDICATIONS  (STANDING):  apixaban 5 milliGRAM(s) Oral every 12 hours  aspirin  chewable 81 milliGRAM(s) Oral daily  atorvastatin 40 milliGRAM(s) Oral at bedtime  bisacodyl Suppository 10 milliGRAM(s) Rectal daily  chlorhexidine 2% Cloths 1 Application(s) Topical <User Schedule>  ertapenem  IVPB 1000 milliGRAM(s) IV Intermittent every 24 hours  influenza  Vaccine (HIGH DOSE) 0.7 milliLiter(s) IntraMuscular once  levETIRAcetam 750 milliGRAM(s) Oral two times a day  melatonin 5 milliGRAM(s) Oral at bedtime  metoprolol tartrate 50 milliGRAM(s) Oral two times a day  multivitamin 1 Tablet(s) Oral daily  mupirocin 2% Ointment 1 Application(s) Topical two times a day  nystatin Powder 1 Application(s) Topical two times a day  pantoprazole    Tablet 40 milliGRAM(s) Oral before breakfast    MEDICATIONS  (PRN):  acetaminophen     Tablet .. 650 milliGRAM(s) Oral every 6 hours PRN Temp greater or equal to 38C (100.4F), Mild Pain (1 - 3), Moderate Pain (4 - 6)  sodium chloride 0.9% lock flush 10 milliLiter(s) IV Push every 1 hour PRN Pre/post blood products, medications, blood draw, and to maintain line patency      Vital Signs Last 24 Hrs  T(C): 36.9 (07 Mar 2023 07:13), Max: 36.9 (06 Mar 2023 16:39)  T(F): 98.4 (07 Mar 2023 07:13), Max: 98.4 (06 Mar 2023 16:39)  HR: 95 (07 Mar 2023 07:13) (95 - 119)  BP: 120/74 (07 Mar 2023 07:13) (118/70 - 138/93)  BP(mean): --  RR: 19 (07 Mar 2023 07:13) (18 - 20)  SpO2: 99% (07 Mar 2023 07:13) (97% - 100%)    Parameters below as of 07 Mar 2023 07:13  Patient On (Oxygen Delivery Method): nasal cannula  O2 Flow (L/min): 2      PHYSICAL EXAMINATION:  GENERAL: NAD, well built  HEAD:  Atraumatic, Normocephalic  EYES:  conjunctiva and sclera clear  NECK: Supple, No JVD, ecchymosis on R neck  CHEST/LUNG: Clear to auscultation. No rales, rhonchi, wheezing, or rubs  HEART: Regular rate and rhythm  ABDOMEN: obese, soft, +hernia periumbilical (umbilicus not visualized), hernia is reducible and soft, +abdominal rash, erythematous with powder. Mildly tender to palp in suprapubic area  NERVOUS SYSTEM: responds to noxious stimuli  PSYCH: somnolent, moaning  : ferrari in place, draining well  EXTREMITIES:  +pressure bandages on b/l knees, +2 pitting edema to knees  SKIN: warm dry                          12.4   12.79 )-----------( 209      ( 07 Mar 2023 05:46 )             41.8     03-07    145  |  113<H>  |  27<H>  ----------------------------<  137<H>  4.8   |  29  |  0.95    Ca    9.1      07 Mar 2023 05:46  Phos  3.1     03-07  Mg     2.1     03-07                I&O's Summary    06 Mar 2023 07:01  -  07 Mar 2023 07:00  --------------------------------------------------------  IN: 190 mL / OUT: 1100 mL / NET: -910 mL    07 Mar 2023 07:01  -  07 Mar 2023 10:23  --------------------------------------------------------  IN: 236 mL / OUT: 0 mL / NET: 236 mL            CAPILLARY BLOOD GLUCOSE      RADIOLOGY & ADDITIONAL TESTS:

## 2023-03-07 NOTE — DISCHARGE NOTE PROVIDER - NSDCCPCAREPLAN_GEN_ALL_CORE_FT
PRINCIPAL DISCHARGE DIAGNOSIS  Diagnosis: Severe sepsis  Assessment and Plan of Treatment:       SECONDARY DISCHARGE DIAGNOSES  Diagnosis: Acute UTI  Assessment and Plan of Treatment:     Diagnosis: Acute respiratory failure with hypoxia  Assessment and Plan of Treatment:     Diagnosis: Urinary retention  Assessment and Plan of Treatment:     Diagnosis: Seizure  Assessment and Plan of Treatment:     Diagnosis: DEJA (acute kidney injury)  Assessment and Plan of Treatment:      PRINCIPAL DISCHARGE DIAGNOSIS  Diagnosis: Severe sepsis  Assessment and Plan of Treatment: You were diagnosed with severe sepsis, due to ESBL E coli bacteremia. Infectious Disease specialist Dr. Ruff was consulted. You were started on IV antibiotics. Repeat blood cultures were negative. You are being discharged with IV Ertapenem to be finished on 3/14. Please continue daily IV infusions of ertapemen until 3/14.   Please follow up with your PMD.        SECONDARY DISCHARGE DIAGNOSES  Diagnosis: Acute respiratory failure with hypoxia  Assessment and Plan of Treatment: You were found to have acute respiratory failure with hypoxia and needed supplemental oxygen. Pulmonologist Dr. Jimenez was consulted. You were found to have right lower lobe pneumonia and pleural effusion. Your condition improved and you were weaned off oxygen. Briefly on 3/9 you were found to be having trouble breathing on room air and were given a small dose of lasix to diurese extra fluid that was seen on Xray, which you responded well to.  Follow up outpatient with your primary care doctor.    Diagnosis: Atrial fibrillation  Assessment and Plan of Treatment: You have atrial fibrillation. Cardiology Dr. Cerna and Bryan were consulted. You were initially started on digoxin and metoprolol increaed to 100 mg, however you began to become bradycardic. The digoxin was discontinued and metoprolol decreased to 50 mg twice a day.  Continue to take metoprolol tartrate 50 mg twice a day.  Follow up outpatient with your primary care doctor.    Diagnosis: Urinary retention  Assessment and Plan of Treatment: You were found to be retaining urine. Ferrari catheter was placed, you were ocntinued on tamsulosin. You passed trial of void after ferrari catheter was discontinued.  Continue tamsulosin.  Follow up outpatient with your primary care doctor.    Diagnosis: Seizure  Assessment and Plan of Treatment: You have history of seizure, Keppra was continued.  Continue keppra as prescribed.  Follow up outpatient with your primary care doctor.    Diagnosis: DEJA (acute kidney injury)  Assessment and Plan of Treatment: You were found to have acute kidney injury on admission, likely due to acute infection and poor oral intake. You were given fluids and your kidney function improved close to your baseline. Aboid nephrotoxic agents like NSAIDs.  Follow up outpatient with your primary care doctor.    Diagnosis: Thyroid nodule  Assessment and Plan of Treatment: CT scan showed thyroid lobe nodules,  your TSH was normal. Pleaes follow up outpatient for ultrasound of thyroid.  Follow up outpatient with your primary care doctor.    Diagnosis: Dermatitis  Assessment and Plan of Treatment: You have dermatitis on your abdomen. Continue to use nystatin powder, keep the area clean and dry.  Follow up outpatient with your primary care doctor.     PRINCIPAL DISCHARGE DIAGNOSIS  Diagnosis: Severe sepsis  Assessment and Plan of Treatment: You were diagnosed with severe sepsis, due to ESBL E coli bacteremia. Infectious Disease specialist Dr. Ruff was consulted. You were started on IV antibiotics. Repeat blood cultures were negative. You are being discharged with IV Ertapenem to be finished on 3/14. Please continue daily IV infusions of ertapemen until 3/14, to be supervised by your primary care doctor. The extended dwell can be removed after this last infusion.  Please follow up with your PMD.        SECONDARY DISCHARGE DIAGNOSES  Diagnosis: Acute respiratory failure with hypoxia  Assessment and Plan of Treatment: You were found to have acute respiratory failure with hypoxia and needed supplemental oxygen. Pulmonologist Dr. Jimenez was consulted. You were found to have right lower lobe pneumonia and pleural effusion. Your condition improved and you were weaned off oxygen. Briefly on 3/9 you were found to be having trouble breathing on room air and were given a small dose of lasix to diurese extra fluid that was seen on Xray, which you responded well to.  Follow up outpatient with your primary care doctor.    Diagnosis: Atrial fibrillation  Assessment and Plan of Treatment: You have atrial fibrillation. Cardiology Dr. Cerna and Bryan were consulted. You are being sent home with digoxin 0.125 mcg to be taken EVERY OTHER DAY, and metoprolol tartrate 25 mg twice daily.  Follow up outpatient with your primary care doctor.    Diagnosis: Urinary retention  Assessment and Plan of Treatment: You were found to be retaining urine. Ferrari catheter was placed, you were ocntinued on tamsulosin. You passed trial of void after ferrari catheter was discontinued.  Continue tamsulosin.  Follow up outpatient with your primary care doctor.    Diagnosis: Seizure  Assessment and Plan of Treatment: You have history of seizure, Keppra was continued.  Continue keppra as prescribed.  Follow up outpatient with your primary care doctor.    Diagnosis: DEJA (acute kidney injury)  Assessment and Plan of Treatment: You were found to have acute kidney injury on admission, likely due to acute infection and poor oral intake. You were given fluids and your kidney function improved close to your baseline. Aboid nephrotoxic agents like NSAIDs.  Follow up outpatient with your primary care doctor.    Diagnosis: Thyroid nodule  Assessment and Plan of Treatment: CT scan showed thyroid lobe nodules,  your TSH was normal. Pleaes follow up outpatient for ultrasound of thyroid.  Follow up outpatient with your primary care doctor.    Diagnosis: Dermatitis  Assessment and Plan of Treatment: You have dermatitis on your abdomen. Continue to use nystatin powder, keep the area clean and dry.  Follow up outpatient with your primary care doctor.     PRINCIPAL DISCHARGE DIAGNOSIS  Diagnosis: Severe sepsis  Assessment and Plan of Treatment: You were diagnosed with severe sepsis, due to ESBL E coli bacteremia. Infectious Disease specialist Dr. Ruff was consulted. You were started on IV antibiotics. Repeat blood cultures were negative. You are being discharged with IV Ertapenem to be finished on 3/14. Please continue daily IV infusions of ertapemen until 3/14, to be supervised by your primary care doctor. The extended dwell can be removed after this last infusion.  Please follow up with your PMD.        SECONDARY DISCHARGE DIAGNOSES  Diagnosis: Acute respiratory failure with hypoxia  Assessment and Plan of Treatment: You were found to have acute respiratory failure with hypoxia and needed supplemental oxygen. Pulmonologist Dr. Jimenez was consulted. You were found to have right lower lobe pneumonia and pleural effusion. Your condition improved and you were weaned off oxygen. Briefly on 3/9 you were found to be having trouble breathing on room air and were given a small dose of lasix to diurese extra fluid that was seen on Xray, which you responded well to.  Follow up outpatient with your primary care doctor.    Diagnosis: Urinary retention  Assessment and Plan of Treatment: You were found to be retaining urine. Ferrari catheter was placed, you were ocntinued on tamsulosin. You passed trial of void after ferrari catheter was discontinued.  Continue tamsulosin.  Follow up outpatient with your primary care doctor.    Diagnosis: Seizure  Assessment and Plan of Treatment: You have history of seizure, Keppra was continued.  Continue keppra as prescribed.  Follow up outpatient with your primary care doctor.    Diagnosis: DEJA (acute kidney injury)  Assessment and Plan of Treatment: You were found to have acute kidney injury on admission, likely due to acute infection and poor oral intake. You were given fluids and your kidney function improved close to your baseline. Aboid nephrotoxic agents like NSAIDs.  Follow up outpatient with your primary care doctor.    Diagnosis: Atrial fibrillation  Assessment and Plan of Treatment: You have atrial fibrillation. Cardiology Dr. Cerna and Bryan were consulted. You are being sent home with digoxin 0.125 mcg to be taken EVERY OTHER DAY, and metoprolol tartrate 25 mg twice daily.  Follow up outpatient with your primary care doctor.    Diagnosis: Thyroid nodule  Assessment and Plan of Treatment: CT scan showed thyroid lobe nodules,  your TSH was normal. Pleaes follow up outpatient for ultrasound of thyroid.  Follow up outpatient with your primary care doctor.    Diagnosis: Dermatitis  Assessment and Plan of Treatment: You have dermatitis on your abdomen. Continue to use nystatin powder, keep the area clean and dry.  Follow up outpatient with your primary care doctor.     PRINCIPAL DISCHARGE DIAGNOSIS  Diagnosis: Severe sepsis  Assessment and Plan of Treatment: You were diagnosed with severe sepsis, due to ESBL E coli bacteremia. Infectious Disease specialist Dr. Ruff was consulted. You were started on IV antibiotics. Repeat blood cultures were negative. You are being discharged with IV Ertapenem to be finished on 3/14. Please continue daily IV infusions of ertapemen until 3/14, to be supervised by your primary care doctor. The extended dwell can be removed after this last infusion.  Please follow up with your PMD.        SECONDARY DISCHARGE DIAGNOSES  Diagnosis: Acute respiratory failure with hypoxia  Assessment and Plan of Treatment: You were found to have acute respiratory failure with hypoxia and needed supplemental oxygen. Pulmonologist Dr. Jimenez was consulted. You were found to have right lower lobe pneumonia and pleural effusion. Your condition improved and you were weaned off oxygen. Briefly on 3/9 you were found to be having trouble breathing on room air and were given a small dose of lasix to diurese extra fluid that was seen on Xray, which you responded well to.  Follow up outpatient with your primary care doctor.    Diagnosis: Urinary retention  Assessment and Plan of Treatment: You were found to be retaining urine. Ferrari catheter was placed, you were ocntinued on tamsulosin. You passed trial of void after ferrari catheter was discontinued.  Continue tamsulosin.  Follow up outpatient with your primary care doctor.    Diagnosis: Seizure  Assessment and Plan of Treatment: You have history of seizure, Keppra was continued.  Continue keppra as prescribed.  Follow up outpatient with your primary care doctor.    Diagnosis: DEJA (acute kidney injury)  Assessment and Plan of Treatment: You were found to have acute kidney injury on admission, likely due to acute infection and poor oral intake. You were given fluids and your kidney function improved close to your baseline. Aboid nephrotoxic agents like NSAIDs.  Follow up outpatient with your primary care doctor.    Diagnosis: Atrial fibrillation  Assessment and Plan of Treatment: You have atrial fibrillation. Cardiology Dr. Cerna and Bryan were consulted. You are being sent home with digoxin 0.125 mcg to be taken EVERY OTHER DAY, and metoprolol tartrate 25 mg twice daily.  Follow up outpatient with your primary care doctor.  check your digoxin level before 4th dose so you need to get levels done within 2 weeks.    Diagnosis: Thyroid nodule  Assessment and Plan of Treatment: CT scan showed thyroid lobe nodules,  your TSH was normal. Pleaes follow up outpatient for ultrasound of thyroid.  Follow up outpatient with your primary care doctor.    Diagnosis: Dermatitis  Assessment and Plan of Treatment: You have dermatitis on your abdomen. Continue to use nystatin powder, keep the area clean and dry.  Follow up outpatient with your primary care doctor.

## 2023-03-08 NOTE — PROGRESS NOTE ADULT - PROBLEM SELECTOR PLAN 1
- On admission, patient with tachycardia, hypotension, leucocytosis, elevated lactate, UA + and RLL pneumonia   - In ED s/p 3L IVF , Vancomycin, Rocephin and Zithromax x1   - Admitted to ICU for septic shock likely 2/2 UTI and asp PNA requiring pressers  - Bcx pos for ESBL E. coli on admission, Ucx pos for E coli  - Started on Meropenem and Zithromax, pressor support and Tylenol for fever  ---> transitioned to ertapenem  - Repeat bcx negative (3/2)  - ID Dr. Ruff  - Pt weaned off levo, downgraded to floor  - 14 days of IV antibiotics 3/1 - 3/14  - got ext dwell 3/8

## 2023-03-08 NOTE — PROGRESS NOTE ADULT - SUBJECTIVE AND OBJECTIVE BOX
EP ATTENDING      HISTORY OF PRESENT ILLNESS: She is a 76 y/o female with persistent AF since at least April 2021. Her AF is managed with eliquis and atenolol. Her echocardiogram is unremarkable. She is now a/w sepsis likely secondary to aspiration pneumonia and recent gram negative bacteremia. In this setting EP is now called for rapid AF. She denies angina nor syncope, but can not participate in a full ROS.  3/6- resting in bed.  nonverbal. joined by family.  no overnight issues.  Date of service 3/8- resting in bed, awaiting dc plan?  no issues overnight per staff.    PAST MEDICAL & SURGICAL HISTORY:  CVA (cerebrovascular accident)  persistent Atrial fibrillation  Carotid stenosis  Seizure disorder  Hyperlipidemia      No significant past surgical history    acetaminophen     Tablet .. 650 milliGRAM(s) Oral every 6 hours PRN  apixaban 5 milliGRAM(s) Oral every 12 hours  aspirin  chewable 81 milliGRAM(s) Oral daily  atorvastatin 40 milliGRAM(s) Oral at bedtime  bisacodyl Suppository 10 milliGRAM(s) Rectal daily  chlorhexidine 2% Cloths 1 Application(s) Topical <User Schedule>  ertapenem  IVPB 1000 milliGRAM(s) IV Intermittent every 24 hours  influenza  Vaccine (HIGH DOSE) 0.7 milliLiter(s) IntraMuscular once  levETIRAcetam 750 milliGRAM(s) Oral two times a day  melatonin 5 milliGRAM(s) Oral at bedtime  metoprolol tartrate 100 milliGRAM(s) Oral two times a day  multivitamin 1 Tablet(s) Oral daily  mupirocin 2% Ointment 1 Application(s) Topical two times a day  nystatin Powder 1 Application(s) Topical two times a day  pantoprazole    Tablet 40 milliGRAM(s) Oral before breakfast  sodium chloride 0.9% lock flush 10 milliLiter(s) IV Push every 1 hour PRN  tamsulosin 0.8 milliGRAM(s) Oral at bedtime                            11.6   9.73  )-----------( 185      ( 08 Mar 2023 05:45 )             39.9       03-08    146<H>  |  112<H>  |  27<H>  ----------------------------<  150<H>  4.8   |  30  |  1.02    Ca    9.0      08 Mar 2023 05:45  Phos  3.2     03-08  Mg     2.1     03-08      T(C): 36.3 (03-08-23 @ 11:07), Max: 37.2 (03-07-23 @ 20:30)  HR: 118 (03-08-23 @ 11:07) (93 - 124)  BP: 152/97 (03-08-23 @ 11:07) (118/88 - 152/97)  RR: 18 (03-08-23 @ 11:07) (18 - 20)  SpO2: 98% (03-08-23 @ 11:07) (94% - 99%)  Wt(kg): --    I&O's Summary    07 Mar 2023 07:01  -  08 Mar 2023 07:00  --------------------------------------------------------  IN: 236 mL / OUT: 550 mL / NET: -314 mL        Gen: elderly  woman in no acute distress, awake and makes eyecontact but nonverbal.  HEENT:   Normal oral mucosa, PERRL, EOMI	  Lymphatic: No lymphadenopathy , no edema  Cardiovascular: IRR Normal S1 S2, No JVD, No murmurs , Peripheral pulses palpable 2+ bilaterally  Respiratory: Lungs clear to auscultation, normal effort 	  Gastrointestinal:  Soft, Non-tender, + BS	  Skin: No rashes, No ecchymoses, No cyanosis, warm to touch        TELEMETRY: AFib, HR 110bpm.	    ECG:  	AFib  Echo:  < from: Transthoracic Echocardiogram (08.02.22 @ 09:36) >  DIMENSIONS:  Dimensions:     Normal Values:  LA:     4.3 cm    2.0 - 4.0 cm  Ao:     3.2 cm    2.0 - 3.8 cm  SEPTUM: 1.3 cm    0.6 - 1.2 cm  PWT:    1.3 cm    0.6 - 1.1 cm  LVIDd:  3.9 cm    3.0 - 5.6 cm  LVIDs:  2.6 cm    1.8 - 4.0 cm      Derived Variables:  LVMI: 86 g/m2  RWT: 0.66  Ejection Fraction Visual Estimate: 55 %    ------------------------------------------------------------------------  OBSERVATIONS:  Mitral Valve: Mild posterior mitral annular calcification.  Trace mitral regurgitation.  Aortic Root: Aortic Root: 3.2 cm.    Aortic Valve: Calcified trileaflet aortic valve with normal  opening.  Left Atrium: Mildly dilated left atrium.  LA volume index =  38 cc/m2.  Left Ventricle: Endocardium not well visualized; grossly  normal left ventricular systolic function. Normal left  ventricular internal dimensions and wall thicknesses.  Unable to adequately assess diastolic function due to  technical aspects of this study.  Right Heart: Normal right atrium. Normal right ventricular  size and systolic function (TAPSE  1.8cm). There is mild  tricuspid regurgitation. There is trace pulmonic  regurgitation.  Pericardium/PleuraNormal pericardium with no pericardial  effusion.  Hemodynamic: Unable to estimate RVSP.    < end of copied text >    A/P) She is a 76 y/o female with longstanding persistent AF since at least April 2021. Her AF is managed with eliquis and atenolol. Her echocardiogram is unremarkable. She is now a/w sepsis likely secondary to aspiration pneumonia and recent gram negative bacteremia. In this setting EP is now called for rapid AF. She denies angina nor syncope, but can not participate in a full ROS.    -continue eliquis for lifelong a/c  -continue aspirin and lipitor for carotid artery disease  -continue metoprolol 100mg bid for rate control of AF  -AFib is still rapid.  Recommend Digoxin load (0.5mcg, 0.25mcg, 0.25mcg q6hrs, then digoxin 0.125mg daily)  -poor candidate for a rhythm control strategy, due to her condition after prior stroke.  -the long term management of her AF is rate control and anticoagulation    Chris Adams M.D.  Cardiac Electrophysiology  [phone]

## 2023-03-08 NOTE — PROGRESS NOTE ADULT - SUBJECTIVE AND OBJECTIVE BOX
Time of Visit:  Patient seen and examined.     MEDICATIONS  (STANDING):  apixaban 5 milliGRAM(s) Oral every 12 hours  aspirin  chewable 81 milliGRAM(s) Oral daily  atorvastatin 40 milliGRAM(s) Oral at bedtime  bisacodyl Suppository 10 milliGRAM(s) Rectal daily  chlorhexidine 2% Cloths 1 Application(s) Topical <User Schedule>  digoxin  Injectable 250 MICROGram(s) IV Push every 6 hours  ertapenem  IVPB 1000 milliGRAM(s) IV Intermittent every 24 hours  lactated ringers. 500 milliLiter(s) (100 mL/Hr) IV Continuous <Continuous>  levETIRAcetam 750 milliGRAM(s) Oral two times a day  melatonin 5 milliGRAM(s) Oral at bedtime  metoprolol tartrate 100 milliGRAM(s) Oral two times a day  multivitamin 1 Tablet(s) Oral daily  mupirocin 2% Ointment 1 Application(s) Topical two times a day  nystatin Powder 1 Application(s) Topical two times a day  pantoprazole    Tablet 40 milliGRAM(s) Oral before breakfast  tamsulosin 0.8 milliGRAM(s) Oral at bedtime      MEDICATIONS  (PRN):  acetaminophen     Tablet .. 650 milliGRAM(s) Oral every 6 hours PRN Temp greater or equal to 38C (100.4F), Mild Pain (1 - 3), Moderate Pain (4 - 6)  sodium chloride 0.9% lock flush 10 milliLiter(s) IV Push every 1 hour PRN Pre/post blood products, medications, blood draw, and to maintain line patency       Medications up to date at time of exam.      PHYSICAL EXAMINATION:  Patient has no new complaints.  GENERAL: The patient is a well-developed, well-nourished, in no apparent distress.     Vital Signs Last 24 Hrs  T(C): 36.8 (08 Mar 2023 16:09), Max: 37.2 (07 Mar 2023 20:30)  T(F): 98.2 (08 Mar 2023 16:09), Max: 99 (07 Mar 2023 20:30)  HR: 117 (08 Mar 2023 16:09) (93 - 118)  BP: 152/84 (08 Mar 2023 16:09) (119/40 - 152/97)  BP(mean): --  RR: 19 (08 Mar 2023 16:09) (18 - 20)  SpO2: 94% (08 Mar 2023 16:09) (94% - 99%)    Parameters below as of 08 Mar 2023 16:09  Patient On (Oxygen Delivery Method): room air       (if applicable)    Chest Tube (if applicable)    HEENT: Head is normocephalic and atraumatic. Extraocular muscles are intact. Mucous membranes are moist.     NECK: Supple, no palpable adenopathy.    LUNGS: Clear to auscultation, no wheezing, rales, or rhonchi.    HEART: Regular rate and rhythm without murmur.    ABDOMEN: Soft, nontender, and nondistended.  No hepatosplenomegaly is noted.    EXTREMITIES: Without any cyanosis, clubbing, rash, lesions or edema.    NEUROLOGIC: Awake, confuse    SKIN: Warm, dry, good turgor.      LABS:                        11.6   9.73  )-----------( 185      ( 08 Mar 2023 05:45 )             39.9     03-08    146<H>  |  112<H>  |  27<H>  ----------------------------<  150<H>  4.8   |  30  |  1.02    Ca    9.0      08 Mar 2023 05:45  Phos  3.2     03-08  Mg     2.1     03-08        < from: Transthoracic Echocardiogram (03.07.23 @ 10:14) >    Patient name: STACI GONG  YOB: 1945   Age: 77 (F)   MR#: [id number]  Study Date: 3/7/2023  Location: [address] [place], [place] [address]onographer: Sebas Montague CHRIS  Study quality: Technically difficult  Referring Physician:  ERIKA KRAFT MD  Blood Pressure: 118/88 mmHg  Height: 172 cm  Weight: 91 kg  BSA: 2.1 m2  ------------------------------------------------------------------------    PROCEDURE: Transthoracic echocardiogram with 2-D, M-Mode  and complete spectral and color flow Doppler.  INDICATION: Abnormal electrocardiogram [ECG] [EKG] (R94.31)  HISTORY:  ------------------------------------------------------------------------  DIMENSIONS:  Dimensions:     Normal Values:  LA:     4.6 cm    2.0 - 4.0 cm  Ao:     3.0 cm    2.0 - 3.8 cm  SEPTUM:1.4 cm    0.6 - 1.2 cm  PWT:    1.4 cm    0.6 - 1.1 cm  LVIDd:  3.9 cm    3.0 - 5.6 cm  LVIDs:  3.1 cm    1.8 - 4.0 cm      Derived Variables:  LVMI: 98 g/m2  RWT: 0.71  Ejection Fraction Visual Estimate: 50-55 %    ------------------------------------------------------------------------  OBSERVATIONS:  Mitral Valve: Mitral annular calcification.  Aortic Root: Aortic Root: 3.0 cm.    Aortic Valve: Calcified trileaflet aortic valve with normal  opening.  Left Atrium: Mildly dilated left atrium.  LA volume index =  36 cc/m2.  Left Ventricle: Endocardium not well visualized; grossly  normal left ventricular systolic function. Mild concentric  left ventricular hypertrophy. Unable to adequately assess  diastolic function due to technical aspects of this study.  Right Heart: Normal right atrium. Normal right ventricular  size and systolic function (TAPSE  2.1cm). There is mild  tricuspid regurgitation. Normal pulmonic valve.  Pericardium/PleuraTrivial pericardial effusion is seen.  Hemodynamic:RA Pressure is 8 mm Hg. RV systolic pressure  is moderately increased at  52 mm Hg.  ------------------------------------------------------------------------  CONCLUSIONS:  1. Mitral annular calcification.  2. Calcified trileaflet aortic valve with normal opening.  3. Aortic Root: 3.0 cm.  4. Mildly dilated left atrium.  LA volume index = 36 cc/m2.  5. Mild concentric left ventricular hypertrophy.  6. Endocardium not well visualized; grossly normal left  ventricular systolic function.  7. Unable to adequately assess diastolic function due to  technical aspects of this study.  8. Normal right atrium.  9. Normal right ventricular size and systolic function  (TAPSE  2.1cm).  10. RA Pressure is 8 mm Hg.  11. RV systolic pressure is moderately increased at  52 mm  Hg.  12. There is mild tricuspid regurgitation.  13. Normal pulmonic valve.  14. Trivial pericardial effusion is seen.  15.Patient was tacycardic during entire exam.    ------------------------------------------------------------------------  Confirmed on  3/8/2023 - 07:30:43 by Mya Bonilla MD  ------------------------------------------------------------------------    < end of copied text >                    MICROBIOLOGY: (if applicable)    RADIOLOGY & ADDITIONAL STUDIES:  EKG:   CXR:  ECHO:    IMPRESSION: 77y Female PAST MEDICAL & SURGICAL HISTORY:  CVA (cerebrovascular accident)      Atrial fibrillation      Carotid stenosis      Seizures      Hyperlipidemia      No significant past surgical history       p/w       IMP: This is a 77-year-old woman , from home, lives with son, has HHA, bedbound, AAO x 1 at baseline, non verbal, answers yes or no and moans, has past medical history of CVA, with right sided residual paralysis, seizures on Keppra, hyperlipidemia, atrial fibrillation on Eliquis and atenolol, hypotension, bilateral ICA stenosis and recurrent UTI's at baseline is admitted to ICU for septic shock and acute hypoxic resp failure      DX:    - Acute Hypoxic Resp Failure   - Asp PNA   - Septic shock 2/2 UTI and aspiration pneumonia   - Bacteremia : E. coli   - Acute encephalopathy  - Bacteremia : ESBL e.coli    - DEJA  - H/o CVA  - H/o seizures   - Atrial fibrillation   - HLD      Sugg;    - Continue O2 supp as needed to maintain sat >90%  - Duonebs   - Continue antibx as per ID   - Asp precaution   - DVT GI prophy

## 2023-03-08 NOTE — ADVANCED PRACTICE NURSE CONSULT - ASSESSMENT
This is a 77yr old female patient admitted for Sepsis, presenting with the following:  -There is a healed wound to the L. Heel  -There is a Stage 1 Pressure Injury to the Bilateral Heels, as evident by non-blanchable erythema  -There is a Stage 3 Pressure Injury to the Coccyx (4.5cm x 7cm x 0.2cm) red tissue, pink tissue, drainage, and periwound maceration and redness

## 2023-03-08 NOTE — PROGRESS NOTE ADULT - PROBLEM SELECTOR PLAN 2
- Hypoxia resolving  - CXR and CT showed RLL pneumonia and pleural effusion  - Pulmonology Dr. Jimenez   - rest of management as above  - on RA satting 95%

## 2023-03-08 NOTE — PROGRESS NOTE ADULT - SUBJECTIVE AND OBJECTIVE BOX
6cm 20 Gauge Extended Dwell Catheter inserted via the left  cephalic vein.  Good blood flow, dressing applied.  Maximum catheter dwell time is <29 days. May use immediately. Dressing c/d/i.

## 2023-03-08 NOTE — PROGRESS NOTE ADULT - ASSESSMENT
77-year-old female, from home, lives with son, has HHA, bedbound, AAO x 1 at baseline, non verbal, answers yes or no and moans, has past medical history of CVA, with right sided residual paralysis, seizures on Keppra, hyperlipidemia, atrial fibrillation on Eliquis and atenolol, hypotension, bilateral ICA stenosis and recurrent UTI's at baseline is admitted to ICU for septic shock, stabilized and downgraded.

## 2023-03-08 NOTE — ADVANCED PRACTICE NURSE CONSULT - RECOMMEDATIONS
-Clean all wounds with normal saline and apply skin prep to the surrounding skin  -Continue to apply Silver Calcium Alginate (Aquacel Ag) to the Coccyx wound and cover with a Foam dressing Q 72hrs PRN  -Elevate/float the patients heels using heel protectors and reposition the patient Q 2hrs using wedges or pillows

## 2023-03-08 NOTE — PROGRESS NOTE ADULT - SUBJECTIVE AND OBJECTIVE BOX
INTERVAL HPI/OVERNIGHT EVENTS:  Patient seen,events noticed ,no acute issues  VITAL SIGNS:  T(F): 97.3 (03-08-23 @ 11:07)  HR: 118 (03-08-23 @ 11:07)  BP: 152/97 (03-08-23 @ 11:07)  RR: 18 (03-08-23 @ 11:07)  SpO2: 98% (03-08-23 @ 11:07)  Wt(kg): --    PHYSICAL EXAM:  awake  Constitutional:  Eyes:  ENMT:perrla  Neck:  Respiratory:clear  Cardiovascular:s1s2,m-none  Gastrointestinal:soft,bs pos  Extremities:  Vascular:  Neurological:no focal deficit  Musculoskeletal:    MEDICATIONS  (STANDING):  apixaban 5 milliGRAM(s) Oral every 12 hours  aspirin  chewable 81 milliGRAM(s) Oral daily  atorvastatin 40 milliGRAM(s) Oral at bedtime  bisacodyl Suppository 10 milliGRAM(s) Rectal daily  chlorhexidine 2% Cloths 1 Application(s) Topical <User Schedule>  ertapenem  IVPB 1000 milliGRAM(s) IV Intermittent every 24 hours  influenza  Vaccine (HIGH DOSE) 0.7 milliLiter(s) IntraMuscular once  levETIRAcetam 750 milliGRAM(s) Oral two times a day  melatonin 5 milliGRAM(s) Oral at bedtime  metoprolol tartrate 100 milliGRAM(s) Oral two times a day  multivitamin 1 Tablet(s) Oral daily  mupirocin 2% Ointment 1 Application(s) Topical two times a day  nystatin Powder 1 Application(s) Topical two times a day  pantoprazole    Tablet 40 milliGRAM(s) Oral before breakfast  tamsulosin 0.8 milliGRAM(s) Oral at bedtime    MEDICATIONS  (PRN):  acetaminophen     Tablet .. 650 milliGRAM(s) Oral every 6 hours PRN Temp greater or equal to 38C (100.4F), Mild Pain (1 - 3), Moderate Pain (4 - 6)  sodium chloride 0.9% lock flush 10 milliLiter(s) IV Push every 1 hour PRN Pre/post blood products, medications, blood draw, and to maintain line patency      Allergies    penicillin (Unknown)    Intolerances        LABS:                        11.6   9.73  )-----------( 185      ( 08 Mar 2023 05:45 )             39.9     03-08    146<H>  |  112<H>  |  27<H>  ----------------------------<  150<H>  4.8   |  30  |  1.02    Ca    9.0      08 Mar 2023 05:45  Phos  3.2     03-08  Mg     2.1     03-08            RADIOLOGY & ADDITIONAL TESTS:        Assessment and Plan:   · Assessment	  77-year-old female, from home, lives with son, has HHA, bedbound, AAO x 1 at baseline, non verbal, answers yes or no and moans, has past medical history of CVA, with right sided residual paralysis, seizures on Keppra, hyperlipidemia, atrial fibrillation on Eliquis and atenolol, hypotension, bilateral ICA stenosis and recurrent UTI's at baseline is admitted to ICU for septic shock, stabilized and downgraded.     Problem/Plan - 1:  ·  Problem: Septic shock.-stable clinically,improved   - Bcx pos for ESBL E. coli on admission, Ucx pos for E coli  - Started on Meropenem and Zithromax, pressor support and Tylenol for fever  ---> transitioned to ertapenem  - Repeat bcx negative (3/2)  - ID Dr. Ruff  - Pt weaned off levo, downgraded to floor  - 14 days of IV antibiotics 3/1 - 3/14, pt will need PICC or midline for d/c.     Problem/Plan - 2:  ·  Problem: Acute respiratory failure with hypoxia -stable clinically  - Pulmonology Dr. Jimenez   - rest of management as above  - wean O2 as tolerated, currently on 2L satting 99%.     Problem/Plan - 3:  ·  Problem: Urinary retention.   ·  Plan: Pt failed TOV on 3/5-3/6. Ferrari replaced by urology.  Cont to monitor I/O  Reattempt TOV in a few days  Consider flomax  Can d/c w ferrari for o/p f/u.     Problem/Plan - 4:  ·  Problem: DEJA (acute kidney injury).-stable clinically   - Monitor BMP daily  - SCr 0.95, resolving.     Problem/Plan - 5:  ·  Problem: Seizure.   ·  Plan: Hx of seizures on Keppra  Cont keppra.     Problem/Plan - 6:  ·  Problem: Cerebrovascular accident (CVA).   ·  Plan: - Right sided paralysis residual   - On asa and Eliquis at home  - c/w home meds.     Problem/Plan - 7:  ·  Problem: Atrial fibrillation.   ·  Plan: - Continue eliquis  - Atenolol switch to metoprolol   - Cards Dr. Cerna, EP Dr. Adams  - rec rate control, cont metoprolol. Consider adding digoxin 0.125 qd if pt's HR uncontrolled.     Problem/Plan - 8:  ·  Problem: Thyroid nodule.   ·  Plan: - CT shows thyroid lobe nodules   - nonemergent ultrasound correlation o/p   - TSH is normal.     Problem/Plan - 9:  ·  Problem: Dermatitis.   ·  Plan: Pt has abdominal rash with erythematous, raised papules  likely fungal infection contact dermatitis  started Nystatin powder.     Problem/Plan - 10:  ·  Problem: HLD (hyperlipidemia).   ·  Plan; Cont statin.     Problem/Plan - 11:  ·  Problem: Prophylactic measure.   ·  Plan: Eliquis  GI ppx with PPI daily.

## 2023-03-08 NOTE — PROGRESS NOTE ADULT - PROBLEM SELECTOR PLAN 3
Pt failed TOV on 3/5-3/6. Ferrari replaced by urology.  Cont to monitor I/O  Reattempt TOV in a few days  started flomax  Pt failed TOVx2  Can d/c w ferrari for o/p f/u Pt failed TOV on 3/5-3/6. Ferrari replaced by urology.  Cont to monitor I/O  Reattempt TOV in a few days  started flomax  Pt failed TOVx2  Pt's family insistent on removing ferrari, however pt has been retaining. Agreed to 1 more TOV, if pt fails again, will d/c w ferrari for o/p f/u

## 2023-03-08 NOTE — PROGRESS NOTE ADULT - SUBJECTIVE AND OBJECTIVE BOX
C A R D I O L O G Y  **********************************     DATE OF SERVICE: 03-08-23    Essentially non-verbal.  Unable to obtain review of symptoms.      acetaminophen     Tablet .. 650 milliGRAM(s) Oral every 6 hours PRN  apixaban 5 milliGRAM(s) Oral every 12 hours  aspirin  chewable 81 milliGRAM(s) Oral daily  atorvastatin 40 milliGRAM(s) Oral at bedtime  bisacodyl Suppository 10 milliGRAM(s) Rectal daily  chlorhexidine 2% Cloths 1 Application(s) Topical <User Schedule>  ertapenem  IVPB 1000 milliGRAM(s) IV Intermittent every 24 hours  influenza  Vaccine (HIGH DOSE) 0.7 milliLiter(s) IntraMuscular once  levETIRAcetam 750 milliGRAM(s) Oral two times a day  melatonin 5 milliGRAM(s) Oral at bedtime  metoprolol tartrate 50 milliGRAM(s) Oral two times a day  multivitamin 1 Tablet(s) Oral daily  mupirocin 2% Ointment 1 Application(s) Topical two times a day  nystatin Powder 1 Application(s) Topical two times a day  pantoprazole    Tablet 40 milliGRAM(s) Oral before breakfast  sodium chloride 0.9% lock flush 10 milliLiter(s) IV Push every 1 hour PRN  tamsulosin 0.8 milliGRAM(s) Oral at bedtime                            11.6   9.73  )-----------( 185      ( 08 Mar 2023 05:45 )             39.9       Hemoglobin: 11.6 g/dL (03-08 @ 05:45)  Hemoglobin: 12.4 g/dL (03-07 @ 05:46)  Hemoglobin: 13.3 g/dL (03-06 @ 10:36)  Hemoglobin: 13.3 g/dL (03-05 @ 06:15)  Hemoglobin: 11.1 g/dL (03-04 @ 03:50)      03-08    146<H>  |  112<H>  |  27<H>  ----------------------------<  150<H>  4.8   |  30  |  1.02    Ca    9.0      08 Mar 2023 05:45  Phos  3.2     03-08  Mg     2.1     03-08      Creatinine Trend: 1.02<--, 0.95<--, 0.95<--, 0.92<--, 1.00<--, 0.95<--    COAGS:           T(C): 36.3 (03-08-23 @ 11:07), Max: 37.2 (03-07-23 @ 20:30)  HR: 118 (03-08-23 @ 11:07) (93 - 124)  BP: 152/97 (03-08-23 @ 11:07) (118/88 - 152/97)  RR: 18 (03-08-23 @ 11:07) (18 - 20)  SpO2: 98% (03-08-23 @ 11:07) (94% - 99%)  Wt(kg): --    I&O's Summary    07 Mar 2023 07:01  -  08 Mar 2023 07:00  --------------------------------------------------------  IN: 236 mL / OUT: 550 mL / NET: -314 mL          HEENT:  (-)icterus (-)pallor  CV: N S1 S2 1/6 PENNY (+)2 Pulses B/l  Resp:  Clear to ausculatation B/L, normal effort  GI: (+) BS Soft, NT, ND  Lymph:  (-)Edema, (-)obvious lymphadenopathy  Skin: Warm to touch, Normal turgor  Psych: unable to adequately assessmood and affect      TELEMETRY: 	  afib        ASSESSMENT/PLAN: 	77y  Female from home, lives with son, has HHA, bedbound, AAO x 1 at baseline, non verbal, answers yes or no and moans, has past medical history of afib on eliquis CVA,  with right sided residual paralysis, seizures on Keppra, hyperlipidemia, atrial fibrillation on Eliquis and atenolol, hypotension, bilateral ICA stenosis and recurrent UTI's at baseline. admitted  with septic shock. Cardiology consulted for Afib with RVR.       # afib  - increase Lopressor to 100 BIB, BP stable  - EP eval appreciated  - cont eliquis if no invasive procedures planned  - echo with moderate pulm HTN otherwise no pertinent findings    # Sepsis  - Abx per primary team  - ID f/u appreciated     Baltazar Cerna MD, [facility]  BEEPER [phone]

## 2023-03-08 NOTE — PROGRESS NOTE ADULT - SUBJECTIVE AND OBJECTIVE BOX
PGY-1 Progress Note discussed with attending    PAGER #: [[phone]]  PLEASE CONTACT ON CALL TEAM:  - On Call Team (Please refer to Marta) FROM 5:00 PM - 8:30PM  - Nightfloat Team FROM 8:30 -7:30 AM    CHIEF COMPLAINT & BRIEF HOSPITAL COURSE:      INTERVAL HPI/OVERNIGHT EVENTS:   Pt failed TOV this morning. Ly replaced.    REVIEW OF SYSTEMS:  unable to acquire 2/2 mental status    MEDICATIONS  (STANDING):  apixaban 5 milliGRAM(s) Oral every 12 hours  aspirin  chewable 81 milliGRAM(s) Oral daily  atorvastatin 40 milliGRAM(s) Oral at bedtime  bisacodyl Suppository 10 milliGRAM(s) Rectal daily  chlorhexidine 2% Cloths 1 Application(s) Topical <User Schedule>  ertapenem  IVPB 1000 milliGRAM(s) IV Intermittent every 24 hours  levETIRAcetam 750 milliGRAM(s) Oral two times a day  melatonin 5 milliGRAM(s) Oral at bedtime  metoprolol tartrate 100 milliGRAM(s) Oral two times a day  multivitamin 1 Tablet(s) Oral daily  mupirocin 2% Ointment 1 Application(s) Topical two times a day  nystatin Powder 1 Application(s) Topical two times a day  pantoprazole    Tablet 40 milliGRAM(s) Oral before breakfast  tamsulosin 0.8 milliGRAM(s) Oral at bedtime    MEDICATIONS  (PRN):  acetaminophen     Tablet .. 650 milliGRAM(s) Oral every 6 hours PRN Temp greater or equal to 38C (100.4F), Mild Pain (1 - 3), Moderate Pain (4 - 6)  sodium chloride 0.9% lock flush 10 milliLiter(s) IV Push every 1 hour PRN Pre/post blood products, medications, blood draw, and to maintain line patency      Vital Signs Last 24 Hrs  T(C): 36.3 (08 Mar 2023 11:07), Max: 37.2 (07 Mar 2023 20:30)  T(F): 97.3 (08 Mar 2023 11:07), Max: 99 (07 Mar 2023 20:30)  HR: 118 (08 Mar 2023 11:07) (93 - 124)  BP: 152/97 (08 Mar 2023 11:07) (118/88 - 152/97)  BP(mean): --  RR: 18 (08 Mar 2023 11:07) (18 - 20)  SpO2: 98% (08 Mar 2023 11:07) (94% - 99%)    Parameters below as of 08 Mar 2023 11:07  Patient On (Oxygen Delivery Method): nasal cannula  O2 Flow (L/min): 2      PHYSICAL EXAMINATION:  GENERAL: NAD, well built, obese  HEAD:  Atraumatic, Normocephalic  EYES:  conjunctiva and sclera clear  NECK: Supple, No JVD, ecchymosis on R neck  CHEST/LUNG: Clear to auscultation. No rales, rhonchi, wheezing, or rubs  HEART: Regular rate and rhythm  ABDOMEN: obese, soft, +hernia periumbilical (umbilicus not visualized), hernia is reducible and soft, +abdominal rash, erythematous with powder. Tender to palp in suprapubic area  NERVOUS SYSTEM: responds to noxious stimuli  PSYCH: somnolent, moaning  EXTREMITIES:  +pressure bandages on b/l knees, +2 pitting edema to mid shin  SKIN: warm dry                            11.6   9.73  )-----------( 185      ( 08 Mar 2023 05:45 )             39.9     03-08    146<H>  |  112<H>  |  27<H>  ----------------------------<  150<H>  4.8   |  30  |  1.02    Ca    9.0      08 Mar 2023 05:45  Phos  3.2     03-08  Mg     2.1     03-08                I&O's Summary    07 Mar 2023 07:01  -  08 Mar 2023 07:00  --------------------------------------------------------  IN: 236 mL / OUT: 550 mL / NET: -314 mL            CAPILLARY BLOOD GLUCOSE      RADIOLOGY & ADDITIONAL TESTS:

## 2023-03-08 NOTE — PROGRESS NOTE ADULT - PROBLEM SELECTOR PLAN 7
- Continue eliquis  - Atenolol switch to metoprolol   - Cards Dr. Cerna, EP Dr. Adams  - rec rate control, cont metoprolol. Consider adding digoxin 0.125 qd if pt's HR uncontrolled - Continue eliquis  - Atenolol switch to metoprolol   - Cards Dr. Cerna, EP Dr. Adams  - Dig loading started on 3/8  - Recs to increase metoprolol tartrate to 100 mg BID, will start with dig first

## 2023-03-09 NOTE — PROGRESS NOTE ADULT - NS ATTEND AMEND GEN_ALL_CORE FT
Impression: This is a 78 Y/O Female presented to ED with hypoglycemia FS in  60’s. As per son, patient is always moaning so it’s hard to tell when she is in pain or something is wrong with her. Son noticed she has poor appetite and is always coughing while eating. Had Hx of multiple hospitalizations for ESBL E Coli UTI and aspiration pneumonia.  Was admitted to ICU due to Acute hypoxic respiratory failure due to Septic shock secondary to  UTI, Aspiration Pneumonia , RLL Pneumonia. Now in Medicine Unit and ongoing Oxygen supplementation  due to Acute hypoxic respiratory failure . 02-28-23 Negative swab for Covid 19 , Legionella Urine. CT chest with moderate Right pleural effusion and small left pleural effusion with adjacent patchy consolidative opacities.      Suggestion:  Had an episode of O2 saturation 89% room air today. O2 saturation 98% with O2 supplement. Continue Oxygen supplementation 2L NC. Patient is bedbound but still having episodic O2 saturation 80S room air, need to have O2 supplementation 2L NC outpatient . Son at bedside and demonstrated on how to use the Pulse Oxymetry to check her O2 saturation and discussed on how to use the O2 supplementation NC at Home.    Aspiration precautions with HOB elevation during meal times.   Oral hygiene care.
I agree with above
77-year-old female, from home, lives with son, has HHA, bedbound, AAO x 1 at baseline, non verbal, answers yes or no and moans, has past medical history of CVA, with right sided residual paralysis, seizures on Keppra, hyperlipidemia, atrial fibrillation on Eliquis and atenolol, hypotension, bilateral ICA stenosis and recurrent UTI's at baseline is admitted to ICU for septic shock     DX:    - Acute Hypoxic Resp Failure   - Asp PNA   - Septic shock 2/2 UTI and aspiration pneumonia   - Bacteremia : E. coli   - Acute encephalopathy  - Bacteremia : ESBL e.coli    - DEJA  - H/o CVA  - H/o seizures   - Atrial fibrillation   - HLD      Plan     - No sedation   - Monitor mental status   - Continue O2 supp as needed to maintain Sat>90%  - Moderate right pleural effusion   - Risk of thoracentesis out weigh the benefit at this time . Pat is bacteremic   - Therapeutic anticoag    - Asp precaution   - Dysphagia diet   - Hemodynamic monitoring   - Off pressors   - Continue antibx   - IVF   - Monitor renal function   - DVt GI prophy  - DNR DNI .

## 2023-03-09 NOTE — PROGRESS NOTE ADULT - PROBLEM SELECTOR PLAN 2
- Continue eliquis  - Atenolol switch to metoprolol   - Cards Dr. Cerna, EP Dr. Adams  - Dig loading started on 3/8  - Pt noted to be having pauses on telemetry and bradycardia. As per EP, stop digoxin, and start metoprolol tonight with parameters, monitor for pauses >5 sec - Continue eliquis  - Atenolol switch to metoprolol   - Cards Dr. Cerna, EP Dr. Adams  - Dig loading started on 3/8  - Pt noted to be having pauses on telemetry and bradycardia, likely 2/2 dig loading and metoprolol tartrate 100 mg BID which was started yesterday.   - As per EP, stop digoxin, and start 50 mg metoprolol tonight with parameters, monitor for pauses >5 sec

## 2023-03-09 NOTE — CHART NOTE - NSCHARTNOTEFT_GEN_A_CORE
Assessment:     Factors impacting intake: [ ] none [ ] nausea  [ ] vomiting [ ] diarrhea [ ] constipation  [ ]chewing problems [ ] swallowing issues  [ ] other:     Diet Presciption: Diet, Regular:   Minced and Moist (MINCEDMOIST) (23 @ 15:45)    Intake:     Daily Weight in k.2 (08 Mar 2023 04:42)  Weight in k.2 (07 Mar 2023 04:48)  Weight in k (04 Mar 2023 08:00)  Weight in k.8 (03 Mar 2023 07:00)    % Weight Change    Pertinent Medications: MEDICATIONS  (STANDING):  apixaban 5 milliGRAM(s) Oral every 12 hours  aspirin  chewable 81 milliGRAM(s) Oral daily  atorvastatin 40 milliGRAM(s) Oral at bedtime  bisacodyl Suppository 10 milliGRAM(s) Rectal daily  chlorhexidine 2% Cloths 1 Application(s) Topical <User Schedule>  ertapenem  IVPB 1000 milliGRAM(s) IV Intermittent every 24 hours  lactated ringers. 500 milliLiter(s) (100 mL/Hr) IV Continuous <Continuous>  levETIRAcetam 750 milliGRAM(s) Oral two times a day  melatonin 5 milliGRAM(s) Oral at bedtime  metoprolol tartrate 50 milliGRAM(s) Oral two times a day  multivitamin 1 Tablet(s) Oral daily  mupirocin 2% Ointment 1 Application(s) Topical two times a day  nystatin Powder 1 Application(s) Topical two times a day  pantoprazole    Tablet 40 milliGRAM(s) Oral before breakfast  tamsulosin 0.8 milliGRAM(s) Oral at bedtime    MEDICATIONS  (PRN):  acetaminophen     Tablet .. 650 milliGRAM(s) Oral every 6 hours PRN Temp greater or equal to 38C (100.4F), Mild Pain (1 - 3), Moderate Pain (4 - 6)  sodium chloride 0.9% lock flush 10 milliLiter(s) IV Push every 1 hour PRN Pre/post blood products, medications, blood draw, and to maintain line patency    Pertinent Labs:  Na143 mmol/L Glu 144 mg/dL<H> K+ 4.5 mmol/L Cr  0.98 mg/dL BUN 23 mg/dL<H>  Phos 2.4 mg/dL<L> 03- Alb 2.2 g/dL<L>     CAPILLARY BLOOD GLUCOSE      POCT Blood Glucose.: 133 mg/dL (09 Mar 2023 11:45)  POCT Blood Glucose.: 141 mg/dL (09 Mar 2023 07:39)      Skin:     Estimated Needs:   [ ] no change since previous assessment  [ ] recalculated:     Previous Nutrition Diagnosis:   [ ] Inadequate Energy Intake [ ]Inadequate Oral Intake [ ] Excessive Energy Intake   [ ] Underweight [ ] Increased Nutrient Needs [ ] Overweight/Obesity  [ ] Swallowing Difficult   [ ] Altered GI Function [ ] Unintended Weight Loss [ ] Food & Nutrition Related Knowledge Deficit [ ] Malnutrition   [ ] Not Ready for Diet/Life Style Changes     Nutrition Diagnosis is [ ] ongoing  [ ] Improving   [ ] resolved [ ] not applicable     New Nutrition Diagnosis: [ ] not applicable       Interventions:   Recommend  [ ] Change Diet To:  [ ] Nutrition Supplement  [ ] Nutrition Support  [ ] Other:     Monitoring and Evaluation:   [ ] PO intake [ x ] Tolerance to diet prescription [ x ] weights [ x ] labs[ x ] follow up per protocol  [ ] other: Assessment:      Nutrition reassessment for follow-up. Chart reviewed, downgraded from ICU, pt visited, asleep, confused/non-verbal noted, family ( son) at bedside, reported pt not eating well, dislikes food served, but not willing to provide further nutrition related information as family very frustrated about discharge planning issues, informed team; 50% intake at breakfast today, lunch tray untouched; s/p swallow evaluation 3/3/23 with Minced and Moist food, thin liquid recommended, and re-evaluation on 3/7/23 with soft/bite sized food, mildly thicken liquid recommended; [id number]=2, no h/o DM, finger stick levels noted, pt not a good candidate for diet education at present     Factors impacting intake:  [ X ] swallowing issues [ X ] chewing issues [ X ] other: acute on chronic comorbidities; cognitive deficit     Diet Prescription:   Diet, Regular:   Minced and Moist (MINCEDMOIST) (23 @ 15:45)    Intake: see above     Daily Weight in k.2 (08 Mar 2023 04:42)  Weight in k.2 (07 Mar 2023 04:48)  Weight in k (04 Mar 2023 08:00)  Weight in k.8 (03 Mar 2023 07:00)    % Weight Change: Wts in [facility] EMR reviewed, a bit fluctuated, may due to scale/fluid variance' 1+ generalized edema     Pertinent Medications: MEDICATIONS  (STANDING):  apixaban 5 milliGRAM(s) Oral every 12 hours  aspirin  chewable 81 milliGRAM(s) Oral daily  atorvastatin 40 milliGRAM(s) Oral at bedtime  bisacodyl Suppository 10 milliGRAM(s) Rectal daily  chlorhexidine 2% Cloths 1 Application(s) Topical <User Schedule>  ertapenem  IVPB 1000 milliGRAM(s) IV Intermittent every 24 hours  lactated ringers. 500 milliLiter(s) (100 mL/Hr) IV Continuous <Continuous>  levETIRAcetam 750 milliGRAM(s) Oral two times a day  melatonin 5 milliGRAM(s) Oral at bedtime  metoprolol tartrate 50 milliGRAM(s) Oral two times a day  multivitamin 1 Tablet(s) Oral daily  mupirocin 2% Ointment 1 Application(s) Topical two times a day  nystatin Powder 1 Application(s) Topical two times a day  pantoprazole    Tablet 40 milliGRAM(s) Oral before breakfast  tamsulosin 0.8 milliGRAM(s) Oral at bedtime    MEDICATIONS  (PRN):  acetaminophen     Tablet .. 650 milliGRAM(s) Oral every 6 hours PRN Temp greater or equal to 38C (100.4F), Mild Pain (1 - 3), Moderate Pain (4 - 6)  sodium chloride 0.9% lock flush 10 milliLiter(s) IV Push every 1 hour PRN Pre/post blood products, medications, blood draw, and to maintain line patency    Pertinent Labs:  Na143 mmol/L Glu 144 mg/dL<H> K+ 4.5 mmol/L Cr  0.98 mg/dL BUN 23 mg/dL<H>  Phos 2.4 mg/dL<L>  Alb 2.2 g/dL<L>     CAPILLARY BLOOD GLUCOSE    POCT Blood Glucose.: 133 mg/dL (09 Mar 2023 11:45)  POCT Blood Glucose.: 141 mg/dL (09 Mar 2023 07:39)    Skin: Pressure Injury: stage I, III     Estimated Needs:   [ X ] no change since previous assessment  [ ] recalculated:     Previous Nutrition Diagnosis:   [ X ] ]Inadequate Oral Intake     Nutrition Diagnosis is [ X ] ongoing  [ ] Improving   [ ] resolved [ ] not applicable     New Nutrition Diagnosis: [ ] not applicable   [ X ] Moderate Malnutrition in context of acute on chronic illness related to inadequate intake with acute on chronic comorbidities, increased needs for wound healing as evidenced by intake <75% needs x 7d, 1+generlaized edema, decubitis, debility     Interventions:   Recommend  [ X ] Change Diet To: Soft and Bite Sized Mildly Thicken fluid, Consistent CHO, Add Ensure Pudding 120 ml x tid ( 510 kcal 12 g protein ) if medically feasible   [ ] Nutrition Supplement  [ ] Nutrition Support  [ X ] Other: Discussed with team                   Nursing to continue feeding assistance and encouragement, aspiration precaution                    Provide food choices within diet Rx as available/updated     Monitoring and Evaluation:   [ X ] PO intake [ x ] Tolerance to diet prescription [ x ] weights [ x ] labs[ x ] follow up per protocol  [ ] other: Assessment:      Nutrition reassessment for follow-up. Chart reviewed, downgraded from ICU, pt visited, asleep, confused/non-verbal noted, family ( son) at bedside, reported pt not eating well, dislikes food served, but not willing to provide further nutrition related information as family very frustrated about discharge planning issues, informed team; 50% intake at breakfast today, lunch tray untouched; s/p swallow evaluation 3/3/23 with Minced and Moist food, thin liquid recommended, and re-evaluation on 3/7/23 with soft/bite sized food, mildly thicken liquid recommended; [id number], no h/o DM, finger stick levels noted, pt not a good candidate for diet education at present     Factors impacting intake:  [ X ] swallowing issues [ X ] chewing issues [ X ] other: acute on chronic comorbidities; cognitive deficit     Diet Prescription:   Diet, Regular:   Minced and Moist (MINCEDMOIST) (23 @ 15:45)    Intake: see above     Daily Weight in k.2 (08 Mar 2023 04:42)  Weight in k.2 (07 Mar 2023 04:48)  Weight in k (04 Mar 2023 08:00)  Weight in k.8 (03 Mar 2023 07:00)    % Weight Change: Wts in [facility] EMR reviewed, a bit fluctuated, may due to scale/fluid variance' 1+ generalized edema     Pertinent Medications: MEDICATIONS  (STANDING):  apixaban 5 milliGRAM(s) Oral every 12 hours  aspirin  chewable 81 milliGRAM(s) Oral daily  atorvastatin 40 milliGRAM(s) Oral at bedtime  bisacodyl Suppository 10 milliGRAM(s) Rectal daily  chlorhexidine 2% Cloths 1 Application(s) Topical <User Schedule>  ertapenem  IVPB 1000 milliGRAM(s) IV Intermittent every 24 hours  lactated ringers. 500 milliLiter(s) (100 mL/Hr) IV Continuous <Continuous>  levETIRAcetam 750 milliGRAM(s) Oral two times a day  melatonin 5 milliGRAM(s) Oral at bedtime  metoprolol tartrate 50 milliGRAM(s) Oral two times a day  multivitamin 1 Tablet(s) Oral daily  mupirocin 2% Ointment 1 Application(s) Topical two times a day  nystatin Powder 1 Application(s) Topical two times a day  pantoprazole    Tablet 40 milliGRAM(s) Oral before breakfast  tamsulosin 0.8 milliGRAM(s) Oral at bedtime    MEDICATIONS  (PRN):  acetaminophen     Tablet .. 650 milliGRAM(s) Oral every 6 hours PRN Temp greater or equal to 38C (100.4F), Mild Pain (1 - 3), Moderate Pain (4 - 6)  sodium chloride 0.9% lock flush 10 milliLiter(s) IV Push every 1 hour PRN Pre/post blood products, medications, blood draw, and to maintain line patency    Pertinent Labs:  Na143 mmol/L Glu 144 mg/dL<H> K+ 4.5 mmol/L Cr  0.98 mg/dL BUN 23 mg/dL<H>  Phos 2.4 mg/dL<L>  Alb 2.2 g/dL<L>     CAPILLARY BLOOD GLUCOSE    POCT Blood Glucose.: 133 mg/dL (09 Mar 2023 11:45)  POCT Blood Glucose.: 141 mg/dL (09 Mar 2023 07:39)    Skin: Pressure Injury: stage I, III     Estimated Needs:   [ X ] no change since previous assessment  [ ] recalculated:     Previous Nutrition Diagnosis:   [ X ] ]Inadequate Oral Intake     Nutrition Diagnosis is [ X ] ongoing  [ ] Improving   [ ] resolved [ ] not applicable     New Nutrition Diagnosis: [ ] not applicable   [ X ] Moderate Malnutrition in context of acute on chronic illness related to inadequate intake with acute on chronic comorbidities, increased needs for wound healing as evidenced by intake <75% needs x 7d, 1+generlaized edema, decubitis, debility     Interventions:   Recommend  [ X ] Change Diet To: Soft and Bite Sized Mildly Thicken fluid, Consistent CHO, Add Ensure Pudding 120 ml x tid ( 510 kcal 12 g protein ) if medically feasible   [ ] Nutrition Supplement: Vit C, ZnSO4 as medically feasible, on MVI  [ ] Nutrition Support  [ X ] Other: Discussed with team                   Nursing to continue feeding assistance and encouragement, aspiration precaution                    Provide food choices within diet Rx as available/updated     Monitoring and Evaluation:   [ X ] PO intake [ x ] Tolerance to diet prescription [ x ] weights [ x ] labs[ x ] follow up per protocol  [ ] other:

## 2023-03-09 NOTE — PROGRESS NOTE ADULT - PROBLEM SELECTOR PLAN 9
Pt has abdominal rash with erythematous, raised papules  likely fungal infection contact dermatitis  started Nystatin powder
Pt has abdominal rash with erythematous, raised papules  likely fungal infection contact dermatitis  started Nystatin powder
Pt has abdominal rash with erythematous, raised papules  likely fungal infection vs contact dermatitis  started Nystatin powder
Pt has abdominal rash with erythematous, raised papules  likely fungal infection contact dermatitis  started Nystatin powder

## 2023-03-09 NOTE — PROGRESS NOTE ADULT - PROBLEM SELECTOR PLAN 8
- CT shows thyroid lobe nodules   - nonemergent ultrasound correlation o/p   - TSH is normal

## 2023-03-09 NOTE — PROGRESS NOTE ADULT - SUBJECTIVE AND OBJECTIVE BOX
C A R D I O L O G Y  **********************************    DATE OF SERVICE: 03-09-23    Patient denies chest pain or shortness of breath.   Review of symptoms otherwise negative.    acetaminophen     Tablet .. 650 milliGRAM(s) Oral every 6 hours PRN  apixaban 5 milliGRAM(s) Oral every 12 hours  aspirin  chewable 81 milliGRAM(s) Oral daily  atorvastatin 40 milliGRAM(s) Oral at bedtime  bisacodyl Suppository 10 milliGRAM(s) Rectal daily  chlorhexidine 2% Cloths 1 Application(s) Topical <User Schedule>  ertapenem  IVPB 1000 milliGRAM(s) IV Intermittent every 24 hours  lactated ringers. 500 milliLiter(s) IV Continuous <Continuous>  levETIRAcetam 750 milliGRAM(s) Oral two times a day  melatonin 5 milliGRAM(s) Oral at bedtime  metoprolol tartrate 100 milliGRAM(s) Oral two times a day  multivitamin 1 Tablet(s) Oral daily  mupirocin 2% Ointment 1 Application(s) Topical two times a day  nystatin Powder 1 Application(s) Topical two times a day  pantoprazole    Tablet 40 milliGRAM(s) Oral before breakfast  sodium chloride 0.9% lock flush 10 milliLiter(s) IV Push every 1 hour PRN  tamsulosin 0.8 milliGRAM(s) Oral at bedtime                            11.6   9.33  )-----------( 211      ( 09 Mar 2023 07:51 )             38.3       Hemoglobin: 11.6 g/dL (03-09 @ 07:51)  Hemoglobin: 11.6 g/dL (03-08 @ 05:45)  Hemoglobin: 12.4 g/dL (03-07 @ 05:46)  Hemoglobin: 13.3 g/dL (03-06 @ 10:36)  Hemoglobin: 13.3 g/dL (03-05 @ 06:15)      03-09    143  |  109<H>  |  23<H>  ----------------------------<  144<H>  4.5   |  32<H>  |  0.98    Ca    8.8      09 Mar 2023 07:51  Phos  2.4     03-09  Mg     2.1     03-09      Creatinine Trend: 0.98<--, 1.02<--, 0.95<--, 0.95<--, 0.92<--, 1.00<--    COAGS:           T(C): 36.4 (03-09-23 @ 07:11), Max: 37 (03-08-23 @ 20:30)  HR: 78 (03-09-23 @ 07:11) (78 - 118)  BP: 128/59 (03-09-23 @ 07:11) (128/59 - 161/82)  RR: 18 (03-09-23 @ 07:11) (18 - 19)  SpO2: 95% (03-09-23 @ 07:11) (93% - 98%)  Wt(kg): --    I&O's Summary    08 Mar 2023 07:01  -  09 Mar 2023 07:00  --------------------------------------------------------  IN: 472 mL / OUT: 800 mL / NET: -328 mL    09 Mar 2023 07:01  -  09 Mar 2023 10:27  --------------------------------------------------------  IN: 177 mL / OUT: 0 mL / NET: 177 mL          HEENT:  (-)icterus (-)pallor  CV: N S1 S2 1/6 PENNY (+)2 Pulses B/l  Resp:  Clear to ausculatation B/L, normal effort  GI: (+) BS Soft, NT, ND  Lymph:  (-)Edema, (-)obvious lymphadenopathy  Skin: Warm to touch, Normal turgor  Psych: unable to adequately assessmood and affect      TELEMETRY: 	  afib        ASSESSMENT/PLAN: 	77y  Female from home, lives with son, has HHA, bedbound, AAO x 1 at baseline, non verbal, answers yes or no and moans, has past medical history of afib on eliquis CVA,  with right sided residual paralysis, seizures on Keppra, hyperlipidemia, atrial fibrillation on Eliquis and atenolol, hypotension, bilateral ICA stenosis and recurrent UTI's at baseline. admitted  with septic shock. Cardiology consulted for Afib with RVR.       # afib  - on Lopressor to 100 BIB, BP stable  - EP eval appreciated  - cont eliquis if no invasive procedures planned  - echo with moderate pulm HTN otherwise no pertinent findings  - s/p Dig load, ep f/u appreciated     # Sepsis  - Abx per primary team  - ID f/u appreciated     Baltazar Cerna MD, [facility]  BEEPER [phone]

## 2023-03-09 NOTE — PROGRESS NOTE ADULT - SUBJECTIVE AND OBJECTIVE BOX
EP ATTENDING      HISTORY OF PRESENT ILLNESS: She is a 78 y/o female with persistent AF since at least April 2021. Her AF is managed with eliquis and atenolol. Her echocardiogram is unremarkable. She is now a/w sepsis likely secondary to aspiration pneumonia and recent gram negative bacteremia. In this setting EP is now called for rapid AF. She denies angina nor syncope, but can not participate in a full ROS.  3/6- resting in bed.  nonverbal. joined by family.  no overnight issues.  3/8- resting in bed, awaiting dc plan?  no issues overnight per staff.  Date of service 3/9 resting in bed, remains nonverbal. plan of care re: beta blockers and digoxin discussed w/ internal medicine team, re: short pauses during AFib.    PAST MEDICAL & SURGICAL HISTORY:  CVA (cerebrovascular accident)  persistent Atrial fibrillation  Carotid stenosis  Seizure disorder  Hyperlipidemia      No significant past surgical history    acetaminophen     Tablet .. 650 milliGRAM(s) Oral every 6 hours PRN  apixaban 5 milliGRAM(s) Oral every 12 hours  aspirin  chewable 81 milliGRAM(s) Oral daily  atorvastatin 40 milliGRAM(s) Oral at bedtime  bisacodyl Suppository 10 milliGRAM(s) Rectal daily  chlorhexidine 2% Cloths 1 Application(s) Topical <User Schedule>  ertapenem  IVPB 1000 milliGRAM(s) IV Intermittent every 24 hours  lactated ringers. 500 milliLiter(s) IV Continuous <Continuous>  levETIRAcetam 750 milliGRAM(s) Oral two times a day  melatonin 5 milliGRAM(s) Oral at bedtime  metoprolol tartrate 50 milliGRAM(s) Oral two times a day  multivitamin 1 Tablet(s) Oral daily  mupirocin 2% Ointment 1 Application(s) Topical two times a day  nystatin Powder 1 Application(s) Topical two times a day  pantoprazole    Tablet 40 milliGRAM(s) Oral before breakfast  sodium chloride 0.9% lock flush 10 milliLiter(s) IV Push every 1 hour PRN  tamsulosin 0.8 milliGRAM(s) Oral at bedtime                            11.6   9.33  )-----------( 211      ( 09 Mar 2023 07:51 )             38.3       03-09    143  |  109<H>  |  23<H>  ----------------------------<  144<H>  4.5   |  32<H>  |  0.98    Ca    8.8      09 Mar 2023 07:51  Phos  2.4     03-09  Mg     2.1     03-09      T(C): 36.9 (03-09-23 @ 11:24), Max: 37 (03-08-23 @ 20:30)  HR: 80 (03-09-23 @ 11:24) (78 - 117)  BP: 114/65 (03-09-23 @ 11:24) (114/65 - 161/82)  RR: 18 (03-09-23 @ 11:24) (18 - 19)  SpO2: 97% (03-09-23 @ 11:25) (89% - 97%)  Wt(kg): --    I&O's Summary    08 Mar 2023 07:01  -  09 Mar 2023 07:00  --------------------------------------------------------  IN: 472 mL / OUT: 800 mL / NET: -328 mL    09 Mar 2023 07:01  -  09 Mar 2023 13:01  --------------------------------------------------------  IN: 177 mL / OUT: 0 mL / NET: 177 mL        Gen: elderly  woman in no acute distress, awake and makes eyecontact but nonverbal.  HEENT:   Normal oral mucosa, PERRL, EOMI	  Lymphatic: No lymphadenopathy , no edema  Cardiovascular: IRR Normal S1 S2, No JVD, No murmurs , Peripheral pulses palpable 2+ bilaterally  Respiratory: Lungs clear to auscultation, normal effort 	  Gastrointestinal:  Soft, Non-tender, + BS	  Skin: No rashes, No ecchymoses, No cyanosis, warm to touch        TELEMETRY: AFib, HR 80s, some sustained 40s, short pauses 2-3.5sec.	    ECG:  	AFib  Echo:  < from: Transthoracic Echocardiogram (08.02.22 @ 09:36) >  DIMENSIONS:  Dimensions:     Normal Values:  LA:     4.3 cm    2.0 - 4.0 cm  Ao:     3.2 cm    2.0 - 3.8 cm  SEPTUM: 1.3 cm    0.6 - 1.2 cm  PWT:    1.3 cm    0.6 - 1.1 cm  LVIDd:  3.9 cm    3.0 - 5.6 cm  LVIDs:  2.6 cm    1.8 - 4.0 cm      Derived Variables:  LVMI: 86 g/m2  RWT: 0.66  Ejection Fraction Visual Estimate: 55 %    ------------------------------------------------------------------------  OBSERVATIONS:  Mitral Valve: Mild posterior mitral annular calcification.  Trace mitral regurgitation.  Aortic Root: Aortic Root: 3.2 cm.    Aortic Valve: Calcified trileaflet aortic valve with normal  opening.  Left Atrium: Mildly dilated left atrium.  LA volume index =  38 cc/m2.  Left Ventricle: Endocardium not well visualized; grossly  normal left ventricular systolic function. Normal left  ventricular internal dimensions and wall thicknesses.  Unable to adequately assess diastolic function due to  technical aspects of this study.  Right Heart: Normal right atrium. Normal right ventricular  size and systolic function (TAPSE  1.8cm). There is mild  tricuspid regurgitation. There is trace pulmonic  regurgitation.  Pericardium/PleuraNormal pericardium with no pericardial  effusion.  Hemodynamic: Unable to estimate RVSP.    < end of copied text >    A/P) She is a 78 y/o female with longstanding persistent AF since at least April 2021. Her AF is managed with eliquis and atenolol. Her echocardiogram is unremarkable. She is now a/w sepsis likely secondary to aspiration pneumonia and recent gram negative bacteremia. In this setting EP is now called for rapid AF. She denies angina nor syncope, but can not participate in a full ROS.    -continue eliquis for lifelong a/c  -continue aspirin and lipitor for carotid artery disease  -continue metoprolol 100mg bid for rate control of AF. can hold for sustained HR <55bpm.  -OK to hold digoxin dose on 3/10.  re-evaluate for 3/11 to resume at 125mcg/day dose.  -short pauses and short warren runs are OK as long as she remains asymptomatic. she is bedbound/nonambulatory, cant get up and faint.  -poor candidate for a rhythm control strategy, due to her condition after prior stroke.  -the long term management of her AF is rate control and anticoagulation    Chris Adams M.D.  Cardiac Electrophysiology  [phone]

## 2023-03-09 NOTE — CHART NOTE - NSCHARTNOTEFT_GEN_A_CORE
Writer spoke to patient's son yesterday and also discussed patient's condition today at bedside. Writer explained the son that patient was having tachycardia HR >120 ytd so metoprolol was increased and also loaded with digoxin. This Writer spoke to patient's son yesterday and also discussed patient's condition today at bedside. Writer explained the son that patient was having tachycardia HR was not well controlled for two days so metoprolol was increased and also loaded with digoxin. This morning patient started having intermittent bradycardia to 30s, 40s with 2 or 3 sec pauses. Discussed with ED Dr. Duarte. Holding digoxin, decrease metoprolol from 100mg bid to 50mg bid. Meds need to be optimized before discharge. Patient also started desaturating to 87% on RA. Obtained repeat chest x-ray showed ?more effusion on right side ? atelectasis given patient is bed bound less likely pneumonia given she is afebrile and no wbc. Explained to son will try small vi [id number] 1 dose. Patient will not be able to do incentive spirometry given her medical conditions. If patient HR control until tomorrow not tachy or warren and her O2 saturation >90% will discharge her. Son verbalized understanding and he stated that he was upset as he was not informed before noon about holding the discharge. Writer assured that he will get update before 10am tomorrow.     Also writer explained to son avoid giving patient a lot of water or juices (given suspicion of fluid overload) because ytd he thought that patient is dehydrated and that's why not voiding. As per nurse patient is having wet Chex times two and it was soaked in moderate amount. Will hold doing bladder scan right now given pt body habitus it is hard to perform and interpret bladder scan. Writer spoke to patient's son yesterday and also discussed patient's condition today at bedside. Writer explained the son that patient was having tachycardia HR was not well controlled for two days so metoprolol was increased and also loaded with digoxin. This morning patient started having intermittent bradycardia to 30s, 40s with 2 or 3 sec pauses. Discussed with EP Dr. Duarte. Holding digoxin, decrease metoprolol from 100mg bid to 50mg bid. Meds need to be optimized before discharge. Patient also started desaturating to 87% on RA. Obtained repeat chest x-ray showed ?more effusion on right side ? atelectasis given patient is bed bound less likely pneumonia given she is afebrile and no wbc. Explained to son will try small vi [id number] 1 dose. Patient will not be able to do incentive spirometry given her medical conditions. If patient HR control until tomorrow not tachy or warren and her O2 saturation >90% will discharge her. Son verbalized understanding and he stated that he was upset as he was not informed before noon about holding the discharge. Writer assured that he will get update before 10am tomorrow.     Also writer explained to son avoid giving patient a lot of water or juices (given suspicion of fluid overload) because ytd he thought that patient is dehydrated and that's why not voiding. As per nurse patient is having wet Chex times two and it was soaked in moderate amount. Will hold doing bladder scan right now given pt body habitus it is hard to perform and interpret bladder scan.

## 2023-03-09 NOTE — PROGRESS NOTE ADULT - PROBLEM SELECTOR PROBLEM 2
Acute respiratory failure with hypoxia
Atrial fibrillation

## 2023-03-09 NOTE — PROGRESS NOTE ADULT - PROBLEM SELECTOR PLAN 5
Hx of seizures on Keppra  Cont keppra
- On admission, patient with Cr 1.1, baseline 0.6  - DEJA most likely pre renal in the setting of acute infection, poor oral intake  - Monitor I/O's daily  - Avoid nephrotoxins, NSAIDS, ACEI and ARBS  - S/p 3L IVF in ED  - Monitor BMP daily  - SCr 0.95, resolving

## 2023-03-09 NOTE — PROGRESS NOTE ADULT - SUBJECTIVE AND OBJECTIVE BOX
PGY-1 Progress Note discussed with attending    PAGER #: [phone]]  PLEASE CONTACT ON CALL TEAM:  - On Call Team (Please refer to Marta) FROM 5:00 PM - 8:30PM  - Nightfloat Team FROM 8:30 -7:30 AM    CHIEF COMPLAINT & BRIEF HOSPITAL COURSE:      INTERVAL HPI/OVERNIGHT EVENTS:   No acute events overnight. Pt passed TOV, had BM.     REVIEW OF SYSTEMS:  unable to acquire    MEDICATIONS  (STANDING):  apixaban 5 milliGRAM(s) Oral every 12 hours  aspirin  chewable 81 milliGRAM(s) Oral daily  atorvastatin 40 milliGRAM(s) Oral at bedtime  bisacodyl Suppository 10 milliGRAM(s) Rectal daily  chlorhexidine 2% Cloths 1 Application(s) Topical <User Schedule>  ertapenem  IVPB 1000 milliGRAM(s) IV Intermittent every 24 hours  lactated ringers. 500 milliLiter(s) (100 mL/Hr) IV Continuous <Continuous>  levETIRAcetam 750 milliGRAM(s) Oral two times a day  melatonin 5 milliGRAM(s) Oral at bedtime  metoprolol tartrate 100 milliGRAM(s) Oral two times a day  multivitamin 1 Tablet(s) Oral daily  mupirocin 2% Ointment 1 Application(s) Topical two times a day  nystatin Powder 1 Application(s) Topical two times a day  pantoprazole    Tablet 40 milliGRAM(s) Oral before breakfast  tamsulosin 0.8 milliGRAM(s) Oral at bedtime    MEDICATIONS  (PRN):  acetaminophen     Tablet .. 650 milliGRAM(s) Oral every 6 hours PRN Temp greater or equal to 38C (100.4F), Mild Pain (1 - 3), Moderate Pain (4 - 6)  sodium chloride 0.9% lock flush 10 milliLiter(s) IV Push every 1 hour PRN Pre/post blood products, medications, blood draw, and to maintain line patency      Vital Signs Last 24 Hrs  T(C): 36.4 (09 Mar 2023 07:11), Max: 37 (08 Mar 2023 20:30)  T(F): 97.5 (09 Mar 2023 07:11), Max: 98.6 (08 Mar 2023 20:30)  HR: 78 (09 Mar 2023 07:11) (78 - 117)  BP: 128/59 (09 Mar 2023 07:11) (128/59 - 161/82)  BP(mean): --  RR: 18 (09 Mar 2023 07:11) (18 - 19)  SpO2: 95% (09 Mar 2023 07:11) (93% - 95%)    Parameters below as of 09 Mar 2023 07:11  Patient On (Oxygen Delivery Method): room air        PHYSICAL EXAMINATION:  GENERAL: NAD, well built, obese  HEAD:  Atraumatic, Normocephalic  EYES:  conjunctiva and sclera clear  NECK: Supple, No JVD, ecchymosis on R neck  CHEST/LUNG: Clear to auscultation. No rales, rhonchi, wheezing, or rubs  HEART: Regular rate and rhythm  ABDOMEN: obese, soft, +hernia periumbilical (umbilicus not visualized), hernia is reducible and soft, +abdominal rash, erythematous, tender to palp  NERVOUS SYSTEM: responds to name, following some commands (breathe deeply)  EXTREMITIES:  +pressure bandages on b/l knees, +2 pitting edema to knees  SKIN: warm dry                          11.6   9.33  )-----------( 211      ( 09 Mar 2023 07:51 )             38.3     03-09    143  |  109<H>  |  23<H>  ----------------------------<  144<H>  4.5   |  32<H>  |  0.98    Ca    8.8      09 Mar 2023 07:51  Phos  2.4     03-09  Mg     2.1     03-09                I&O's Summary    08 Mar 2023 07:01  -  09 Mar 2023 07:00  --------------------------------------------------------  IN: 472 mL / OUT: 800 mL / NET: -328 mL    09 Mar 2023 07:01  -  09 Mar 2023 11:07  --------------------------------------------------------  IN: 177 mL / OUT: 0 mL / NET: 177 mL            CAPILLARY BLOOD GLUCOSE      RADIOLOGY & ADDITIONAL TESTS:

## 2023-03-09 NOTE — PROGRESS NOTE ADULT - SUBJECTIVE AND OBJECTIVE BOX
Time of Visit:  Patient seen and examined.     MEDICATIONS  (STANDING):  apixaban 5 milliGRAM(s) Oral every 12 hours  aspirin  chewable 81 milliGRAM(s) Oral daily  atorvastatin 40 milliGRAM(s) Oral at bedtime  bisacodyl Suppository 10 milliGRAM(s) Rectal daily  chlorhexidine 2% Cloths 1 Application(s) Topical <User Schedule>  ertapenem  IVPB 1000 milliGRAM(s) IV Intermittent every 24 hours  lactated ringers. 500 milliLiter(s) (100 mL/Hr) IV Continuous <Continuous>  levETIRAcetam 750 milliGRAM(s) Oral two times a day  melatonin 5 milliGRAM(s) Oral at bedtime  metoprolol tartrate 50 milliGRAM(s) Oral two times a day  multivitamin 1 Tablet(s) Oral daily  mupirocin 2% Ointment 1 Application(s) Topical two times a day  nystatin Powder 1 Application(s) Topical two times a day  pantoprazole    Tablet 40 milliGRAM(s) Oral before breakfast  tamsulosin 0.8 milliGRAM(s) Oral at bedtime      MEDICATIONS  (PRN):  acetaminophen     Tablet .. 650 milliGRAM(s) Oral every 6 hours PRN Temp greater or equal to 38C (100.4F), Mild Pain (1 - 3), Moderate Pain (4 - 6)  sodium chloride 0.9% lock flush 10 milliLiter(s) IV Push every 1 hour PRN Pre/post blood products, medications, blood draw, and to maintain line patency       Medications up to date at time of exam.      PHYSICAL EXAMINATION:    Vital Signs Last 24 Hrs  T(C): 36.9 (09 Mar 2023 11:24), Max: 37 (08 Mar 2023 20:30)  T(F): 98.4 (09 Mar 2023 11:24), Max: 98.6 (08 Mar 2023 20:30)  HR: 80 (09 Mar 2023 11:24) (78 - 117)  BP: 114/65 (09 Mar 2023 11:24) (114/65 - 161/82)  BP(mean): --  RR: 18 (09 Mar 2023 11:24) (18 - 19)  SpO2: 97% (09 Mar 2023 11:25) (89% - 97%)    Parameters below as of 09 Mar 2023 11:25  Patient On (Oxygen Delivery Method): nasal cannula  O2 Flow (L/min): 2     General : Non verbal as baseline mental status    . Poor historian.  No acute distress .     HEENT: Head is normocephalic and atraumatic. No nasal tenderness .  Mucous membranes are moist.     NECK: Supple, no palpable adenopathy.    LUNGS: Fair air entrance . Non labored. No use of accessory muscle.     HEART: S1 S2 irregular rate and no click / rub.     ABDOMEN: Soft, nontender, and nondistended. Active bowel sounds.     ; No bladder distention and tenderness.     NEUROLOGIC: Forgetful , Non verbal. Has Right Hemiparesis.      SKIN: Warm and moist . Non diaphoretic.       LABS:                        11.6   9.33  )-----------( 211      ( 09 Mar 2023 07:51 )             38.3     03-09    143  |  109<H>  |  23<H>  ----------------------------<  144<H>  4.5   |  32<H>  |  0.98    Ca    8.8      09 Mar 2023 07:51  Phos  2.4     03-09  Mg     2.1     03-09    MICROBIOLOGY: (if applicable)    RADIOLOGY & ADDITIONAL STUDIES:  EKG:   CXR:  ECHO:    IMPRESSION: 77y Female PAST MEDICAL & SURGICAL HISTORY:  CVA (cerebrovascular accident)      Atrial fibrillation      Carotid stenosis      Seizures      Hyperlipidemia      No significant past surgical history       Impression: This is a 76 Y/O Female presented to ED with hypoglycemia FS in  60’s. As per son, patient is always moaning so it’s hard to tell when she is in pain or something is wrong with her. Son noticed she has poor appetite and is always coughing while eating. Had Hx of multiple hospitalizations for ESBL E Coli UTI and aspiration pneumonia.  Was admitted to ICU due to Acute hypoxic respiratory failure due to Septic shock secondary to  UTI, Aspiration Pneumonia , RLL Pneumonia. Now in Medicine Unit and ongoing Oxygen supplementation  due to Acute hypoxic respiratory failure . 02-28-23 Negative swab for Covid 19 , Legionella Urine. CT chest with moderate Right pleural effusion and small left pleural effusion with adjacent patchy consolidative opacities.      Suggestion:  Had an episode of O2 saturation 89% room air today. O2 saturation 98% with O2 supplement. Continue Oxygen supplementation 2L NC. Patient is bedbound but still having episodic O2 saturation 80S room air, need to have O2 supplementation 2L NC outpatient . Son at bedside and demonstrated on how to use the Pulse Oxymetry to check her O2 saturation and discussed on how to use the O2 supplementation NC at Home.    Aspiration precautions with HOB elevation during meal times.   Oral hygiene care.   Per ID , on Ertapenem 1 Gm IVPB Daily for Bacteremia .   On Apixaban 5 mg Q 12 Hours.

## 2023-03-09 NOTE — PROGRESS NOTE ADULT - PROBLEM SELECTOR PLAN 4
- On admission, patient with Cr 1.1, baseline 0.6  - DEJA most likely pre renal in the setting of acute infection, poor oral intake  - Monitor I/O's daily  - Avoid nephrotoxins, NSAIDS, ACEI and ARBS  - S/p 3L IVF in ED  - Monitor BMP daily  - SCr 0.95, resolving
RESOLVED  Pt failed TOV on 3/5-3/6. Ly replaced by urology.  Cont to monitor I/O  Reattempt TOV in a few days  started flomax  Pt failed TOVx2  Passed TOV 3/9

## 2023-03-09 NOTE — PROGRESS NOTE ADULT - PROBLEM SELECTOR PROBLEM 6
Cerebrovascular accident (CVA)
Cerebrovascular accident (CVA)
Seizure
Cerebrovascular accident (CVA)

## 2023-03-09 NOTE — CHART NOTE - NSCHARTNOTESELECT_GEN_ALL_CORE
Family Update/Event Note
Central line removal/Event Note
Event Note
ICU Downgrade/Transfer Note
Nutrition Services
POCUS/Event Note

## 2023-03-09 NOTE — PROGRESS NOTE ADULT - SUBJECTIVE AND OBJECTIVE BOX
77y Female is under our care for     REVIEW OF SYSTEMS:  [  ] Not able to elicit      MEDS:  ertapenem  IVPB 1000 milliGRAM(s) IV Intermittent every 24 hours    ALLERGIES: Allergies    penicillin (Unknown)    Intolerances        VITALS:  Vital Signs Last 24 Hrs  T(C): 36.9 (09 Mar 2023 11:24), Max: 37 (08 Mar 2023 20:30)  T(F): 98.4 (09 Mar 2023 11:24), Max: 98.6 (08 Mar 2023 20:30)  HR: 80 (09 Mar 2023 11:24) (78 - 117)  BP: 114/65 (09 Mar 2023 11:24) (114/65 - 161/82)  BP(mean): --  RR: 18 (09 Mar 2023 11:24) (18 - 19)  SpO2: 97% (09 Mar 2023 11:25) (89% - 97%)    Parameters below as of 09 Mar 2023 11:25  Patient On (Oxygen Delivery Method): nasal cannula  O2 Flow (L/min): 2        PHYSICAL EXAM:      LABS/DIAGNOSTIC TESTS:                        11.6   9.33  )-----------( 211      ( 09 Mar 2023 07:51 )             38.3     WBC Count: 9.33 K/uL (03-09 @ 07:51)  WBC Count: 9.73 K/uL (03-08 @ 05:45)  WBC Count: 12.79 K/uL (03-07 @ 05:46)  WBC Count: 12.96 K/uL (03-06 @ 10:36)  WBC Count: 12.73 K/uL (03-05 @ 06:15)    03-09    143  |  109<H>  |  23<H>  ----------------------------<  144<H>  4.5   |  32<H>  |  0.98    Ca    8.8      09 Mar 2023 07:51  Phos  2.4     03-09  Mg     2.1     03-09        CULTURES:   .Blood Blood-Peripheral  03-02 @ 13:34   No Growth Final  --  --      .Blood Blood-Peripheral  03-02 @ 13:15   No Growth Final  --  --      Clean Catch Clean Catch (Midstream)  02-28 @ 18:50   >100,000 CFU/ml Escherichia coli ESBL  <10,000 CFU/ml Normal Urogenital nakul present  --  Escherichia coli ESBL      .Blood Blood-Peripheral  02-28 @ 18:40   No Growth Final  --  Blood Culture PCR  Escherichia coli ESBL        RADIOLOGY:  no new studies 77y Female is under our care for bacteremia, pneumonia and UTI.  Patient was seen laying comfortably in bed with no acute distress on 2L nasal canula with son at the bedside.  Patient is s/p extended dwell catheter yesterday, remains afebrile and WBC count is WNL.    REVIEW OF SYSTEMS:  [ x ] Not able to elicit      MEDS:  ertapenem  IVPB 1000 milliGRAM(s) IV Intermittent every 24 hours    ALLERGIES: Allergies    penicillin (Unknown)    Intolerances        VITALS:  Vital Signs Last 24 Hrs  T(C): 36.9 (09 Mar 2023 11:24), Max: 37 (08 Mar 2023 20:30)  T(F): 98.4 (09 Mar 2023 11:24), Max: 98.6 (08 Mar 2023 20:30)  HR: 80 (09 Mar 2023 11:24) (78 - 117)  BP: 114/65 (09 Mar 2023 11:24) (114/65 - 161/82)  BP(mean): --  RR: 18 (09 Mar 2023 11:24) (18 - 19)  SpO2: 97% (09 Mar 2023 11:25) (89% - 97%)    Parameters below as of 09 Mar 2023 11:25  Patient On (Oxygen Delivery Method): nasal cannula  O2 Flow (L/min): 2        PHYSICAL EXAM:  HEENT: n/a  Neck: supple no LN's   Respiratory: lungs clear no rales  Cardiovascular: S1 S2 reg no murmurs  Gastrointestinal: +BS with soft, nondistended abdomen; nontender  Extremities: no edema, RUE extended dwell  Skin: no rashes  Ortho: n/a  Neuro: Lethargic      LABS/DIAGNOSTIC TESTS:                        11.6   9.33  )-----------( 211      ( 09 Mar 2023 07:51 )             38.3     WBC Count: 9.33 K/uL (03-09 @ 07:51)  WBC Count: 9.73 K/uL (03-08 @ 05:45)  WBC Count: 12.79 K/uL (03-07 @ 05:46)  WBC Count: 12.96 K/uL (03-06 @ 10:36)  WBC Count: 12.73 K/uL (03-05 @ 06:15)    03-09    143  |  109<H>  |  23<H>  ----------------------------<  144<H>  4.5   |  32<H>  |  0.98    Ca    8.8      09 Mar 2023 07:51  Phos  2.4     03-09  Mg     2.1     03-09        CULTURES:   .Blood Blood-Peripheral  03-02 @ 13:34   No Growth Final  --  --      .Blood Blood-Peripheral  03-02 @ 13:15   No Growth Final  --  --      Clean Catch Clean Catch (Midstream)  02-28 @ 18:50   >100,000 CFU/ml Escherichia coli ESBL  <10,000 CFU/ml Normal Urogenital nakul present  --  Escherichia coli ESBL      .Blood Blood-Peripheral  02-28 @ 18:40   No Growth Final  --  Blood Culture PCR  Escherichia coli ESBL        RADIOLOGY:  no new studies

## 2023-03-09 NOTE — PROGRESS NOTE ADULT - PROBLEM SELECTOR PLAN 3
RESOLVED  - Hypoxia resolving  - CXR and CT showed RLL pneumonia and pleural effusion  - Pulmonology Dr. Jimenez   - rest of management as above  - on RA satting 95% - Pt came in w AHRF  - On admission, CXR and CT showed RLL pneumonia and pleural effusion  - Pulmonology Dr. Jimenez   - rest of management as above  - Initially resolved, pt on RA satting 95%  - However on 3/9, pt began to desat to 87% on RA again, CXR showed pleural effusion

## 2023-03-09 NOTE — DIETITIAN NUTRITION RISK NOTIFICATION - ADDITIONAL COMMENTS/DIETITIAN RECOMMENDATIONS
Malnutrition Diagnosis Parameters: intake <75% needs x 7d, 1+generlaized edema, decubitis, debility

## 2023-03-09 NOTE — PROGRESS NOTE ADULT - PROBLEM SELECTOR PLAN 6
- Right sided paralysis residual   - On asa and Eliquis at home  - c/w home meds
- Right sided paralysis residual   - On asa and Eliquis at home  - c/w home meds
Hx of seizures on Keppra  Cont keppra
- Right sided paralysis residual   - On asa and Eliquis at home  - c/w home meds

## 2023-03-09 NOTE — PROGRESS NOTE ADULT - PROBLEM SELECTOR PLAN 11
Eliquis  GI ppx with PPI daily

## 2023-03-09 NOTE — PROGRESS NOTE ADULT - ASSESSMENT
Bacteremia - with ESBL E. Coli.  UTI  Bilat Pneumonia - resolved  Fevers - resolved  Leukocytosis - normalized      Plan - Continue Invanz 1 gm iv q24hrs till 3/13/23.  s/p Extended dwell catheter

## 2023-03-10 NOTE — PROGRESS NOTE ADULT - SUBJECTIVE AND OBJECTIVE BOX
INTERVAL HPI/OVERNIGHT EVENTS:  Patient seen,awake,events noticed  VITAL SIGNS:  T(F): 98.6 (03-10-23 @ 07:24)  HR: 87 (03-10-23 @ 07:24)  BP: 119/69 (03-10-23 @ 07:24)  RR: 20 (03-10-23 @ 07:24)  SpO2: 96% (03-10-23 @ 07:24)  Wt(kg): --    PHYSICAL EXAM:  awake  Constitutional:  Eyes:  ENMT:perrla  Neck:  Respiratory:few rales  Cardiovascular:s1s2,m-none  Gastrointestinal:soft,bs pos  Extremities:  Vascular:  Neurological:no focal deficit  Musculoskeletal:    MEDICATIONS  (STANDING):  apixaban 5 milliGRAM(s) Oral every 12 hours  aspirin  chewable 81 milliGRAM(s) Oral daily  atorvastatin 40 milliGRAM(s) Oral at bedtime  bisacodyl Suppository 10 milliGRAM(s) Rectal daily  chlorhexidine 2% Cloths 1 Application(s) Topical <User Schedule>  ertapenem  IVPB 1000 milliGRAM(s) IV Intermittent every 24 hours  lactated ringers. 500 milliLiter(s) (100 mL/Hr) IV Continuous <Continuous>  levETIRAcetam 750 milliGRAM(s) Oral two times a day  melatonin 5 milliGRAM(s) Oral at bedtime  metoprolol tartrate 50 milliGRAM(s) Oral two times a day  multivitamin 1 Tablet(s) Oral daily  mupirocin 2% Ointment 1 Application(s) Topical two times a day  nystatin Powder 1 Application(s) Topical two times a day  pantoprazole    Tablet 40 milliGRAM(s) Oral before breakfast  tamsulosin 0.8 milliGRAM(s) Oral at bedtime    MEDICATIONS  (PRN):  acetaminophen     Tablet .. 650 milliGRAM(s) Oral every 6 hours PRN Temp greater or equal to 38C (100.4F), Mild Pain (1 - 3), Moderate Pain (4 - 6)  sodium chloride 0.9% lock flush 10 milliLiter(s) IV Push every 1 hour PRN Pre/post blood products, medications, blood draw, and to maintain line patency      Allergies    penicillin (Unknown)    Intolerances        LABS:                        11.5   11.42 )-----------( 214      ( 10 Mar 2023 05:58 )             38.2     03-10    145  |  110<H>  |  23<H>  ----------------------------<  176<H>  4.6   |  32<H>  |  1.07    Ca    8.8      10 Mar 2023 05:58  Phos  3.0     03-10  Mg     2.0     03-10            RADIOLOGY & ADDITIONAL TESTS:        Assessment and Plan:   · Assessment	  77-year-old female, from home, lives with son, has HHA, bedbound, AAO x 1 at baseline, non verbal, answers yes or no and moans, has past medical history of CVA, with right sided residual paralysis, seizures on Keppra, hyperlipidemia, atrial fibrillation on Eliquis and atenolol, hypotension, bilateral ICA stenosis and recurrent UTI's at baseline is admitted to ICU for septic shock, stabilized and downgraded.     Problem/Plan - 1:  ·  Problem: Septic shock.   - Bcx pos for ESBL E. coli on admission, Ucx pos for E coli  - Started on Meropenem and Zithromax, pressor support and Tylenol for fever  ---> transitioned to ertapenem  - Repeat bcx negative (3/2)  - ID Dr. Ruff  - Pt weaned off levo, downgraded to floor  - 14 days of IV antibiotics 3/1 - 3/14  - got ext dwell 3/8.     Problem/Plan - 2:  ·  Problem: Atrial fibrillation.   ·  Plan: - Continue eliquis  - Atenolol switch to metoprolol   - Cards Dr. Cerna, EP Dr. Adams  - Dig loading started on 3/8  - Pt noted to be having pauses on telemetry and bradycardia, likely 2/2 dig loading and metoprolol tartrate 100 mg BID which was started yesterday.   - As per EP, stop digoxin, and start 50 mg metoprolol tonight with parameters, monitor for pauses >5 sec.     Problem/Plan - 3:  ·  Problem: Acute respiratory failure with hypoxia.   ·  Plan: - [facility]  - On admission, CXR and CT showed RLL pneumonia and pleural effusion  - Pulmonology Dr. Jimenez   - rest of management as above  - Initially resolved, pt on RA satting 95%  - However on 3/9, pt began to desat to 87% on RA again, CXR showed pleural effusion.     Problem/Plan - 4:  ·  Problem: Urinary retention.   ·  Plan: RESOLVED  Pt failed TOV on 3/5-3/6. Ly replaced by urology.  Cont to monitor I/O  Reattempt TOV in a few days  started flomax  Pt failed TOVx2  Passed TOV 3/9.     Problem/Plan - 5:  ·  Problem: DEJA (acute kidney injury).   ·  Plan: - On admission, patient with Cr 1.1, baseline 0.6  - DEJA most likely pre renal in the setting of acute infection, poor oral intake  - Monitor I/O's daily  - Avoid nephrotoxins, NSAIDS, ACEI and ARBS  - S/p 3L IVF in ED  - Monitor BMP daily  - SCr 0.95, resolving.     Problem/Plan - 6:  ·  Problem: Seizure.   ·  Plan: Hx of seizures on Keppra  Cont keppra.     Problem/Plan - 7:  ·  Problem: Cerebrovascular accident (CVA).   ·  Plan: - Right sided paralysis residual   - On asa and Eliquis at home  - c/w home meds.     Problem/Plan - 8:  ·  Problem: Thyroid nodule.   ·  Plan: - CT shows thyroid lobe nodules   - nonemergent ultrasound correlation o/p   - TSH is normal.     Problem/Plan - 9:  ·  Problem: Dermatitis.   ·  Plan: Pt has abdominal rash with erythematous, raised papules  likely fungal infection vs contact dermatitis  started Nystatin powder.     Problem/Plan - 10:  ·  Problem: HLD (hyperlipidemia).   ·  Plan; Cont statin.     Problem/Plan - 11:  ·  Problem: Prophylactic measure.   ·  Plan: Eliquis  GI ppx with PPI daily.  FOR WEEKEND COVERAGE PLEASE CALL DR MAYES

## 2023-03-10 NOTE — PROGRESS NOTE ADULT - SUBJECTIVE AND OBJECTIVE BOX
C A R D I O L O G Y  **********************************    DATE OF SERVICE: 03-10-23    Essentially non-verbal.  Unable to obtain review of symptoms.      acetaminophen     Tablet .. 650 milliGRAM(s) Oral every 6 hours PRN  apixaban 5 milliGRAM(s) Oral every 12 hours  aspirin  chewable 81 milliGRAM(s) Oral daily  atorvastatin 40 milliGRAM(s) Oral at bedtime  bisacodyl Suppository 10 milliGRAM(s) Rectal daily  chlorhexidine 2% Cloths 1 Application(s) Topical <User Schedule>  ertapenem  IVPB 1000 milliGRAM(s) IV Intermittent every 24 hours  lactated ringers. 500 milliLiter(s) IV Continuous <Continuous>  levETIRAcetam 750 milliGRAM(s) Oral two times a day  melatonin 5 milliGRAM(s) Oral at bedtime  metoprolol tartrate 50 milliGRAM(s) Oral two times a day  multivitamin 1 Tablet(s) Oral daily  mupirocin 2% Ointment 1 Application(s) Topical two times a day  nystatin Powder 1 Application(s) Topical two times a day  pantoprazole    Tablet 40 milliGRAM(s) Oral before breakfast  sodium chloride 0.9% lock flush 10 milliLiter(s) IV Push every 1 hour PRN  tamsulosin 0.8 milliGRAM(s) Oral at bedtime                            11.5   11.42 )-----------( 214      ( 10 Mar 2023 05:58 )             38.2       Hemoglobin: 11.5 g/dL (03-10 @ 05:58)  Hemoglobin: 11.6 g/dL (03-09 @ 07:51)  Hemoglobin: 11.6 g/dL (03-08 @ 05:45)  Hemoglobin: 12.4 g/dL (03-07 @ 05:46)  Hemoglobin: 13.3 g/dL (03-06 @ 10:36)      03-10    145  |  110<H>  |  23<H>  ----------------------------<  176<H>  4.6   |  32<H>  |  1.07    Ca    8.8      10 Mar 2023 05:58  Phos  3.0     03-10  Mg     2.0     03-10      Creatinine Trend: 1.07<--, 0.98<--, 1.02<--, 0.95<--, 0.95<--, 0.92<--    COAGS:           T(C): 36.8 (03-10-23 @ 09:58), Max: 37.1 (03-09-23 @ 23:35)  HR: 82 (03-10-23 @ 09:58) (79 - 91)  BP: 135/67 (03-10-23 @ 09:58) (119/69 - 148/73)  RR: 20 (03-10-23 @ 09:58) (18 - 20)  SpO2: 98% (03-10-23 @ 09:58) (93% - 100%)  Wt(kg): --    I&O's Summary    09 Mar 2023 07:01  -  10 Mar 2023 07:00  --------------------------------------------------------  IN: 531 mL / OUT: 700 mL / NET: -169 mL        HEENT:  (-)icterus (-)pallor  CV: N S1 S2 1/6 PENNY (+)2 Pulses B/l  Resp:  Clear to ausculatation B/L, normal effort  GI: (+) BS Soft, NT, ND  Lymph:  (-)Edema, (-)obvious lymphadenopathy  Skin: Warm to touch, Normal turgor  Psych: unable to adequately assessmood and affect      TELEMETRY: 	  afib        ASSESSMENT/PLAN: 	77y  Female from home, lives with son, has HHA, bedbound, AAO x 1 at baseline, non verbal, answers yes or no and moans, has past medical history of afib on eliquis CVA,  with right sided residual paralysis, seizures on Keppra, hyperlipidemia, atrial fibrillation on Eliquis and atenolol, hypotension, bilateral ICA stenosis and recurrent UTI's at baseline. admitted  with septic shock. Cardiology consulted for Afib with RVR.       # afib  - Hr is appropriate for her bedbound state.  Aould restart Digoxin 0.125 mg PO QOD and Lopressor 25 mg PO BID upon D/C  - EP eval appreciated  - cont eliquis if no invasive procedures planned  - echo with moderate pulm HTN otherwise no pertinent findings  - s/p Dig load, ep f/u appreciated     # Sepsis  - Abx per primary team  - ID f/u appreciated     Baltazar Cerna MD, [facility]  BEEPER [phone]

## 2023-03-10 NOTE — DISCHARGE NOTE NURSING/CASE MANAGEMENT/SOCIAL WORK - NSDCPEFALRISK_GEN_ALL_CORE
For information on Fall & Injury Prevention, visit: [email or web address] OR  [email or web address] OR  https://www.cdc.gov/steadi/patient.html

## 2023-03-10 NOTE — PROGRESS NOTE ADULT - SUBJECTIVE AND OBJECTIVE BOX
Time of Visit:  Patient seen and examined. pat seen earlier today laying in bed comfortable     MEDICATIONS  (STANDING):  apixaban 5 milliGRAM(s) Oral every 12 hours  aspirin  chewable 81 milliGRAM(s) Oral daily  atorvastatin 40 milliGRAM(s) Oral at bedtime  bisacodyl Suppository 10 milliGRAM(s) Rectal daily  chlorhexidine 2% Cloths 1 Application(s) Topical <User Schedule>  ertapenem  IVPB 1000 milliGRAM(s) IV Intermittent every 24 hours  lactated ringers. 500 milliLiter(s) (100 mL/Hr) IV Continuous <Continuous>  levETIRAcetam 750 milliGRAM(s) Oral two times a day  melatonin 5 milliGRAM(s) Oral at bedtime  metoprolol tartrate 50 milliGRAM(s) Oral two times a day  multivitamin 1 Tablet(s) Oral daily  mupirocin 2% Ointment 1 Application(s) Topical two times a day  nystatin Powder 1 Application(s) Topical two times a day  pantoprazole    Tablet 40 milliGRAM(s) Oral before breakfast  tamsulosin 0.8 milliGRAM(s) Oral at bedtime      MEDICATIONS  (PRN):  acetaminophen     Tablet .. 650 milliGRAM(s) Oral every 6 hours PRN Temp greater or equal to 38C (100.4F), Mild Pain (1 - 3), Moderate Pain (4 - 6)  sodium chloride 0.9% lock flush 10 milliLiter(s) IV Push every 1 hour PRN Pre/post blood products, medications, blood draw, and to maintain line patency       Medications up to date at time of exam.      PHYSICAL EXAMINATION:  Patient has no new complaints.  GENERAL: The patient is a well-developed, well-nourished, in no apparent distress.     Vital Signs Last 24 Hrs  T(C): 36.7 (10 Mar 2023 16:11), Max: 37.1 (09 Mar 2023 23:35)  T(F): 98 (10 Mar 2023 16:11), Max: 98.7 (09 Mar 2023 23:35)  HR: 87 (10 Mar 2023 16:11) (79 - 91)  BP: 108/70 (10 Mar 2023 16:11) (108/70 - 147/99)  BP(mean): --  RR: 19 (10 Mar 2023 16:11) (18 - 20)  SpO2: 92% (10 Mar 2023 16:11) (92% - 100%)    Parameters below as of 10 Mar 2023 16:11  Patient On (Oxygen Delivery Method): room air       (if applicable)    Chest Tube (if applicable)    HEENT: Head is normocephalic and atraumatic. Extraocular muscles are intact. Mucous membranes are moist.     NECK: Supple, no palpable adenopathy.    LUNGS: Clear to auscultation, no wheezing, rales, or rhonchi.    HEART: Regular rate and rhythm without murmur.    ABDOMEN: Soft, nontender, and nondistended.  No hepatosplenomegaly is noted.    EXTREMITIES: Without any cyanosis, clubbing, rash, lesions or edema.    NEUROLOGIC: Awake,     SKIN: Warm, dry, good turgor.      LABS:                        11.5   11.42 )-----------( 214      ( 10 Mar 2023 05:58 )             38.2     03-10    145  |  110<H>  |  23<H>  ----------------------------<  176<H>  4.6   |  32<H>  |  1.07    Ca    8.8      10 Mar 2023 05:58  Phos  3.0     03-10  Mg     2.0     03-10                          MICROBIOLOGY: (if applicable)    RADIOLOGY & ADDITIONAL STUDIES:  EKG:   CXR:  ECHO:    IMPRESSION: 77y Female PAST MEDICAL & SURGICAL HISTORY:  CVA (cerebrovascular accident)      Atrial fibrillation      Carotid stenosis      Seizures      Hyperlipidemia      No significant past surgical history       p/w        Impression: This is a 76 Y/O Female presented to ED with hypoglycemia FS in  60’s. As per son, patient is always moaning so it’s hard to tell when she is in pain or something is wrong with her. Son noticed she has poor appetite and is always coughing while eating. Had Hx of multiple hospitalizations for ESBL E Coli UTI and aspiration pneumonia.  Was admitted to ICU due to Acute hypoxic respiratory failure due to Septic shock secondary to  UTI, Aspiration Pneumonia , RLL Pneumonia. Now in Medicine Unit and ongoing Oxygen supplementation  due to Acute hypoxic respiratory failure . 02-28-23 Negative swab for Covid 19 , Legionella Urine. CT chest with moderate Right pleural effusion and small left pleural effusion with adjacent patchy consolidative opacities.      Suggestion:  - Off O2 supp and tolerating room air   - Asp precaution   - Oral hygiene care.   -  Ertapenem 1 Gm IVPB Daily for Bacteremia  as per ID   - On Apixaban 5 mg Q 12 Hours.

## 2023-03-10 NOTE — PROGRESS NOTE ADULT - PROVIDER SPECIALTY LIST ADULT
Cardiology
Critical Care
Infectious Disease
Pulmonology
Cardiology
Critical Care
Electrophysiology
Infectious Disease
Internal Medicine
Internal Medicine
Pulmonology
Cardiology
Critical Care
Critical Care
Electrophysiology
Electrophysiology
Infectious Disease
Infectious Disease
Internal Medicine
Intervent Radiology
Pulmonology
Pulmonology
Critical Care
Internal Medicine

## 2023-03-10 NOTE — PROGRESS NOTE ADULT - REASON FOR ADMISSION
SEPTIC SHOCK

## 2023-03-10 NOTE — DISCHARGE NOTE NURSING/CASE MANAGEMENT/SOCIAL WORK - PATIENT PORTAL LINK FT
You can access the FollowMyHealth Patient Portal offered by [facility] by registering at the following website: [email or web address]. By joining [employer]’s FollowMyHealth portal, you will also be able to view your health information using other applications (apps) compatible with our system.

## 2023-03-10 NOTE — PROGRESS NOTE ADULT - NUTRITIONAL ASSESSMENT
This patient has been assessed with a concern for Malnutrition and has been determined to have a diagnosis/diagnoses of Moderate protein-calorie malnutrition.    This patient is being managed with:   Diet Soft and Bite Sized-  Consistent Carbohydrate {No Snacks}  Supplement Feeding Modality:  Oral  Ensure Pudding Cans or Servings Per Day:  1       Frequency:  Three Times a day  Entered: Mar  9 2023  3:59PM

## 2023-08-31 PROBLEM — Z00.00 ENCOUNTER FOR PREVENTIVE HEALTH EXAMINATION: Status: ACTIVE | Noted: 2023-01-01

## 2023-09-13 NOTE — SWALLOW BEDSIDE ASSESSMENT ADULT - SWALLOW EVAL: DIAGNOSIS
7 East
Pt p/w s/s oropharyngeal dysphagia, while pt accepted PO trial of ice & demonstrated mild lingual control of bolus, bolus pools in L lateral sulcus & does not trigger swallow. Additional attempt of puree made, required manual removal of bolus. No overt s/s aspiration/penetration at this exam.
7 East
7 East
Pt p/w s/s oropharyngeal dysphagia c/b prolonged mastication (2/2 incomplete dentition), lingual stasis, decreased a-p transport, base of tongue pumping & delayed swallow trigger. Overt s/s penetration/aspiration noted at this exam on thin liquids e/b teary eyes, throat clear, cough post PO intake & significantly delayed coughs post PO intake.

## 2023-10-24 PROBLEM — E78.5 HYPERLIPIDEMIA, UNSPECIFIED: Chronic | Status: ACTIVE | Noted: 2023-01-01

## 2023-10-24 PROBLEM — R56.9 UNSPECIFIED CONVULSIONS: Chronic | Status: ACTIVE | Noted: 2023-01-01

## 2023-10-24 NOTE — ED ADULT NURSE NOTE - ED STAT RN HANDOFF DETAILS
While hanging fluids per verbal order by Dr Chavez, pt's monitor started to alarm. Pt was assessed and found to have no pulse or respirations. son was at the bedside. MD Chavez was in the ED and made aware. Pt was assessed by doctors Alea and Kathy and time of death was called. Pt  at 17:32. Case # [id number] Representative Jud.

## 2023-10-24 NOTE — CONSULT NOTE ADULT - PROBLEM SELECTOR RECOMMENDATION 2
DEJA due to pre-renal azotemia due to volume depletion, and sepsis. w Hypernatremia- due to dehydration in pt with poor po intake.    -Nephrology consult  -Avoid nephrotoxins.

## 2023-10-24 NOTE — ED PROVIDER NOTE - SECONDARY DIAGNOSIS.
Severe protein-calorie malnutrition Acute UTI Palliative care encounter DEJA (acute kidney injury) Transient hypotension Advanced dementia Severe sepsis

## 2023-10-24 NOTE — ED PROVIDER NOTE - CARE PLAN
Principal Discharge DX:	Severe dehydration  Secondary Diagnosis:	DEJA (acute kidney injury)  Secondary Diagnosis:	Severe protein-calorie malnutrition  Secondary Diagnosis:	Palliative care encounter  Secondary Diagnosis:	Advanced dementia  Secondary Diagnosis:	Severe sepsis  Secondary Diagnosis:	Transient hypotension   1

## 2023-10-24 NOTE — CONSULT NOTE ADULT - CONVERSATION DETAILS
Met with the pt's son Sim at the bedside, explained Palliative Care team's role in assisting w complex decision making, communication and support. Discussed her current clinical condition and goals of care.  Explained that pt's prognosis is guarded.  Sim expressed he has a family friend who is a  (Alina Mendez [phone]) who was helping them with hospice. Sim endorsed pt has been bedbound, non verbal for a long time.    About one month ago pt stopped eating, only drank Ensure which she stopped recently.  She has become increasingly weak, pt's PCP suggested he bring her into the hospital. Discussed dementia trajectory and provided anticipatory guidance. Explained that swallowing difficulties is common in advancing dementia. The person may have a weak swallow or lose the ability to swallow safely.  Pt with advanced dementia food and fluid intake tends to decrease slowly over time. Discussed the risk/ benefits of artificial nutrition.  PEG does not optimize pt's life but rather prolongs suffering.     Pt is appropriate for hospice given advanced dementia, severe PCM, debility, w recurrent hospitalization for UTIs.      Sim endorsed pt  was hospitalized in the past for a urinary infection, was treated and did well. Provided education and counseling that patient will always be prone to UTIs based on her clinical condition.     Discussed risks/benefits of LST such as CPR/ intubation, artificial nutrition and PEG in the context of advanced dementia and debility. Sim ia aware these invasive measures will not optimize the pt's life but may cause more stress and pain. He stated he went through t his with his father and aunt.  He does not want CPR/intubation or feeding tube for the pt. JAN drafted: DNR/DNI/no feeding tube. Chaplaincy offered, declined at this time.  All questions answered.  Support provided.   d/w primary team

## 2023-10-24 NOTE — ED ADULT NURSE NOTE - OBJECTIVE STATEMENT
ELIZABETH SY COVERING NOTES: Patient brought in by ambulance for unresponsiveness and failure to thrive. As per son at the bedside patient decreased po for the past couple days. IO on th R knee. Patient noted cachectic ELIZABETH GILL COVERING NOTES: Patient brought in by ambulance for unresponsiveness and failure to thrive. As per son at the bedside patient decreased po for the past couple days. IO on th R knee. Patient noted cachectic, hypotensive Alea MORALEZ.

## 2023-10-24 NOTE — CONSULT NOTE ADULT - PROBLEM SELECTOR RECOMMENDATION 9
p/w patient with tachycardia, hypotension, leucocytosis, elevated lactate, UA +c/w abx.  Pt w poor prognosis.    DNR/DNI

## 2023-10-24 NOTE — ED PROVIDER NOTE - NS ED MD DISPO ISOLATION TYPES
[facility], [address]       Physical Therapy Daily Treatment Note  Date:  2021    Patient Name:  Germain Christianson    :  1950  MRN: [id number]  Restrictions/Precautions:    Medical/Treatment Diagnosis Information:  · Diagnosis: M17.12 (ICD-10-CM) - Osteoarthritis of left knee, unspecified osteoarthritis type  s/p Left TKR  DOS: 21  · Treatment Diagnosis: increased pain; decreased ROM; decreased strength; gait abnormality; increased swelling  Insurance/Certification information:  PT Insurance Information: Sandra Anguiano 8  Physician Information:  Referring Practitioner: Milka Jerez MD  Has the plan of care been signed (Y/N):        [x]  Yes-  []  No     Date of Patient follow up with Physician: 21      Is this a Progress Report:     []  Yes  [x]  No        If Yes:  Date Range for reporting period:  Beginning 21  Ending     Progress report will be due (10 Rx or 30 days whichever is less):       Recertification will be due (POC Duration  / 90 days whichever is less): 21    Visit # Insurance Allowable Auth Required   5 MN []  Yes [x]  No        Functional Scale:        Date assessed:          LEFS: raw score=35; dysfunction =56%    21     Latex Allergy:  [x]NO      []YES  Preferred Language for Healthcare:   [x]English       []other:    Pain level:  0-1/10     SUBJECTIVE:  Pt reports the night after last session her knee was sore and a little painful but overall not bad. Currently her pain is minimal if any. OBJECTIVE:    Observation: : avoids bending/weighting leg during sit to stand. Tends to use RW like a  SW. Needs cues to bend knee during swing phase. Pt tends to manually assist LLE onto table, stepper, car.     Test measurements:       left knee incision healing well and has been using Vit E to massage.  Gait mildly antalgic  Left 0-120 ERMI   21   Left knee ROM -5/ 0 after stretching   6/29: L knee 108 flexion PROM   6/23: AROM L knee (supine): QS lacks 4, SAQ lacks 7; knee flexion 90     RESTRICTIONS/PRECAUTIONS:  Left TKR, WBAT    Exercises/Interventions:   Exercise/Equipment  TE x  Resistance Sets/time Repetitions Other comments    6/23 added   BIKE   ERMI   4' Full revolutions 6/29   added 7/7   Stretching     EOB HSS  20\" 2x L 6/23   Lunge knee flexion stretch on step  6/23   Towel Pull     Inclined Calf  20\" 2x    Hip Flexion     ITB     Groin     Quad     Bridge   Added 7/7          Strengthening       SLR Supine     SLR Abduction  SL  clams   orange 6/23 could only do one set due to spasm in thigh   SLR Prone       SLR+ quad set  2 10           Quad sets            LAQ 2# 2 10x L  7/7   SAQ 2# 3 10 L    Calf raises  2 10 B 6/23   Mini squats   2 10 6/29   Hip abd in standing  1 10 R/L    Hip ext in standing  1 10 R/L                  ROM       Sheet Pulls       Hang Weights       Passive       Weight Shift       Ankle Pumps       ERMI   Resume npv                 NMR/ CKC              TRX squats       Standing 3 way SLR       1 leg stand              CC TKE  purple TB 2 10 Added 7/2    Staggered stance airex 20\" holds x1 each foot position    Slow march on airex 110Light UE A   bridges     FSU 6\" up & up and over  Added 7/2    Added 7/7   Therapeutic Activity       Fwd step up 4 inch  6 inch 1  1 10  10    Fwd step down       Lat step up 4 inch 2 10    Lat dip/heel tap       Total gym squats       Sit to stand                                   Manual interventions x  8'       Patellar glides Scar mobs  PROM STM                 Patient Education:  6/23: instructed pt to try to avoid manually helping her LLE into bed/car etc.  Since she is able to do a SLR without assistance, she should try to actively lift LLE.   Walked with patient to work on flexing L knee during swing phase and pushing RW instead of picking it up like a     Home Exercise program  Access Code: [email or web address]  URL: [email or web address]   Date: 06/16/2021  Prepared by: Prashanth Gallegos  Exercises  Long Sitting Calf Stretch with Strap - 2 x daily - 7 x weekly - 5 reps - 30 seconds hold  Seated Table Hamstring Stretch - 2 x daily - 7 x weekly - 5 reps - 30 seconds hold  Long Sitting Quad Set - 2 x daily - 7 x weekly - 10 reps - 10 seconds hold  Straight Leg Raise - 2 x daily - 7 x weekly - 3 sets - 10 reps  Sidelying Hip Abduction - 2 x daily - 7 x weekly - 3 sets - 10 reps  Supine Hip Adduction Isometric with Ball - 2 x daily - 7 x weekly - 10 reps - 10 sec hold  Sitting Heel Slide with Towel - 2 x daily - 7 x weekly - 10 reps - 10seconds hold  Seated Knee Flexion AAROM - 2 x daily - 7 x weekly - 10 reps - 10 sec hold    Updated on 7/21:   Access Code: [email or web address]  URL: [email or web address]  Date: 07/21/2021  Prepared by: Prerna Martinez    Exercises  Active Straight Leg Raise with Quad Set - 2 x daily - 7 x weekly - 2 sets - 10 reps  Supine Knee Extension Strengthening - 2 x daily - 7 x weekly - 2 sets - 10 reps  Seated Long Arc Quad - 2 x daily - 7 x weekly - 2 sets - 10 reps  Standing March with Counter Support - 2 x daily - 7 x weekly - 2 sets - 10 reps        Therapeutic Exercise and NMR EXR  [x] (17081) Provided verbal/tactile cueing for activities related to strengthening, flexibility, endurance, ROM for improvements in LE, proximal hip, and core control with self care, mobility, lifting, ambulation.  [] (30798) Provided verbal/tactile cueing for activities related to improving balance, coordination, kinesthetic sense, posture, motor skill, proprioception  to assist with LE, proximal hip, and core control in self care, mobility, lifting, ambulation and eccentric single leg control.      NMR and Therapeutic Activities:    [] (37205 or 42326) Provided verbal/tactile cueing for activities related to improving balance, coordination, kinesthetic sense, posture, motor skill, proprioception and motor activation to allow for proper function of core, proximal hip and LE with self care and ADLs  [] (91766) Gait Re-education- Provided training and instruction to the patient for proper LE, core and proximal hip recruitment and positioning and eccentric body weight control with ambulation re-education including up and down stairs     Home Exercise Program:    [] (22541) Reviewed/Progressed HEP activities related to strengthening, flexibility, endurance, ROM of core, proximal hip and LE for functional self-care, mobility, lifting and ambulation/stair navigation   [] (52497)Reviewed/Progressed HEP activities related to improving balance, coordination, kinesthetic sense, posture, motor skill, proprioception of core, proximal hip and LE for self care, mobility, lifting, and ambulation/stair navigation      Manual Treatments:  PROM / STM / Oscillations-Mobs:  G-I, II, III, IV (PA's, Inf., Post.)  [x] (54288) Provided manual therapy to mobilize LE, proximal hip and/or LS spine soft tissue/joints for the purpose of modulating pain, promoting relaxation,  increasing ROM, reducing/eliminating soft tissue swelling/inflammation/restriction, improving soft tissue extensibility and allowing for proper ROM for normal function with self care, mobility, lifting and ambulation. Modalities: 7/7/21  10'  6/16/21 vasopneumatic compression x 15 min   6/23: pt to use her cold machine when she gets home    Charges:  Timed Code Treatment Minutes: 45   Total Treatment Minutes: 45       [] EVAL (LOW) 04474 (typically 20 minutes face-to-face)  [] EVAL (MOD) 95744 (typically 30 minutes face-to-face)  [] EVAL (HIGH) 92911 (typically 45 minutes face-to-face)  [x] RE-EVAL   [x] OV(49118) x 2   [] IONTO  [] NMR (26987) x     [] VASO   [] Manual (29989) x     [] Other:  [] TA x      [] Mech Traction (29060)  [] ES(attended) (01838)      [] ES (un) (28565):        GOALS:   Patient stated goal: walking.     [] Progressing: [] Met: [] Not Met: [] Yes  [] No    Assessment:  Progressions per bolded areas per log in which pt tolerating progressions with strengthening, functional activity progressions and balance all really well, mild increase in soreness on L LE with standing R hip abd and ext loading L however not limiting. Pt overall is progressing really well with PT POC, slow transition and progressions with strength this date as pt recently coming off of 8 day hospital stay with NO PT and minimal activity. Pt will continue to benefit from PT to work on full ROM and strength restoration as well as progressions with activity tolerance and balance as able. Treatment/Activity Tolerance:  [x] Patient able to complete treatment  [] Patient limited by fatigue  [] Patient limited by pain     [] Patient limited by other medical complications  [] Other:         PLAN:  Patient advised to get a cane but not to use FT just yet, continue with walker  [x] Continue per plan of care [] Alter current plan (see comments above)  [] Plan of care initiated [] Hold pending MD visit [] Discharge      Electronically signed by:  Prerna Martinez, PT DPT, OMT-C  Note: If patient does not return for scheduled/ recommended follow up visits, this note will serve as a discharge from care along with most recent update on progress. None

## 2023-10-24 NOTE — ED ADULT NURSE NOTE - NSFALLUNIVINTERV_ED_ALL_ED
Bed/Stretcher in lowest position, wheels locked, appropriate side rails in place/Call bell, personal items and telephone in reach/Instruct patient to call for assistance before getting out of bed/chair/stretcher/Non-slip footwear applied when patient is off stretcher/[place] to call system/Physically safe environment - no spills, clutter or unnecessary equipment/Purposeful proactive rounding/Room/bathroom lighting operational, light cord in reach

## 2023-10-24 NOTE — CONSULT NOTE ADULT - PROBLEM SELECTOR RECOMMENDATION 5
Hx of CVA w residual right side weakness.  Bedbound.  Total care. High risk for skin failure.  Supportive care  Freq positioning    Pt eval

## 2023-10-24 NOTE — ED PROVIDER NOTE - OBJECTIVE STATEMENT
77-year-old female with history of CVA with baseline aphasia and replies to yes/no questions only, bedbound, carotid stenosis, A-fib on Eliquis, CVA, presents with failure to thrive. Per son at bedside, she has been progressively declining and has had very poor p.o. intake for the time, usually drinks Ensures only, over the past 3 days has refused to have any p.o. intake. Son states he thinks that she just wanted to throw in the towel finally and was hesitant to bring her to hospital as she was declining to come. States would like to be DNR/DNI. Denies vomiting, diarrhea, cough, shortness of breath, fever, and all other symptoms. Patient herself unable to participate in history. EMS placed L tib IO.

## 2023-10-24 NOTE — CONSULT NOTE ADULT - SUBJECTIVE AND OBJECTIVE BOX
Patient is a 77y old  Female who presents with a chief complaint of     HPI:      PAST MEDICAL & SURGICAL HISTORY:  CVA (cerebrovascular accident)      Atrial fibrillation      Carotid stenosis      Seizures      Hyperlipidemia      No significant past surgical history          SOCIAL HX:   Smoking                         ETOH                            Other    FAMILY HISTORY:  :  No known cardiovacular family hisotry     ROS:  See HPI     Allergies    penicillin (Unknown)    Intolerances          PHYSICAL EXAM    ICU Vital Signs Last 24 Hrs  T(C): --  T(F): --  HR: 80 (24 Oct 2023 16:31) (80 - 117)  BP: 103/67 (24 Oct 2023 16:31) (60/40 - 103/67)  BP(mean): --  ABP: --  ABP(mean): --  RR: 21 (24 Oct 2023 14:09) (18 - 21)  SpO2: 95% (24 Oct 2023 14:09) (95% - 95%)    O2 Parameters below as of 24 Oct 2023 14:09  Patient On (Oxygen Delivery Method): nasal cannula  O2 Flow (L/min): 2          General: Not in distress  HEENT:  AJIT              Lymphatic system: No LN  Lungs: Bilateral BS  Cardiovascular: Regular  Gastrointestinal: Soft, Positive BS  Musculoskeletal: No clubbing.  Moves all extremities.    Skin: Warm.  Intact  Neurological: No motor or sensory deficit         LABS:                          12.5   18.22 )-----------( 81       ( 24 Oct 2023 13:30 )             41.4                                               10-24    161<HH>  |  128<H>  |  109<H>  ----------------------------<  175<H>  4.4   |  25  |  3.40<H>    Ca    7.7<L>      24 Oct 2023 13:30  Mg     2.7     10-24    TPro  5.4<L>  /  Alb  1.3<L>  /  TBili  0.6  /  DBili  x   /  AST  59<H>  /  ALT  53  /  AlkPhos  130<H>  10-24      PT/INR - ( 24 Oct 2023 13:30 )   PT: 17.2 sec;   INR: 1.53 ratio         PTT - ( 24 Oct 2023 13:30 )  PTT:22.7 sec                                       Urinalysis Basic - ( 24 Oct 2023 13:30 )    Color: Dark Yellow / Appearance: Turbid / S.018 / pH: x  Gluc: 175 mg/dL / Ketone: Trace mg/dL  / Bili: Small / Urobili: 0.2 mg/dL   Blood: x / Protein: 100 mg/dL / Nitrite: Negative   Leuk Esterase: Large / RBC: 55 /HPF / WBC Too Numerous to count /HPF   Sq Epi: x / Non Sq Epi: x / Bacteria: Moderate /HPF        CARDIAC MARKERS ( 24 Oct 2023 13:30 )  x     / x     / 173 U/L / x     / x                                                LIVER FUNCTIONS - ( 24 Oct 2023 13:30 )  Alb: 1.3 g/dL / Pro: 5.4 g/dL / ALK PHOS: 130 U/L / ALT: 53 U/L DA / AST: 59 U/L / GGT: x                                                                                                                                       CXR:    ECHO:    MEDICATIONS  (STANDING):  cefepime   IVPB 2000 milliGRAM(s) IV Intermittent once  lactated ringers. 1000 milliLiter(s) (100 mL/Hr) IV Continuous <Continuous>  lactated ringers. 1000 milliLiter(s) (100 mL/Hr) IV Continuous <Continuous>  sodium chloride 0.9% Bolus 1000 milliLiter(s) IV Bolus once  sodium chloride 0.9% Bolus 2000 milliLiter(s) IV Bolus once  vancomycin  IVPB. 1000 milliGRAM(s) IV Intermittent once    MEDICATIONS  (PRN):         Patient is a 77-year-old female with history of CVA with baseline aphasia and replies to yes/no questions only, bedbound, carotid stenosis, A-fib on Eliquis, CVA, presents with failure to thrive. Per son at bedside, she has been progressively declining and has had very poor p.o. intake for the time, usually drinks Ensures only, over the past 3 days has refused to have any p.o. intake. Son states he thinks that she just wanted to throw in the towel finally and was hesitant to bring her to hospital as she was declining to come. States would like to be DNR/DNI. Denies vomiting, diarrhea, cough, shortness of breath, fever, and all other symptoms. Patient herself unable to participate in history. EMS placed L tib IO. She received 3L NS bolus and given IV antibiotics Cefepime and Vancomycin. Blood and urine cultures were sent. Urinalysis was positive. Of note, she had multiple admissions in the past including ICU for septic shock 2/2 UTI. Palliative was consulted and MOLST form done, DNR/DNI.      PAST MEDICAL & SURGICAL HISTORY:  CVA (cerebrovascular accident)      Atrial fibrillation      Carotid stenosis      Seizures      Hyperlipidemia      No significant past surgical history          SOCIAL HX:   Smoking                         ETOH                            Other    FAMILY HISTORY:  :  No known cardiovacular family hisotry     ROS:  See HPI     Allergies    penicillin (Unknown)    Intolerances          PHYSICAL EXAM    ICU Vital Signs Last 24 Hrs  T(C): --  T(F): --  HR: 80 (24 Oct 2023 16:31) (80 - 117)  BP: 103/67 (24 Oct 2023 16:31) (60/40 - 103/67)  BP(mean): --  ABP: --  ABP(mean): --  RR: 21 (24 Oct 2023 14:09) (18 - 21)  SpO2: 95% (24 Oct 2023 14:09) (95% - 95%)    O2 Parameters below as of 24 Oct 2023 14:09  Patient On (Oxygen Delivery Method): nasal cannula  O2 Flow (L/min): 2          General: (+) obtunded; (+) lethargic; (+) cachectic  HEENT:  AJIT              Lymphatic system: No LN  Lungs: Bilateral BS  Cardiovascular: Regular  Gastrointestinal: Soft, Positive BS  Musculoskeletal: No clubbing.  Moves all extremities.    Skin: (+) large fungating plaque on anterior abdomen; Warm.  Intact  Neurological: No motor or sensory deficit        LABS:                          12.5   18.22 )-----------( 81       ( 24 Oct 2023 13:30 )             41.4                                               10    161<HH>  |  128<H>  |  109<H>  ----------------------------<  175<H>  4.4   |  25  |  3.40<H>    Ca    7.7<L>      24 Oct 2023 13:30  Mg     2.7     10-24    TPro  5.4<L>  /  Alb  1.3<L>  /  TBili  0.6  /  DBili  x   /  AST  59<H>  /  ALT  53  /  AlkPhos  130<H>  1024      PT/INR - ( 24 Oct 2023 13:30 )   PT: 17.2 sec;   INR: 1.53 ratio         PTT - ( 24 Oct 2023 13:30 )  PTT:22.7 sec                                       Urinalysis Basic - ( 24 Oct 2023 13:30 )    Color: Dark Yellow / Appearance: Turbid / S.018 / pH: x  Gluc: 175 mg/dL / Ketone: Trace mg/dL  / Bili: Small / Urobili: 0.2 mg/dL   Blood: x / Protein: 100 mg/dL / Nitrite: Negative   Leuk Esterase: Large / RBC: 55 /HPF / WBC Too Numerous to count /HPF   Sq Epi: x / Non Sq Epi: x / Bacteria: Moderate /HPF        CARDIAC MARKERS ( 24 Oct 2023 13:30 )  x     / x     / 173 U/L / x     / x                                                LIVER FUNCTIONS - ( 24 Oct 2023 13:30 )  Alb: 1.3 g/dL / Pro: 5.4 g/dL / ALK PHOS: 130 U/L / ALT: 53 U/L DA / AST: 59 U/L / GGT: x                                                                                                                                       CXR:    ECHO:    MEDICATIONS  (STANDING):  cefepime   IVPB 2000 milliGRAM(s) IV Intermittent once  lactated ringers. 1000 milliLiter(s) (100 mL/Hr) IV Continuous <Continuous>  lactated ringers. 1000 milliLiter(s) (100 mL/Hr) IV Continuous <Continuous>  sodium chloride 0.9% Bolus 1000 milliLiter(s) IV Bolus once  sodium chloride 0.9% Bolus 2000 milliLiter(s) IV Bolus once  vancomycin  IVPB. 1000 milliGRAM(s) IV Intermittent once    MEDICATIONS  (PRN):

## 2023-10-24 NOTE — CONSULT NOTE ADULT - PROBLEM SELECTOR RECOMMENDATION 4
Clinical evidence indicates that the patient has Severe protein calorie malnutrition/ 3rd degree. Albumin 1.3. Stopped eating one month ago.    In context of   Chronic Illness (>1 month)    Energy/Food intake <50% of estimated energy requirement >5 days  Weight loss: Moderate - severe (lbs lost recently)  Body Fat loss: Severe   Cachexia, temporal wasting, contracted, muscle atrophy  Muscle mass loss: Severe  (Skin failure/pressure ulcers)  Fluid Accumulation: Severe (Fluid overload, ascites, pleural effusions)   Strength: weakened severe bedbound    Recommend:   pleasure feeds as tolerated - aspiration precautions, careful hand-feeding, teaching to caregivers  nutritional supplements as tolerated, nutrition consult    No PEG

## 2023-10-24 NOTE — ED PROVIDER NOTE - CLINICAL SUMMARY MEDICAL DECISION MAKING FREE TEXT BOX
DNR/DNI on discussion on arrival. GCS 9, improved from EMS arrival on scene. IO from scene. Initiated IV fluids and broad-spectrum antibiotics on arrival. DNR/DNI on discussion on arrival. GCS 9, improved from EMS arrival on scene. IO from scene. Initiated IV fluids and broad-spectrum antibiotics on arrival. Abdomen benign. DNR/DNI on discussion on arrival. GCS 9, improved from EMS arrival on scene. IO from scene. Initiated IV fluids and broad-spectrum antibiotics on arrival. Abdomen benign. Vital signs significantly improved with 3 L of IV fluids, no longer in RVR. ICU consulted and declined. Palliative care consulted and assessed patient. Admitted for further management. DNR/DNI on discussion on arrival. GCS 9, improved from EMS arrival on scene. IO from scene. Initiated IV fluids and broad-spectrum antibiotics on arrival. Abdomen benign. Vital signs significantly improved with 3 L of IV fluids, no longer in RVR. ICU consulted and declined. Palliative care consulted and assessed patient. Admitted for further management.  ADDENDUM: Shortly after admission pt noted by admitting team to be apneic, no palpable pulse. On my assessment no pulse, no respirations, no activity at all on cardiac bedside Echo. TOD 1732. Family at bedside and informed.

## 2023-10-24 NOTE — ED PROVIDER NOTE - ATTENDING PHYSICIAN NOTIFIED OF DEATH/AUTOPSY/CONSENT STATUS (DATE/TIME OF NOTIFICATION)
Pts sister calling for pt due to pts back \" went out\" today at work again and pt is wondering if Dr. Bel Bailey would be able to prescribe steroids to help.  Please Advise 24-Oct-2023 15:32

## 2023-10-24 NOTE — ED ADULT NURSE NOTE - NS PRO AD PATIENT TYPE
Latest ECHO performed and demonstrates- Results for orders placed during the hospital encounter of 11/10/21    Echo    Interpretation Summary  · The left ventricle is moderately enlarged with concentric remodeling and severely decreased systolic function.  · The estimated ejection fraction is 20%.  · Grade III left ventricular diastolic dysfunction.  · Mild right ventricular enlargement with mildly reduced right ventricular systolic function.  · Moderate to severe left atrial enlargement.  · Moderate right atrial enlargement.  · Moderate-to-severe mitral regurgitation.  · Severe tricuspid regurgitation.  · Elevated central venous pressure (15 mmHg).  · The estimated PA systolic pressure is 44 mmHg.  · There is pulmonary hypertension.    - Patient seen and evaluated by Dr. Castellanos  - Increased dobutamine to 5 mcg/kg  - Continue IV lasix 80mg q 8 hours  - Strict I & O and daily weights  - Will continue monitoring    2/16/2022:  - Patient seen and evaluated by Dr. Castellanos  - Output, net - 1.7L, 3 lb weight lose  - Creatinine 2.5, was 2.2 yesterday  - Continue dobutamine at 5 and IV lasix 80mg q 8 hours  - BMP in am    2/17/2022:  - Patient seen and evaluated by Dr. Castellanos  - Improving, continue IV dobutamine at 2.5 mcg/kg and IV diuresis, may consider discontinuing dobutamine tomorrow.  - Net - 2.2 L  - Creatinine stable at 2.5  - BMP in am  - Recommend referral to [facility] in [place] at discharge.     Do Not Resuscitate (DNR)

## 2023-10-24 NOTE — CONSULT NOTE ADULT - PROBLEM SELECTOR RECOMMENDATION 3
Pt is bedbound. Non verbal at baseline. Total care. No behavior issues. FAST 7d.  Appropriate for hospice.  Discussed dementia trajectory and provided anticipatory guidance  Educated son about hospice philosophy, services provided and location of care.

## 2023-10-24 NOTE — CONSULT NOTE ADULT - SUBJECTIVE AND OBJECTIVE BOX
[facility] Geriatric and Palliative Consult Service:  Bhumika Patel DO: cell ([phone])  Jeffrey Dasilva MD: cell ([phone])  Mansoor Lakhani NP: cell ([phone])   Manas Ordaz SW: cell ([phone])   Chasity Bautista NP: via [employer]    Can contact any Palliative Team member via [employer] for consults and questions      HPI:  77-year-old female, from home, lives with son, has HHA, bedbound, AAOx0 has past medical history of CVA, with right sided residual paralysis, seizures on Keppra, hyperlipidemia, atrial fibrillation on Eliquis and atenolol, hypotension at baseline (SBP , DBP 60-70), bilateral ICA stenosis and recurrent UTI'sson stated she stopped eating about one month ago, and has been noticeably weak.  Her PCP told him to bring her to hospital. ESAU, admitted for sepsis.       Interval hx: Pt is obtunded, hypotensive. Unable to track.  Poor prognosis.  Son Sim at the bedside.     PAST MEDICAL & SURGICAL HISTORY:  CVA (cerebrovascular accident)      Atrial fibrillation      Carotid stenosis      Seizures      Hyperlipidemia      No significant past surgical history          SOCIAL HISTORY:    Admitted from:  home with family has HHA 11 hours daily   Pt has 2 children, her son Sim is her HCP  [ none ] Substance abuse, [ none ] Tobacco hx, [ none ] Alcohol hx, [ none ] Home Opioid Hx    Rastafarian: Pentecostal    Sim Redman  (surrogate/son)   Phone# [phone]    FAMILY HISTORY:   unable to obtain from patient due to poor mentation, family unable to give information, see H&P for history  Baseline ADLs (prior to admission):    Allergies    penicillin (Unknown)    Intolerances      Present Symptoms: Mild, Moderate, Severe  Unable to obtain due to poor mentation]    CPOT:    https://www.sccm.org/getattachment/[email or web address]-[email or web address]-9918u4567c5p/Critical-Care-Pain-Observation-Tool-(CPOT)  PAIN AD Score:   http://geriatrictoolkit.[email or web address]/painad.pdf (press ctrl +  left click to view)      MEDICATIONS  (STANDING):  cefepime   IVPB 2000 milliGRAM(s) IV Intermittent once  sodium chloride 0.9% Bolus 1000 milliLiter(s) IV Bolus once  sodium chloride 0.9% Bolus 2000 milliLiter(s) IV Bolus once  vancomycin  IVPB. 1000 milliGRAM(s) IV Intermittent once    MEDICATIONS  (PRN):      PHYSICAL EXAM:  Vital Signs Last 24 Hrs  T(C): --  T(F): --  HR: 117 (24 Oct 2023 14:09) (117 - 117)  BP: 96/55 (24 Oct 2023 14:09) (60/40 - 96/55)  BP(mean): --  RR: 21 (24 Oct 2023 14:09) (18 - 21)  SpO2: 95% (24 Oct 2023 14:09) (95% - 95%)    Parameters below as of 24 Oct 2023 14:09  Patient On (Oxygen Delivery Method): nasal cannula  O2 Flow (L/min): 2      General: Obtunded, unable to track. Dyspneic on NC.     Palliative Performance Scale/Karnofsky Score: 20%  http://npcrc.org/files/news/palliative_performance_scale_ppsv2.pdf    HEENT: temporal wasting, poor dentition, dry MM, neck supple  Lungs: dyspneic on NC  CV: RRR, S1S2, tachycardia  GI: soft non distended non tender  incontinent  : incontinent/ ferrari oliguria  Musculoskeletal: weakness x4 , bedbound, no edema, + LLE IO  Skin: poor skin turgor, b/l UE ecchymosis   Neuro: unable ot follow commands  Oral intake ability: unable/only mouth care    LABS:                        12.5   18.22 )-----------( 81       ( 24 Oct 2023 13:30 )             41.4     10-24    161<HH>  |  128<H>  |  109<H>  ----------------------------<  175<H>  4.4   |  25  |  3.40<H>    Ca    7.7<L>      24 Oct 2023 13:30  Mg     2.7     10-24    TPro  5.4<L>  /  Alb  1.3<L>  /  TBili  0.6  /  DBili  x   /  AST  59<H>  /  ALT  53  /  AlkPhos  130<H>  10-24    Urinalysis Basic - ( 24 Oct 2023 13:30 )    Color: Dark Yellow / Appearance: Turbid / S.018 / pH: x  Gluc: 175 mg/dL / Ketone: Trace mg/dL  / Bili: Small / Urobili: 0.2 mg/dL   Blood: x / Protein: 100 mg/dL / Nitrite: Negative   Leuk Esterase: Large / RBC: 55 /HPF / WBC Too Numerous to count /HPF   Sq Epi: x / Non Sq Epi: x / Bacteria: Moderate /HPF    < from: Xray Chest 1 View-PORTABLE IMMEDIATE (10.24.23 @ 15:38) >  ACC: [id number] EXAM:  XR CHEST PORTABLE IMMED 1V   ORDERED BY: ELLEN LOGAN     PROCEDURE DATE:  10/24/2023          INTERPRETATION:  An AP portable chest x-ray was performed for altered   mental status.    Comparison is made to 3/9/2023.    The right pleural effusion seen previously has resolved however there is   a left pleural effusion which is increased at this time and associated   with linear atelectasis in the left upper lobe with likely passive   atelectasis in the left lower lobe.The cardiac silhouette is prominent   with engorgement of central pulmonary veins but without suraj pulmonary   edema. There are bands of linear atelectasis in the right upper lobe. No   air bronchograms are seen to indicate pneumonia. There is no   pneumothorax. The bony thorax remains unchanged.    IMPRESSION:  1. The previously noted right pleural effusion has resolved however there   is an increase in left pleural fluid compared to the prior study and   associated with left upper lobe linear atelectasis along with likely   passive atelectasis in the left lower lobe.  2. Prominent cardiac silhouette with engorgement of central pulmonary   veins but without suraj pulmonary edema.  3. Linear atelectatic bands are identified within the right upper lobe   and likely within the right middle lobe as well.    < end of copied text >      RADIOLOGY & ADDITIONAL STUDIES: reviewed  ADVANCED DIRECTIVES: MOLST: DNR/DNI/no feeding tube

## 2023-10-24 NOTE — ED PROVIDER NOTE - PHYSICAL EXAMINATION
Afebrile, hemodynamically stable, saturating well[ on room air]  Cachectic, ill appearing, breathing at normal rate, no WOB  Head NCAT  EOMI grossly, anicteric  MM severely dry  No JVD  RRR, nml S1/S2, no m/r/g  Lungs CTAB, no w/r/r  Abd soft, NT, ND, nml BS, no rebound or guarding  Opens eyes spontaneously, moves extremities spontaneously withdrawing from pain, nonverbal, GCS 9, CN's 3-12 grossly intact  CASTRO spontaneously, no leg cyanosis or edema  Skin warm, dry, fungating rash to anterior abdomen

## 2023-10-24 NOTE — ED ADULT NURSE NOTE - ED CARDIAC RHYTHM
----- Message from Joseph F Seipel, MD sent at 1/5/2022  6:16 PM EST -----  This patient was a direct referral to the sleep lab from Dr. Ching the home sleep test was normal she can follow-up with Dr. Ching as her test was normal or she can come here for consultation.  The referral papers indicated she was to follow-up here    Spoke with pt, she will contact us if needed  
irregular

## 2023-10-24 NOTE — CONSULT NOTE ADULT - ASSESSMENT
_________CNS___________    #pain   #sedation    _________CVS___________    #pressors    _________ RESP__________    __________GI____________    ________ RENAL__________    _________MSK___________    __________ID____________    _________ENDO__________    _______HEME/ONC_______    _________SKIN____________    ________Prophylaxis_______  #DVT  #GI     __________GOC/ DISPO___________     Patient is a 77-year-old female with history of CVA with baseline aphasia and replies to yes/no questions only, bedbound, carotid stenosis, A-fib on Eliquis, CVA, presents with failure to thrive. Son (Juan Redman) is at bedside. ICU was consulted fro septic shock.    #Septic Shock  #UTI  #Acute Encephalopathy  #Failure to Thrive  #CVA  #AFib    _________CNS___________    #Acute Encephalopathy  - likely due to underlying shock  - manage accordingly    _________CVS___________    #Septic Shock  - Hx of recurrent UTI  - UCx (2/2023) - ESBL UTI  - WBC 18, Lactate 4  - s/p 3L NS bolus  - given IV antibiotics Cefepime and Vancomycin  -     _________ RESP__________    __________GI____________    ________ RENAL__________    _________MSK___________    __________ID____________    _________ENDO__________    _______HEME/ONC_______    _________SKIN____________    ________Prophylaxis_______  #DVT  #GI     __________GOC/ DISPO___________     Patient is a 77-year-old female with history of CVA with baseline aphasia and replies to yes/no questions only, bedbound, carotid stenosis, A-fib on Eliquis, CVA, presents with failure to thrive. Son (Juan Redman) is at bedside. ICU was consulted fro septic shock.    #Septic Shock  #UTI  #Acute Encephalopathy  #Failure to Thrive  #CVA  #AFib  #Skin Lesion    _________CNS___________    #Acute Encephalopathy  - likely due to underlying shock  - manage accordingly    _________CVS___________    #Septic Shock  - Hx of recurrent UTI  - UCx (2/2023) - ESBL UTI  - WBC 18, Lactate 4  - s/p 3L NS bolus  - given IV antibiotics Cefepime and Vancomycin  - can continue IVF resuscitation  - no utility of starting pressors if not in line with patient's GOC  - antibitiocs as per primary team  - f/u BCx, UCx  - Palliative Care consulted    _________ RESP__________    #no issues    __________GI____________    #no issues  - NPO while encephalopathic    ________ RENAL__________    #DEJA  - Pt w/ SCr 109/3.40 on admission  - Baseline SCr - 1.07  - IVF for now, follow BMP daily    _________MSK___________    #Failure to Thrive  - bedbound and nonverbal at baseline  - continue IVF for now  - Palliative consulted    __________ID____________    #Septic Shock  #UTI  - see above    _________ENDO__________    #no issues    _______HEME/ONC_______    #no issues    _________SKIN____________    #Skin Lesion  - large fungating plaque on anterior abdomen  - could be malignancy  - manage as per Primary Team    ________Prophylaxis_______    - as per Primary Team    __________GOC/ DISPO___________    #GOC - DNR/DNI (Palliative Team following)  #DISPO - Admit to Medicine

## 2023-10-25 LAB
-  STAPHYLOCOCCUS EPIDERMIDIS: SIGNIFICANT CHANGE UP
-  STAPHYLOCOCCUS EPIDERMIDIS: SIGNIFICANT CHANGE UP
GRAM STN FLD: ABNORMAL
GRAM STN FLD: ABNORMAL
METHOD TYPE: SIGNIFICANT CHANGE UP
METHOD TYPE: SIGNIFICANT CHANGE UP
SPECIMEN SOURCE: SIGNIFICANT CHANGE UP
SPECIMEN SOURCE: SIGNIFICANT CHANGE UP

## 2023-10-26 LAB
CULTURE RESULTS: ABNORMAL
CULTURE RESULTS: ABNORMAL
LEVETIRACETAM SERPL-MCNC: 31 UG/ML — SIGNIFICANT CHANGE UP (ref 10–40)
LEVETIRACETAM SERPL-MCNC: 31 UG/ML — SIGNIFICANT CHANGE UP (ref 10–40)
ORGANISM # SPEC MICROSCOPIC CNT: ABNORMAL
SPECIMEN SOURCE: SIGNIFICANT CHANGE UP
SPECIMEN SOURCE: SIGNIFICANT CHANGE UP

## 2023-10-28 LAB
-  AMIKACIN: SIGNIFICANT CHANGE UP
-  AMIKACIN: SIGNIFICANT CHANGE UP
-  AMOXICILLIN/CLAVULANIC ACID: SIGNIFICANT CHANGE UP
-  AMOXICILLIN/CLAVULANIC ACID: SIGNIFICANT CHANGE UP
-  AMPICILLIN/SULBACTAM: SIGNIFICANT CHANGE UP
-  AMPICILLIN/SULBACTAM: SIGNIFICANT CHANGE UP
-  AMPICILLIN: SIGNIFICANT CHANGE UP
-  AMPICILLIN: SIGNIFICANT CHANGE UP
-  AZTREONAM: SIGNIFICANT CHANGE UP
-  AZTREONAM: SIGNIFICANT CHANGE UP
-  CEFAZOLIN: SIGNIFICANT CHANGE UP
-  CEFAZOLIN: SIGNIFICANT CHANGE UP
-  CEFEPIME: SIGNIFICANT CHANGE UP
-  CEFEPIME: SIGNIFICANT CHANGE UP
-  CEFTRIAXONE: SIGNIFICANT CHANGE UP
-  CEFTRIAXONE: SIGNIFICANT CHANGE UP
-  CEFUROXIME: SIGNIFICANT CHANGE UP
-  CEFUROXIME: SIGNIFICANT CHANGE UP
-  CIPROFLOXACIN: SIGNIFICANT CHANGE UP
-  CIPROFLOXACIN: SIGNIFICANT CHANGE UP
-  ERTAPENEM: SIGNIFICANT CHANGE UP
-  ERTAPENEM: SIGNIFICANT CHANGE UP
-  GENTAMICIN: SIGNIFICANT CHANGE UP
-  GENTAMICIN: SIGNIFICANT CHANGE UP
-  IMIPENEM: SIGNIFICANT CHANGE UP
-  IMIPENEM: SIGNIFICANT CHANGE UP
-  LEVOFLOXACIN: SIGNIFICANT CHANGE UP
-  LEVOFLOXACIN: SIGNIFICANT CHANGE UP
-  MEROPENEM: SIGNIFICANT CHANGE UP
-  MEROPENEM: SIGNIFICANT CHANGE UP
-  NITROFURANTOIN: SIGNIFICANT CHANGE UP
-  NITROFURANTOIN: SIGNIFICANT CHANGE UP
-  PIPERACILLIN/TAZOBACTAM: SIGNIFICANT CHANGE UP
-  PIPERACILLIN/TAZOBACTAM: SIGNIFICANT CHANGE UP
-  TOBRAMYCIN: SIGNIFICANT CHANGE UP
-  TOBRAMYCIN: SIGNIFICANT CHANGE UP
-  TRIMETHOPRIM/SULFAMETHOXAZOLE: SIGNIFICANT CHANGE UP
-  TRIMETHOPRIM/SULFAMETHOXAZOLE: SIGNIFICANT CHANGE UP
METHOD TYPE: SIGNIFICANT CHANGE UP
METHOD TYPE: SIGNIFICANT CHANGE UP

## 2023-10-29 LAB
-  AMPICILLIN: SIGNIFICANT CHANGE UP
-  AMPICILLIN: SIGNIFICANT CHANGE UP
-  CIPROFLOXACIN: SIGNIFICANT CHANGE UP
-  CIPROFLOXACIN: SIGNIFICANT CHANGE UP
-  LEVOFLOXACIN: SIGNIFICANT CHANGE UP
-  LEVOFLOXACIN: SIGNIFICANT CHANGE UP
-  NITROFURANTOIN: SIGNIFICANT CHANGE UP
-  NITROFURANTOIN: SIGNIFICANT CHANGE UP
-  TETRACYCLINE: SIGNIFICANT CHANGE UP
-  TETRACYCLINE: SIGNIFICANT CHANGE UP
-  VANCOMYCIN: SIGNIFICANT CHANGE UP
-  VANCOMYCIN: SIGNIFICANT CHANGE UP
CULTURE RESULTS: ABNORMAL
CULTURE RESULTS: ABNORMAL
CULTURE RESULTS: SIGNIFICANT CHANGE UP
CULTURE RESULTS: SIGNIFICANT CHANGE UP
METHOD TYPE: SIGNIFICANT CHANGE UP
METHOD TYPE: SIGNIFICANT CHANGE UP
ORGANISM # SPEC MICROSCOPIC CNT: ABNORMAL
SPECIMEN SOURCE: SIGNIFICANT CHANGE UP

## 2023-12-04 NOTE — ED ADULT TRIAGE NOTE - ACCOMPANIED BY
Jacinta Gresham is a 63y.o. year old female. 12/4/2023 seen as a new patient for palpitations. Patient had 1 episode during stressful situation which lasted for about 15 minutes and has not had any recurrence yet. She felt her heart was racing. She took her medicine that she takes for tremors and felt better in a few minutes. She denies any chest pain which was the reason for referral.  Denies any dyspnea but cannot walk too much because of the left ankle pain. Denies any dizziness or loss of consciousness. Review of Systems   Constitutional: Negative. HENT: Negative. Eyes: Negative. Respiratory: Negative. Cardiovascular:  Positive for palpitations. Gastrointestinal: Negative. Endocrine: Negative. Genitourinary: Negative. Musculoskeletal: Negative. Neurological: Negative. Psychiatric/Behavioral: Negative. All other systems reviewed and are negative. Physical Exam  Vitals and nursing note reviewed. Constitutional:       Appearance: Normal appearance. She is obese. HENT:      Head: Normocephalic and atraumatic. Nose: Nose normal.   Eyes:      Conjunctiva/sclera: Conjunctivae normal.   Cardiovascular:      Rate and Rhythm: Normal rate and regular rhythm. Pulses: Normal pulses. Heart sounds: Normal heart sounds. Pulmonary:      Effort: Pulmonary effort is normal.      Breath sounds: Normal breath sounds. Abdominal:      General: Bowel sounds are normal.      Palpations: Abdomen is soft. Musculoskeletal:         General: Normal range of motion. Right lower leg: No edema. Left lower leg: No edema. Skin:     General: Skin is warm and dry. Neurological:      General: No focal deficit present. Mental Status: She is alert and oriented to person, place, and time.    Psychiatric:         Mood and Affect: Mood normal.         Behavior: Behavior normal.        Not on File    Past Medical History:   Diagnosis Date    Diabetes EMT/paramedic

## 2023-12-14 NOTE — PROGRESS NOTE ADULT - PROBLEM SELECTOR PROBLEM 9
Echo unchanged, EF remains 40%.  Have follow-up in January.  Results discussed via [employer]
Prophylactic measure

## 2023-12-29 NOTE — PATIENT PROFILE ADULT - NSPROPOAPRESSUREINJURYCT_GEN_A_NUR
Detail Level: Detailed Size Of Lesion In Cm (Required For Billing): 0.7 Anesthesia Type: 1% lidocaine with epinephrine Anesthesia Volume In Cc: 0.5 Hemostasis: Drysol Cautery Type: electrocautery (heat cautery) Number Of Curettages: 3 Wound Care: Petrolatum Dressing: dry sterile dressing Lab: [id number] Notes In Note?: No Consent: Written consent was obtained and risks were reviewed including but not limited to scarring, infection, bleeding, scabbing, incomplete removal, nerve damage and allergy to anesthesia. Post-Care Instructions: I reviewed with the patient in detail post-care instructions. Patient is to keep the biopsy site dry overnight, and then apply bacitracin twice daily until healed. Patient may apply hydrogen peroxide soaks to remove any crusting. Notification Instructions: Patient will be notified of biopsy results. However, patient instructed to call the office if not contacted within 2 weeks. Billing Type: Third-Party Bill 1

## 2024-03-06 NOTE — DISCHARGE NOTE NURSING/CASE MANAGEMENT/SOCIAL WORK - NSCORESITESY/N_GEN_A_CORE_RD
Yes Patient was admitted to the hospital for cerebral edema, for evaluation, treatment with Keppra and Decadron. Consideration of Admission/Observation

## 2024-04-22 NOTE — ED ADULT TRIAGE NOTE - BEFAST EYES
Called patient back to go over results with patient. Patient didn't have any further questions. Will call to make an apt with nephrology     ----- Message from MAURI Alford sent at 4/22/2024  9:32 AM CDT -----  Creatinine and GFR has decreased even further. Referral to nephrology will be given for possible further workup. The referral is already written.    No

## 2024-07-31 NOTE — H&P ADULT - CONSTITUTIONAL
Refill request is from pharmacy    Medication refill requested for tiZANidine (ZANAFLEX) 4 MG tablet   Last Refill/Prescribed: Date 06/11/24, # of tablets: 21, # of refills approved:0   No protocol for requested medication.  Medication: tiZANidine (ZANAFLEX) 4 MG tablet   Last office visit date: 02/12/24  Pharmacy: [facility] #[id number] - [place] [id number] [address]  Order pended, routed to clinician for review.     Medication refill requested for traMADol (ULTRAM) 50 MG tablet   Last Refill/Prescribed: Date 06/11/24, # of tablets: 20, # of refills approved:0   Medication: traMADol (ULTRAM) 50 MG tablet   Last office visit date: 02/12/24  Medication Refill Protocol Failed.  Failed criteria: Opioid Refill Protocol - 12 Month Protocol - ALWAYS forward to ordering provider Failed . Sent to clinician to review.      detailed exam

## 2024-12-10 NOTE — ED PROVIDER NOTE - IV ALTEPLASE DOOR HIDDEN

## 2025-04-15 NOTE — ED PROVIDER NOTE - WR ORDER DATE AND TIME 1
Keep a list of your medicines with you. List all of the prescription medicines, nonprescription medicines, supplements, natural remedies, and vitamins that you take. Tell your healthcare providers who treat you about all of the products you are taking. Your provider can provide you with a form to keep track of them. Just ask.  Follow the directions that come with your medicine, including information about food or alcohol. Make sure you know how and when to take your medicine. Do not take more or less than you are supposed to take.  Keep all medicines out of the reach of children.  Store medicines according to the directions on the label.  Monitor yourself. Learn to know how your body reacts to your new medicine and keep track of how it makes you feel before attempting (If your provider has allowed you to do so) to drive or go to work.   Seek emergency medical attention if you think you have used too much of this medicine. An overdose of any prescription medicine can be fatal. Overdose symptoms may include extreme drowsiness, muscle weakness, confusion, cold and clammy skin, pinpoint pupils, shallow breathing, slow heart rate, fainting, or coma.  Don't share prescription medicines with others, even when they seem to have the same symptoms. What may be good for you may be harmful to others.  If you are no longer taking a prescribed medication and you have pills left please take your pills out of their original containers. Mix crushed pills with an undesirable substance, such as cat litter or used coffee grounds. Put the mixture into a disposable container with a lid, such as an empty margarine tub, or into a sealable bag.  Cover up or remove any of your personal information on the empty containers by covering it with black permanent marker or duct tape.  Place the sealed container with the mixture, and the empty drug containers, in the trash.   If you use a medication that is in the form of a patch, dispose of used 
Patient requests all Lab, Cardiology, and Radiology Results on their Discharge Instructions
26-Jul-2022 07:05

## 2025-07-12 NOTE — ED ADULT NURSE NOTE - CAS EDN INTEG ASSESS
66 year old female with pmhx of hld, htn, hypothyroidism presents with abdominal pain since this morning. Pt states she was outside looking at her garden and suddenly developed RLQ abdominal pain that has been constant since onset. States pain radiates to right flank. Reports associated nausea/vomiting. Denies fever, dysuria, chest pain, sob, diarrhea, lightheadedness or any other complaints - - -